# Patient Record
Sex: MALE | Race: WHITE | NOT HISPANIC OR LATINO
[De-identification: names, ages, dates, MRNs, and addresses within clinical notes are randomized per-mention and may not be internally consistent; named-entity substitution may affect disease eponyms.]

---

## 2018-08-06 PROBLEM — Z00.00 ENCOUNTER FOR PREVENTIVE HEALTH EXAMINATION: Status: ACTIVE | Noted: 2018-08-06

## 2018-08-29 ENCOUNTER — APPOINTMENT (OUTPATIENT)
Dept: CARDIOLOGY | Facility: CLINIC | Age: 68
End: 2018-08-29

## 2020-01-10 ENCOUNTER — INPATIENT (INPATIENT)
Facility: HOSPITAL | Age: 70
LOS: 10 days | Discharge: NURSING, REHAB OR LONG-TERM CARE | DRG: 347 | End: 2020-01-21
Attending: SURGERY | Admitting: SURGERY
Payer: COMMERCIAL

## 2020-01-10 VITALS
HEIGHT: 70 IN | WEIGHT: 160.06 LBS | DIASTOLIC BLOOD PRESSURE: 83 MMHG | SYSTOLIC BLOOD PRESSURE: 205 MMHG | OXYGEN SATURATION: 100 % | HEART RATE: 106 BPM | RESPIRATION RATE: 16 BRPM | TEMPERATURE: 98 F

## 2020-01-10 DIAGNOSIS — S12.501A UNSPECIFIED NONDISPLACED FRACTURE OF SIXTH CERVICAL VERTEBRA, INITIAL ENCOUNTER FOR CLOSED FRACTURE: ICD-10-CM

## 2020-01-10 DIAGNOSIS — S20.211A CONTUSION OF RIGHT FRONT WALL OF THORAX, INITIAL ENCOUNTER: ICD-10-CM

## 2020-01-10 DIAGNOSIS — E11.69 TYPE 2 DIABETES MELLITUS WITH OTHER SPECIFIED COMPLICATION: ICD-10-CM

## 2020-01-10 DIAGNOSIS — S00.81XA ABRASION OF OTHER PART OF HEAD, INITIAL ENCOUNTER: ICD-10-CM

## 2020-01-10 DIAGNOSIS — S32.591A OTHER SPECIFIED FRACTURE OF RIGHT PUBIS, INITIAL ENCOUNTER FOR CLOSED FRACTURE: ICD-10-CM
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
## 2020-01-10 DIAGNOSIS — I48.91 UNSPECIFIED ATRIAL FIBRILLATION: ICD-10-CM

## 2020-01-10 LAB
ALBUMIN SERPL ELPH-MCNC: 3.6 G/DL — SIGNIFICANT CHANGE UP (ref 3.3–5)
ALP SERPL-CCNC: 68 U/L — SIGNIFICANT CHANGE UP (ref 40–120)
ALT FLD-CCNC: 24 U/L — SIGNIFICANT CHANGE UP (ref 12–78)
ANION GAP SERPL CALC-SCNC: 5 MMOL/L — SIGNIFICANT CHANGE UP (ref 5–17)
APTT BLD: 25.4 SEC — LOW (ref 27.5–36.3)
AST SERPL-CCNC: 32 U/L — SIGNIFICANT CHANGE UP (ref 15–37)
BASOPHILS # BLD AUTO: 0.03 K/UL — SIGNIFICANT CHANGE UP (ref 0–0.2)
BASOPHILS NFR BLD AUTO: 0.2 % — SIGNIFICANT CHANGE UP (ref 0–2)
BILIRUB SERPL-MCNC: 0.6 MG/DL — SIGNIFICANT CHANGE UP (ref 0.2–1.2)
BLD GP AB SCN SERPL QL: SIGNIFICANT CHANGE UP
BUN SERPL-MCNC: 14 MG/DL — SIGNIFICANT CHANGE UP (ref 7–23)
CALCIUM SERPL-MCNC: 8.9 MG/DL — SIGNIFICANT CHANGE UP (ref 8.5–10.1)
CHLORIDE SERPL-SCNC: 109 MMOL/L — HIGH (ref 96–108)
CO2 SERPL-SCNC: 27 MMOL/L — SIGNIFICANT CHANGE UP (ref 22–31)
CREAT SERPL-MCNC: 0.91 MG/DL — SIGNIFICANT CHANGE UP (ref 0.5–1.3)
EOSINOPHIL # BLD AUTO: 0.07 K/UL — SIGNIFICANT CHANGE UP (ref 0–0.5)
EOSINOPHIL NFR BLD AUTO: 0.5 % — SIGNIFICANT CHANGE UP (ref 0–6)
GLUCOSE BLDC GLUCOMTR-MCNC: 201 MG/DL — HIGH (ref 70–99)
GLUCOSE SERPL-MCNC: 207 MG/DL — HIGH (ref 70–99)
HCT VFR BLD CALC: 40 % — SIGNIFICANT CHANGE UP (ref 39–50)
HGB BLD-MCNC: 13.4 G/DL — SIGNIFICANT CHANGE UP (ref 13–17)
IMM GRANULOCYTES NFR BLD AUTO: 0.9 % — SIGNIFICANT CHANGE UP (ref 0–1.5)
INR BLD: 0.97 RATIO — SIGNIFICANT CHANGE UP (ref 0.88–1.16)
LYMPHOCYTES # BLD AUTO: 0.92 K/UL — LOW (ref 1–3.3)
LYMPHOCYTES # BLD AUTO: 6.1 % — LOW (ref 13–44)
MCHC RBC-ENTMCNC: 29.5 PG — SIGNIFICANT CHANGE UP (ref 27–34)
MCHC RBC-ENTMCNC: 33.5 GM/DL — SIGNIFICANT CHANGE UP (ref 32–36)
MCV RBC AUTO: 87.9 FL — SIGNIFICANT CHANGE UP (ref 80–100)
MONOCYTES # BLD AUTO: 1.1 K/UL — HIGH (ref 0–0.9)
MONOCYTES NFR BLD AUTO: 7.3 % — SIGNIFICANT CHANGE UP (ref 2–14)
NEUTROPHILS # BLD AUTO: 12.78 K/UL — HIGH (ref 1.8–7.4)
NEUTROPHILS NFR BLD AUTO: 85 % — HIGH (ref 43–77)
PLATELET # BLD AUTO: 193 K/UL — SIGNIFICANT CHANGE UP (ref 150–400)
POTASSIUM SERPL-MCNC: 3.8 MMOL/L — SIGNIFICANT CHANGE UP (ref 3.5–5.3)
POTASSIUM SERPL-SCNC: 3.8 MMOL/L — SIGNIFICANT CHANGE UP (ref 3.5–5.3)
PROT SERPL-MCNC: 6.9 GM/DL — SIGNIFICANT CHANGE UP (ref 6–8.3)
PROTHROM AB SERPL-ACNC: 10.8 SEC — SIGNIFICANT CHANGE UP (ref 10–12.9)
RBC # BLD: 4.55 M/UL — SIGNIFICANT CHANGE UP (ref 4.2–5.8)
RBC # FLD: 13.2 % — SIGNIFICANT CHANGE UP (ref 10.3–14.5)
SODIUM SERPL-SCNC: 141 MMOL/L — SIGNIFICANT CHANGE UP (ref 135–145)
WBC # BLD: 15.04 K/UL — HIGH (ref 3.8–10.5)
WBC # FLD AUTO: 15.04 K/UL — HIGH (ref 3.8–10.5)

## 2020-01-10 PROCEDURE — 80053 COMPREHEN METABOLIC PANEL: CPT

## 2020-01-10 PROCEDURE — 36415 COLL VENOUS BLD VENIPUNCTURE: CPT

## 2020-01-10 PROCEDURE — 84484 ASSAY OF TROPONIN QUANT: CPT

## 2020-01-10 PROCEDURE — 82962 GLUCOSE BLOOD TEST: CPT

## 2020-01-10 PROCEDURE — 85610 PROTHROMBIN TIME: CPT

## 2020-01-10 PROCEDURE — 99223 1ST HOSP IP/OBS HIGH 75: CPT

## 2020-01-10 PROCEDURE — 82607 VITAMIN B-12: CPT

## 2020-01-10 PROCEDURE — 86803 HEPATITIS C AB TEST: CPT

## 2020-01-10 PROCEDURE — 93005 ELECTROCARDIOGRAM TRACING: CPT

## 2020-01-10 PROCEDURE — 84443 ASSAY THYROID STIM HORMONE: CPT

## 2020-01-10 PROCEDURE — 71045 X-RAY EXAM CHEST 1 VIEW: CPT | Mod: 26

## 2020-01-10 PROCEDURE — 97530 THERAPEUTIC ACTIVITIES: CPT | Mod: GP

## 2020-01-10 PROCEDURE — 80307 DRUG TEST PRSMV CHEM ANLYZR: CPT

## 2020-01-10 PROCEDURE — 73523 X-RAY EXAM HIPS BI 5/> VIEWS: CPT | Mod: 26

## 2020-01-10 PROCEDURE — 80061 LIPID PANEL: CPT

## 2020-01-10 PROCEDURE — 97116 GAIT TRAINING THERAPY: CPT | Mod: GP

## 2020-01-10 PROCEDURE — 93306 TTE W/DOPPLER COMPLETE: CPT

## 2020-01-10 PROCEDURE — 83036 HEMOGLOBIN GLYCOSYLATED A1C: CPT

## 2020-01-10 PROCEDURE — 73552 X-RAY EXAM OF FEMUR 2/>: CPT | Mod: 26,LT,76

## 2020-01-10 PROCEDURE — 93010 ELECTROCARDIOGRAM REPORT: CPT | Mod: 76

## 2020-01-10 PROCEDURE — 70498 CT ANGIOGRAPHY NECK: CPT

## 2020-01-10 PROCEDURE — 72125 CT NECK SPINE W/O DYE: CPT | Mod: 26

## 2020-01-10 PROCEDURE — 71045 X-RAY EXAM CHEST 1 VIEW: CPT

## 2020-01-10 PROCEDURE — 84439 ASSAY OF FREE THYROXINE: CPT

## 2020-01-10 PROCEDURE — 72141 MRI NECK SPINE W/O DYE: CPT

## 2020-01-10 PROCEDURE — 84100 ASSAY OF PHOSPHORUS: CPT

## 2020-01-10 PROCEDURE — 70496 CT ANGIOGRAPHY HEAD: CPT

## 2020-01-10 PROCEDURE — 85730 THROMBOPLASTIN TIME PARTIAL: CPT

## 2020-01-10 PROCEDURE — 70551 MRI BRAIN STEM W/O DYE: CPT

## 2020-01-10 PROCEDURE — 99221 1ST HOSP IP/OBS SF/LOW 40: CPT | Mod: 25

## 2020-01-10 PROCEDURE — 97163 PT EVAL HIGH COMPLEX 45 MIN: CPT | Mod: GP

## 2020-01-10 PROCEDURE — 70547 MR ANGIOGRAPHY NECK W/O DYE: CPT

## 2020-01-10 PROCEDURE — 80048 BASIC METABOLIC PNL TOTAL CA: CPT

## 2020-01-10 PROCEDURE — 83735 ASSAY OF MAGNESIUM: CPT

## 2020-01-10 PROCEDURE — 27197 CLSD TX PELVIC RING FX: CPT | Mod: RT

## 2020-01-10 PROCEDURE — 85027 COMPLETE CBC AUTOMATED: CPT

## 2020-01-10 PROCEDURE — 95819 EEG AWAKE AND ASLEEP: CPT

## 2020-01-10 PROCEDURE — 70450 CT HEAD/BRAIN W/O DYE: CPT | Mod: 26

## 2020-01-10 PROCEDURE — 94640 AIRWAY INHALATION TREATMENT: CPT

## 2020-01-10 RX ORDER — ONDANSETRON 8 MG/1
4 TABLET, FILM COATED ORAL EVERY 6 HOURS
Refills: 0 | Status: DISCONTINUED | OUTPATIENT
Start: 2020-01-10 | End: 2020-01-21

## 2020-01-10 RX ORDER — DEXTROSE 50 % IN WATER 50 %
12.5 SYRINGE (ML) INTRAVENOUS ONCE
Refills: 0 | Status: DISCONTINUED | OUTPATIENT
Start: 2020-01-10 | End: 2020-01-21

## 2020-01-10 RX ORDER — INSULIN LISPRO 100/ML
VIAL (ML) SUBCUTANEOUS
Refills: 0 | Status: DISCONTINUED | OUTPATIENT
Start: 2020-01-10 | End: 2020-01-11

## 2020-01-10 RX ORDER — INSULIN LISPRO 100/ML
VIAL (ML) SUBCUTANEOUS AT BEDTIME
Refills: 0 | Status: DISCONTINUED | OUTPATIENT
Start: 2020-01-10 | End: 2020-01-11

## 2020-01-10 RX ORDER — GLIMEPIRIDE 1 MG
1 TABLET ORAL
Qty: 0 | Refills: 0 | DISCHARGE

## 2020-01-10 RX ORDER — SODIUM CHLORIDE 9 MG/ML
1000 INJECTION, SOLUTION INTRAVENOUS
Refills: 0 | Status: DISCONTINUED | OUTPATIENT
Start: 2020-01-10 | End: 2020-01-21

## 2020-01-10 RX ORDER — DEXTROSE 50 % IN WATER 50 %
15 SYRINGE (ML) INTRAVENOUS ONCE
Refills: 0 | Status: DISCONTINUED | OUTPATIENT
Start: 2020-01-10 | End: 2020-01-21

## 2020-01-10 RX ORDER — ACETAMINOPHEN 500 MG
650 TABLET ORAL EVERY 6 HOURS
Refills: 0 | Status: DISCONTINUED | OUTPATIENT
Start: 2020-01-10 | End: 2020-01-21

## 2020-01-10 RX ORDER — SODIUM CHLORIDE 9 MG/ML
1000 INJECTION INTRAMUSCULAR; INTRAVENOUS; SUBCUTANEOUS
Refills: 0 | Status: DISCONTINUED | OUTPATIENT
Start: 2020-01-10 | End: 2020-01-11

## 2020-01-10 RX ORDER — DEXTROSE 50 % IN WATER 50 %
25 SYRINGE (ML) INTRAVENOUS ONCE
Refills: 0 | Status: DISCONTINUED | OUTPATIENT
Start: 2020-01-10 | End: 2020-01-21

## 2020-01-10 RX ORDER — GLUCAGON INJECTION, SOLUTION 0.5 MG/.1ML
1 INJECTION, SOLUTION SUBCUTANEOUS ONCE
Refills: 0 | Status: DISCONTINUED | OUTPATIENT
Start: 2020-01-10 | End: 2020-01-21

## 2020-01-10 RX ORDER — ASPIRIN/CALCIUM CARB/MAGNESIUM 324 MG
1 TABLET ORAL
Qty: 0 | Refills: 0 | DISCHARGE

## 2020-01-10 NOTE — ED PROVIDER NOTE - EYES, MLM
Clear bilaterally, pupils equal, round and reactive to light. +small abrasion to lateral L orbit, no step offs, no deformities. EOMI without pain.
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
## 2020-01-10 NOTE — ED ADULT NURSE REASSESSMENT NOTE - NS ED NURSE REASSESS COMMENT FT1
report received from previous rn. color good, skin warm and dry, respirations even and unlabored. patient noted to go into SVT on cardiac monitor, broke into NSR, rhythm then went into vtach. zoll machine and pads on patient. dr. pedraza at bedside. FAST exam in progress by dr. pedraza. patient safety maintained. will continue to monitor.
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
## 2020-01-10 NOTE — ED PROVIDER NOTE - DURATION
minute(s) Patient back at baseline; evaluated by neuro; can f/u as outpatient with neurology; will repeat creatinine s/p hydration;pt wants to go home; obtained old ekg and twi seen on old ekg; strict return precautions for recurrent symptoms; pt with  at bedside; will f/u with pmd as well
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
## 2020-01-10 NOTE — ED PROVIDER NOTE - CARE PLAN
Principal Discharge DX:	Closed nondisplaced fracture of sixth cervical vertebra, unspecified fracture morphology, initial encounter  Secondary Diagnosis:	Pubic ramus fracture, right, closed, initial encounter
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
## 2020-01-10 NOTE — ED PROVIDER NOTE - MUSCULOSKELETAL, MLM
+some midline cervical spinal tenderness, tenderness to L and R hip no pelvic instability. Neurovascularly intact.
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
## 2020-01-11 DIAGNOSIS — I47.1 SUPRAVENTRICULAR TACHYCARDIA: ICD-10-CM

## 2020-01-11 DIAGNOSIS — I47.2 VENTRICULAR TACHYCARDIA: ICD-10-CM

## 2020-01-11 LAB
ABO RH CONFIRMATION: SIGNIFICANT CHANGE UP
ETHANOL SERPL-MCNC: <10 MG/DL — SIGNIFICANT CHANGE UP (ref 0–10)
HBA1C BLD-MCNC: 12.9 % — HIGH (ref 4–5.6)
HCV AB S/CO SERPL IA: 0.05 S/CO — SIGNIFICANT CHANGE UP (ref 0–0.99)
HCV AB SERPL-IMP: SIGNIFICANT CHANGE UP
MAGNESIUM SERPL-MCNC: 1.6 MG/DL — SIGNIFICANT CHANGE UP (ref 1.6–2.6)
TROPONIN I SERPL-MCNC: 0.04 NG/ML — SIGNIFICANT CHANGE UP (ref 0.01–0.04)
TROPONIN I SERPL-MCNC: 0.04 NG/ML — SIGNIFICANT CHANGE UP (ref 0.01–0.04)

## 2020-01-11 PROCEDURE — 99232 SBSQ HOSP IP/OBS MODERATE 35: CPT

## 2020-01-11 PROCEDURE — 93306 TTE W/DOPPLER COMPLETE: CPT | Mod: 26

## 2020-01-11 PROCEDURE — 99255 IP/OBS CONSLTJ NEW/EST HI 80: CPT

## 2020-01-11 PROCEDURE — 99223 1ST HOSP IP/OBS HIGH 75: CPT

## 2020-01-11 PROCEDURE — 93010 ELECTROCARDIOGRAM REPORT: CPT

## 2020-01-11 RX ORDER — OXYCODONE HYDROCHLORIDE 5 MG/1
5 TABLET ORAL EVERY 4 HOURS
Refills: 0 | Status: DISCONTINUED | OUTPATIENT
Start: 2020-01-11 | End: 2020-01-12

## 2020-01-11 RX ORDER — MAGNESIUM SULFATE 500 MG/ML
2 VIAL (ML) INJECTION ONCE
Refills: 0 | Status: COMPLETED | OUTPATIENT
Start: 2020-01-11 | End: 2020-01-11

## 2020-01-11 RX ORDER — AMIODARONE HYDROCHLORIDE 400 MG/1
1 TABLET ORAL
Qty: 900 | Refills: 0 | Status: DISCONTINUED | OUTPATIENT
Start: 2020-01-11 | End: 2020-01-11

## 2020-01-11 RX ORDER — METOPROLOL TARTRATE 50 MG
25 TABLET ORAL
Refills: 0 | Status: DISCONTINUED | OUTPATIENT
Start: 2020-01-11 | End: 2020-01-11

## 2020-01-11 RX ORDER — AMIODARONE HYDROCHLORIDE 400 MG/1
150 TABLET ORAL ONCE
Refills: 0 | Status: COMPLETED | OUTPATIENT
Start: 2020-01-11 | End: 2020-01-11

## 2020-01-11 RX ADMIN — Medication 1: at 12:28

## 2020-01-11 RX ADMIN — SODIUM CHLORIDE 75 MILLILITER(S): 9 INJECTION INTRAMUSCULAR; INTRAVENOUS; SUBCUTANEOUS at 05:57

## 2020-01-11 RX ADMIN — Medication 50 GRAM(S): at 10:58

## 2020-01-11 RX ADMIN — Medication 650 MILLIGRAM(S): at 16:20

## 2020-01-11 RX ADMIN — AMIODARONE HYDROCHLORIDE 618 MILLIGRAM(S): 400 TABLET ORAL at 02:21

## 2020-01-11 RX ADMIN — AMIODARONE HYDROCHLORIDE 33.33 MG/MIN: 400 TABLET ORAL at 02:33

## 2020-01-11 RX ADMIN — Medication 2: at 10:59
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
## 2020-01-11 NOTE — ED ADULT NURSE REASSESSMENT NOTE - NS ED NURSE REASSESS COMMENT FT1
author RN remains awaiting Amiodarone and filter. pharmacy contacted and aware. will continue to monitor.

## 2020-01-11 NOTE — PHYSICAL THERAPY INITIAL EVALUATION ADULT - GENERAL OBSERVATIONS, REHAB EVAL
c-collar donned; IV; flowtrons; HM; BP cuff; pulse oxym; pt rec'd in bed supine; HR 93; /66; O2 Sat 97%; RR 19; 1:1 in progress; pain in neck 1/10; RN Saida made aware
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
## 2020-01-11 NOTE — PHYSICAL THERAPY INITIAL EVALUATION ADULT - MODALITIES TREATMENT COMMENTS
pt left seated in chair post Eval; chair alarm / c-collar donned; all above lines/monitors in place; 1:1 in progress; callbell in reach; pt instructed not to get up alone; call nursing for assist; alfie well; pain level 1/10; RN Saida made aware pt OOB

## 2020-01-11 NOTE — ED ADULT NURSE REASSESSMENT NOTE - NS ED NURSE REASSESS COMMENT FT1
pharmacy called by author RN for amiodarone and in-line filter. awaiting medication and filter. will continue to monitor.
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
## 2020-01-11 NOTE — PATIENT PROFILE ADULT - NSTRANSFEREYEGLASSESPAIRS_GEN_A_NUR
1 pair [Dear  ___] : Dear  [unfilled], [Consult Letter:] : I had the pleasure of evaluating your patient, [unfilled]. [Please see my note below.] : Please see my note below. [Consult Closing:] : Thank you very much for allowing me to participate in the care of this patient.  If you have any questions, please do not hesitate to contact me. [Sincerely,] : Sincerely, [FreeTextEntry3] : Bina Avelar MD
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
## 2020-01-11 NOTE — ED ADULT NURSE REASSESSMENT NOTE - NS ED NURSE REASSESS COMMENT FT1
spoke with dr. douglass, patient continues to go into runs of vtach. last episode noted to be 2 minutes, rhythm broke on its own, dr. douglass made aware. Amiodarone bolus and drip to be ordered and given. will continue to monitor.

## 2020-01-12 DIAGNOSIS — R41.0 DISORIENTATION, UNSPECIFIED: ICD-10-CM

## 2020-01-12 DIAGNOSIS — T14.90XA INJURY, UNSPECIFIED, INITIAL ENCOUNTER: ICD-10-CM

## 2020-01-12 LAB
ANION GAP SERPL CALC-SCNC: 7 MMOL/L — SIGNIFICANT CHANGE UP (ref 5–17)
BUN SERPL-MCNC: 17 MG/DL — SIGNIFICANT CHANGE UP (ref 7–23)
CALCIUM SERPL-MCNC: 8.7 MG/DL — SIGNIFICANT CHANGE UP (ref 8.5–10.1)
CHLORIDE SERPL-SCNC: 110 MMOL/L — HIGH (ref 96–108)
CO2 SERPL-SCNC: 23 MMOL/L — SIGNIFICANT CHANGE UP (ref 22–31)
CREAT SERPL-MCNC: 0.86 MG/DL — SIGNIFICANT CHANGE UP (ref 0.5–1.3)
GLUCOSE SERPL-MCNC: 280 MG/DL — HIGH (ref 70–99)
HCT VFR BLD CALC: 40.6 % — SIGNIFICANT CHANGE UP (ref 39–50)
HGB BLD-MCNC: 13.2 G/DL — SIGNIFICANT CHANGE UP (ref 13–17)
MCHC RBC-ENTMCNC: 29 PG — SIGNIFICANT CHANGE UP (ref 27–34)
MCHC RBC-ENTMCNC: 32.5 GM/DL — SIGNIFICANT CHANGE UP (ref 32–36)
MCV RBC AUTO: 89.2 FL — SIGNIFICANT CHANGE UP (ref 80–100)
PLATELET # BLD AUTO: 147 K/UL — LOW (ref 150–400)
POTASSIUM SERPL-MCNC: 3.9 MMOL/L — SIGNIFICANT CHANGE UP (ref 3.5–5.3)
POTASSIUM SERPL-SCNC: 3.9 MMOL/L — SIGNIFICANT CHANGE UP (ref 3.5–5.3)
RBC # BLD: 4.55 M/UL — SIGNIFICANT CHANGE UP (ref 4.2–5.8)
RBC # FLD: 13.2 % — SIGNIFICANT CHANGE UP (ref 10.3–14.5)
SODIUM SERPL-SCNC: 140 MMOL/L — SIGNIFICANT CHANGE UP (ref 135–145)
TSH SERPL-MCNC: 0.37 UU/ML — SIGNIFICANT CHANGE UP (ref 0.34–4.82)
VIT B12 SERPL-MCNC: 538 PG/ML — SIGNIFICANT CHANGE UP (ref 232–1245)
WBC # BLD: 10.5 K/UL — SIGNIFICANT CHANGE UP (ref 3.8–10.5)
WBC # FLD AUTO: 10.5 K/UL — SIGNIFICANT CHANGE UP (ref 3.8–10.5)

## 2020-01-12 PROCEDURE — 99232 SBSQ HOSP IP/OBS MODERATE 35: CPT

## 2020-01-12 PROCEDURE — 93010 ELECTROCARDIOGRAM REPORT: CPT

## 2020-01-12 PROCEDURE — 71045 X-RAY EXAM CHEST 1 VIEW: CPT | Mod: 26

## 2020-01-12 PROCEDURE — 99233 SBSQ HOSP IP/OBS HIGH 50: CPT

## 2020-01-12 PROCEDURE — 99223 1ST HOSP IP/OBS HIGH 75: CPT

## 2020-01-12 RX ORDER — MAGNESIUM SULFATE 500 MG/ML
2 VIAL (ML) INJECTION ONCE
Refills: 0 | Status: COMPLETED | OUTPATIENT
Start: 2020-01-12 | End: 2020-01-12

## 2020-01-12 RX ORDER — HYDRALAZINE HCL 50 MG
10 TABLET ORAL EVERY 6 HOURS
Refills: 0 | Status: DISCONTINUED | OUTPATIENT
Start: 2020-01-12 | End: 2020-01-14

## 2020-01-12 RX ORDER — INSULIN LISPRO 100/ML
VIAL (ML) SUBCUTANEOUS AT BEDTIME
Refills: 0 | Status: DISCONTINUED | OUTPATIENT
Start: 2020-01-12 | End: 2020-01-21

## 2020-01-12 RX ORDER — AMIODARONE HYDROCHLORIDE 400 MG/1
1 TABLET ORAL
Qty: 900 | Refills: 0 | Status: DISCONTINUED | OUTPATIENT
Start: 2020-01-12 | End: 2020-01-12

## 2020-01-12 RX ORDER — DILTIAZEM HCL 120 MG
20 CAPSULE, EXT RELEASE 24 HR ORAL ONCE
Refills: 0 | Status: COMPLETED | OUTPATIENT
Start: 2020-01-12 | End: 2020-01-12

## 2020-01-12 RX ORDER — HYDRALAZINE HCL 50 MG
10 TABLET ORAL ONCE
Refills: 0 | Status: COMPLETED | OUTPATIENT
Start: 2020-01-12 | End: 2020-01-12

## 2020-01-12 RX ORDER — INSULIN LISPRO 100/ML
VIAL (ML) SUBCUTANEOUS
Refills: 0 | Status: DISCONTINUED | OUTPATIENT
Start: 2020-01-12 | End: 2020-01-21

## 2020-01-12 RX ORDER — AMIODARONE HYDROCHLORIDE 400 MG/1
0.5 TABLET ORAL
Qty: 900 | Refills: 0 | Status: DISCONTINUED | OUTPATIENT
Start: 2020-01-12 | End: 2020-01-12

## 2020-01-12 RX ORDER — MORPHINE SULFATE 50 MG/1
2 CAPSULE, EXTENDED RELEASE ORAL EVERY 4 HOURS
Refills: 0 | Status: DISCONTINUED | OUTPATIENT
Start: 2020-01-12 | End: 2020-01-19

## 2020-01-12 RX ORDER — DILTIAZEM HCL 120 MG
10 CAPSULE, EXT RELEASE 24 HR ORAL
Qty: 125 | Refills: 0 | Status: DISCONTINUED | OUTPATIENT
Start: 2020-01-12 | End: 2020-01-13

## 2020-01-12 RX ORDER — EPINEPHRINE 11.25MG/ML
1 SOLUTION, NON-ORAL INHALATION ONCE
Refills: 0 | Status: COMPLETED | OUTPATIENT
Start: 2020-01-12 | End: 2020-01-12

## 2020-01-12 RX ORDER — AMIODARONE HYDROCHLORIDE 400 MG/1
150 TABLET ORAL ONCE
Refills: 0 | Status: COMPLETED | OUTPATIENT
Start: 2020-01-12 | End: 2020-01-12

## 2020-01-12 RX ORDER — ADENOSINE 3 MG/ML
6 INJECTION INTRAVENOUS ONCE
Refills: 0 | Status: COMPLETED | OUTPATIENT
Start: 2020-01-12 | End: 2020-01-12

## 2020-01-12 RX ADMIN — Medication 10 MG/HR: at 08:20

## 2020-01-12 RX ADMIN — Medication 10 MILLIGRAM(S): at 15:34

## 2020-01-12 RX ADMIN — AMIODARONE HYDROCHLORIDE 618 MILLIGRAM(S): 400 TABLET ORAL at 06:12

## 2020-01-12 RX ADMIN — Medication 2 MILLIGRAM(S): at 15:28

## 2020-01-12 RX ADMIN — MORPHINE SULFATE 2 MILLIGRAM(S): 50 CAPSULE, EXTENDED RELEASE ORAL at 23:13

## 2020-01-12 RX ADMIN — Medication 4: at 17:09

## 2020-01-12 RX ADMIN — Medication 50 GRAM(S): at 06:09

## 2020-01-12 RX ADMIN — AMIODARONE HYDROCHLORIDE 33.33 MG/MIN: 400 TABLET ORAL at 06:12

## 2020-01-12 RX ADMIN — MORPHINE SULFATE 2 MILLIGRAM(S): 50 CAPSULE, EXTENDED RELEASE ORAL at 23:30

## 2020-01-12 RX ADMIN — Medication 1 MILLILITER(S): at 11:58

## 2020-01-12 RX ADMIN — Medication 1 MILLIGRAM(S): at 06:08

## 2020-01-12 RX ADMIN — MORPHINE SULFATE 2 MILLIGRAM(S): 50 CAPSULE, EXTENDED RELEASE ORAL at 15:34

## 2020-01-12 RX ADMIN — Medication 20 MILLIGRAM(S): at 08:19

## 2020-01-12 RX ADMIN — ADENOSINE 6 MILLIGRAM(S): 3 INJECTION INTRAVENOUS at 08:20
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
## 2020-01-12 NOTE — PROGRESS NOTE ADULT - SUBJECTIVE AND OBJECTIVE BOX
patient seen/examined this afternoon  agree with MYCHAL Castaneda note from earlier this am  motor/neuro remain intact to all extremities  cervical collar in place  needs to be changed to a [place] ISAK or [place] orthosis  spoke with Randy Bhandari at [facility] this am who will coordinate for collar  maintain cervical collar at all times  f/u MRA to r/o vertebral artery injury (low likelihood with nondisplaed fracture)  Continue management as per CCU

## 2020-01-12 NOTE — CONSULT NOTE ADULT - PROBLEM SELECTOR RECOMMENDATION 5
Acute encephalopathy of unclear etiology - possible due to pain, acute infectious process, concussion vs metabolic   - MRI/MRA brain   - CBC, BMP   - CXR   - resume BGM and coverage, no feeding until follows commands

## 2020-01-12 NOTE — PROGRESS NOTE ADULT - PROBLEM SELECTOR PLAN 1
SVT has been recurrent and associated with very high HRs; I gave adenosine 6mg IVP upon arrival this AM and arrhythmia broke but then recurred; start IV Cardizem; poor candidate for ablation until mental status improves.

## 2020-01-12 NOTE — CHART NOTE - NSCHARTNOTEFT_GEN_A_CORE
Asked by RN to evaluate the patient for Stridor and HTN.      Upon arrival, the patient was noted to have some audible inspiratory stridorous breathing. Mildly tachypneic, not is respiratory distress. No paradoxical breathing,  O2 sats 100%, BP also noted to be elevated in to 180s HR was afib @ rate of 100.   Exam:  inspiratory upper airway stidor present. B/L BS with good air movement over lower lung fields, some transmitted sounds from upper airway. No Wheezing noted     Will give racemic epinephrine treatment now and IV Hydralzine for BP control.    Dr Byrne notified by bedside RN, who will further evaluate.

## 2020-01-12 NOTE — CONSULT NOTE ADULT - PROBLEM SELECTOR RECOMMENDATION 2
Sustained wide complex tachyarrhythmias on telemetry in CCU (VT vs SVT with aberrancy); continue amiodarone for now (anticipate short-term therapy); will request input from electrophysiology service.  No signs of myocardial injury -- await TTE.
Collar in place - change to [place]  as per ortho

## 2020-01-12 NOTE — PROVIDER CONTACT NOTE (OTHER) - BACKGROUND
wrist restraints placed for safety. 1:1 maintained. TRAVIS HORTA saw pt at bedside. call placed to dr jiang & md douglass. orders received. ativan given x1. amio bolus and gtt started.
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
## 2020-01-12 NOTE — PROVIDER CONTACT NOTE (OTHER) - ASSESSMENT
pt appeared to change rhythm to sinus tach 150s. per C.C PA okay to keep amio gtt. amio gtt then stopped around 0700 per md bioglioli. adenosine 6mg push x1 w/ md bioglioli at bedside. report passed to day ganga cha.
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
## 2020-01-12 NOTE — PROGRESS NOTE ADULT - PROBLEM SELECTOR PLAN 2
Mental status worse than yesterday - recommend medicine consult to assist with evaluation and management -- patient denied etOH use yesterday when he was more lucid.
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
## 2020-01-12 NOTE — CHART NOTE - NSCHARTNOTEFT_GEN_A_CORE
The patient received a new cervical collar orthosis today. The collar does not have to be tight, but it should be secure. Additional replacement sleeves were left with the patient. They can be washed and alternated with the sleeves that are already on the collar. Use and care were explained. Instructions were left bedside with the sleeves.    Kem Giordano [facility]  [phone]

## 2020-01-13 PROBLEM — E11.9 TYPE 2 DIABETES MELLITUS WITHOUT COMPLICATIONS: Chronic | Status: ACTIVE | Noted: 2020-01-10

## 2020-01-13 LAB — PCP SPEC-MCNC: SIGNIFICANT CHANGE UP

## 2020-01-13 PROCEDURE — 99232 SBSQ HOSP IP/OBS MODERATE 35: CPT

## 2020-01-13 PROCEDURE — 70551 MRI BRAIN STEM W/O DYE: CPT | Mod: 26

## 2020-01-13 PROCEDURE — 99233 SBSQ HOSP IP/OBS HIGH 50: CPT

## 2020-01-13 PROCEDURE — 95819 EEG AWAKE AND ASLEEP: CPT | Mod: 26

## 2020-01-13 PROCEDURE — 72141 MRI NECK SPINE W/O DYE: CPT | Mod: 26

## 2020-01-13 PROCEDURE — ZZZZZ: CPT

## 2020-01-13 RX ORDER — OLANZAPINE 15 MG/1
5 TABLET, FILM COATED ORAL ONCE
Refills: 0 | Status: COMPLETED | OUTPATIENT
Start: 2020-01-13 | End: 2020-01-13

## 2020-01-13 RX ORDER — DILTIAZEM HCL 120 MG
240 CAPSULE, EXT RELEASE 24 HR ORAL DAILY
Refills: 0 | Status: DISCONTINUED | OUTPATIENT
Start: 2020-01-13 | End: 2020-01-13

## 2020-01-13 RX ORDER — DONEPEZIL HYDROCHLORIDE 10 MG/1
5 TABLET, FILM COATED ORAL AT BEDTIME
Refills: 0 | Status: DISCONTINUED | OUTPATIENT
Start: 2020-01-13 | End: 2020-01-21

## 2020-01-13 RX ORDER — DILTIAZEM HCL 120 MG
240 CAPSULE, EXT RELEASE 24 HR ORAL DAILY
Refills: 0 | Status: DISCONTINUED | OUTPATIENT
Start: 2020-01-13 | End: 2020-01-21

## 2020-01-13 RX ADMIN — Medication 240 MILLIGRAM(S): at 08:19

## 2020-01-13 RX ADMIN — MORPHINE SULFATE 2 MILLIGRAM(S): 50 CAPSULE, EXTENDED RELEASE ORAL at 15:00

## 2020-01-13 RX ADMIN — Medication 1 MILLIGRAM(S): at 19:46

## 2020-01-13 RX ADMIN — MORPHINE SULFATE 2 MILLIGRAM(S): 50 CAPSULE, EXTENDED RELEASE ORAL at 10:34

## 2020-01-13 RX ADMIN — MORPHINE SULFATE 2 MILLIGRAM(S): 50 CAPSULE, EXTENDED RELEASE ORAL at 23:07

## 2020-01-13 RX ADMIN — MORPHINE SULFATE 2 MILLIGRAM(S): 50 CAPSULE, EXTENDED RELEASE ORAL at 14:46

## 2020-01-13 RX ADMIN — Medication 1 MILLIGRAM(S): at 15:06

## 2020-01-13 RX ADMIN — Medication 4: at 12:48

## 2020-01-13 RX ADMIN — DONEPEZIL HYDROCHLORIDE 5 MILLIGRAM(S): 10 TABLET, FILM COATED ORAL at 22:07

## 2020-01-13 RX ADMIN — MORPHINE SULFATE 2 MILLIGRAM(S): 50 CAPSULE, EXTENDED RELEASE ORAL at 09:59

## 2020-01-13 RX ADMIN — Medication 4: at 08:18

## 2020-01-13 RX ADMIN — OLANZAPINE 5 MILLIGRAM(S): 15 TABLET, FILM COATED ORAL at 18:10

## 2020-01-13 RX ADMIN — Medication 4: at 18:16
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
## 2020-01-13 NOTE — PROGRESS NOTE ADULT - SUBJECTIVE AND OBJECTIVE BOX
confused, agitated and belligerent  On exam, pt is following commands, comprehends, has no apahsia    B12 Level 538, TSH 0.37    ROS:     MEDICATIONS  (STANDING):  dextrose 5%. 1000 milliLiter(s) (50 mL/Hr) IV Continuous <Continuous>  dextrose 50% Injectable 12.5 Gram(s) IV Push once  dextrose 50% Injectable 25 Gram(s) IV Push once  dextrose 50% Injectable 25 Gram(s) IV Push once  diltiazem    milliGRAM(s) Oral daily  hydrALAZINE Injectable 10 milliGRAM(s) IV Push every 6 hours  insulin lispro (HumaLOG) corrective regimen sliding scale   SubCutaneous three times a day before meals  insulin lispro (HumaLOG) corrective regimen sliding scale   SubCutaneous at bedtime      Vital Signs Last 24 Hrs  T(C): 36.9 (13 Jan 2020 05:21), Max: 36.9 (13 Jan 2020 05:21)  T(F): 98.5 (13 Jan 2020 05:21), Max: 98.5 (13 Jan 2020 05:21)  HR: 66 (13 Jan 2020 08:00) (66 - 128)  BP: 133/74 (13 Jan 2020 08:00) (103/84 - 156/65)  BP(mean): 85 (13 Jan 2020 08:00) (67 - 109)  RR: 13 (13 Jan 2020 08:00) (12 - 25)  SpO2: 100% (13 Jan 2020 08:00) (91% - 100%)    Gen exam:   Very frail looking male, restless, agitated.  HEENT: PERRLA, EOMI,   Neck: in Collar  Respiratory:  tender on right lower chest on palpation  Cardiovascular: S1 and S2, regular  Extremities:  no edema  Vascular: Caritid Bruit - unable to check due to collar       Neurological exam:  HF: Lethargic but arousable, follows some simple commands, oriented to self, hospital and knows where he lives, but confused, restless and decreased con/attention  CN: KRISTIE, EOMI, no gross NLFD, tongue midline, Palate moves equally  Motor: Unable to check for pronator drift, moves UE prox barely antigravity, grasp 5/5, wiggles toes and withdraws, unable to raise legs antigravity    Sens: Limited exam    Reflexes: BJ 2+, BR 2+, KJ 0, AJ 0, downgoing toes b/l  Coord: unable to check   Gait/Balance: Cannot test            Labs:   Vitamin B12, Serum: 538 pg/mL (01.12.20 @ 06:31)    Thyroid Stimulating Hormone, Serum: 0.37 uU/mL (01.12.20 @ 06:31)                 13.2   10.50 )-----------( 147      ( 12 Jan 2020 13:28 )             40.6     01-12    140  |  110<H>  |  17  ----------------------------<  280<H>  3.9   |  23  |  0.86    Ca    8.7      12 Jan 2020 13:28      01-11 [id number] 12.9        Radiology report:   CT Head: < from: CT Head No Cont (01.10.20 @ 21:03) >  IMPRESSION:   Unremarkable head CT.    < from: CT Cervical Spine No Cont (01.10.20 @ 21:05) >  IMPRESSION:  Left C6 facet/pars nondisplaced fracture. Pts mental sensorium remakablr improved, no confusion or agitated toady.  On exam, interactive, answers questions appropriately, is following commands, comprehends, has no aphasia  Moves left leg better than right.    B12 Level 538, TSH 0.37    ROS: As above, other ROS Negative      MEDICATIONS  (STANDING):  dextrose 5%. 1000 milliLiter(s) (50 mL/Hr) IV Continuous <Continuous>  dextrose 50% Injectable 12.5 Gram(s) IV Push once  dextrose 50% Injectable 25 Gram(s) IV Push once  dextrose 50% Injectable 25 Gram(s) IV Push once  diltiazem    milliGRAM(s) Oral daily  hydrALAZINE Injectable 10 milliGRAM(s) IV Push every 6 hours  insulin lispro (HumaLOG) corrective regimen sliding scale   SubCutaneous three times a day before meals  insulin lispro (HumaLOG) corrective regimen sliding scale   SubCutaneous at bedtime      Vital Signs Last 24 Hrs  T(C): 36.9 (13 Jan 2020 05:21), Max: 36.9 (13 Jan 2020 05:21)  T(F): 98.5 (13 Jan 2020 05:21), Max: 98.5 (13 Jan 2020 05:21)  HR: 66 (13 Jan 2020 08:00) (66 - 128)  BP: 133/74 (13 Jan 2020 08:00) (103/84 - 156/65)  BP(mean): 85 (13 Jan 2020 08:00) (67 - 109)  RR: 13 (13 Jan 2020 08:00) (12 - 25)  SpO2: 100% (13 Jan 2020 08:00) (91% - 100%)    Gen exam:   Very frail looking male, in no distress.  HEENT: PERRLA, EOMI,   Neck: in Collar  Respiratory:  tender on right lower chest on palpation  Extremities:  no edema       Neurological exam:  HF: Alert and attentive, 0 x 3,, follows simple commands, oriented x 3, no aphasia, con/attention - nl  CN: KRISTIE, EOMI, no gross NLFD, tongue midline, Palate moves equally  Motor: No pronator drift, moves UE 5-/5 prox grasp 5/5, RLE 4-/5, LLE 4/5    Sens: LT/PP - nl   Reflexes: BJ 2+, BR 2+, KJ 0, AJ 0, downgoing toes b/l  Coord: No FMFA   Gait/Balance: Cannot test              Labs:   Vitamin B12, Serum: 538 pg/mL (01.12.20 @ 06:31)    Thyroid Stimulating Hormone, Serum: 0.37 uU/mL (01.12.20 @ 06:31)                 13.2   10.50 )-----------( 147      ( 12 Jan 2020 13:28 )             40.6     01-12    140  |  110<H>  |  17  ----------------------------<  280<H>  3.9   |  23  |  0.86    Ca    8.7      12 Jan 2020 13:28      01-11 [id number] 12.9        Radiology report:   CT Head: < from: CT Head No Cont (01.10.20 @ 21:03) >  IMPRESSION:   Unremarkable head CT.    < from: CT Cervical Spine No Cont (01.10.20 @ 21:05) >  IMPRESSION:  Left C6 facet/pars nondisplaced fracture.

## 2020-01-13 NOTE — PROGRESS NOTE ADULT - SUBJECTIVE AND OBJECTIVE BOX
Pt seen laying in bed with Aspen collar on. Pt confused, but is able to cooperate with exam. Pt does not express pain of the cervical region. Pt denies upper extremities pain, numbness, and weakness.    PE  Gen appearance: NAD  Motor strength of the upper extremities: 5/5 of b/l upper ext  Sensation: intact of the upper ext  Motor strength: 4/5 of the right HF otherwise 5/5 of b/l lower extremities. Weakness 2/2 right superior and inferior pubic ramus fractures.   Sensation intact of the lower ext    Plan---Left C6 facet/pars nondisplaced fracture.  - Awaiting for MRA to r/o vertebral artery injury  - Keep collar on at all times  - Gen ortho eval for right superior and inferior pubic ramus fractures
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
## 2020-01-13 NOTE — PROGRESS NOTE ADULT - PROBLEM SELECTOR PLAN 2
Mental status worse than yesterday - recommend medicine consult to assist with evaluation and management -- patient denied etOH use yesterday when he was more lucid. mental status improved today.

## 2020-01-13 NOTE — PROGRESS NOTE ADULT - PROBLEM SELECTOR PLAN 1
SVT has been recurrent and associated with very high HRs; I gave adenosine 6mg IVP upon arrival this AM and arrhythmia broke but then recurred; start IV Cardizem; poor candidate for ablation until mental status improves. SVT responded to cardizem gtt at 10 mg/hr.  Has remained in NSR for past 24 hrs.  d/c cardizem gtt, start cardizem po this AM.

## 2020-01-14 LAB
HCT VFR BLD CALC: 42 % — SIGNIFICANT CHANGE UP (ref 39–50)
HGB BLD-MCNC: 14 G/DL — SIGNIFICANT CHANGE UP (ref 13–17)
MCHC RBC-ENTMCNC: 29.4 PG — SIGNIFICANT CHANGE UP (ref 27–34)
MCHC RBC-ENTMCNC: 33.3 GM/DL — SIGNIFICANT CHANGE UP (ref 32–36)
MCV RBC AUTO: 88.2 FL — SIGNIFICANT CHANGE UP (ref 80–100)
PLATELET # BLD AUTO: 193 K/UL — SIGNIFICANT CHANGE UP (ref 150–400)
RBC # BLD: 4.76 M/UL — SIGNIFICANT CHANGE UP (ref 4.2–5.8)
RBC # FLD: 13.3 % — SIGNIFICANT CHANGE UP (ref 10.3–14.5)
T4 FREE SERPL-MCNC: 1.31 NG/DL — SIGNIFICANT CHANGE UP (ref 0.76–1.46)
TSH SERPL-MCNC: 0.58 UU/ML — SIGNIFICANT CHANGE UP (ref 0.34–4.82)
WBC # BLD: 7.35 K/UL — SIGNIFICANT CHANGE UP (ref 3.8–10.5)
WBC # FLD AUTO: 7.35 K/UL — SIGNIFICANT CHANGE UP (ref 3.8–10.5)

## 2020-01-14 PROCEDURE — 99232 SBSQ HOSP IP/OBS MODERATE 35: CPT

## 2020-01-14 PROCEDURE — 99233 SBSQ HOSP IP/OBS HIGH 50: CPT

## 2020-01-14 RX ORDER — QUETIAPINE FUMARATE 200 MG/1
25 TABLET, FILM COATED ORAL
Refills: 0 | Status: DISCONTINUED | OUTPATIENT
Start: 2020-01-14 | End: 2020-01-21

## 2020-01-14 RX ORDER — POTASSIUM CHLORIDE 20 MEQ
20 PACKET (EA) ORAL
Refills: 0 | Status: COMPLETED | OUTPATIENT
Start: 2020-01-14 | End: 2020-01-14

## 2020-01-14 RX ORDER — SODIUM,POTASSIUM PHOSPHATES 278-250MG
2 POWDER IN PACKET (EA) ORAL ONCE
Refills: 0 | Status: COMPLETED | OUTPATIENT
Start: 2020-01-14 | End: 2020-01-14

## 2020-01-14 RX ORDER — HEPARIN SODIUM 5000 [USP'U]/ML
5000 INJECTION INTRAVENOUS; SUBCUTANEOUS EVERY 8 HOURS
Refills: 0 | Status: DISCONTINUED | OUTPATIENT
Start: 2020-01-14 | End: 2020-01-19

## 2020-01-14 RX ORDER — POTASSIUM CHLORIDE 20 MEQ
20 PACKET (EA) ORAL
Refills: 0 | Status: DISCONTINUED | OUTPATIENT
Start: 2020-01-14 | End: 2020-01-14

## 2020-01-14 RX ADMIN — MORPHINE SULFATE 2 MILLIGRAM(S): 50 CAPSULE, EXTENDED RELEASE ORAL at 11:39

## 2020-01-14 RX ADMIN — MORPHINE SULFATE 2 MILLIGRAM(S): 50 CAPSULE, EXTENDED RELEASE ORAL at 11:25

## 2020-01-14 RX ADMIN — Medication 4: at 17:12

## 2020-01-14 RX ADMIN — Medication 240 MILLIGRAM(S): at 05:27

## 2020-01-14 RX ADMIN — Medication 20 MILLIEQUIVALENT(S): at 17:11

## 2020-01-14 RX ADMIN — MORPHINE SULFATE 2 MILLIGRAM(S): 50 CAPSULE, EXTENDED RELEASE ORAL at 00:00

## 2020-01-14 RX ADMIN — Medication 1 MILLIGRAM(S): at 21:04

## 2020-01-14 RX ADMIN — Medication 1 MILLIGRAM(S): at 13:30

## 2020-01-14 RX ADMIN — HEPARIN SODIUM 5000 UNIT(S): 5000 INJECTION INTRAVENOUS; SUBCUTANEOUS at 13:33

## 2020-01-14 RX ADMIN — Medication 2 PACKET(S): at 14:07

## 2020-01-14 RX ADMIN — Medication 4: at 08:17

## 2020-01-14 RX ADMIN — DONEPEZIL HYDROCHLORIDE 5 MILLIGRAM(S): 10 TABLET, FILM COATED ORAL at 21:04

## 2020-01-14 RX ADMIN — MORPHINE SULFATE 2 MILLIGRAM(S): 50 CAPSULE, EXTENDED RELEASE ORAL at 22:55

## 2020-01-14 RX ADMIN — HEPARIN SODIUM 5000 UNIT(S): 5000 INJECTION INTRAVENOUS; SUBCUTANEOUS at 21:04

## 2020-01-14 RX ADMIN — Medication 4: at 11:25

## 2020-01-14 RX ADMIN — Medication 20 MILLIEQUIVALENT(S): at 14:07

## 2020-01-14 RX ADMIN — QUETIAPINE FUMARATE 25 MILLIGRAM(S): 200 TABLET, FILM COATED ORAL at 17:11

## 2020-01-14 RX ADMIN — MORPHINE SULFATE 2 MILLIGRAM(S): 50 CAPSULE, EXTENDED RELEASE ORAL at 23:15
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
## 2020-01-14 NOTE — PROGRESS NOTE ADULT - SUBJECTIVE AND OBJECTIVE BOX
Known left C6 pars fx  Cervical MR completed and reviewed  limited study, no definate fracture identified,   no cord compression, no severe stenosis   awaiting MRA access vertebral artery  patency
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
## 2020-01-14 NOTE — PROGRESS NOTE ADULT - ASSESSMENT
A/P:  C6 fracture on CT  Spine surgery on consult, pt in hard cervical collar  Right pelvic fracture  Fall risk precautions  Neurology on consult  Ortho on consult  Hospitalist on consult for medical management of comorbidities of DMII, Dementia  GI/DVT prophylaxis  Pain control  F/U labs  Pt will be monitored for signs of evolution/resolution of pathology and surgical intervention as required and warranted  Cont current care and meds
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
## 2020-01-14 NOTE — PROGRESS NOTE ADULT - SUBJECTIVE AND OBJECTIVE BOX
Pts mental sensorium remakablr improved, no confusion or agitated toady.  On exam, interactive, answers questions appropriately, is following commands, comprehends, has no aphasia  Moves left leg better than right.    B12 Level 538, TSH 0.37      ROS: As above, other     MEDICATIONS  (STANDING):  dextrose 5%. 1000 milliLiter(s) (50 mL/Hr) IV Continuous <Continuous>  dextrose 50% Injectable 12.5 Gram(s) IV Push once  dextrose 50% Injectable 25 Gram(s) IV Push once  dextrose 50% Injectable 25 Gram(s) IV Push once  diltiazem    milliGRAM(s) Oral daily  donepezil 5 milliGRAM(s) Oral at bedtime  hydrALAZINE Injectable 10 milliGRAM(s) IV Push every 6 hours  insulin lispro (HumaLOG) corrective regimen sliding scale   SubCutaneous three times a day before meals  insulin lispro (HumaLOG) corrective regimen sliding scale   SubCutaneous at bedtime      Vital Signs Last 24 Hrs  T(C): 37.1 (14 Jan 2020 09:17), Max: 37.1 (13 Jan 2020 10:12)  T(F): 98.7 (14 Jan 2020 09:17), Max: 98.8 (13 Jan 2020 10:12)  HR: 85 (14 Jan 2020 07:00) (81 - 132)  BP: 133/56 (14 Jan 2020 07:00) (116/76 - 153/85)  BP(mean): 76 (14 Jan 2020 07:00) (76 - 104)  RR: 14 (14 Jan 2020 07:00) (12 - 28)  SpO2: 97% (14 Jan 2020 07:00) (97% - 100%)    Neurological exam:  HEENT: PERRLA, EOMI,   Neck: in Collar  HF: Alert and attentive, 0 x 3,, follows simple commands, oriented x 3, no aphasia, con/attention - nl  CN: KRISTIE, EOMI, no gross NLFD, tongue midline, Palate moves equally  Motor: No pronator drift, moves UE 5-/5 prox grasp 5/5, RLE 4-/5, LLE 4/5    Sens: LT/PP - nl   Reflexes: BJ 2+, BR 2+, KJ 0, AJ 0, downgoing toes b/l  Coord: No FMFA   Gait/Balance: Cannot test                                14.0   7.35  )-----------( 193      ( 14 Jan 2020 09:31 )             42.0     01-14    138  |  105  |  29<H>  ----------------------------<  213<H>  3.2<L>   |  26  |  0.90    Ca    9.4      14 Jan 2020 09:31  Phos  2.2     01-14  Mg     2.0     01-14 01-11 [id number] 12.9        Radiology report:  - MRI brain: < from: MR Head No Cont (01.13.20 @ 15:54) >  IMPRESSION:   Nondiagnostic study secondary to motion. DWI and ADC sequences are not diagnostic. The brain parenchyma on the sagittal T1-weighted sequence appears unremarkable, however evaluation is markedly limited.    - EEG: < from: EEG Awake and Asleep (01.13.20 @ 11:30) >  EEG Summary/Classification:  This was a normal EEG study in the awake and drowsy states.    EEG Impression/Clinical Correlate:  No epileptiform activity was seen and no clinical events or seizures were recorded. Consider a repeat study if clinically indicated. Pts mental sensorium has improved remakably, sitting in chair, interactive, answers questions appropriately, is following commands, comprehends, has no aphasia  Mild weakness of left right > left leg.    B12 Level 538, TSH 0.37    MRI brain and C-spine are limited due to motion, no gross acute infarct or CORD COMPRESSION noted    ROS: As above, other ROS negative    MEDICATIONS  (STANDING):  dextrose 5%. 1000 milliLiter(s) (50 mL/Hr) IV Continuous <Continuous>  dextrose 50% Injectable 12.5 Gram(s) IV Push once  dextrose 50% Injectable 25 Gram(s) IV Push once  dextrose 50% Injectable 25 Gram(s) IV Push once  diltiazem    milliGRAM(s) Oral daily  donepezil 5 milliGRAM(s) Oral at bedtime  hydrALAZINE Injectable 10 milliGRAM(s) IV Push every 6 hours  insulin lispro (HumaLOG) corrective regimen sliding scale   SubCutaneous three times a day before meals  insulin lispro (HumaLOG) corrective regimen sliding scale   SubCutaneous at bedtime      Vital Signs Last 24 Hrs  T(C): 37.1 (14 Jan 2020 09:17), Max: 37.1 (13 Jan 2020 10:12)  T(F): 98.7 (14 Jan 2020 09:17), Max: 98.8 (13 Jan 2020 10:12)  HR: 85 (14 Jan 2020 07:00) (81 - 132)  BP: 133/56 (14 Jan 2020 07:00) (116/76 - 153/85)  BP(mean): 76 (14 Jan 2020 07:00) (76 - 104)  RR: 14 (14 Jan 2020 07:00) (12 - 28)  SpO2: 97% (14 Jan 2020 07:00) (97% - 100%)      Neurological exam:  HEENT: PERRLA, EOMI,   Neck: in Collar  HF: Alert and attentive, 0 x 3,, follows simple commands, oriented x 3, no aphasia, con/attention - nl  CN: KRISTIE, EOMI, no gross NLFD, tongue midline, Palate moves equally  Motor: No pronator drift, moves UE 5/5 prox grasp 5/5, RLE 4-/5, LLE 4/5, atrophy B/L Quards   Sens: LT/PP - nl   Reflexes: BJ 2+, BR 2+, KJ 0, AJ 0, downgoing toes b/l  Coord: No FNFA   Gait/Balance: Cannot test            labs:                        14.0   7.35  )-----------( 193      ( 14 Jan 2020 09:31 )             42.0     01-14    138  |  105  |  29<H>  ----------------------------<  213<H>  3.2<L>   |  26  |  0.90    Ca    9.4      14 Jan 2020 09:31  Phos  2.2     01-14  Mg     2.0     01-14 01-11 [id number] 12.9    Radiology report:  - MRI brain: < from: MR Head No Cont (01.13.20 @ 15:54) >  IMPRESSION:   Nondiagnostic study secondary to motion. DWI and ADC sequences are not diagnostic. The brain parenchyma on the sagittal T1-weighted sequence appears unremarkable, however evaluation is markedly limited.    < from: MR Cervical Spine No Cont (01.13.20 @ 15:55) >  IMPRESSION:      Single sagittal T2-weighted sequence was obtained. Study is of very limited diagnostic value. No definite fracture. Alignment is maintained. No cord compression. Posterior disc bulging is present at C3/C4 through C6/C7.      - EEG: < from: EEG Awake and Asleep (01.13.20 @ 11:30) >  EEG Summary/Classification:  This was a normal EEG study in the awake and drowsy states.    EEG Impression/Clinical Correlate:  No epileptiform activity was seen and no clinical events or seizures were recorded. Consider a repeat study if clinically indicated.

## 2020-01-14 NOTE — PROGRESS NOTE ADULT - PROBLEM SELECTOR PLAN 1
Remained in NSR on cardizem 240 CD, cont. at current dose.  EP following, plan for EP study & ablation as OP.

## 2020-01-15 DIAGNOSIS — E11.9 TYPE 2 DIABETES MELLITUS WITHOUT COMPLICATIONS: ICD-10-CM

## 2020-01-15 LAB
ANION GAP SERPL CALC-SCNC: 9 MMOL/L — SIGNIFICANT CHANGE UP (ref 5–17)
BUN SERPL-MCNC: 27 MG/DL — HIGH (ref 7–23)
CALCIUM SERPL-MCNC: 8.7 MG/DL — SIGNIFICANT CHANGE UP (ref 8.5–10.1)
CHLORIDE SERPL-SCNC: 108 MMOL/L — SIGNIFICANT CHANGE UP (ref 96–108)
CO2 SERPL-SCNC: 23 MMOL/L — SIGNIFICANT CHANGE UP (ref 22–31)
CREAT SERPL-MCNC: 0.7 MG/DL — SIGNIFICANT CHANGE UP (ref 0.5–1.3)
GLUCOSE SERPL-MCNC: 215 MG/DL — HIGH (ref 70–99)
MAGNESIUM SERPL-MCNC: 1.8 MG/DL — SIGNIFICANT CHANGE UP (ref 1.6–2.6)
PHOSPHATE SERPL-MCNC: 3.1 MG/DL — SIGNIFICANT CHANGE UP (ref 2.5–4.5)
POTASSIUM SERPL-MCNC: 4.1 MMOL/L — SIGNIFICANT CHANGE UP (ref 3.5–5.3)
POTASSIUM SERPL-SCNC: 4.1 MMOL/L — SIGNIFICANT CHANGE UP (ref 3.5–5.3)
SODIUM SERPL-SCNC: 140 MMOL/L — SIGNIFICANT CHANGE UP (ref 135–145)

## 2020-01-15 PROCEDURE — 99232 SBSQ HOSP IP/OBS MODERATE 35: CPT

## 2020-01-15 PROCEDURE — 99233 SBSQ HOSP IP/OBS HIGH 50: CPT

## 2020-01-15 RX ADMIN — Medication 6: at 12:13

## 2020-01-15 RX ADMIN — HEPARIN SODIUM 5000 UNIT(S): 5000 INJECTION INTRAVENOUS; SUBCUTANEOUS at 13:14

## 2020-01-15 RX ADMIN — MORPHINE SULFATE 2 MILLIGRAM(S): 50 CAPSULE, EXTENDED RELEASE ORAL at 06:02

## 2020-01-15 RX ADMIN — Medication 2: at 09:46

## 2020-01-15 RX ADMIN — Medication 240 MILLIGRAM(S): at 05:44

## 2020-01-15 RX ADMIN — HEPARIN SODIUM 5000 UNIT(S): 5000 INJECTION INTRAVENOUS; SUBCUTANEOUS at 05:45

## 2020-01-15 RX ADMIN — HEPARIN SODIUM 5000 UNIT(S): 5000 INJECTION INTRAVENOUS; SUBCUTANEOUS at 21:35

## 2020-01-15 RX ADMIN — MORPHINE SULFATE 2 MILLIGRAM(S): 50 CAPSULE, EXTENDED RELEASE ORAL at 20:00

## 2020-01-15 RX ADMIN — Medication 6: at 17:07

## 2020-01-15 RX ADMIN — QUETIAPINE FUMARATE 25 MILLIGRAM(S): 200 TABLET, FILM COATED ORAL at 17:07

## 2020-01-15 RX ADMIN — MORPHINE SULFATE 2 MILLIGRAM(S): 50 CAPSULE, EXTENDED RELEASE ORAL at 19:25

## 2020-01-15 RX ADMIN — DONEPEZIL HYDROCHLORIDE 5 MILLIGRAM(S): 10 TABLET, FILM COATED ORAL at 21:35

## 2020-01-15 RX ADMIN — MORPHINE SULFATE 2 MILLIGRAM(S): 50 CAPSULE, EXTENDED RELEASE ORAL at 05:44
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
## 2020-01-15 NOTE — PROGRESS NOTE ADULT - PROBLEM SELECTOR PROBLEM 5
Pubic ramus fracture, right, closed, initial encounter
Type 2 diabetes mellitus without complication, unspecified whether long term insulin use
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
## 2020-01-16 PROCEDURE — 99232 SBSQ HOSP IP/OBS MODERATE 35: CPT

## 2020-01-16 RX ORDER — INSULIN LISPRO 100/ML
2 VIAL (ML) SUBCUTANEOUS
Refills: 0 | Status: DISCONTINUED | OUTPATIENT
Start: 2020-01-16 | End: 2020-01-21

## 2020-01-16 RX ORDER — INSULIN GLARGINE 100 [IU]/ML
7 INJECTION, SOLUTION SUBCUTANEOUS AT BEDTIME
Refills: 0 | Status: DISCONTINUED | OUTPATIENT
Start: 2020-01-16 | End: 2020-01-21

## 2020-01-16 RX ORDER — OLANZAPINE 15 MG/1
5 TABLET, FILM COATED ORAL ONCE
Refills: 0 | Status: COMPLETED | OUTPATIENT
Start: 2020-01-16 | End: 2020-01-16

## 2020-01-16 RX ADMIN — MORPHINE SULFATE 2 MILLIGRAM(S): 50 CAPSULE, EXTENDED RELEASE ORAL at 23:20

## 2020-01-16 RX ADMIN — Medication 2: at 21:48

## 2020-01-16 RX ADMIN — Medication 2: at 11:58

## 2020-01-16 RX ADMIN — HEPARIN SODIUM 5000 UNIT(S): 5000 INJECTION INTRAVENOUS; SUBCUTANEOUS at 15:43

## 2020-01-16 RX ADMIN — Medication 4: at 08:14

## 2020-01-16 RX ADMIN — QUETIAPINE FUMARATE 25 MILLIGRAM(S): 200 TABLET, FILM COATED ORAL at 19:50

## 2020-01-16 RX ADMIN — DONEPEZIL HYDROCHLORIDE 5 MILLIGRAM(S): 10 TABLET, FILM COATED ORAL at 19:50

## 2020-01-16 RX ADMIN — HEPARIN SODIUM 5000 UNIT(S): 5000 INJECTION INTRAVENOUS; SUBCUTANEOUS at 21:48

## 2020-01-16 RX ADMIN — Medication 1 MILLIGRAM(S): at 15:43

## 2020-01-16 RX ADMIN — OLANZAPINE 5 MILLIGRAM(S): 15 TABLET, FILM COATED ORAL at 16:41

## 2020-01-16 RX ADMIN — MORPHINE SULFATE 2 MILLIGRAM(S): 50 CAPSULE, EXTENDED RELEASE ORAL at 23:08

## 2020-01-16 RX ADMIN — INSULIN GLARGINE 7 UNIT(S): 100 INJECTION, SOLUTION SUBCUTANEOUS at 21:48

## 2020-01-16 RX ADMIN — Medication 240 MILLIGRAM(S): at 05:15
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
## 2020-01-17 PROCEDURE — 99232 SBSQ HOSP IP/OBS MODERATE 35: CPT

## 2020-01-17 PROCEDURE — 99233 SBSQ HOSP IP/OBS HIGH 50: CPT

## 2020-01-17 RX ORDER — HALOPERIDOL DECANOATE 100 MG/ML
5 INJECTION INTRAMUSCULAR ONCE
Refills: 0 | Status: DISCONTINUED | OUTPATIENT
Start: 2020-01-17 | End: 2020-01-17

## 2020-01-17 RX ORDER — QUETIAPINE FUMARATE 200 MG/1
12.5 TABLET, FILM COATED ORAL DAILY
Refills: 0 | Status: DISCONTINUED | OUTPATIENT
Start: 2020-01-17 | End: 2020-01-20

## 2020-01-17 RX ADMIN — HEPARIN SODIUM 5000 UNIT(S): 5000 INJECTION INTRAVENOUS; SUBCUTANEOUS at 05:39

## 2020-01-17 RX ADMIN — QUETIAPINE FUMARATE 12.5 MILLIGRAM(S): 200 TABLET, FILM COATED ORAL at 15:07

## 2020-01-17 RX ADMIN — MORPHINE SULFATE 2 MILLIGRAM(S): 50 CAPSULE, EXTENDED RELEASE ORAL at 15:20

## 2020-01-17 RX ADMIN — MORPHINE SULFATE 2 MILLIGRAM(S): 50 CAPSULE, EXTENDED RELEASE ORAL at 07:58

## 2020-01-17 RX ADMIN — MORPHINE SULFATE 2 MILLIGRAM(S): 50 CAPSULE, EXTENDED RELEASE ORAL at 08:34

## 2020-01-17 RX ADMIN — Medication 240 MILLIGRAM(S): at 14:58

## 2020-01-17 RX ADMIN — QUETIAPINE FUMARATE 25 MILLIGRAM(S): 200 TABLET, FILM COATED ORAL at 22:28

## 2020-01-17 RX ADMIN — Medication 1 MILLIGRAM(S): at 01:48

## 2020-01-17 RX ADMIN — MORPHINE SULFATE 2 MILLIGRAM(S): 50 CAPSULE, EXTENDED RELEASE ORAL at 23:10

## 2020-01-17 RX ADMIN — Medication 2 UNIT(S): at 12:02

## 2020-01-17 RX ADMIN — Medication 4: at 12:01

## 2020-01-17 RX ADMIN — Medication 4: at 17:22

## 2020-01-17 RX ADMIN — INSULIN GLARGINE 7 UNIT(S): 100 INJECTION, SOLUTION SUBCUTANEOUS at 22:30

## 2020-01-17 RX ADMIN — Medication 2 UNIT(S): at 17:23

## 2020-01-17 RX ADMIN — DONEPEZIL HYDROCHLORIDE 5 MILLIGRAM(S): 10 TABLET, FILM COATED ORAL at 22:31

## 2020-01-17 RX ADMIN — HEPARIN SODIUM 5000 UNIT(S): 5000 INJECTION INTRAVENOUS; SUBCUTANEOUS at 14:58

## 2020-01-17 RX ADMIN — HEPARIN SODIUM 5000 UNIT(S): 5000 INJECTION INTRAVENOUS; SUBCUTANEOUS at 22:31

## 2020-01-17 RX ADMIN — MORPHINE SULFATE 2 MILLIGRAM(S): 50 CAPSULE, EXTENDED RELEASE ORAL at 14:58

## 2020-01-17 RX ADMIN — MORPHINE SULFATE 2 MILLIGRAM(S): 50 CAPSULE, EXTENDED RELEASE ORAL at 22:34

## 2020-01-17 NOTE — PROVIDER CONTACT NOTE (CHANGE IN STATUS NOTIFICATION) - SITUATION
Patient with increasing agitation/agression- Pt pulling off heart monitor, attempting to pull cervical collar,climb OOB- verbal aggression noted.

## 2020-01-17 NOTE — PROGRESS NOTE ADULT - SUBJECTIVE AND OBJECTIVE BOX
contacted by Dr. Middleton regarding MRA ordered on 01-  to r/o vertebral artery injury  Neuro (Dr. Brito) reported as not requiring the study  d/w Dr. Cosby - spine recommendation to complete the study stands as ordered  d/w RN in CCU - pt will require sedation for study due to increasing agitation and aggression  please recontact if order needs to be modified to include sedation
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
## 2020-01-18 LAB
ALBUMIN SERPL ELPH-MCNC: 3.2 G/DL — LOW (ref 3.3–5)
ALP SERPL-CCNC: 83 U/L — SIGNIFICANT CHANGE UP (ref 40–120)
ALT FLD-CCNC: 11 U/L — LOW (ref 12–78)
ANION GAP SERPL CALC-SCNC: 7 MMOL/L — SIGNIFICANT CHANGE UP (ref 5–17)
AST SERPL-CCNC: 12 U/L — LOW (ref 15–37)
BILIRUB SERPL-MCNC: 0.9 MG/DL — SIGNIFICANT CHANGE UP (ref 0.2–1.2)
BUN SERPL-MCNC: 24 MG/DL — HIGH (ref 7–23)
CALCIUM SERPL-MCNC: 9.4 MG/DL — SIGNIFICANT CHANGE UP (ref 8.5–10.1)
CHLORIDE SERPL-SCNC: 104 MMOL/L — SIGNIFICANT CHANGE UP (ref 96–108)
CO2 SERPL-SCNC: 28 MMOL/L — SIGNIFICANT CHANGE UP (ref 22–31)
CREAT SERPL-MCNC: 1.09 MG/DL — SIGNIFICANT CHANGE UP (ref 0.5–1.3)
GLUCOSE SERPL-MCNC: 200 MG/DL — HIGH (ref 70–99)
HCT VFR BLD CALC: 41.5 % — SIGNIFICANT CHANGE UP (ref 39–50)
HGB BLD-MCNC: 13.8 G/DL — SIGNIFICANT CHANGE UP (ref 13–17)
MCHC RBC-ENTMCNC: 29.3 PG — SIGNIFICANT CHANGE UP (ref 27–34)
MCHC RBC-ENTMCNC: 33.3 GM/DL — SIGNIFICANT CHANGE UP (ref 32–36)
MCV RBC AUTO: 88.1 FL — SIGNIFICANT CHANGE UP (ref 80–100)
PLATELET # BLD AUTO: 313 K/UL — SIGNIFICANT CHANGE UP (ref 150–400)
POTASSIUM SERPL-MCNC: 3.9 MMOL/L — SIGNIFICANT CHANGE UP (ref 3.5–5.3)
POTASSIUM SERPL-SCNC: 3.9 MMOL/L — SIGNIFICANT CHANGE UP (ref 3.5–5.3)
PROT SERPL-MCNC: 7.4 GM/DL — SIGNIFICANT CHANGE UP (ref 6–8.3)
RBC # BLD: 4.71 M/UL — SIGNIFICANT CHANGE UP (ref 4.2–5.8)
RBC # FLD: 13 % — SIGNIFICANT CHANGE UP (ref 10.3–14.5)
SODIUM SERPL-SCNC: 139 MMOL/L — SIGNIFICANT CHANGE UP (ref 135–145)
WBC # BLD: 6.76 K/UL — SIGNIFICANT CHANGE UP (ref 3.8–10.5)
WBC # FLD AUTO: 6.76 K/UL — SIGNIFICANT CHANGE UP (ref 3.8–10.5)

## 2020-01-18 PROCEDURE — 72198 MR ANGIO PELVIS W/O & W/DYE: CPT | Mod: 26

## 2020-01-18 PROCEDURE — 99254 IP/OBS CNSLTJ NEW/EST MOD 60: CPT

## 2020-01-18 PROCEDURE — 99233 SBSQ HOSP IP/OBS HIGH 50: CPT

## 2020-01-18 PROCEDURE — 70496 CT ANGIOGRAPHY HEAD: CPT | Mod: 26

## 2020-01-18 PROCEDURE — 70498 CT ANGIOGRAPHY NECK: CPT | Mod: 26

## 2020-01-18 RX ADMIN — QUETIAPINE FUMARATE 12.5 MILLIGRAM(S): 200 TABLET, FILM COATED ORAL at 10:04

## 2020-01-18 RX ADMIN — Medication 2 UNIT(S): at 18:25

## 2020-01-18 RX ADMIN — Medication 1 MILLIGRAM(S): at 11:52

## 2020-01-18 RX ADMIN — DONEPEZIL HYDROCHLORIDE 5 MILLIGRAM(S): 10 TABLET, FILM COATED ORAL at 22:14

## 2020-01-18 RX ADMIN — Medication 240 MILLIGRAM(S): at 06:14

## 2020-01-18 RX ADMIN — Medication 4: at 18:25

## 2020-01-18 RX ADMIN — HEPARIN SODIUM 5000 UNIT(S): 5000 INJECTION INTRAVENOUS; SUBCUTANEOUS at 22:13

## 2020-01-18 RX ADMIN — HEPARIN SODIUM 5000 UNIT(S): 5000 INJECTION INTRAVENOUS; SUBCUTANEOUS at 06:14

## 2020-01-18 RX ADMIN — Medication 2: at 08:06

## 2020-01-18 RX ADMIN — HEPARIN SODIUM 5000 UNIT(S): 5000 INJECTION INTRAVENOUS; SUBCUTANEOUS at 13:44

## 2020-01-18 RX ADMIN — QUETIAPINE FUMARATE 25 MILLIGRAM(S): 200 TABLET, FILM COATED ORAL at 18:26

## 2020-01-18 RX ADMIN — Medication 2 UNIT(S): at 08:07

## 2020-01-18 RX ADMIN — MORPHINE SULFATE 2 MILLIGRAM(S): 50 CAPSULE, EXTENDED RELEASE ORAL at 11:52

## 2020-01-18 RX ADMIN — MORPHINE SULFATE 2 MILLIGRAM(S): 50 CAPSULE, EXTENDED RELEASE ORAL at 13:38
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
## 2020-01-18 NOTE — CONSULT NOTE ADULT - SKIN/BREAST
negative Doxepin Pregnancy And Lactation Text: This medication is Pregnancy Category C and it isn't known if it is safe during pregnancy. It is also excreted in breast milk and breast feeding isn't recommended.
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
## 2020-01-18 NOTE — PROGRESS NOTE ADULT - SUBJECTIVE AND OBJECTIVE BOX
< from: MR Angio Neck No Cont (01.18.20 @ 12:34) >    IMPRESSION:      1. There is an abrupt transition in caliber of the right vertebral artery at approximately the C3 level, with attenuation or occlusion more cephalad. 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 Vascular injury or thrombus cannot be excluded. However this could be technical, asthere is no infarct on the MRI study of 1/13/2020, of either the brain or cervical spine. CTA imaging of the head and neck is recommended.  2. The other vessels are patent.      D//W Dr. Trevizo of ortho spine  neurosurgery consult to discuss need for CTA, A/P

## 2020-01-18 NOTE — PROGRESS NOTE ADULT - ASSESSMENT
C6 pars fracture    - CTA neck pending.   - Neurosurgery consult to eval for vertebral artery injury  - No acute ortho spine surgical intervention for fracture  - Maintain cervical collar at all times    Gurpreet Trevizo MD  [facility],
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
## 2020-01-18 NOTE — PROGRESS NOTE ADULT - SUBJECTIVE AND OBJECTIVE BOX
Ortho Spine Attending Note    Pt seen and examined  MRA cervical spine done.  No significant neck pain.    In rigid collar  5/5 throughout b/l UE  Sensation grossly intact b/l UE    MRA c spine reviewed - per report, possible injury to vertebral artery

## 2020-01-18 NOTE — CONSULT NOTE ADULT - CRANIAL NERVE
CN II -XII intact, no pronator drift, no facial asymmetry, speech is clear & fluent, no finger to nose ataxia
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
## 2020-01-18 NOTE — CONSULT NOTE ADULT - MOTOR
R  delt 5/5  bicep 5/5  tricep 5/5  grasp intact  pincer intact  interoseous 5/5 wrist ext 5/5  L         5/5 5/5 5/5            intact            intact                   5/5 5/5  R HF    5-/5  quad 5/5  ham 5/5  plantar 5/5  dorsi 5/5 EHL 5/5  L         5/5 5/5 5/5 5/5 5/5 5/5

## 2020-01-19 LAB
APTT BLD: 30 SEC — SIGNIFICANT CHANGE UP (ref 27.5–36.3)
APTT BLD: 67.3 SEC — HIGH (ref 27.5–36.3)
APTT BLD: 71.1 SEC — HIGH (ref 27.5–36.3)
HCT VFR BLD CALC: 36.1 % — LOW (ref 39–50)
HGB BLD-MCNC: 12 G/DL — LOW (ref 13–17)
INR BLD: 1.03 RATIO — SIGNIFICANT CHANGE UP (ref 0.88–1.16)
MCHC RBC-ENTMCNC: 29.2 PG — SIGNIFICANT CHANGE UP (ref 27–34)
MCHC RBC-ENTMCNC: 33.2 GM/DL — SIGNIFICANT CHANGE UP (ref 32–36)
MCV RBC AUTO: 87.8 FL — SIGNIFICANT CHANGE UP (ref 80–100)
PLATELET # BLD AUTO: 292 K/UL — SIGNIFICANT CHANGE UP (ref 150–400)
PROTHROM AB SERPL-ACNC: 11.5 SEC — SIGNIFICANT CHANGE UP (ref 10–12.9)
RBC # BLD: 4.11 M/UL — LOW (ref 4.2–5.8)
RBC # FLD: 12.8 % — SIGNIFICANT CHANGE UP (ref 10.3–14.5)
WBC # BLD: 5.37 K/UL — SIGNIFICANT CHANGE UP (ref 3.8–10.5)
WBC # FLD AUTO: 5.37 K/UL — SIGNIFICANT CHANGE UP (ref 3.8–10.5)

## 2020-01-19 PROCEDURE — 99223 1ST HOSP IP/OBS HIGH 75: CPT

## 2020-01-19 PROCEDURE — 99232 SBSQ HOSP IP/OBS MODERATE 35: CPT

## 2020-01-19 RX ORDER — ASPIRIN/CALCIUM CARB/MAGNESIUM 324 MG
325 TABLET ORAL DAILY
Refills: 0 | Status: DISCONTINUED | OUTPATIENT
Start: 2020-01-19 | End: 2020-01-19

## 2020-01-19 RX ORDER — HEPARIN SODIUM 5000 [USP'U]/ML
1000 INJECTION INTRAVENOUS; SUBCUTANEOUS
Qty: 25000 | Refills: 0 | Status: DISCONTINUED | OUTPATIENT
Start: 2020-01-19 | End: 2020-01-20

## 2020-01-19 RX ORDER — ASPIRIN/CALCIUM CARB/MAGNESIUM 324 MG
81 TABLET ORAL ONCE
Refills: 0 | Status: COMPLETED | OUTPATIENT
Start: 2020-01-19 | End: 2020-01-19

## 2020-01-19 RX ORDER — ASPIRIN/CALCIUM CARB/MAGNESIUM 324 MG
81 TABLET ORAL DAILY
Refills: 0 | Status: DISCONTINUED | OUTPATIENT
Start: 2020-01-19 | End: 2020-01-19

## 2020-01-19 RX ORDER — ASPIRIN/CALCIUM CARB/MAGNESIUM 324 MG
81 TABLET ORAL DAILY
Refills: 0 | Status: DISCONTINUED | OUTPATIENT
Start: 2020-01-19 | End: 2020-01-20

## 2020-01-19 RX ORDER — ATORVASTATIN CALCIUM 80 MG/1
40 TABLET, FILM COATED ORAL AT BEDTIME
Refills: 0 | Status: DISCONTINUED | OUTPATIENT
Start: 2020-01-19 | End: 2020-01-21

## 2020-01-19 RX ADMIN — ATORVASTATIN CALCIUM 40 MILLIGRAM(S): 80 TABLET, FILM COATED ORAL at 21:47

## 2020-01-19 RX ADMIN — INSULIN GLARGINE 7 UNIT(S): 100 INJECTION, SOLUTION SUBCUTANEOUS at 21:48

## 2020-01-19 RX ADMIN — QUETIAPINE FUMARATE 12.5 MILLIGRAM(S): 200 TABLET, FILM COATED ORAL at 12:45

## 2020-01-19 RX ADMIN — Medication 2 UNIT(S): at 12:45

## 2020-01-19 RX ADMIN — QUETIAPINE FUMARATE 25 MILLIGRAM(S): 200 TABLET, FILM COATED ORAL at 17:17

## 2020-01-19 RX ADMIN — Medication 2 UNIT(S): at 17:16

## 2020-01-19 RX ADMIN — DONEPEZIL HYDROCHLORIDE 5 MILLIGRAM(S): 10 TABLET, FILM COATED ORAL at 21:47

## 2020-01-19 RX ADMIN — Medication 2: at 12:45

## 2020-01-19 RX ADMIN — Medication 2: at 17:17

## 2020-01-19 RX ADMIN — Medication 81 MILLIGRAM(S): at 12:48

## 2020-01-19 RX ADMIN — HEPARIN SODIUM 10 UNIT(S)/HR: 5000 INJECTION INTRAVENOUS; SUBCUTANEOUS at 03:48

## 2020-01-19 RX ADMIN — MORPHINE SULFATE 2 MILLIGRAM(S): 50 CAPSULE, EXTENDED RELEASE ORAL at 17:39

## 2020-01-19 RX ADMIN — Medication 81 MILLIGRAM(S): at 02:43

## 2020-01-19 RX ADMIN — MORPHINE SULFATE 2 MILLIGRAM(S): 50 CAPSULE, EXTENDED RELEASE ORAL at 17:50

## 2020-01-19 RX ADMIN — Medication 240 MILLIGRAM(S): at 06:29

## 2020-01-19 RX ADMIN — HEPARIN SODIUM 10 UNIT(S)/HR: 5000 INJECTION INTRAVENOUS; SUBCUTANEOUS at 17:45

## 2020-01-19 RX ADMIN — HEPARIN SODIUM 10 UNIT(S)/HR: 5000 INJECTION INTRAVENOUS; SUBCUTANEOUS at 11:05
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
## 2020-01-19 NOTE — PROGRESS NOTE ADULT - SUBJECTIVE AND OBJECTIVE BOX
D/W Dr. Marrero at [facility], suggested to continue A/C, considering neurologically intact, no infarction on imaging will not need intervention, will D/W family, need for A/C, increased risk of stroke. I left messages for Son stanley. Hold off on transfer for now, will reevaluate if neuro exam changes with focal deficit.   Will follwo up with neurology and neurosurgery at [facility] in am.
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
## 2020-01-20 LAB
ADD ON TEST-SPECIMEN IN LAB: SIGNIFICANT CHANGE UP
APTT BLD: 38.3 SEC — HIGH (ref 27.5–36.3)
CHOLEST SERPL-MCNC: 243 MG/DL — HIGH (ref 10–199)
HCT VFR BLD CALC: 35.5 % — LOW (ref 39–50)
HDLC SERPL-MCNC: 51 MG/DL — SIGNIFICANT CHANGE UP
HGB BLD-MCNC: 12.1 G/DL — LOW (ref 13–17)
LIPID PNL WITH DIRECT LDL SERPL: 177 MG/DL — HIGH
MCHC RBC-ENTMCNC: 29.4 PG — SIGNIFICANT CHANGE UP (ref 27–34)
MCHC RBC-ENTMCNC: 34.1 GM/DL — SIGNIFICANT CHANGE UP (ref 32–36)
MCV RBC AUTO: 86.4 FL — SIGNIFICANT CHANGE UP (ref 80–100)
PLATELET # BLD AUTO: 311 K/UL — SIGNIFICANT CHANGE UP (ref 150–400)
RBC # BLD: 4.11 M/UL — LOW (ref 4.2–5.8)
RBC # FLD: 12.7 % — SIGNIFICANT CHANGE UP (ref 10.3–14.5)
TOTAL CHOLESTEROL/HDL RATIO MEASUREMENT: 4.8 RATIO — SIGNIFICANT CHANGE UP (ref 3.4–9.6)
TRIGL SERPL-MCNC: 76 MG/DL — SIGNIFICANT CHANGE UP (ref 10–149)
WBC # BLD: 5.3 K/UL — SIGNIFICANT CHANGE UP (ref 3.8–10.5)
WBC # FLD AUTO: 5.3 K/UL — SIGNIFICANT CHANGE UP (ref 3.8–10.5)

## 2020-01-20 PROCEDURE — 99232 SBSQ HOSP IP/OBS MODERATE 35: CPT

## 2020-01-20 PROCEDURE — 99233 SBSQ HOSP IP/OBS HIGH 50: CPT

## 2020-01-20 RX ORDER — ASPIRIN/CALCIUM CARB/MAGNESIUM 324 MG
325 TABLET ORAL DAILY
Refills: 0 | Status: DISCONTINUED | OUTPATIENT
Start: 2020-01-20 | End: 2020-01-21

## 2020-01-20 RX ORDER — QUETIAPINE FUMARATE 200 MG/1
25 TABLET, FILM COATED ORAL DAILY
Refills: 0 | Status: DISCONTINUED | OUTPATIENT
Start: 2020-01-20 | End: 2020-01-21

## 2020-01-20 RX ORDER — HEPARIN SODIUM 5000 [USP'U]/ML
6000 INJECTION INTRAVENOUS; SUBCUTANEOUS ONCE
Refills: 0 | Status: COMPLETED | OUTPATIENT
Start: 2020-01-20 | End: 2020-01-20

## 2020-01-20 RX ORDER — HEPARIN SODIUM 5000 [USP'U]/ML
6000 INJECTION INTRAVENOUS; SUBCUTANEOUS EVERY 6 HOURS
Refills: 0 | Status: DISCONTINUED | OUTPATIENT
Start: 2020-01-20 | End: 2020-01-20

## 2020-01-20 RX ORDER — QUETIAPINE FUMARATE 200 MG/1
25 TABLET, FILM COATED ORAL ONCE
Refills: 0 | Status: DISCONTINUED | OUTPATIENT
Start: 2020-01-20 | End: 2020-01-20

## 2020-01-20 RX ORDER — QUETIAPINE FUMARATE 200 MG/1
12.5 TABLET, FILM COATED ORAL ONCE
Refills: 0 | Status: COMPLETED | OUTPATIENT
Start: 2020-01-20 | End: 2020-01-20

## 2020-01-20 RX ORDER — HEPARIN SODIUM 5000 [USP'U]/ML
INJECTION INTRAVENOUS; SUBCUTANEOUS
Qty: 25000 | Refills: 0 | Status: DISCONTINUED | OUTPATIENT
Start: 2020-01-20 | End: 2020-01-20

## 2020-01-20 RX ORDER — HEPARIN SODIUM 5000 [USP'U]/ML
3000 INJECTION INTRAVENOUS; SUBCUTANEOUS EVERY 6 HOURS
Refills: 0 | Status: DISCONTINUED | OUTPATIENT
Start: 2020-01-20 | End: 2020-01-20

## 2020-01-20 RX ORDER — ENOXAPARIN SODIUM 100 MG/ML
40 INJECTION SUBCUTANEOUS DAILY
Refills: 0 | Status: DISCONTINUED | OUTPATIENT
Start: 2020-01-20 | End: 2020-01-21

## 2020-01-20 RX ADMIN — Medication 325 MILLIGRAM(S): at 11:28

## 2020-01-20 RX ADMIN — INSULIN GLARGINE 7 UNIT(S): 100 INJECTION, SOLUTION SUBCUTANEOUS at 21:43

## 2020-01-20 RX ADMIN — Medication 4: at 08:27

## 2020-01-20 RX ADMIN — ATORVASTATIN CALCIUM 40 MILLIGRAM(S): 80 TABLET, FILM COATED ORAL at 21:44

## 2020-01-20 RX ADMIN — QUETIAPINE FUMARATE 25 MILLIGRAM(S): 200 TABLET, FILM COATED ORAL at 17:48

## 2020-01-20 RX ADMIN — Medication 4: at 16:47

## 2020-01-20 RX ADMIN — DONEPEZIL HYDROCHLORIDE 5 MILLIGRAM(S): 10 TABLET, FILM COATED ORAL at 21:44

## 2020-01-20 RX ADMIN — ENOXAPARIN SODIUM 40 MILLIGRAM(S): 100 INJECTION SUBCUTANEOUS at 11:29

## 2020-01-20 RX ADMIN — Medication 650 MILLIGRAM(S): at 17:49

## 2020-01-20 RX ADMIN — HEPARIN SODIUM 6000 UNIT(S): 5000 INJECTION INTRAVENOUS; SUBCUTANEOUS at 04:41

## 2020-01-20 RX ADMIN — HEPARIN SODIUM 1300 UNIT(S)/HR: 5000 INJECTION INTRAVENOUS; SUBCUTANEOUS at 04:42

## 2020-01-20 RX ADMIN — Medication 650 MILLIGRAM(S): at 18:26

## 2020-01-20 RX ADMIN — QUETIAPINE FUMARATE 12.5 MILLIGRAM(S): 200 TABLET, FILM COATED ORAL at 11:28

## 2020-01-20 RX ADMIN — Medication 2 UNIT(S): at 08:28

## 2020-01-20 RX ADMIN — Medication 240 MILLIGRAM(S): at 05:19

## 2020-01-20 RX ADMIN — Medication 2 UNIT(S): at 16:47
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
## 2020-01-20 NOTE — PROGRESS NOTE ADULT - CARDIOVASCULAR
detailed exam
detailed exam
Regular rate & rhythm, normal S1, S2; no murmurs, gallops or rubs; no S3, S4
detailed exam
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
## 2020-01-20 NOTE — PROGRESS NOTE ADULT - PROBLEM/PLAN-2
Addended by: Barbara Sarmiento on: 2/1/2019 10:05 AM     Modules accepted: Orders
DISPLAY PLAN FREE TEXT
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
## 2020-01-20 NOTE — CONSULT NOTE ADULT - PROVIDER SPECIALTY LIST ADULT
Orthopedics
Anticoag Management
Electrophysiology
Neurology
Neurology
Cardiology
Hospitalist
Neurosurgery
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
## 2020-01-20 NOTE — PROVIDER CONTACT NOTE (OTHER) - SITUATION
Dr. Griffin office aware of consult.
Dr. Ramirez aware of consult.
Notified Dr. Pedraza office regarding consult spoke with Colleen from answering service.
Notified Dr. Ruano office regarding consult spoke with Sinai from answering service.
Spoke with Eloina at office to unform dr that patient is in [facility].  Please fax discharge papers to0 [phone].
Spoke with Ginny at service to inform  of consult.
pt became extremely agitated/combative. stating he feels "scared & unsafe." noted to be in a rapid afib 180-200s. unable to get EKG. attempted to reorientate and calm patient down but failed.
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
## 2020-01-21 ENCOUNTER — TRANSCRIPTION ENCOUNTER (OUTPATIENT)
Age: 70
End: 2020-01-21

## 2020-01-21 VITALS — DIASTOLIC BLOOD PRESSURE: 70 MMHG | SYSTOLIC BLOOD PRESSURE: 156 MMHG

## 2020-01-21 LAB
HCT VFR BLD CALC: 36.9 % — LOW (ref 39–50)
HGB BLD-MCNC: 12.2 G/DL — LOW (ref 13–17)
MCHC RBC-ENTMCNC: 28.8 PG — SIGNIFICANT CHANGE UP (ref 27–34)
MCHC RBC-ENTMCNC: 33.1 GM/DL — SIGNIFICANT CHANGE UP (ref 32–36)
MCV RBC AUTO: 87.2 FL — SIGNIFICANT CHANGE UP (ref 80–100)
PLATELET # BLD AUTO: 324 K/UL — SIGNIFICANT CHANGE UP (ref 150–400)
RBC # BLD: 4.23 M/UL — SIGNIFICANT CHANGE UP (ref 4.2–5.8)
RBC # FLD: 13.1 % — SIGNIFICANT CHANGE UP (ref 10.3–14.5)
WBC # BLD: 6.58 K/UL — SIGNIFICANT CHANGE UP (ref 3.8–10.5)
WBC # FLD AUTO: 6.58 K/UL — SIGNIFICANT CHANGE UP (ref 3.8–10.5)

## 2020-01-21 PROCEDURE — 99239 HOSP IP/OBS DSCHRG MGMT >30: CPT

## 2020-01-21 PROCEDURE — 99231 SBSQ HOSP IP/OBS SF/LOW 25: CPT

## 2020-01-21 RX ORDER — DONEPEZIL HYDROCHLORIDE 10 MG/1
1 TABLET, FILM COATED ORAL
Qty: 0 | Refills: 0 | DISCHARGE
Start: 2020-01-21

## 2020-01-21 RX ORDER — DILTIAZEM HCL 120 MG
1 CAPSULE, EXT RELEASE 24 HR ORAL
Qty: 0 | Refills: 0 | DISCHARGE
Start: 2020-01-21

## 2020-01-21 RX ORDER — ENOXAPARIN SODIUM 100 MG/ML
40 INJECTION SUBCUTANEOUS
Qty: 0 | Refills: 0 | DISCHARGE
Start: 2020-01-21

## 2020-01-21 RX ORDER — ASPIRIN/CALCIUM CARB/MAGNESIUM 324 MG
1 TABLET ORAL
Qty: 0 | Refills: 0 | DISCHARGE
Start: 2020-01-21

## 2020-01-21 RX ORDER — QUETIAPINE FUMARATE 200 MG/1
1 TABLET, FILM COATED ORAL
Qty: 0 | Refills: 0 | DISCHARGE
Start: 2020-01-21

## 2020-01-21 RX ORDER — INSULIN GLARGINE 100 [IU]/ML
7 INJECTION, SOLUTION SUBCUTANEOUS
Qty: 0 | Refills: 0 | DISCHARGE
Start: 2020-01-21

## 2020-01-21 RX ORDER — ATORVASTATIN CALCIUM 80 MG/1
1 TABLET, FILM COATED ORAL
Qty: 0 | Refills: 0 | DISCHARGE
Start: 2020-01-21

## 2020-01-21 RX ADMIN — Medication 2 UNIT(S): at 08:18

## 2020-01-21 RX ADMIN — Medication 4: at 08:18

## 2020-01-21 RX ADMIN — Medication 650 MILLIGRAM(S): at 12:14

## 2020-01-21 RX ADMIN — Medication 240 MILLIGRAM(S): at 05:27

## 2020-01-21 RX ADMIN — Medication 8: at 11:23

## 2020-01-21 RX ADMIN — Medication 2 UNIT(S): at 11:23

## 2020-01-21 RX ADMIN — Medication 325 MILLIGRAM(S): at 12:13

## 2020-01-21 RX ADMIN — Medication 650 MILLIGRAM(S): at 13:00

## 2020-01-21 RX ADMIN — ENOXAPARIN SODIUM 40 MILLIGRAM(S): 100 INJECTION SUBCUTANEOUS at 12:13

## 2020-01-21 RX ADMIN — QUETIAPINE FUMARATE 25 MILLIGRAM(S): 200 TABLET, FILM COATED ORAL at 12:14
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
## 2020-01-21 NOTE — DISCHARGE NOTE NURSING/CASE MANAGEMENT/SOCIAL WORK - PATIENT PORTAL LINK FT
You can access the FollowMyHealth Patient Portal offered by [facility] by registering at the following website: [email or web address]. By joining [email or web address]’s FollowMyHealth portal, you will also be able to view your health information using other applications (apps) compatible with our system.

## 2020-01-21 NOTE — DISCHARGE NOTE PROVIDER - CARE PROVIDER_API CALL
Jose Antonio Gant)  Cardiovascular Disease; Internal Medicine  [address] [place] [address]  Phone: [phone]  Fax: [phone]  Follow Up Time: 1 week    Don Knowles (DO)  Orthopaedic Surgery  [address] [place] [address]  Phone: [phone]  Fax: [phone]  Follow Up Time: 1 week    Amando Miranda; PhD)  Neurosurgery  [address] [facility] [place] [address]  Phone: [phone]  Fax: [phone]  Follow Up Time: 1 week    Agustin Pedraza (DO)  Orthopaedic Surgery  [address] [facility], [address] [place] [address]  Phone: [phone]  Fax: [phone]  Follow Up Time: 1 week    Sheridan Barbour)  Cardiology; Internal Medicine  [address] [place] [address]  Phone: [phone]  Fax: [phone]  Follow Up Time: 1 week    Wade Villar)  Internal Medicine; Neurology  [address], [address]  [place] [address]  Phone: [phone]  Fax: [phone]  Follow Up Time: 1 week
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
## 2020-01-21 NOTE — DISCHARGE NOTE PROVIDER - NSDCMRMEDTOKEN_GEN_ALL_CORE_FT
aspirin 325 mg oral tablet: 1 tab(s) orally once a day  atorvastatin 40 mg oral tablet: 1 tab(s) orally once a day (at bedtime)  dilTIAZem 240 mg/24 hours oral capsule, extended release: 1 cap(s) orally once a day  donepezil 5 mg oral tablet: 1 tab(s) orally once a day (at bedtime)  enoxaparin: 40 milligram(s) subcutaneous once a day  insulin glargine: 7 unit(s) subcutaneous once a day (at bedtime)  QUEtiapine 25 mg oral tablet: 1 tab(s) orally   QUEtiapine 25 mg oral tablet: 1 tab(s) orally once a day

## 2020-01-21 NOTE — DISCHARGE NOTE PROVIDER - PROVIDER TOKENS
PROVIDER:[TOKEN:[[id number]],FOLLOWUP:[1 week]],PROVIDER:[TOKEN:[[id number]],FOLLOWUP:[1 week]],PROVIDER:[TOKEN:[[id number]],FOLLOWUP:[1 week]],PROVIDER:[TOKEN:[[id number]],FOLLOWUP:[1 week]],PROVIDER:[TOKEN:[[id number]],FOLLOWUP:[1 week]],PROVIDER:[TOKEN:[[id number]],FOLLOWUP:[1 week]]

## 2020-01-21 NOTE — DISCHARGE NOTE PROVIDER - NSDCFUADDINST_GEN_ALL_CORE_FT
Take all appropriate fall risk precautions  Please follow up as indicated for orthopedic, neurosurgery, spine surgery, cardiology, neurology  Please seek immediate medical attention for chest pain, shortness of breath, any adverse changes to health
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
## 2020-01-21 NOTE — DISCHARGE NOTE PROVIDER - NSDCACTIVITY_GEN_ALL_CORE
Stairs allowed/Walking - Indoors allowed/No heavy lifting/straining/Do not drive or operate machinery/Bathing allowed/Walking - Outdoors allowed/Do not make important decisions/Showering allowed
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
## 2020-01-21 NOTE — DISCHARGE NOTE PROVIDER - CARE PROVIDERS DIRECT ADDRESSES
[email or web address]

## 2020-01-23 NOTE — PROGRESS NOTE ADULT - PROBLEM SELECTOR PLAN 2
Patient: Ermelinda Hayes Date of Service: 2020   : 1960 MRN: [id number]       Reason For Visit  Ermelinda Hayes is a 61year old female who presents today for cough. Chief Complaint   Patient presents with   â¢ Cough     possible bronchitis, cough has been going on for a couple of days            HPI     #cough  -states she was sick with a bad cold in November and states it went away  -started last week  -trying OTC cough syrups and OTC nasal spray  -reports pressure pain in the bottom of the throat when she breaths  -[facility] to go to her PCP  -reports low grade fever 98.8  -endorses chills and fatigue  -states at times she has difficulty taking a full breath  -reports clear phlegm when she coughs  -denies ear pain  -denies any sick contacts at work or at home    Review of Systems  Review of Systems   Constitutional: Positive for chills and fatigue. Negative for activity change, appetite change and fever. HENT: Positive for congestion. Negative for rhinorrhea and sore throat. Eyes: Negative for pain, discharge and redness. Respiratory: Positive for cough. Negative for shortness of breath. Cardiovascular: Negative for chest pain and palpitations. Gastrointestinal: Negative for abdominal pain, constipation, diarrhea, nausea and vomiting. Genitourinary: Negative for dysuria, frequency and hematuria. Musculoskeletal: Negative for arthralgias. Skin: Negative for color change and rash. Neurological: Negative for dizziness, weakness, numbness and headaches. Psychiatric/Behavioral: Negative for agitation and confusion.        Allergy  ALLERGIES:   Allergen Reactions   â¢ Hydroxychloroquine Other (See Comments)     Unknown   â¢ Latex RASH     LATEX  ADHESIVE TAPE    â¢ Oxaprozin Other (See Comments)     Unknown       Medications  Current Outpatient Medications   Medication Sig Dispense Refill   â¢ DULoxetine (CYMBALTA) 30 MG capsule TK 1 C PO QAM  0   â¢ topiramate (TOPAMAX) 100 "MG tablet Take 100 mg by mouth daily. â¢ traZODone (DESYREL) 50 MG tablet Take 50 mg by mouth daily. No current facility-administered medications for this visit. Past Medical History  No problems updated. Active Ambulatory Problems     Diagnosis Date Noted   â¢ Cerebral palsy (CMS/[facility]) 03/30/2018   â¢ Fibromyalgia 03/30/2018   â¢ Hyperlipidemia 10/29/2018   â¢ Essential hypertension 02/04/2019   â¢ Osteoarthrosis 02/04/2019   â¢ RA (rheumatoid arthritis) (CMS/[facility]) 02/04/2019     Resolved Ambulatory Problems     Diagnosis Date Noted   â¢ No Resolved Ambulatory Problems     Past Medical History:   Diagnosis Date   â¢ Depressive disorder    â¢ Endometriosis    â¢ Rheumatoid arthritis (CMS/[facility])        Surgical History  Past Surgical History:   Procedure Laterality Date   â¢ Anesth,knee arthroscopy     â¢ Foot surgery     â¢ Hysterectomy     â¢ Shoulder arthroscopy     â¢ Shoulder surgery         Family History  Family History   Problem Relation Age of Onset   â¢ Patient is unaware of any medical problems Father    â¢ Alcohol Abuse Sister    â¢ Heart disease Paternal Grandfather        Social History  Social History     Tobacco Use   â¢ Smoking status: Never Smoker   â¢ Smokeless tobacco: Never Used   Substance Use Topics   â¢ Alcohol use: Yes     Alcohol/week: 14.0 standard drinks     Types: 14 Cans of beer per week   â¢ Drug use: Yes     Types: Marijuana     Comment: MEDICAL MARIJUANA        Physical Exam  Vitals:    01/23/20 1415   BP: 164/88   Pulse: 90   Temp: 98.8 Â°F (37.1 Â°C)   TempSrc: Oral   SpO2: 99%   Weight: 66.4 kg (146 lb 6.2 oz)   Height: 5' 1"" (1.549 m)     Body mass index is 27.66 kg/mÂ². Physical Exam   Gen:  Alert and Oriented x3, NAD, well appearing  HEENT:  normocephalic, atraumatic,  No conjunctival discharge. TM pearly grey bilaterally, PERRLA, EOMI, +erythematous posterior pharynx, no exudates, +nasal drainage  Neck:  No cervical lymphadenopathy  CV:  RRR, S1, S2, no m/r/g. No pedal edema.   2+ " peripheral pulses  Chest:  No bony abnormalities. Pulm:  Good aeration,  +rales at lung bases  Abd:  Soft, NT, ND, NABS. No HSM  MS:  Normal gait. No effusions present  Neuro:  alert  Skin:   No rashes or ulcerations. No concerning skin lesions. Psych:  Speech fluent and appropriate. Mentation appropriate. Normal eye contact       Assessment/Plan    Acute bronchitis, unspecified organism  - benzonatate (TESSALON PERLES) 100 MG capsule; Take 1 capsule by mouth 3 times daily as needed for Cough. Dispense: 20 capsule; Refill: 0  - azithromycin (ZITHROMAX) 250 MG tablet; Take 2 tablets by mouth on day 1. Then take 1 tablet by mouth for the next 4 days. Dispense: 6 tablet; Refill: 0    #acute bronchitis  -advised patient if she does not feel better in a few days then start to start z-pack  -prescribed tessalon perles for cough relief  -continue with rest and increase fluid intake  -may continue to use throat lozenges and flonase  -note for work given to patient  -advised to return if cough does not improve in 1 week    Proper usage and side effects of medications reviewed and discussed. Patient education completed on disease process, etiology, and prognosis. Return to clinic as clinically indicated. All questions answered and patient verbalized understanding of the conversation and plan. Return if symptoms worsen or fail to improve.       Devan Calix MD as per cc and ortho.

## 2020-01-24 DIAGNOSIS — E83.39 OTHER DISORDERS OF PHOSPHORUS METABOLISM: ICD-10-CM

## 2020-01-24 DIAGNOSIS — I47.1 SUPRAVENTRICULAR TACHYCARDIA: ICD-10-CM

## 2020-01-24 DIAGNOSIS — I48.91 UNSPECIFIED ATRIAL FIBRILLATION: ICD-10-CM

## 2020-01-24 DIAGNOSIS — S20.20XA CONTUSION OF THORAX, UNSPECIFIED, INITIAL ENCOUNTER: ICD-10-CM

## 2020-01-24 DIAGNOSIS — G82.20 PARAPLEGIA, UNSPECIFIED: ICD-10-CM

## 2020-01-24 DIAGNOSIS — S00.81XA ABRASION OF OTHER PART OF HEAD, INITIAL ENCOUNTER: ICD-10-CM

## 2020-01-24 DIAGNOSIS — S32.591A OTHER SPECIFIED FRACTURE OF RIGHT PUBIS, INITIAL ENCOUNTER FOR CLOSED FRACTURE: ICD-10-CM

## 2020-01-24 DIAGNOSIS — I47.2 VENTRICULAR TACHYCARDIA: ICD-10-CM

## 2020-01-24 DIAGNOSIS — S12.501A UNSPECIFIED NONDISPLACED FRACTURE OF SIXTH CERVICAL VERTEBRA, INITIAL ENCOUNTER FOR CLOSED FRACTURE: ICD-10-CM

## 2020-01-24 DIAGNOSIS — E87.6 HYPOKALEMIA: ICD-10-CM
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
## 2020-01-24 DIAGNOSIS — F03.90 UNSPECIFIED DEMENTIA WITHOUT BEHAVIORAL DISTURBANCE: ICD-10-CM

## 2020-01-24 DIAGNOSIS — E11.9 TYPE 2 DIABETES MELLITUS WITHOUT COMPLICATIONS: ICD-10-CM

## 2020-01-24 DIAGNOSIS — Z87.891 PERSONAL HISTORY OF NICOTINE DEPENDENCE: ICD-10-CM

## 2020-01-24 DIAGNOSIS — G93.41 METABOLIC ENCEPHALOPATHY: ICD-10-CM
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
## 2020-01-24 DIAGNOSIS — I65.01 OCCLUSION AND STENOSIS OF RIGHT VERTEBRAL ARTERY: ICD-10-CM

## 2020-01-29 ENCOUNTER — APPOINTMENT (OUTPATIENT)
Dept: ORTHOPEDIC SURGERY | Facility: CLINIC | Age: 70
End: 2020-01-29

## 2020-01-29 VITALS
BODY MASS INDEX: 24.25 KG/M2 | DIASTOLIC BLOOD PRESSURE: 75 MMHG | HEIGHT: 68 IN | SYSTOLIC BLOOD PRESSURE: 131 MMHG | HEART RATE: 72 BPM | WEIGHT: 160 LBS

## 2020-02-21 DIAGNOSIS — Z78.9 OTHER SPECIFIED HEALTH STATUS: ICD-10-CM

## 2020-02-21 DIAGNOSIS — Z86.39 PERSONAL HISTORY OF OTHER ENDOCRINE, NUTRITIONAL AND METABOLIC DISEASE: ICD-10-CM

## 2020-02-21 DIAGNOSIS — Z72.3 LACK OF PHYSICAL EXERCISE: ICD-10-CM

## 2020-02-21 RX ORDER — DONEPEZIL HYDROCHLORIDE 23 MG/1
TABLET, FILM COATED ORAL
Refills: 0 | Status: ACTIVE | COMMUNITY

## 2020-02-24 ENCOUNTER — APPOINTMENT (OUTPATIENT)
Dept: ELECTROPHYSIOLOGY | Facility: CLINIC | Age: 70
End: 2020-02-24

## 2020-02-25 ENCOUNTER — APPOINTMENT (OUTPATIENT)
Dept: NEUROLOGY | Facility: CLINIC | Age: 70
End: 2020-02-25
Payer: COMMERCIAL

## 2020-02-25 VITALS
HEIGHT: 69.5 IN | BODY MASS INDEX: 19.69 KG/M2 | SYSTOLIC BLOOD PRESSURE: 138 MMHG | HEART RATE: 60 BPM | DIASTOLIC BLOOD PRESSURE: 60 MMHG | WEIGHT: 136 LBS

## 2020-02-25 PROCEDURE — 99214 OFFICE O/P EST MOD 30 MIN: CPT

## 2020-02-25 NOTE — DATA REVIEWED
[de-identified] : \par FINDINGS:\par \par CT HEAD:\par \par There is no evidence of acute infarction, intracranial hemorrhage or mass lesion.  There is hypoattenuation of the subcortical and periventricular white matter, which is nonspecific finding, but most likely represents sequela of chronic microvascular ischemic disease. There are atherosclerotic calcifications of the cavernous and supraclinoid internal carotid arteries bilaterally, and also the bilateral intradural vertebral arteries. There is mild prominence of the cortical sulci related to underlying brain parenchymal volume loss.\par \par There is no evidence of hydrocephalus. There are no extra-axial fluid collections.\par \par The visualized intraorbital contents are normal. The imaged portions of the paranasal sinuses are aerated. The mastoid air cells are clear. The visualized soft tissues and osseous structures appear unremarkable.\par \par \par CTA NECK:\par \par The visualized portion of the aortic arch is unremarkable in appearance. The origins of the great vessels and the vertebral arteries are normal without evidence of stenosis.\par \par The common carotid arteries are patent bilaterally without evidence of stenosis. There is mild atherosclerotic plaque at the left carotid bulb. There is no narrowing of the cervical internal carotid arteries bilaterally. \par \par There is a dominant left vertebral artery. There is nonvisualization of the right vertebral artery V3 segment from the level of the C2 vertebral body and extending intracranially (series 4 images [id number]).\par \par \par CTA HEAD:\par \par Evaluation of the anterior circulation demonstrates atherosclerotic calcification along the cavernous and supraclinoid ICAs bilaterally, resulting in mild vessel narrowing.  The proximal anterior, middle and posterior cerebral arteries are patent bilaterally.\par \par Evaluation of the posterior circulation demonstrates reconstitution of flow within the right vertebral artery at the level of the origin of the right PICA. The left vertebral artery is dominant and normal in caliber. The basilar artery is normal in caliber and patent throughout its course.\par \par No evidence of  aneurysm or vascular malformation. \par \par \par \par IMPRESSION: \par CT HEAD: No acute intracranial findings.\par \par CTA NECK: Nonvisualization of the right vertebral artery from the level of the C2 vertebral body and extending intracranially (V3 segment). Normal carotid arteries bilaterally.\par \par CTA HEAD: Reconstitution of flow within the right vertebral artery at the level of the origin of the right PICA. Mild narrowing cavernous and supraclinoid ICAs bilaterally. [de-identified] : \par EEG Summary/Classification:\par This was a normal EEG study in the awake and drowsy states.\par \par EEG Impression/Clinical Correlate:\par No epileptiform activity was seen and no clinical events or seizures were recorded. Consider a repeat study if clinically indicated. \par \par \par ________________________________________\par Sofie Aguayo MD\par Attending Physician\par Director of Epilepsy at [facility]\par

## 2020-02-25 NOTE — CONSULT LETTER
[Dear  ___] : Dear  [unfilled], [Consult Closing:] : Thank you very much for allowing me to participate in the care of this patient.  If you have any questions, please do not hesitate to contact me. [Courtesy Letter:] : I had the pleasure of seeing your patient, [unfilled], in my office today. [FreeTextEntry2] : Familia Coffman MD [Sincerely,] : Sincerely, [FreeTextEntry3] : Wade Villar MD

## 2020-02-25 NOTE — REVIEW OF SYSTEMS
[Confused or Disoriented] : confusion [Memory Lapses or Loss] : memory loss [Decr. Concentrating Ability] : decreased concentrating ability [Facial Weakness] : no facial weakness [Difficulty with Language] : no ~M difficulty with language [Hand Weakness] : no hand weakness [Arm Weakness] : no arm weakness [Leg Weakness] : no leg weakness [Abnormal Sensation] : no abnormal sensation [Poor Coordination] : good coordination [Seizures] : no convulsions [Dizziness] : no dizziness [Difficulty Walking] : no difficulty walking [Frequent Falls] : not falling [FreeTextEntry9] : neck pain [Negative] : Heme/Lymph

## 2020-02-25 NOTE — PHYSICAL EXAM
[General Appearance - Alert] : alert [Person] : oriented to person [General Appearance - In No Acute Distress] : in no acute distress [Time] : disoriented to time [Place] : disoriented to place [Remote Intact] : remote memory impaired [Concentration Intact] : a decrease in concentrating ability was observed [Naming Objects] : no difficulty naming common objects [Span Intact] : the attention span was normal [Repeating Phrases] : no difficulty repeating a phrase [Fluency] : fluency intact [Comprehension] : comprehension intact [Current Events] : inadequate knowledge of current events [Past History] : inadequate knowledge of personal past history [Cranial Nerves Optic (II)] : visual acuity intact bilaterally,  visual fields full to confrontation, pupils equal round and reactive to light [Cranial Nerves Oculomotor (III)] : extraocular motion intact [Cranial Nerves Trigeminal (V)] : facial sensation intact symmetrically [Cranial Nerves Glossopharyngeal (IX)] : tongue and palate midline [Cranial Nerves Vestibulocochlear (VIII)] : hearing was intact bilaterally [Cranial Nerves Facial (VII)] : face symmetrical [Cranial Nerves Hypoglossal (XII)] : there was no tongue deviation with protrusion [Motor Tone] : muscle tone was normal in all four extremities [Cranial Nerves Accessory (XI - Cranial And Spinal)] : head turning and shoulder shrug symmetric [Paresis Pronator Drift Right-Sided] : no pronator drift on the right [Motor Strength] : muscle strength was normal in all four extremities [Motor Handedness Right-Handed] : the patient is right hand dominant [Paresis Pronator Drift Left-Sided] : no pronator drift on the left [Sensation Pain / Temperature Decrease] : pain and temperature was intact [Sensation Tactile Decrease] : light touch was intact [Romberg's Sign] : Romberg's sign was negtive [Coordination - Dysmetria Impaired Finger-to-Nose Bilateral] : not present [Dysdiadochokinesia Bilaterally] : not present [1+] : Patella left 1+ [0] : Ankle jerk left 0 [PERRL With Normal Accommodation] : pupils were equal in size, round, reactive to light, with normal accommodation [Sclera] : the sclera and conjunctiva were normal [Full Visual Field] : full visual field [Extraocular Movements] : extraocular movements were intact [Neck Appearance] : the appearance of the neck was normal [Outer Ear] : the ears and nose were normal in appearance [Arterial Pulses Carotid] : carotid pulses were normal with no bruits [] : the neck was supple [No Spinal Tenderness] : no spinal tenderness [Abnormal Walk] : normal gait [Musculoskeletal - Swelling] : no joint swelling seen [Skin Color & Pigmentation] : normal skin color and pigmentation
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
## 2020-11-10 ENCOUNTER — INPATIENT (INPATIENT)
Facility: HOSPITAL | Age: 70
LOS: 7 days | Discharge: OTHER INSTITUTION | DRG: 57 | End: 2020-11-18
Attending: INTERNAL MEDICINE | Admitting: INTERNAL MEDICINE
Payer: MEDICARE

## 2020-11-10 VITALS
TEMPERATURE: 99 F | WEIGHT: 138.01 LBS | DIASTOLIC BLOOD PRESSURE: 111 MMHG | HEIGHT: 67 IN | RESPIRATION RATE: 19 BRPM | OXYGEN SATURATION: 97 % | SYSTOLIC BLOOD PRESSURE: 164 MMHG | HEART RATE: 118 BPM

## 2020-11-10 DIAGNOSIS — I10 ESSENTIAL (PRIMARY) HYPERTENSION: ICD-10-CM

## 2020-11-10 DIAGNOSIS — R41.82 ALTERED MENTAL STATUS, UNSPECIFIED: ICD-10-CM

## 2020-11-10 DIAGNOSIS — G30.9 ALZHEIMER'S DISEASE, UNSPECIFIED: ICD-10-CM

## 2020-11-10 DIAGNOSIS — E78.5 HYPERLIPIDEMIA, UNSPECIFIED: ICD-10-CM

## 2020-11-10 DIAGNOSIS — R79.89 OTHER SPECIFIED ABNORMAL FINDINGS OF BLOOD CHEMISTRY: ICD-10-CM

## 2020-11-10 DIAGNOSIS — I48.91 UNSPECIFIED ATRIAL FIBRILLATION: ICD-10-CM

## 2020-11-10 DIAGNOSIS — Z29.9 ENCOUNTER FOR PROPHYLACTIC MEASURES, UNSPECIFIED: ICD-10-CM

## 2020-11-10 DIAGNOSIS — F60.0 PARANOID PERSONALITY DISORDER: ICD-10-CM

## 2020-11-10 DIAGNOSIS — E11.9 TYPE 2 DIABETES MELLITUS WITHOUT COMPLICATIONS: ICD-10-CM

## 2020-11-10 LAB
ALBUMIN SERPL ELPH-MCNC: 4.9 G/DL — SIGNIFICANT CHANGE UP (ref 3.3–5)
ALP SERPL-CCNC: 59 U/L — SIGNIFICANT CHANGE UP (ref 40–120)
ALT FLD-CCNC: 17 U/L — SIGNIFICANT CHANGE UP (ref 12–78)
ANION GAP SERPL CALC-SCNC: 6 MMOL/L — SIGNIFICANT CHANGE UP (ref 5–17)
APPEARANCE UR: CLEAR — SIGNIFICANT CHANGE UP
AST SERPL-CCNC: 12 U/L — LOW (ref 15–37)
BASOPHILS # BLD AUTO: 0.05 K/UL — SIGNIFICANT CHANGE UP (ref 0–0.2)
BASOPHILS NFR BLD AUTO: 0.4 % — SIGNIFICANT CHANGE UP (ref 0–2)
BILIRUB SERPL-MCNC: 0.7 MG/DL — SIGNIFICANT CHANGE UP (ref 0.2–1.2)
BILIRUB UR-MCNC: NEGATIVE — SIGNIFICANT CHANGE UP
BUN SERPL-MCNC: 25 MG/DL — HIGH (ref 7–23)
CALCIUM SERPL-MCNC: 9.6 MG/DL — SIGNIFICANT CHANGE UP (ref 8.5–10.1)
CHLORIDE SERPL-SCNC: 105 MMOL/L — SIGNIFICANT CHANGE UP (ref 96–108)
CO2 SERPL-SCNC: 28 MMOL/L — SIGNIFICANT CHANGE UP (ref 22–31)
COLOR SPEC: YELLOW — SIGNIFICANT CHANGE UP
CREAT SERPL-MCNC: 1.3 MG/DL — SIGNIFICANT CHANGE UP (ref 0.5–1.3)
D DIMER BLD IA.RAPID-MCNC: 174 NG/ML DDU — SIGNIFICANT CHANGE UP
DIFF PNL FLD: NEGATIVE — SIGNIFICANT CHANGE UP
EOSINOPHIL # BLD AUTO: 0.1 K/UL — SIGNIFICANT CHANGE UP (ref 0–0.5)
EOSINOPHIL NFR BLD AUTO: 0.8 % — SIGNIFICANT CHANGE UP (ref 0–6)
GLUCOSE SERPL-MCNC: 281 MG/DL — HIGH (ref 70–99)
GLUCOSE UR QL: 100 MG/DL
HCT VFR BLD CALC: 40.2 % — SIGNIFICANT CHANGE UP (ref 39–50)
HGB BLD-MCNC: 14 G/DL — SIGNIFICANT CHANGE UP (ref 13–17)
IMM GRANULOCYTES NFR BLD AUTO: 0.3 % — SIGNIFICANT CHANGE UP (ref 0–1.5)
KETONES UR-MCNC: NEGATIVE — SIGNIFICANT CHANGE UP
LACTATE SERPL-SCNC: 0.9 MMOL/L — SIGNIFICANT CHANGE UP (ref 0.7–2)
LACTATE SERPL-SCNC: 3.1 MMOL/L — HIGH (ref 0.7–2)
LEUKOCYTE ESTERASE UR-ACNC: NEGATIVE — SIGNIFICANT CHANGE UP
LYMPHOCYTES # BLD AUTO: 1.31 K/UL — SIGNIFICANT CHANGE UP (ref 1–3.3)
LYMPHOCYTES # BLD AUTO: 10.6 % — LOW (ref 13–44)
MCHC RBC-ENTMCNC: 31.3 PG — SIGNIFICANT CHANGE UP (ref 27–34)
MCHC RBC-ENTMCNC: 34.8 GM/DL — SIGNIFICANT CHANGE UP (ref 32–36)
MCV RBC AUTO: 89.7 FL — SIGNIFICANT CHANGE UP (ref 80–100)
MONOCYTES # BLD AUTO: 0.84 K/UL — SIGNIFICANT CHANGE UP (ref 0–0.9)
MONOCYTES NFR BLD AUTO: 6.8 % — SIGNIFICANT CHANGE UP (ref 2–14)
NEUTROPHILS # BLD AUTO: 10.04 K/UL — HIGH (ref 1.8–7.4)
NEUTROPHILS NFR BLD AUTO: 81.1 % — HIGH (ref 43–77)
NITRITE UR-MCNC: NEGATIVE — SIGNIFICANT CHANGE UP
NRBC # BLD: 0 /100 WBCS — SIGNIFICANT CHANGE UP (ref 0–0)
PH UR: 5 — SIGNIFICANT CHANGE UP (ref 5–8)
PLATELET # BLD AUTO: 248 K/UL — SIGNIFICANT CHANGE UP (ref 150–400)
POTASSIUM SERPL-MCNC: 4.4 MMOL/L — SIGNIFICANT CHANGE UP (ref 3.5–5.3)
POTASSIUM SERPL-SCNC: 4.4 MMOL/L — SIGNIFICANT CHANGE UP (ref 3.5–5.3)
PROCALCITONIN SERPL-MCNC: <0.05 — SIGNIFICANT CHANGE UP (ref 0–0.04)
PROT SERPL-MCNC: 8.6 G/DL — HIGH (ref 6–8.3)
PROT UR-MCNC: NEGATIVE — SIGNIFICANT CHANGE UP
RBC # BLD: 4.48 M/UL — SIGNIFICANT CHANGE UP (ref 4.2–5.8)
RBC # FLD: 14.1 % — SIGNIFICANT CHANGE UP (ref 10.3–14.5)
SARS-COV-2 RNA SPEC QL NAA+PROBE: SIGNIFICANT CHANGE UP
SARS-COV-2 RNA SPEC QL NAA+PROBE: SIGNIFICANT CHANGE UP
SODIUM SERPL-SCNC: 139 MMOL/L — SIGNIFICANT CHANGE UP (ref 135–145)
SP GR SPEC: 1.01 — SIGNIFICANT CHANGE UP (ref 1.01–1.02)
UROBILINOGEN FLD QL: NEGATIVE — SIGNIFICANT CHANGE UP
WBC # BLD: 12.38 K/UL — HIGH (ref 3.8–10.5)
WBC # FLD AUTO: 12.38 K/UL — HIGH (ref 3.8–10.5)

## 2020-11-10 PROCEDURE — 99285 EMERGENCY DEPT VISIT HI MDM: CPT

## 2020-11-10 RX ORDER — SODIUM CHLORIDE 9 MG/ML
1000 INJECTION, SOLUTION INTRAVENOUS
Refills: 0 | Status: DISCONTINUED | OUTPATIENT
Start: 2020-11-10 | End: 2020-11-18

## 2020-11-10 RX ORDER — ATORVASTATIN CALCIUM 80 MG/1
40 TABLET, FILM COATED ORAL AT BEDTIME
Refills: 0 | Status: DISCONTINUED | OUTPATIENT
Start: 2020-11-10 | End: 2020-11-18

## 2020-11-10 RX ORDER — GLUCAGON INJECTION, SOLUTION 0.5 MG/.1ML
1 INJECTION, SOLUTION SUBCUTANEOUS ONCE
Refills: 0 | Status: DISCONTINUED | OUTPATIENT
Start: 2020-11-10 | End: 2020-11-18

## 2020-11-10 RX ORDER — QUETIAPINE FUMARATE 200 MG/1
50 TABLET, FILM COATED ORAL ONCE
Refills: 0 | Status: COMPLETED | OUTPATIENT
Start: 2020-11-10 | End: 2020-11-10

## 2020-11-10 RX ORDER — OLANZAPINE 15 MG/1
5 TABLET, FILM COATED ORAL EVERY 8 HOURS
Refills: 0 | Status: DISCONTINUED | OUTPATIENT
Start: 2020-11-10 | End: 2020-11-11

## 2020-11-10 RX ORDER — SODIUM CHLORIDE 9 MG/ML
1000 INJECTION INTRAMUSCULAR; INTRAVENOUS; SUBCUTANEOUS ONCE
Refills: 0 | Status: COMPLETED | OUTPATIENT
Start: 2020-11-10 | End: 2020-11-10

## 2020-11-10 RX ORDER — DILTIAZEM HCL 120 MG
240 CAPSULE, EXT RELEASE 24 HR ORAL DAILY
Refills: 0 | Status: DISCONTINUED | OUTPATIENT
Start: 2020-11-10 | End: 2020-11-18

## 2020-11-10 RX ORDER — LORATADINE 10 MG/1
10 TABLET ORAL DAILY
Refills: 0 | Status: DISCONTINUED | OUTPATIENT
Start: 2020-11-10 | End: 2020-11-18

## 2020-11-10 RX ORDER — MEMANTINE HYDROCHLORIDE 10 MG/1
5 TABLET ORAL
Refills: 0 | Status: DISCONTINUED | OUTPATIENT
Start: 2020-11-10 | End: 2020-11-18

## 2020-11-10 RX ORDER — HALOPERIDOL DECANOATE 100 MG/ML
5 INJECTION INTRAMUSCULAR ONCE
Refills: 0 | Status: COMPLETED | OUTPATIENT
Start: 2020-11-10 | End: 2020-11-10

## 2020-11-10 RX ORDER — HEPARIN SODIUM 5000 [USP'U]/ML
5000 INJECTION INTRAVENOUS; SUBCUTANEOUS EVERY 12 HOURS
Refills: 0 | Status: DISCONTINUED | OUTPATIENT
Start: 2020-11-10 | End: 2020-11-18

## 2020-11-10 RX ORDER — QUETIAPINE FUMARATE 200 MG/1
25 TABLET, FILM COATED ORAL THREE TIMES A DAY
Refills: 0 | Status: DISCONTINUED | OUTPATIENT
Start: 2020-11-10 | End: 2020-11-11

## 2020-11-10 RX ORDER — ACETAMINOPHEN 500 MG
650 TABLET ORAL EVERY 6 HOURS
Refills: 0 | Status: DISCONTINUED | OUTPATIENT
Start: 2020-11-10 | End: 2020-11-18

## 2020-11-10 RX ORDER — DONEPEZIL HYDROCHLORIDE 10 MG/1
10 TABLET, FILM COATED ORAL AT BEDTIME
Refills: 0 | Status: DISCONTINUED | OUTPATIENT
Start: 2020-11-10 | End: 2020-11-18

## 2020-11-10 RX ORDER — DEXTROSE 50 % IN WATER 50 %
15 SYRINGE (ML) INTRAVENOUS ONCE
Refills: 0 | Status: DISCONTINUED | OUTPATIENT
Start: 2020-11-10 | End: 2020-11-18

## 2020-11-10 RX ORDER — LANOLIN ALCOHOL/MO/W.PET/CERES
5 CREAM (GRAM) TOPICAL AT BEDTIME
Refills: 0 | Status: DISCONTINUED | OUTPATIENT
Start: 2020-11-10 | End: 2020-11-18

## 2020-11-10 RX ORDER — DEXTROSE 50 % IN WATER 50 %
25 SYRINGE (ML) INTRAVENOUS ONCE
Refills: 0 | Status: DISCONTINUED | OUTPATIENT
Start: 2020-11-10 | End: 2020-11-18

## 2020-11-10 RX ORDER — FAMOTIDINE 10 MG/ML
20 INJECTION INTRAVENOUS DAILY
Refills: 0 | Status: DISCONTINUED | OUTPATIENT
Start: 2020-11-10 | End: 2020-11-18

## 2020-11-10 RX ORDER — SODIUM CHLORIDE 9 MG/ML
1000 INJECTION INTRAMUSCULAR; INTRAVENOUS; SUBCUTANEOUS
Refills: 0 | Status: DISCONTINUED | OUTPATIENT
Start: 2020-11-10 | End: 2020-11-13

## 2020-11-10 RX ORDER — DEXTROSE 50 % IN WATER 50 %
12.5 SYRINGE (ML) INTRAVENOUS ONCE
Refills: 0 | Status: DISCONTINUED | OUTPATIENT
Start: 2020-11-10 | End: 2020-11-18

## 2020-11-10 RX ORDER — ACETAMINOPHEN 500 MG
650 TABLET ORAL ONCE
Refills: 0 | Status: COMPLETED | OUTPATIENT
Start: 2020-11-10 | End: 2020-11-10

## 2020-11-10 RX ORDER — ASPIRIN/CALCIUM CARB/MAGNESIUM 324 MG
325 TABLET ORAL DAILY
Refills: 0 | Status: DISCONTINUED | OUTPATIENT
Start: 2020-11-10 | End: 2020-11-18

## 2020-11-10 RX ORDER — INSULIN LISPRO 100/ML
VIAL (ML) SUBCUTANEOUS
Refills: 0 | Status: DISCONTINUED | OUTPATIENT
Start: 2020-11-10 | End: 2020-11-16

## 2020-11-10 RX ADMIN — HALOPERIDOL DECANOATE 5 MILLIGRAM(S): 100 INJECTION INTRAMUSCULAR at 10:19

## 2020-11-10 RX ADMIN — Medication 1 MILLIGRAM(S): at 17:16

## 2020-11-10 RX ADMIN — Medication 1 MILLIGRAM(S): at 09:15

## 2020-11-10 RX ADMIN — DONEPEZIL HYDROCHLORIDE 10 MILLIGRAM(S): 10 TABLET, FILM COATED ORAL at 23:20

## 2020-11-10 RX ADMIN — SODIUM CHLORIDE 1000 MILLILITER(S): 9 INJECTION INTRAMUSCULAR; INTRAVENOUS; SUBCUTANEOUS at 10:30

## 2020-11-10 RX ADMIN — SODIUM CHLORIDE 1000 MILLILITER(S): 9 INJECTION INTRAMUSCULAR; INTRAVENOUS; SUBCUTANEOUS at 11:10

## 2020-11-10 RX ADMIN — HALOPERIDOL DECANOATE 5 MILLIGRAM(S): 100 INJECTION INTRAMUSCULAR at 17:23

## 2020-11-10 RX ADMIN — QUETIAPINE FUMARATE 25 MILLIGRAM(S): 200 TABLET, FILM COATED ORAL at 23:20

## 2020-11-10 RX ADMIN — SODIUM CHLORIDE 1000 MILLILITER(S): 9 INJECTION INTRAMUSCULAR; INTRAVENOUS; SUBCUTANEOUS at 09:15

## 2020-11-10 RX ADMIN — QUETIAPINE FUMARATE 50 MILLIGRAM(S): 200 TABLET, FILM COATED ORAL at 19:31

## 2020-11-10 RX ADMIN — SODIUM CHLORIDE 50 MILLILITER(S): 9 INJECTION INTRAMUSCULAR; INTRAVENOUS; SUBCUTANEOUS at 18:39

## 2020-11-10 RX ADMIN — ATORVASTATIN CALCIUM 40 MILLIGRAM(S): 80 TABLET, FILM COATED ORAL at 23:20

## 2020-11-10 RX ADMIN — Medication 650 MILLIGRAM(S): at 09:15

## 2020-11-10 RX ADMIN — Medication 0.5 MILLIGRAM(S): at 23:39

## 2020-11-10 RX ADMIN — Medication 650 MILLIGRAM(S): at 10:19

## 2020-11-10 RX ADMIN — OLANZAPINE 5 MILLIGRAM(S): 15 TABLET, FILM COATED ORAL at 19:21

## 2020-11-10 NOTE — ED PROVIDER NOTE - PATIENT PORTAL LINK FT
You can access the FollowMyHealth Patient Portal offered by [facility] by registering at the following website: [email or web address]. By joining [employer]’s FollowMyHealth portal, you will also be able to view your health information using other applications (apps) compatible with our system.

## 2020-11-10 NOTE — CONSULT NOTE ADULT - SUBJECTIVE AND OBJECTIVE BOX
JODI DUBOSE    PLV APER 07    Patient is a 69y old  Male who presents with a chief complaint of ALTERED MENTAL STATUS  AND LACTICEMIA (10 Nov 2020 18:10)       Allergies    No Known Allergies    Intolerances        HPI:  70 y/o Male  from [facility] with medical  hx of Afib, HTN ,HLD ,DM ,Paranoid personality disorder, Alzheimer's dementia, sent in to ED  for combative behaviour and AMS x 1 day.  Patient was tested  +Covid yesterday  .  Pt is apparently AAOx3 at baseline.  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 Pt wants the person responsible for sending him to the ED to be fired. Pt denies any SOB, cough, GI/ symptoms. Covid test came back negative but patient found to have lactate serum elevated 3.1 ,iv hydration given Admitted for septic workup and evaluation,send blood and urine cx,serial lactate levels,monitor vitals closley,ivfs hydration,monitor urine output and renal profile,id and pulm consults called to determine need in antibacterial tx Patient developed severe agitation and combativeness in er later ,requiring  several ativan ,haldol injections .Placed on constant observation CT BRAIN -NAD .PSYCHIATRY AND NEUROLOGY consults requested .Palliative care consult requested ,to discuss advance directives and complete MOLST  (10 Nov 2020 18:10)      PAST MEDICAL & SURGICAL HISTORY:  Paranoid personality disorder    Anxiety    Dementia    Alzheimer disease    Hypertension    Hyperlipidemia    Diabetes mellitus    Afib        FAMILY HISTORY:        MEDICATIONS   acetaminophen   Tablet .. 650 milliGRAM(s) Oral every 6 hours PRN  aspirin 325 milliGRAM(s) Oral daily  atorvastatin 40 milliGRAM(s) Oral at bedtime  dextrose 40% Gel 15 Gram(s) Oral once  dextrose 5%. 1000 milliLiter(s) IV Continuous <Continuous>  dextrose 5%. 1000 milliLiter(s) IV Continuous <Continuous>  dextrose 50% Injectable 25 Gram(s) IV Push once  dextrose 50% Injectable 12.5 Gram(s) IV Push once  dextrose 50% Injectable 25 Gram(s) IV Push once  diltiazem    milliGRAM(s) Oral daily  donepezil 10 milliGRAM(s) Oral at bedtime  famotidine    Tablet 20 milliGRAM(s) Oral daily  glucagon  Injectable 1 milliGRAM(s) IntraMuscular once  heparin   Injectable 5000 Unit(s) SubCutaneous every 12 hours  insulin lispro (ADMELOG) corrective regimen sliding scale   SubCutaneous three times a day before meals  loratadine 10 milliGRAM(s) Oral daily  LORazepam   Injectable 0.5 milliGRAM(s) IV Push three times a day PRN  melatonin 5 milliGRAM(s) Oral at bedtime PRN  memantine 5 milliGRAM(s) Oral two times a day  OLANZapine Injectable 5 milliGRAM(s) IntraMuscular every 8 hours PRN  QUEtiapine 25 milliGRAM(s) Oral three times a day  sodium chloride 0.9%. 1000 milliLiter(s) IV Continuous <Continuous>      Vital Signs Last 24 Hrs  T(C): 36.8 (10 Nov 2020 13:04), Max: 37.3 (10 Nov 2020 08:36)  T(F): 98.3 (10 Nov 2020 13:04), Max: 99.2 (10 Nov 2020 08:36)  HR: 74 (10 Nov 2020 18:31) (54 - 118)  BP: 130/84 (10 Nov 2020 18:31) (130/84 - 164/111)  BP(mean): --  RR: 16 (10 Nov 2020 18:31) (16 - 19)  SpO2: 99% (10 Nov 2020 18:31) (97% - 99%)            LABS:                        14.0   12.38 )-----------( 248      ( 10 Nov 2020 09:41 )             40.2     11-10    139  |  105  |  25<H>  ----------------------------<  281<H>  4.4   |  28  |  1.30    Ca    9.6      10 Nov 2020 09:41    TPro  8.6<H>  /  Alb  4.9  /  TBili  0.7  /  DBili  x   /  AST  12<L>  /  ALT  17  /  AlkPhos  59  11-10      Urinalysis Basic - ( 10 Nov 2020 15:37 )    Color: Yellow / Appearance: Clear / S.015 / pH: x  Gluc: x / Ketone: Negative  / Bili: Negative / Urobili: Negative   Blood: x / Protein: Negative / Nitrite: Negative   Leuk Esterase: Negative / RBC: x / WBC x   Sq Epi: x / Non Sq Epi: x / Bacteria: x            WBC:  WBC Count: 12.38 K/uL (11-10 @ 09:41)      MICROBIOLOGY:  RECENT CULTURES:                  Sodium:  Sodium, Serum: 139 mmol/L (11-10 @ 09:41)      1.30 mg/dL 11-10 @ 09:41      Hemoglobin:  Hemoglobin: 14.0 g/dL (11-10 @ 09:41)      Platelets: Platelet Count - Automated: 248 K/uL (11-10 @ 09:41)      LIVER FUNCTIONS - ( 10 Nov 2020 09:41 )  Alb: 4.9 g/dL / Pro: 8.6 g/dL / ALK PHOS: 59 U/L / ALT: 17 U/L / AST: 12 U/L / GGT: x             Urinalysis Basic - ( 10 Nov 2020 15:37 )    Color: Yellow / Appearance: Clear / S.015 / pH: x  Gluc: x / Ketone: Negative  / Bili: Negative / Urobili: Negative   Blood: x / Protein: Negative / Nitrite: Negative   Leuk Esterase: Negative / RBC: x / WBC x   Sq Epi: x / Non Sq Epi: x / Bacteria: x        RADIOLOGY & ADDITIONAL STUDIES:

## 2020-11-10 NOTE — CONSULT NOTE ADULT - ASSESSMENT
THOMAS ZAPATA [facility] 11/10/2020 1950 DR GEORGIA BROWNLEE            Initial evaluation/Pulmonary Critical Care consultation requested on  11/10/2020  by Dr Brownlee    from Dr Cameron   Patient examined chart reviewed    HOSPITAL ADMISSION   PATIENT CAME  FROM (if information available)      THOMAS ZAPATA [facility] 11/10/2020 1950 DR GEORGIA BROWNLEE       REVIEW OF SYMPTOMS      Able to give ROS  NO     PHYSICAL EXAM    HEENT Unremarkable PERRLA atraumatic   RESP Fair air entry EXP prolonged    Harsh breath sound Resp distres mild   CARDIAC S1 S2 No S3     NO JVD    ABDOMEN SOFT BS PRESENT NOT DISTENDED No hepatosplenomegaly PEDAL EDEMA present No calf tenderness  NO rash     PT DATA/BEST PRACTICE  ALLERGY   nka           WT                11/10/2020 62               BMI                       11/10/2020 21                 ADVANCED DIRECTIVE     HEAD OF BED ELEVATION Yes      DVT PROPHYLAXIS.      hpsc (11/10/2020)   DIET. cons carb soft  (11/10/46046                                                                    LLOYD PROPHYLAXIS.   INFECTION PPLX                                                    OXYGENATION.   11/10/2020 ra 995       VITALS.   11/10/2020 99f 118 136/65          IV Fluids.   NS 50 (11/10/27712         MICROBIO.   COVID pcr 11/10/2020 pcr negv   UA 11/10/2020 ua nitr negv l estr negv       LABS.   W 11/10/2020 w 12.3  la 11/10-11/10/2020 la 3.1-.9  Pr 11/10/2020 Pr negv   d dimr 11/10/2020 ddimr 174   Hb 11/10/2020 Hb 14   Plt 11/10/2020 plt 248   Na 11/10/2020 Na 139   Cr 11/10/2020 Cr 1.3   LFTS 11/10/2020   ap 59  ast 12  alt 17          EKG  11/10/2020 ekg a fib     IMAGING.   CXR 11/10/2020 cxr napd   CT h 11/10/2020 ct h negv            MEDS.   HPSC (11/10/2020)    (11/10/2020)  LORATADINE 10 (11/10/2020)   ATORVASTAT 40 (11/10/2020)   CARDIZEM 240 (11/10/2020)   FAMOTIDINE 20 (11/10/2020)   INSULIN (11/10/2020)  LORAZEPAM .5x3 p (11/10/2020)   OLANZAPINE 5.3 p (11/10/2020)   QUETIAPINE 25.3 (11/10/2020)   DONEPEZIL 10 (11/10/2020)       PATIENT SUMMARY.   69mf who had tested covid posv 11/9 was sent from [employer] [facility] to er with agitation andtested covid negv On arrival 164/110 99f ra 97%   In ER pt was found to have lacticemia and pulm consulted for sepsis ro pneumonia 11/10/2020   PMH Afib alzheimers anxiety dementia DM hytn   home meds asa 325 atorvastat 40 cardizem 240 aricept 10 pepcid namenda 5 claritin 10 metformin 500 quetiapine 25.3       PROBLEM/ASSESSMENT/RECOMMENDATIONS.  SEPSIS   Doubt bacterial infection No significant fever or leukocytosis and negv cxr and negv procalc poa   LACTICEMIA   Repeat la improved on 11/10/2020   RESP   ra po 99% poa   Monitor and target po 90-95%  ALTERED MENTAL STATE   11/10/2020 ct head 11/10/2020 was negv   observe   Control agitation with meds   A fib   On ASA cardizem   cont rx      TIME SPENT   Over 55 minutes aggregate care time spent on encounter; activities included   direct patient care, counseling and/or coordinating care reviewing notes, lab data/ imaging , discussion with multidisciplinary team/ patient  /family and explaining in detail risks, benefits, alternatives  of the recommendations     THOMAS MCCLELLAND 11/10/2020 1950 DR GEORGIA BROWNLEE

## 2020-11-10 NOTE — H&P ADULT - PROBLEM SELECTOR PLAN 4
serial bmp and lactate levels ,continue iv hydration ,stop metformin .Septic workup and id evaluation in view of recent covid positive test

## 2020-11-10 NOTE — H&P ADULT - PROBLEM SELECTOR PLAN 2
WITH BEHAVIORAL DISTURBANCE ,POA -ZYPREXA AND ATIVAN PRN ,CONSTANT OBSERVATION ,PSYCHIATRY CONSULT ,SUPPORTIVE CARE

## 2020-11-10 NOTE — H&P ADULT - HISTORY OF PRESENT ILLNESS
68 y/o Male  from [facility] with medical  hx of Afib, HTN ,HLD ,DM ,Paranoid personality disorder, Alzheimer's dementia, sent in to ED  for combative behaviour and AMS x 1 day.  Patient was tested  +Covid yesterday 11/09 .  Pt is apparently AAOx3 at baseline.  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 Pt wants the person responsible for sending him to the ED to be fired. Pt denies any SOB, cough, GI/ symptoms. Covid test came back negative but patient found to have lactate serum elevated 3.1 ,iv hydration given Admitted for septic workup and evaluation,send blood and urine cx,serial lactate levels,monitor vitals closley,ivfs hydration,monitor urine output and renal profile,id and pulm consults called to determine need in antibacterial tx Patient developed severe agitation and combativeness in er later ,requiring  several ativan ,haldol injections .Placed on constant observation CT BRAIN -NAD .PSYCHIATRY AND NEUROLOGY consults requested .Palliative care consult requested ,to discuss advance directives and complete MOLST
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
## 2020-11-10 NOTE — ED ADULT NURSE REASSESSMENT NOTE - NS ED NURSE REASSESS COMMENT FT1
Meal provided, pt sitting up awake alert calm and cooperative. Reiterated need for urine to patient, requests water in order to urinate.

## 2020-11-10 NOTE — INPATIENT CERTIFICATION FOR MEDICARE PATIENTS - RISKS OF ADVERSE EVENTS
Concern for delay in diagnosis and treatment/Other:/Concern for neurologic deterioration/management of severe agitation

## 2020-11-10 NOTE — H&P ADULT - NSICDXPASTMEDICALHX_GEN_ALL_CORE_FT
PAST MEDICAL HISTORY:  Afib     Alzheimer disease     Anxiety     Dementia     Diabetes mellitus     Hyperlipidemia     Hypertension     Paranoid personality disorder

## 2020-11-10 NOTE — H&P ADULT - PROBLEM SELECTOR PLAN 1
LIKELY 2/2 TO PROGRESSION OF DEMENTIA ,RULE OUT ACUTE METABOLIC ENCEPHALOPATHY AND INFECTIOUS SOURCES OF AMS

## 2020-11-10 NOTE — ED ADULT NURSE NOTE - PMH
Afib    Alzheimer disease    Anxiety    Dementia    Diabetes mellitus    Hyperlipidemia    Hypertension    Paranoid personality disorder

## 2020-11-10 NOTE — H&P ADULT - NSHPLABSRESULTS_GEN_ALL_CORE
< from: CT Head No Cont (11.10.20 @ 14:43) >          INTERPRETATION:  CLINICAL Indications:  Mental status change, unknown cause    COMPARISON: None.    TECHNIQUE: Noncontrast CT of the head. Multiplanar reformations are submitted.    FINDINGS:  There is periventricular and subcortical white matter hypodensity without mass effect, nonspecific, likely representing mild chronic microvascular ischemic changes. There is no compelling evidence for an acute transcortical infarction. There is no evidence of mass, mass effect, midline shift or extra-axial fluid collection. The lateral ventricles and cortical sulci are age-appropriate in size and configuration. The orbits, mastoid air cells and visualized paranasal sinusesare unremarkable. The calvarium is intact. Consider MRI of the brain as clinically warranted.    IMPRESSION:  Mild chronic microvascular changes without evidence of an acute transcortical infarction or hemorrhage.    < end of copied text >    < from: Xray Chest 1 View- PORTABLE-Urgent (11.10.20 @ 11:33) >    EXAM:  XR CHEST PORTABLE URGENT 1V                            PROCEDURE DATE:  11/10/2020          INTERPRETATION:  Chest one view    HISTORY: Shortness of breath, cough and fever    COMPARISON STUDY: None available    Frontal expiratory view of the chest shows the heart to be normal in size. The lungs are clear and there is no evidence of pneumothorax nor definite pleural effusion.    IMPRESSION:  No active pulmonary disease.    Thank you for the courtesy of this referral.    < end of copied text >

## 2020-11-10 NOTE — ED PROVIDER NOTE - OBJECTIVE STATEMENT
68 y/o M from [facility] with hx of Afib, paranoid personality disorder, alzheimer's dementia, sent in for combative behaviour, AMS x 1 day.  pt +Covid yesterday.  Pt is apparently AAOx3 at baseline.  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 Pt wants the person responsible for sending him to the ED to be fired. Pt denies any SOB, cough, GI/ symptoms.

## 2020-11-10 NOTE — ED PROVIDER NOTE - RELIEVING FACTORS
Pt is wondering if there is any alternative he could get for the Rx for Proctoroam HC 1-1% rectal foam that was sent over? It is quite expensive even after insurance ($135)         none

## 2020-11-10 NOTE — H&P ADULT - ASSESSMENT
68 y/o Male  from [facility] with medical  hx of Afib, HTN ,HLD ,DM ,Paranoid personality disorder, Alzheimer's dementia, sent in to ED  for combative behaviour and AMS x 1 day.  Patient was tested  +Covid yesterday 11/09 .  Pt is apparently AAOx3 at baseline.  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 Pt wants the person responsible for sending him to the ED to be fired. Pt denies any SOB, cough, GI/ symptoms. Covid test came back negative but patient found to have lactate serum elevated 3.1 ,iv hydration given Admitted for septic workup and evaluation,send blood and urine cx,serial lactate levels,monitor vitals closley,ivfs hydration,monitor urine output and renal profile,id and pulm consults called to determine need in antibacterial tx Patient developed severe agitation and combativeness in er later ,requiring  several ativan ,haldol injections .Placed on constant observation CT BRAIN -NAD .PSYCHIATRY AND NEUROLOGY consults requested .Palliative care consult requested ,to discuss advance directives and complete MOLST

## 2020-11-10 NOTE — ED PROVIDER NOTE - PROGRESS NOTE DETAILS
pt improved after ativan/haldol.  improved after IVFs.  lactate normal.  pt pending discharge become combative and agitated again.  seroquel/ativan/haldol, admit

## 2020-11-10 NOTE — H&P ADULT - ATTENDING COMMENTS
70 y/o Male  from [facility] with medical  hx of Afib, HTN ,HLD ,DM ,Paranoid personality disorder, Alzheimer's dementia, sent in to ED  for combative behaviour and AMS x 1 day.  Patient was tested  +Covid yesterday 11/09 .  Pt is apparently AAOx3 at baseline.  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 Pt wants the person responsible for sending him to the ED to be fired. Pt denies any SOB, cough, GI/ symptoms. Covid test came back negative but patient found to have lactate serum elevated 3.1 ,iv hydration given Admitted for septic workup and evaluation,send blood and urine cx,serial lactate levels,monitor vitals closley,ivfs hydration,monitor urine output and renal profile,id and pulm consults called to determine need in antibacterial tx Patient developed severe agitation and combativeness in er later ,requiring  several ativan ,haldol injections .Placed on constant observation CT BRAIN -NAD .PSYCHIATRY AND NEUROLOGY consults requested .Palliative care consult requested ,to discuss advance directives and complete MOLST

## 2020-11-10 NOTE — CONSULT NOTE ADULT - SUBJECTIVE AND OBJECTIVE BOX
Patient is a 69y old  Male who presents with a chief complaint of ALTERED MENTAL STATUS  AND LACTICEMIA (10 Nov 2020 18:45)      HPI:  68 y/o Male  from [facility] with medical  hx of Afib, HTN ,HLD ,DM ,Paranoid personality disorder, Alzheimer's dementia, sent in to ED  for combative behaviour and AMS x 1 day.  Patient was tested  +Covid yesterday 11/09 .  Pt is apparently AAOx3 at baseline.  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 Pt wants the person responsible for sending him to the ED to be fired. Pt denies any SOB, cough, GI/ symptoms. Covid test came back negative but patient found to have lactate serum elevated 3.1 ,iv hydration given Admitted for septic workup and evaluation,send blood and urine cx,serial lactate levels,monitor vitals closley,ivfs hydration,monitor urine output and renal profile,id and pulm consults called to determine need in antibacterial tx Patient developed severe agitation and combativeness in er later ,requiring  several ativan ,haldol injections .Placed on constant observation CT BRAIN -NAD .PSYCHIATRY AND NEUROLOGY consults requested .Palliative care consult requested ,to discuss advance directives and complete MOLST  (10 Nov 2020 18:10)      Asked to see patient for ID Consult    PAST MEDICAL & SURGICAL HISTORY:  Paranoid personality disorder    Anxiety    Dementia    Alzheimer disease    Hypertension    Hyperlipidemia    Diabetes mellitus    Afib        Allergies    No Known Allergies    Intolerances        REVIEW OF SYSTEMS:  All systems below were reviewed and are negative [  ]  HEENT:  ID:  Pulmonary:  Cardiac:  GI:  Renal:  Musculoskeletal:  All other systems above were reviewed and are negative   [  ]    HOME MEDICATIONS:    MEDICATIONS  (STANDING):  aspirin 325 milliGRAM(s) Oral daily  atorvastatin 40 milliGRAM(s) Oral at bedtime  dextrose 40% Gel 15 Gram(s) Oral once  dextrose 5%. 1000 milliLiter(s) (50 mL/Hr) IV Continuous <Continuous>  dextrose 5%. 1000 milliLiter(s) (100 mL/Hr) IV Continuous <Continuous>  dextrose 50% Injectable 25 Gram(s) IV Push once  dextrose 50% Injectable 12.5 Gram(s) IV Push once  dextrose 50% Injectable 25 Gram(s) IV Push once  diltiazem    milliGRAM(s) Oral daily  donepezil 10 milliGRAM(s) Oral at bedtime  famotidine    Tablet 20 milliGRAM(s) Oral daily  glucagon  Injectable 1 milliGRAM(s) IntraMuscular once  heparin   Injectable 5000 Unit(s) SubCutaneous every 12 hours  insulin lispro (ADMELOG) corrective regimen sliding scale   SubCutaneous three times a day before meals  loratadine 10 milliGRAM(s) Oral daily  memantine 5 milliGRAM(s) Oral two times a day  QUEtiapine 25 milliGRAM(s) Oral three times a day  sodium chloride 0.9%. 1000 milliLiter(s) (50 mL/Hr) IV Continuous <Continuous>    MEDICATIONS  (PRN):  acetaminophen   Tablet .. 650 milliGRAM(s) Oral every 6 hours PRN Temp greater or equal to 38C (100.4F), Mild Pain (1 - 3)  LORazepam   Injectable 0.5 milliGRAM(s) IV Push three times a day PRN SEVERE AGITATION  melatonin 5 milliGRAM(s) Oral at bedtime PRN Insomnia  OLANZapine Injectable 5 milliGRAM(s) IntraMuscular every 8 hours PRN agitation      Vital Signs Last 24 Hrs  T(C): 36.8 (10 Nov 2020 13:04), Max: 37.3 (10 Nov 2020 08:36)  T(F): 98.3 (10 Nov 2020 13:04), Max: 99.2 (10 Nov 2020 08:36)  HR: 74 (10 Nov 2020 18:31) (54 - 118)  BP: 130/84 (10 Nov 2020 18:31) (130/84 - 164/111)  BP(mean): --  RR: 16 (10 Nov 2020 18:31) (16 - 19)  SpO2: 99% (10 Nov 2020 18:31) (97% - 99%)    PHYSICAL EXAM:  HEENT:  Neck:  Lungs:  Heart:  Abdomen:  Genital/ Rectal:  Extremities:  Neurologic:  Vascular:    I&O's Summary      LABORATORY:                          14.0   12.38 )-----------( 248      ( 10 Nov 2020 09:41 )             40.2       ESR:                   11-10 @ 15:33  --    C-Reactive Protein:     11-10 @ 15:33  1.24    Procalcitonin:           11-10 @ 15:33   --  ESR:                   11-10 @ 09:41  --    C-Reactive Protein:     11-10 @ 09:41  --    Procalcitonin:           11-10 @ 09:41   <0.05      11-10    139  |  105  |  25<H>  ----------------------------<  281<H>  4.4   |  28  |  1.30    Ca    9.6      10 Nov 2020 09:41    TPro  8.6<H>  /  Alb  4.9  /  TBili  0.7  /  DBili  x   /  AST  12<L>  /  ALT  17  /  AlkPhos  59  11-10          LABORATORY:    CBC Full  -  ( 10 Nov 2020 09:41 )  WBC Count : 12.38 K/uL  RBC Count : 4.48 M/uL  Hemoglobin : 14.0 g/dL  Hematocrit : 40.2 %  Platelet Count - Automated : 248 K/uL  Mean Cell Volume : 89.7 fl  Mean Cell Hemoglobin : 31.3 pg  Mean Cell Hemoglobin Concentration : 34.8 gm/dL  Auto Neutrophil # : 10.04 K/uL  Auto Lymphocyte # : 1.31 K/uL  Auto Monocyte # : 0.84 K/uL  Auto Eosinophil # : 0.10 K/uL  Auto Basophil # : 0.05 K/uL  Auto Neutrophil % : 81.1 %  Auto Lymphocyte % : 10.6 %  Auto Monocyte % : 6.8 %  Auto Eosinophil % : 0.8 %  Auto Basophil % : 0.4 %        ESR:                   11-10 @ 15:33  --    C-Reactive Protein:     11-10 @ 15:33  1.24    Procalcitonin:           11-10 @ 15:33   --    ESR:                   11-10 @ 09:41  --    C-Reactive Protein:     11-10 @ 09:41  --    Procalcitonin:           11-10 @ 09:41   <0.05      11-10    139  |  105  |  25<H>  ----------------------------<  281<H>  4.4   |  28  |  1.30    Ca    9.6      10 Nov 2020 09:41    TPro  8.6<H>  /  Alb  4.9  /  TBili  0.7  /  DBili  x   /  AST  12<L>  /  ALT  17  /  AlkPhos  59  11-10                                                   Patient is a 69y old  Male who presents with a chief complaint of ALTERED MENTAL STATUS  AND LACTICEMIA (10 Nov 2020 18:45)      HPI:  70 y/o Male  from [facility] with medical  hx of Afib, HTN ,HLD ,DM ,Paranoid personality disorder, Alzheimer's dementia, sent in to ED  for combative behaviour and AMS x 1 day.  Patient was tested  +Covid yesterday 11/09 .  Pt is apparently AAOx3 at baseline.  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 Pt wants the person responsible for sending him to the ED to be fired. Pt denies any SOB, cough, GI/ symptoms. Covid test came back negative but patient found to have lactate serum elevated 3.1 ,iv hydration given Admitted for septic workup and evaluation,send blood and urine cx,serial lactate levels,monitor vitals closley,ivfs hydration,monitor urine output and renal profile,id and pulm consults called to determine need in antibacterial tx Patient developed severe agitation and combativeness in er later ,requiring  several ativan ,haldol injections .Placed on constant observation CT BRAIN -NAD .PSYCHIATRY AND NEUROLOGY consults requested .Palliative care consult requested ,to discuss advance directives and complete MOLST  (10 Nov 2020 18:10)      Asked to see patient for ID Consult    PAST MEDICAL & SURGICAL HISTORY:  Paranoid personality disorder    Anxiety    Dementia    Alzheimer disease    Hypertension    Hyperlipidemia    Diabetes mellitus    Afib        Allergies    No Known Allergies    Intolerances        REVIEW OF SYSTEMS:  No cough or SOB  No diarrhea     HOME MEDICATIONS:    MEDICATIONS  (STANDING):  aspirin 325 milliGRAM(s) Oral daily  atorvastatin 40 milliGRAM(s) Oral at bedtime  dextrose 40% Gel 15 Gram(s) Oral once  dextrose 5%. 1000 milliLiter(s) (50 mL/Hr) IV Continuous <Continuous>  dextrose 5%. 1000 milliLiter(s) (100 mL/Hr) IV Continuous <Continuous>  dextrose 50% Injectable 25 Gram(s) IV Push once  dextrose 50% Injectable 12.5 Gram(s) IV Push once  dextrose 50% Injectable 25 Gram(s) IV Push once  diltiazem    milliGRAM(s) Oral daily  donepezil 10 milliGRAM(s) Oral at bedtime  famotidine    Tablet 20 milliGRAM(s) Oral daily  glucagon  Injectable 1 milliGRAM(s) IntraMuscular once  heparin   Injectable 5000 Unit(s) SubCutaneous every 12 hours  insulin lispro (ADMELOG) corrective regimen sliding scale   SubCutaneous three times a day before meals  loratadine 10 milliGRAM(s) Oral daily  memantine 5 milliGRAM(s) Oral two times a day  QUEtiapine 25 milliGRAM(s) Oral three times a day  sodium chloride 0.9%. 1000 milliLiter(s) (50 mL/Hr) IV Continuous <Continuous>    MEDICATIONS  (PRN):  acetaminophen   Tablet .. 650 milliGRAM(s) Oral every 6 hours PRN Temp greater or equal to 38C (100.4F), Mild Pain (1 - 3)  LORazepam   Injectable 0.5 milliGRAM(s) IV Push three times a day PRN SEVERE AGITATION  melatonin 5 milliGRAM(s) Oral at bedtime PRN Insomnia  OLANZapine Injectable 5 milliGRAM(s) IntraMuscular every 8 hours PRN agitation      Vital Signs Last 24 Hrs  T(C): 36.8 (10 Nov 2020 13:04), Max: 37.3 (10 Nov 2020 08:36)  T(F): 98.3 (10 Nov 2020 13:04), Max: 99.2 (10 Nov 2020 08:36)  HR: 74 (10 Nov 2020 18:31) (54 - 118)  BP: 130/84 (10 Nov 2020 18:31) (130/84 - 164/111)  BP(mean): --  RR: 16 (10 Nov 2020 18:31) (16 - 19)  SpO2: 99% (10 Nov 2020 18:31) (97% - 99%)    PHYSICAL EXAM:    HEENT: NC/AT  Neck: Soft  Lungs: CTA bilaterally; no wheezing.   Heart: RRR; no murmurs.   Abdomen: Soft; no tenderness.   Genital/ Rectal: No echeverria   Extremities: No ulcers.   Neurologic:: Confused.       I&O's Summary      LABORATORY:                          14.0   12.38 )-----------( 248      ( 10 Nov 2020 09:41 )             40.2       ESR:                   11-10 @ 15:33  --    C-Reactive Protein:     11-10 @ 15:33  1.24    Procalcitonin:           11-10 @ 15:33   --  ESR:                   11-10 @ 09:41  --    C-Reactive Protein:     11-10 @ 09:41  --    Procalcitonin:           11-10 @ 09:41   <0.05      11-10    139  |  105  |  25<H>  ----------------------------<  281<H>  4.4   |  28  |  1.30    Ca    9.6      10 Nov 2020 09:41    TPro  8.6<H>  /  Alb  4.9  /  TBili  0.7  /  DBili  x   /  AST  12<L>  /  ALT  17  /  AlkPhos  59  11-10          LABORATORY:    CBC Full  -  ( 10 Nov 2020 09:41 )  WBC Count : 12.38 K/uL  RBC Count : 4.48 M/uL  Hemoglobin : 14.0 g/dL  Hematocrit : 40.2 %  Platelet Count - Automated : 248 K/uL  Mean Cell Volume : 89.7 fl  Mean Cell Hemoglobin : 31.3 pg  Mean Cell Hemoglobin Concentration : 34.8 gm/dL  Auto Neutrophil # : 10.04 K/uL  Auto Lymphocyte # : 1.31 K/uL  Auto Monocyte # : 0.84 K/uL  Auto Eosinophil # : 0.10 K/uL  Auto Basophil # : 0.05 K/uL  Auto Neutrophil % : 81.1 %  Auto Lymphocyte % : 10.6 %  Auto Monocyte % : 6.8 %  Auto Eosinophil % : 0.8 %  Auto Basophil % : 0.4 %        ESR:                   11-10 @ 15:33  --    C-Reactive Protein:     11-10 @ 15:33  1.24    Procalcitonin:           11-10 @ 15:33   --    ESR:                   11-10 @ 09:41  --    C-Reactive Protein:     11-10 @ 09:41  --    Procalcitonin:           11-10 @ 09:41   <0.05      11-10    139  |  105  |  25<H>  ----------------------------<  281<H>  4.4   |  28  |  1.30    Ca    9.6      10 Nov 2020 09:41    TPro  8.6<H>  /  Alb  4.9  /  TBili  0.7  /  DBili  x   /  AST  12<L>  /  ALT  17  /  AlkPhos  59  11-10        Assessment and Plan:    1.  COVID 19  2. Altered menta; status.    . He is currently with no SOB, hypoxia or fevers.  . Monitor closely. No need for steroid or Remdesivir for now.  . COVID 19 PCR was negative in the ED but will continue isolation for now.    Thank you                                            Patient is a 69y old  Male who presents with a chief complaint of ALTERED MENTAL STATUS  AND LACTICEMIA (10 Nov 2020 18:45)      The patient is a 68 y/o Male  from [facility] with medical  hx of Afib, HTN ,HLD ,DM ,Paranoid personality disorder, Alzheimer's dementia, sent in to ED  for increased agitation and combative behaviour for 1 day.  The patient was tested positive for Covid 19 yesterday 11/09 at the [facility].  He was apparently AAOx3 at his baseline.  In the ED he was confused at restless. He was not hypoxic and did not have SOB or coughing. COVID 19 PCR was negative in the ED. CXR was negative for pneumonia.       PAST MEDICAL & SURGICAL HISTORY:  Paranoid personality disorder    Anxiety    Dementia    Alzheimer disease    Hypertension    Hyperlipidemia    Diabetes mellitus    Afib        Allergies    No Known Allergies    Intolerances        REVIEW OF SYSTEMS:  No cough or SOB  No diarrhea     HOME MEDICATIONS:    MEDICATIONS  (STANDING):  aspirin 325 milliGRAM(s) Oral daily  atorvastatin 40 milliGRAM(s) Oral at bedtime  dextrose 40% Gel 15 Gram(s) Oral once  dextrose 5%. 1000 milliLiter(s) (50 mL/Hr) IV Continuous <Continuous>  dextrose 5%. 1000 milliLiter(s) (100 mL/Hr) IV Continuous <Continuous>  dextrose 50% Injectable 25 Gram(s) IV Push once  dextrose 50% Injectable 12.5 Gram(s) IV Push once  dextrose 50% Injectable 25 Gram(s) IV Push once  diltiazem    milliGRAM(s) Oral daily  donepezil 10 milliGRAM(s) Oral at bedtime  famotidine    Tablet 20 milliGRAM(s) Oral daily  glucagon  Injectable 1 milliGRAM(s) IntraMuscular once  heparin   Injectable 5000 Unit(s) SubCutaneous every 12 hours  insulin lispro (ADMELOG) corrective regimen sliding scale   SubCutaneous three times a day before meals  loratadine 10 milliGRAM(s) Oral daily  memantine 5 milliGRAM(s) Oral two times a day  QUEtiapine 25 milliGRAM(s) Oral three times a day  sodium chloride 0.9%. 1000 milliLiter(s) (50 mL/Hr) IV Continuous <Continuous>    MEDICATIONS  (PRN):  acetaminophen   Tablet .. 650 milliGRAM(s) Oral every 6 hours PRN Temp greater or equal to 38C (100.4F), Mild Pain (1 - 3)  LORazepam   Injectable 0.5 milliGRAM(s) IV Push three times a day PRN SEVERE AGITATION  melatonin 5 milliGRAM(s) Oral at bedtime PRN Insomnia  OLANZapine Injectable 5 milliGRAM(s) IntraMuscular every 8 hours PRN agitation      Vital Signs Last 24 Hrs  T(C): 36.8 (10 Nov 2020 13:04), Max: 37.3 (10 Nov 2020 08:36)  T(F): 98.3 (10 Nov 2020 13:04), Max: 99.2 (10 Nov 2020 08:36)  HR: 74 (10 Nov 2020 18:31) (54 - 118)  BP: 130/84 (10 Nov 2020 18:31) (130/84 - 164/111)  BP(mean): --  RR: 16 (10 Nov 2020 18:31) (16 - 19)  SpO2: 99% (10 Nov 2020 18:31) (97% - 99%)    PHYSICAL EXAM:    HEENT: NC/AT  Neck: Soft  Lungs: CTA bilaterally; no wheezing.   Heart: RRR; no murmurs.   Abdomen: Soft; no tenderness.   Genital/ Rectal: No echeverria   Extremities: No ulcers.   Neurologic:: Confused.       I&O's Summary      LABORATORY:                          14.0   12.38 )-----------( 248      ( 10 Nov 2020 09:41 )             40.2       ESR:                   11-10 @ 15:33  --    C-Reactive Protein:     11-10 @ 15:33  1.24    Procalcitonin:           11-10 @ 15:33   --  ESR:                   11-10 @ 09:41  --    C-Reactive Protein:     11-10 @ 09:41  --    Procalcitonin:           11-10 @ 09:41   <0.05      11-10    139  |  105  |  25<H>  ----------------------------<  281<H>  4.4   |  28  |  1.30    Ca    9.6      10 Nov 2020 09:41    TPro  8.6<H>  /  Alb  4.9  /  TBili  0.7  /  DBili  x   /  AST  12<L>  /  ALT  17  /  AlkPhos  59  11-10          LABORATORY:    CBC Full  -  ( 10 Nov 2020 09:41 )  WBC Count : 12.38 K/uL  RBC Count : 4.48 M/uL  Hemoglobin : 14.0 g/dL  Hematocrit : 40.2 %  Platelet Count - Automated : 248 K/uL  Mean Cell Volume : 89.7 fl  Mean Cell Hemoglobin : 31.3 pg  Mean Cell Hemoglobin Concentration : 34.8 gm/dL  Auto Neutrophil # : 10.04 K/uL  Auto Lymphocyte # : 1.31 K/uL  Auto Monocyte # : 0.84 K/uL  Auto Eosinophil # : 0.10 K/uL  Auto Basophil # : 0.05 K/uL  Auto Neutrophil % : 81.1 %  Auto Lymphocyte % : 10.6 %  Auto Monocyte % : 6.8 %  Auto Eosinophil % : 0.8 %  Auto Basophil % : 0.4 %        ESR:                   11-10 @ 15:33  --    C-Reactive Protein:     11-10 @ 15:33  1.24    Procalcitonin:           11-10 @ 15:33   --    ESR:                   11-10 @ 09:41  --    C-Reactive Protein:     11-10 @ 09:41  --    Procalcitonin:           11-10 @ 09:41   <0.05      11-10    139  |  105  |  25<H>  ----------------------------<  281<H>  4.4   |  28  |  1.30    Ca    9.6      10 Nov 2020 09:41    TPro  8.6<H>  /  Alb  4.9  /  TBili  0.7  /  DBili  x   /  AST  12<L>  /  ALT  17  /  AlkPhos  59  11-10        Assessment and Plan:    1.  =COVID 19  2. Altered mental status.    . He is currently with no SOB, hypoxia or fevers.  . Monitor closely. No need for steroid or Remdesivir for now.  . COVID 19 PCR was negative in the ED but will continue isolation for now.    Thank you for the courtesy of this consultation.

## 2020-11-10 NOTE — ED ADULT NURSE REASSESSMENT NOTE - NS ED NURSE REASSESS COMMENT FT1
Pt agitated, repeatedly getting out of bed, attempting to hit staff kicking and yelling. Attempts to calm patient w/o success. MD Cabello at bedside to assess pt. Pt placed on 1:1, transport cancelled. Pt to be admitted.

## 2020-11-11 DIAGNOSIS — Z51.5 ENCOUNTER FOR PALLIATIVE CARE: ICD-10-CM

## 2020-11-11 DIAGNOSIS — G30.8 OTHER ALZHEIMER'S DISEASE: ICD-10-CM

## 2020-11-11 LAB
-  COAGULASE NEGATIVE STAPHYLOCOCCUS: SIGNIFICANT CHANGE UP
A1C WITH ESTIMATED AVERAGE GLUCOSE RESULT: 7 % — HIGH (ref 4–5.6)
ALBUMIN SERPL ELPH-MCNC: 3.5 G/DL — SIGNIFICANT CHANGE UP (ref 3.3–5)
ALP SERPL-CCNC: 41 U/L — SIGNIFICANT CHANGE UP (ref 40–120)
ALT FLD-CCNC: 15 U/L — SIGNIFICANT CHANGE UP (ref 12–78)
ANION GAP SERPL CALC-SCNC: 6 MMOL/L — SIGNIFICANT CHANGE UP (ref 5–17)
AST SERPL-CCNC: 25 U/L — SIGNIFICANT CHANGE UP (ref 15–37)
BASOPHILS # BLD AUTO: 0.03 K/UL — SIGNIFICANT CHANGE UP (ref 0–0.2)
BASOPHILS NFR BLD AUTO: 0.6 % — SIGNIFICANT CHANGE UP (ref 0–2)
BILIRUB SERPL-MCNC: 0.5 MG/DL — SIGNIFICANT CHANGE UP (ref 0.2–1.2)
BUN SERPL-MCNC: 14 MG/DL — SIGNIFICANT CHANGE UP (ref 7–23)
CALCIUM SERPL-MCNC: 8.4 MG/DL — LOW (ref 8.5–10.1)
CHLORIDE SERPL-SCNC: 113 MMOL/L — HIGH (ref 96–108)
CO2 SERPL-SCNC: 26 MMOL/L — SIGNIFICANT CHANGE UP (ref 22–31)
CREAT SERPL-MCNC: 0.56 MG/DL — SIGNIFICANT CHANGE UP (ref 0.5–1.3)
CULTURE RESULTS: NO GROWTH — SIGNIFICANT CHANGE UP
EOSINOPHIL # BLD AUTO: 0.16 K/UL — SIGNIFICANT CHANGE UP (ref 0–0.5)
EOSINOPHIL NFR BLD AUTO: 3.2 % — SIGNIFICANT CHANGE UP (ref 0–6)
ESTIMATED AVERAGE GLUCOSE: 154 MG/DL — HIGH (ref 68–114)
GLUCOSE SERPL-MCNC: 129 MG/DL — HIGH (ref 70–99)
GRAM STN FLD: SIGNIFICANT CHANGE UP
HCT VFR BLD CALC: 31.8 % — LOW (ref 39–50)
HCV AB S/CO SERPL IA: 0.04 S/CO — SIGNIFICANT CHANGE UP (ref 0–0.99)
HCV AB SERPL-IMP: SIGNIFICANT CHANGE UP
HGB BLD-MCNC: 11 G/DL — LOW (ref 13–17)
IMM GRANULOCYTES NFR BLD AUTO: 0.2 % — SIGNIFICANT CHANGE UP (ref 0–1.5)
INR BLD: 1.07 RATIO — SIGNIFICANT CHANGE UP (ref 0.88–1.16)
LACTATE SERPL-SCNC: 1 MMOL/L — SIGNIFICANT CHANGE UP (ref 0.7–2)
LYMPHOCYTES # BLD AUTO: 0.77 K/UL — LOW (ref 1–3.3)
LYMPHOCYTES # BLD AUTO: 15.2 % — SIGNIFICANT CHANGE UP (ref 13–44)
MCHC RBC-ENTMCNC: 31.2 PG — SIGNIFICANT CHANGE UP (ref 27–34)
MCHC RBC-ENTMCNC: 34.6 GM/DL — SIGNIFICANT CHANGE UP (ref 32–36)
MCV RBC AUTO: 90.1 FL — SIGNIFICANT CHANGE UP (ref 80–100)
METHOD TYPE: SIGNIFICANT CHANGE UP
MONOCYTES # BLD AUTO: 0.48 K/UL — SIGNIFICANT CHANGE UP (ref 0–0.9)
MONOCYTES NFR BLD AUTO: 9.5 % — SIGNIFICANT CHANGE UP (ref 2–14)
NEUTROPHILS # BLD AUTO: 3.61 K/UL — SIGNIFICANT CHANGE UP (ref 1.8–7.4)
NEUTROPHILS NFR BLD AUTO: 71.3 % — SIGNIFICANT CHANGE UP (ref 43–77)
NRBC # BLD: 0 /100 WBCS — SIGNIFICANT CHANGE UP (ref 0–0)
PHOSPHATE SERPL-MCNC: 2.4 MG/DL — LOW (ref 2.5–4.5)
PLATELET # BLD AUTO: 160 K/UL — SIGNIFICANT CHANGE UP (ref 150–400)
POTASSIUM SERPL-MCNC: 3.5 MMOL/L — SIGNIFICANT CHANGE UP (ref 3.5–5.3)
POTASSIUM SERPL-SCNC: 3.5 MMOL/L — SIGNIFICANT CHANGE UP (ref 3.5–5.3)
PROCALCITONIN SERPL-MCNC: <0.05 — SIGNIFICANT CHANGE UP (ref 0–0.04)
PROT SERPL-MCNC: 6.2 G/DL — SIGNIFICANT CHANGE UP (ref 6–8.3)
PROTHROM AB SERPL-ACNC: 12.5 SEC — SIGNIFICANT CHANGE UP (ref 10.6–13.6)
RBC # BLD: 3.53 M/UL — LOW (ref 4.2–5.8)
RBC # FLD: 13.9 % — SIGNIFICANT CHANGE UP (ref 10.3–14.5)
SODIUM SERPL-SCNC: 145 MMOL/L — SIGNIFICANT CHANGE UP (ref 135–145)
SPECIMEN SOURCE: SIGNIFICANT CHANGE UP
TROPONIN I SERPL-MCNC: 0.28 NG/ML — HIGH (ref 0.01–0.04)
WBC # BLD: 5.06 K/UL — SIGNIFICANT CHANGE UP (ref 3.8–10.5)
WBC # FLD AUTO: 5.06 K/UL — SIGNIFICANT CHANGE UP (ref 3.8–10.5)

## 2020-11-11 PROCEDURE — 99232 SBSQ HOSP IP/OBS MODERATE 35: CPT

## 2020-11-11 RX ORDER — MEMANTINE HYDROCHLORIDE 10 MG/1
1 TABLET ORAL
Qty: 0 | Refills: 0 | DISCHARGE

## 2020-11-11 RX ORDER — METOPROLOL TARTRATE 50 MG
25 TABLET ORAL
Refills: 0 | Status: DISCONTINUED | OUTPATIENT
Start: 2020-11-11 | End: 2020-11-16

## 2020-11-11 RX ORDER — DONEPEZIL HYDROCHLORIDE 10 MG/1
1 TABLET, FILM COATED ORAL
Qty: 0 | Refills: 0 | DISCHARGE

## 2020-11-11 RX ORDER — FAMOTIDINE 10 MG/ML
1 INJECTION INTRAVENOUS
Qty: 0 | Refills: 0 | DISCHARGE

## 2020-11-11 RX ORDER — OLANZAPINE 15 MG/1
5 TABLET, FILM COATED ORAL EVERY 6 HOURS
Refills: 0 | Status: DISCONTINUED | OUTPATIENT
Start: 2020-11-11 | End: 2020-11-18

## 2020-11-11 RX ORDER — OLANZAPINE 15 MG/1
10 TABLET, FILM COATED ORAL ONCE
Refills: 0 | Status: COMPLETED | OUTPATIENT
Start: 2020-11-11 | End: 2020-11-11

## 2020-11-11 RX ORDER — OLANZAPINE 15 MG/1
10 TABLET, FILM COATED ORAL THREE TIMES A DAY
Refills: 0 | Status: DISCONTINUED | OUTPATIENT
Start: 2020-11-12 | End: 2020-11-18

## 2020-11-11 RX ORDER — ACETAMINOPHEN 500 MG
650 TABLET ORAL
Qty: 0 | Refills: 0 | DISCHARGE

## 2020-11-11 RX ORDER — SODIUM,POTASSIUM PHOSPHATES 278-250MG
1 POWDER IN PACKET (EA) ORAL
Refills: 0 | Status: COMPLETED | OUTPATIENT
Start: 2020-11-11 | End: 2020-11-13

## 2020-11-11 RX ORDER — QUETIAPINE FUMARATE 200 MG/1
50 TABLET, FILM COATED ORAL THREE TIMES A DAY
Refills: 0 | Status: DISCONTINUED | OUTPATIENT
Start: 2020-11-11 | End: 2020-11-11

## 2020-11-11 RX ORDER — METFORMIN HYDROCHLORIDE 850 MG/1
1 TABLET ORAL
Qty: 0 | Refills: 0 | DISCHARGE

## 2020-11-11 RX ORDER — OLANZAPINE 15 MG/1
10 TABLET, FILM COATED ORAL THREE TIMES A DAY
Refills: 0 | Status: DISCONTINUED | OUTPATIENT
Start: 2020-11-11 | End: 2020-11-11

## 2020-11-11 RX ADMIN — OLANZAPINE 10 MILLIGRAM(S): 15 TABLET, FILM COATED ORAL at 14:24

## 2020-11-11 RX ADMIN — DONEPEZIL HYDROCHLORIDE 10 MILLIGRAM(S): 10 TABLET, FILM COATED ORAL at 21:41

## 2020-11-11 RX ADMIN — Medication 1 PACKET(S): at 17:20

## 2020-11-11 RX ADMIN — LORATADINE 10 MILLIGRAM(S): 10 TABLET ORAL at 11:50

## 2020-11-11 RX ADMIN — Medication 1: at 17:19

## 2020-11-11 RX ADMIN — QUETIAPINE FUMARATE 25 MILLIGRAM(S): 200 TABLET, FILM COATED ORAL at 05:49

## 2020-11-11 RX ADMIN — Medication 325 MILLIGRAM(S): at 13:17

## 2020-11-11 RX ADMIN — MEMANTINE HYDROCHLORIDE 5 MILLIGRAM(S): 10 TABLET ORAL at 05:49

## 2020-11-11 RX ADMIN — Medication 0.5 MILLIGRAM(S): at 13:40

## 2020-11-11 RX ADMIN — HEPARIN SODIUM 5000 UNIT(S): 5000 INJECTION INTRAVENOUS; SUBCUTANEOUS at 05:49

## 2020-11-11 RX ADMIN — ATORVASTATIN CALCIUM 40 MILLIGRAM(S): 80 TABLET, FILM COATED ORAL at 21:42

## 2020-11-11 RX ADMIN — Medication 25 MILLIGRAM(S): at 17:20

## 2020-11-11 RX ADMIN — HEPARIN SODIUM 5000 UNIT(S): 5000 INJECTION INTRAVENOUS; SUBCUTANEOUS at 17:19

## 2020-11-11 RX ADMIN — MEMANTINE HYDROCHLORIDE 5 MILLIGRAM(S): 10 TABLET ORAL at 17:20

## 2020-11-11 RX ADMIN — FAMOTIDINE 20 MILLIGRAM(S): 10 INJECTION INTRAVENOUS at 11:49

## 2020-11-11 RX ADMIN — Medication 0.5 MILLIGRAM(S): at 08:26

## 2020-11-11 RX ADMIN — Medication 0.5 MILLIGRAM(S): at 21:41

## 2020-11-11 RX ADMIN — OLANZAPINE 5 MILLIGRAM(S): 15 TABLET, FILM COATED ORAL at 08:40

## 2020-11-11 RX ADMIN — QUETIAPINE FUMARATE 50 MILLIGRAM(S): 200 TABLET, FILM COATED ORAL at 13:17

## 2020-11-11 RX ADMIN — Medication 240 MILLIGRAM(S): at 05:49

## 2020-11-11 NOTE — BEHAVIORAL HEALTH ASSESSMENT NOTE - HPI (INCLUDE ILLNESS QUALITY, SEVERITY, DURATION, TIMING, CONTEXT, MODIFYING FACTORS, ASSOCIATED SIGNS AND SYMPTOMS)
Patient seen, evaluated and chart reviewed. Patient is a 70 y/o Male  from [facility] with medical  hx of Afib, HTN ,HLD ,DM ,Paranoid personality disorder, Alzheimer's dementia, sent in to ED  for combative behaviour and AMS x 1 day.  Patient was tested  +Covid yesterday 11/09 .  Pt is apparently AAOx3 at baseline.  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 Pt wants the person responsible for sending him to the ED to be fired. Pt denies any SOB, cough, GI/ symptoms. Covid test came back negative but patient found to have lactate serum elevated 3.1 ,iv hydration given Admitted for septic workup and evaluation,send blood and urine cx,serial lactate levels,monitor vitals closley,ivfs hydration,monitor urine output and renal profile,id and pulm consults called to determine need in antibacterial tx Patient developed severe agitation and combativeness in er later ,requiring  several ativan ,haldol injections .Placed on constant observation CT BRAIN -NAD .PSYCHIATRY AND NEUROLOGY consults requested. Patient present with severe agitation upon arrival, did not respond to redirection, and had to be given Zyprexa and Ativan. Patient at this time patient is calm, but very confused and is unreliable historian.

## 2020-11-11 NOTE — PROGRESS NOTE ADULT - SUBJECTIVE AND OBJECTIVE BOX
PROGRESS NOTE  Patient is a 69y old  Male who presents with a chief complaint of ALTERED MENTAL STATUS  AND LACTICEMIA (2020 08:41)  Chart and available morning labs /imaging are reviewed electronically , urgent issues addressed . More information  is being added upon completion of rounds , when more information is collected and management discussed with consultants , medical staff and social service/case management on the floor   LATE ENTRY- patient was seen and examined earlier today . Plan of care was discussed with med staff and unit coordinator .   OVERNIGHT  Wilman bland was called this morning since patient was trying to leave the unit . He was redirected by medical staff and went back to the bed .Recieved zyprexa .Ativan was given earlier for anxious behavior .Seen by Dr Watkins shortly after episode .Continue current management advised     HPI:  70 y/o Male  from [facility] with medical  hx of Afib, HTN ,HLD ,DM ,Paranoid personality disorder, Alzheimer's dementia, sent in to ED  for combative behaviour and AMS x 1 day.  Patient was tested  +Covid yesterday  .  Pt is apparently AAOx3 at baseline.  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 Pt wants the person responsible for sending him to the ED to be fired. Pt denies any SOB, cough, GI/ symptoms. Covid test came back negative but patient found to have lactate serum elevated 3.1 ,iv hydration given Admitted for septic workup and evaluation,send blood and urine cx,serial lactate levels,monitor vitals closley,ivfs hydration,monitor urine output and renal profile,id and pulm consults called to determine need in antibacterial tx Patient developed severe agitation and combativeness in er later ,requiring  several ativan ,haldol injections .Placed on constant observation CT BRAIN -NAD .PSYCHIATRY AND NEUROLOGY consults requested .Palliative care consult requested ,to discuss advance directives and complete MOLST  (10 Nov 2020 18:10)    PAST MEDICAL & SURGICAL HISTORY:  Paranoid personality disorder    Anxiety    Dementia    Alzheimer disease    Hypertension    Hyperlipidemia    Diabetes mellitus    Afib        MEDICATIONS  (STANDING):  aspirin 325 milliGRAM(s) Oral daily  atorvastatin 40 milliGRAM(s) Oral at bedtime  dextrose 40% Gel 15 Gram(s) Oral once  dextrose 5%. 1000 milliLiter(s) (50 mL/Hr) IV Continuous <Continuous>  dextrose 5%. 1000 milliLiter(s) (100 mL/Hr) IV Continuous <Continuous>  dextrose 50% Injectable 25 Gram(s) IV Push once  dextrose 50% Injectable 12.5 Gram(s) IV Push once  dextrose 50% Injectable 25 Gram(s) IV Push once  diltiazem    milliGRAM(s) Oral daily  donepezil 10 milliGRAM(s) Oral at bedtime  famotidine    Tablet 20 milliGRAM(s) Oral daily  glucagon  Injectable 1 milliGRAM(s) IntraMuscular once  heparin   Injectable 5000 Unit(s) SubCutaneous every 12 hours  insulin lispro (ADMELOG) corrective regimen sliding scale   SubCutaneous three times a day before meals  loratadine 10 milliGRAM(s) Oral daily  memantine 5 milliGRAM(s) Oral two times a day  potassium phosphate / sodium phosphate Powder (PHOS-NaK) 1 Packet(s) Oral two times a day  QUEtiapine 50 milliGRAM(s) Oral three times a day  sodium chloride 0.9%. 1000 milliLiter(s) (50 mL/Hr) IV Continuous <Continuous>    MEDICATIONS  (PRN):  acetaminophen   Tablet .. 650 milliGRAM(s) Oral every 6 hours PRN Temp greater or equal to 38C (100.4F), Mild Pain (1 - 3)  LORazepam   Injectable 0.5 milliGRAM(s) IV Push three times a day PRN SEVERE AGITATION  melatonin 5 milliGRAM(s) Oral at bedtime PRN Insomnia  OLANZapine Injectable 5 milliGRAM(s) IntraMuscular every 8 hours PRN agitation      OBJECTIVE    T(C): 36.6 (20 @ 05:34), Max: 36.8 (11-10-20 @ 13:04)  HR: 71 (20 @ 05:34) (54 - 76)  BP: 109/63 (20 @ 05:34) (109/63 - 159/85)  RR: 17 (20 @ 05:34) (16 - 18)  SpO2: 94% (20 @ 05:34) (94% - 99%)  Wt(kg): --  I&O's Summary    10 Nov 2020 07:01  -  2020 07:00  --------------------------------------------------------  IN: 400 mL / OUT: 0 mL / NET: 400 mL          REVIEW OF SYSTEMS:  ROS is unobtainable due to dementia and confusion PATIENT IS CONFUSED AND IS UNABLE TO GIVE ANY/RELIABLE HISTORY OR REVIEW OF SYSTEMS PLEASE ALSO SEE UNDER HPI AND ASSESSMENT FOR OBTAINED REVIEW OF SYSTEMS     PHYSICAL EXAM:  Appearance: NAD. VS past 24 hrs -as above   HEENT:   Moist oral mucosa. Conjunctiva clear b/l.   Neck : supple  Respiratory: Lungs CTAB.  Gastrointestinal:  Soft, nontender. No rebound. No rigidity. BS present	  Cardiovascular: RRR ,S1S2 present  Neurologic: Non-focal. Moving all extremities.  Extremities: No edema. No erythema. No calf tenderness.  Skin: No rashes, No ecchymoses, No cyanosis.	  wounds ,skin lesions-See skin assesment flow sheet   LABS:                        14.0   12.38 )-----------( 248      ( 10 Nov 2020 09:41 )             40.2     -    145  |  113<H>  |  14  ----------------------------<  129<H>  3.5   |  26  |  0.56    Ca    8.4<L>      2020 05:52  Phos  2.4         TPro  6.2  /  Alb  3.5  /  TBili  0.5  /  DBili  x   /  AST  25  /  ALT  15  /  AlkPhos  41  11-11    CAPILLARY BLOOD GLUCOSE      POCT Blood Glucose.: 144 mg/dL (2020 11:42)  POCT Blood Glucose.: 117 mg/dL (2020 07:13)  POCT Blood Glucose.: 138 mg/dL (10 Nov 2020 23:34)  POCT Blood Glucose.: 177 mg/dL (10 Nov 2020 18:29)    PT/INR - ( 2020 05:52 )   PT: 12.5 sec;   INR: 1.07 ratio           Urinalysis Basic - ( 10 Nov 2020 15:37 )    Color: Yellow / Appearance: Clear / S.015 / pH: x  Gluc: x / Ketone: Negative  / Bili: Negative / Urobili: Negative   Blood: x / Protein: Negative / Nitrite: Negative   Leuk Esterase: Negative / RBC: x / WBC x   Sq Epi: x / Non Sq Epi: x / Bacteria: x        RADIOLOGY & ADDITIONAL TESTS:< from: CT Head No Cont (11.10.20 @ 14:43) >  EXAM:  CT BRAIN                            PROCEDURE DATE:  11/10/2020          INTERPRETATION:  CLINICAL Indications:  Mental status change, unknown cause    COMPARISON: None.    TECHNIQUE: Noncontrast CT of the head. Multiplanar reformations are submitted.    FINDINGS:  There is periventricular and subcortical white matter hypodensity without mass effect, nonspecific, likely representing mild chronic microvascular ischemic changes. There is no compelling evidence for an acute transcortical infarction. There is no evidence of mass, mass effect, midline shift or extra-axial fluid collection. The lateral ventricles and cortical sulci are age-appropriate in size and configuration. The orbits, mastoid air cells and visualized paranasal sinusesare unremarkable. The calvarium is intact. Consider MRI of the brain as clinically warranted.    IMPRESSION:  Mild chronic microvascular changes without evidence of an acute transcortical infarction or hemorrhage.    < end of copied text >     reviewed elctronically  ASSESSMENT/PLAN: 	  45minutes spent on this visit, 50% visit time spent in care co-ordination with other attendings and counselling patient  I have discussed care plan with patient and HCP,expressed understanding of problems treatment and their effect and side effects, alternatives in detail,I have asked if they have any questions and concerns and appropriately addressed them to best of my ability

## 2020-11-11 NOTE — CONSULT NOTE ADULT - PROBLEM SELECTOR RECOMMENDATION 9
70 y/o Male  from [facility] with medical  hx of Afib, HTN ,HLD ,DM ,Paranoid personality disorder, Alzheimer's dementia, sent in to ED  for combative behaviour  ID eval noted  Pulm eval noted  CXR and CT head noted  Covid PCR neg on admission  assist with ADL  left a message for the listed contacts  GOC discussion in progress  anti psychotics use PRN  monitor for needs re - orient nutrition

## 2020-11-11 NOTE — BEHAVIORAL HEALTH ASSESSMENT NOTE - NSBHCHARTREVIEWVS_PSY_A_CORE FT
Vital Signs Last 24 Hrs  T(C): 36.9 (11 Nov 2020 13:05), Max: 36.9 (11 Nov 2020 13:05)  T(F): 98.5 (11 Nov 2020 13:05), Max: 98.5 (11 Nov 2020 13:05)  HR: 90 (11 Nov 2020 13:05) (58 - 90)  BP: 160/69 (11 Nov 2020 13:05) (109/63 - 160/69)  BP(mean): --  RR: 18 (11 Nov 2020 13:05) (16 - 18)  SpO2: 95% (11 Nov 2020 13:05) (94% - 99%)

## 2020-11-11 NOTE — PROVIDER CONTACT NOTE (CRITICAL VALUE NOTIFICATION) - ACTION/TREATMENT ORDERED:
Message left for MD awaiting call back Message left for MD awaiting call back. MD aware another troponin to be drawn at 1800

## 2020-11-11 NOTE — CONSULT NOTE ADULT - SUBJECTIVE AND OBJECTIVE BOX
Date/Time Patient Seen:  		  Referring MD:   Data Reviewed	       Patient is a 69y old  Male who presents with a chief complaint of ALTERED MENTAL STATUS  AND LACTICEMIA (10 Nov 2020 19:28)      Subjective/HPI  in bed  seen and examined  on 1 to 1 obs  agitated at times  unable to provide Hx  from [facility] [employer]     68 y/o Male  from [facility] with medical  hx of Afib, HTN ,HLD ,DM ,Paranoid personality disorder, Alzheimer's dementia, sent in to ED  for combative behaviour and AMS x 1 day.  Patient was tested  +Covid yesterday 11/09 .  Pt is apparently AAOx3 at baseline.  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 Pt wants the person responsible for sending him to the ED to be fired. Pt denies any SOB, cough, GI/ symptoms. Covid test came back negative but patient found to have lactate serum elevated 3.1 ,iv hydration given Admitted for septic workup and evaluation,send blood and urine cx,serial lactate levels,monitor vitals closley,ivfs hydration,monitor urine output and renal profile,id and pulm consults called to determine need in antibacterial tx Patient developed severe agitation and combativeness in er later ,requiring  several ativan ,haldol injections .Placed on constant observation CT BRAIN -NAD .PSYCHIATRY AND NEUROLOGY consults requested .Palliative care consult requested ,to discuss advance directives and complete MOLST       PAST MEDICAL & SURGICAL HISTORY:  Paranoid personality disorder    Anxiety    Dementia    Alzheimer disease    Hypertension    Hyperlipidemia    Diabetes mellitus    Afib          Medication list         MEDICATIONS  (STANDING):  aspirin 325 milliGRAM(s) Oral daily  atorvastatin 40 milliGRAM(s) Oral at bedtime  dextrose 40% Gel 15 Gram(s) Oral once  dextrose 5%. 1000 milliLiter(s) (50 mL/Hr) IV Continuous <Continuous>  dextrose 5%. 1000 milliLiter(s) (100 mL/Hr) IV Continuous <Continuous>  dextrose 50% Injectable 25 Gram(s) IV Push once  dextrose 50% Injectable 12.5 Gram(s) IV Push once  dextrose 50% Injectable 25 Gram(s) IV Push once  diltiazem    milliGRAM(s) Oral daily  donepezil 10 milliGRAM(s) Oral at bedtime  famotidine    Tablet 20 milliGRAM(s) Oral daily  glucagon  Injectable 1 milliGRAM(s) IntraMuscular once  heparin   Injectable 5000 Unit(s) SubCutaneous every 12 hours  insulin lispro (ADMELOG) corrective regimen sliding scale   SubCutaneous three times a day before meals  loratadine 10 milliGRAM(s) Oral daily  memantine 5 milliGRAM(s) Oral two times a day  QUEtiapine 25 milliGRAM(s) Oral three times a day  sodium chloride 0.9%. 1000 milliLiter(s) (50 mL/Hr) IV Continuous <Continuous>    MEDICATIONS  (PRN):  acetaminophen   Tablet .. 650 milliGRAM(s) Oral every 6 hours PRN Temp greater or equal to 38C (100.4F), Mild Pain (1 - 3)  LORazepam   Injectable 0.5 milliGRAM(s) IV Push three times a day PRN SEVERE AGITATION  melatonin 5 milliGRAM(s) Oral at bedtime PRN Insomnia  OLANZapine Injectable 5 milliGRAM(s) IntraMuscular every 8 hours PRN agitation         Vitals log        ICU Vital Signs Last 24 Hrs  T(C): 36.6 (11 Nov 2020 05:34), Max: 37.3 (10 Nov 2020 08:36)  T(F): 97.8 (11 Nov 2020 05:34), Max: 99.2 (10 Nov 2020 08:36)  HR: 71 (11 Nov 2020 05:34) (54 - 118)  BP: 109/63 (11 Nov 2020 05:34) (109/63 - 164/111)  BP(mean): --  ABP: --  ABP(mean): --  RR: 17 (11 Nov 2020 05:34) (16 - 19)  SpO2: 94% (11 Nov 2020 05:34) (94% - 99%)           Input and Output:  I&O's Detail    10 Nov 2020 07:01  -  11 Nov 2020 05:54  --------------------------------------------------------  IN:    sodium chloride 0.9%: 200 mL  Total IN: 200 mL    OUT:  Total OUT: 0 mL    Total NET: 200 mL          Lab Data                        14.0   12.38 )-----------( 248      ( 10 Nov 2020 09:41 )             40.2     11-10    139  |  105  |  25<H>  ----------------------------<  281<H>  4.4   |  28  |  1.30    Ca    9.6      10 Nov 2020 09:41    TPro  8.6<H>  /  Alb  4.9  /  TBili  0.7  /  DBili  x   /  AST  12<L>  /  ALT  17  /  AlkPhos  59  11-10            Review of Systems	      Objective     Physical Examination        Pertinent Lab findings & Imaging      Bianca:  NO   Adequate UO     I&O's Detail    10 Nov 2020 07:01  -  11 Nov 2020 05:54  --------------------------------------------------------  IN:    sodium chloride 0.9%: 200 mL  Total IN: 200 mL    OUT:  Total OUT: 0 mL    Total NET: 200 mL               Discussed with:     Cultures:	        Radiology            EXAM:  XR CHEST PORTABLE URGENT 1V                            PROCEDURE DATE:  11/10/2020          INTERPRETATION:  Chest one view    HISTORY: Shortness of breath, cough and fever    COMPARISON STUDY: None available    Frontal expiratory view of the chest shows the heart to be normal in size. The lungs are clear and there is no evidence of pneumothorax nor definite pleural effusion.    IMPRESSION:  No active pulmonary disease.    Thank you for the courtesy of this referral.            JEWEL ESCAMILLA MD; Attending Interventional Radiologist  This document has been electronically signed. Nov 10 2020  1:42PM

## 2020-11-11 NOTE — PROVIDER CONTACT NOTE (CRITICAL VALUE NOTIFICATION) - ACTION/TREATMENT ORDERED:
Message left for MD awaiting call back Message left for MD awaiting call back. MD made aware. No further orders at this time

## 2020-11-11 NOTE — PROGRESS NOTE ADULT - PROBLEM SELECTOR PLAN 4
serial bmp and lactate levels ,continue iv hydration ,stop metformin .Septic workup and id evaluation in view of recent covid positive test ,repeated test is negative Lactate is WNL now

## 2020-11-11 NOTE — PROGRESS NOTE ADULT - SUBJECTIVE AND OBJECTIVE BOX
JODI DUBOSE    PLV 1EAS 115 D1    Patient is a 69y old  Male who presents with a chief complaint of ALTERED MENTAL STATUS  AND LACTICEMIA (2020 05:54)       Allergies    No Known Allergies    Intolerances        HPI:  70 y/o Male  from [facility] with medical  hx of Afib, HTN ,HLD ,DM ,Paranoid personality disorder, Alzheimer's dementia, sent in to ED  for combative behaviour and AMS x 1 day.  Patient was tested  +Covid yesterday  .  Pt is apparently AAOx3 at baseline.  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 Pt wants the person responsible for sending him to the ED to be fired. Pt denies any SOB, cough, GI/ symptoms. Covid test came back negative but patient found to have lactate serum elevated 3.1 ,iv hydration given Admitted for septic workup and evaluation,send blood and urine cx,serial lactate levels,monitor vitals closley,ivfs hydration,monitor urine output and renal profile,id and pulm consults called to determine need in antibacterial tx Patient developed severe agitation and combativeness in er later ,requiring  several ativan ,haldol injections .Placed on constant observation CT BRAIN -NAD .PSYCHIATRY AND NEUROLOGY consults requested .Palliative care consult requested ,to discuss advance directives and complete MOLST  (10 Nov 2020 18:10)      PAST MEDICAL & SURGICAL HISTORY:  Paranoid personality disorder    Anxiety    Dementia    Alzheimer disease    Hypertension    Hyperlipidemia    Diabetes mellitus    Afib        FAMILY HISTORY:        MEDICATIONS   acetaminophen   Tablet .. 650 milliGRAM(s) Oral every 6 hours PRN  aspirin 325 milliGRAM(s) Oral daily  atorvastatin 40 milliGRAM(s) Oral at bedtime  dextrose 40% Gel 15 Gram(s) Oral once  dextrose 5%. 1000 milliLiter(s) IV Continuous <Continuous>  dextrose 5%. 1000 milliLiter(s) IV Continuous <Continuous>  dextrose 50% Injectable 25 Gram(s) IV Push once  dextrose 50% Injectable 12.5 Gram(s) IV Push once  dextrose 50% Injectable 25 Gram(s) IV Push once  diltiazem    milliGRAM(s) Oral daily  donepezil 10 milliGRAM(s) Oral at bedtime  famotidine    Tablet 20 milliGRAM(s) Oral daily  glucagon  Injectable 1 milliGRAM(s) IntraMuscular once  heparin   Injectable 5000 Unit(s) SubCutaneous every 12 hours  insulin lispro (ADMELOG) corrective regimen sliding scale   SubCutaneous three times a day before meals  loratadine 10 milliGRAM(s) Oral daily  LORazepam   Injectable 0.5 milliGRAM(s) IV Push three times a day PRN  melatonin 5 milliGRAM(s) Oral at bedtime PRN  memantine 5 milliGRAM(s) Oral two times a day  OLANZapine Injectable 5 milliGRAM(s) IntraMuscular every 8 hours PRN  QUEtiapine 25 milliGRAM(s) Oral three times a day  sodium chloride 0.9%. 1000 milliLiter(s) IV Continuous <Continuous>      Vital Signs Last 24 Hrs  T(C): 36.6 (2020 05:34), Max: 36.8 (10 Nov 2020 13:04)  T(F): 97.8 (2020 05:34), Max: 98.3 (10 Nov 2020 13:04)  HR: 71 (2020 05:34) (54 - 76)  BP: 109/63 (2020 05:34) (109/63 - 159/85)  BP(mean): --  RR: 17 (2020 05:34) (16 - 18)  SpO2: 94% (2020 05:34) (94% - 99%)      11-10-20 @ 07:01  -  20 @ 07:00  --------------------------------------------------------  IN: 400 mL / OUT: 0 mL / NET: 400 mL            LABS:                        14.0   12.38 )-----------( 248      ( 10 Nov 2020 09:41 )             40.2         145  |  113<H>  |  14  ----------------------------<  129<H>  3.5   |  26  |  0.56    Ca    8.4<L>      2020 05:52  Phos  2.4         TPro  6.2  /  Alb  3.5  /  TBili  0.5  /  DBili  x   /  AST  25  /  ALT  15  /  AlkPhos  41  11-11    PT/INR - ( 2020 05:52 )   PT: 12.5 sec;   INR: 1.07 ratio           Urinalysis Basic - ( 10 Nov 2020 15:37 )    Color: Yellow / Appearance: Clear / S.015 / pH: x  Gluc: x / Ketone: Negative  / Bili: Negative / Urobili: Negative   Blood: x / Protein: Negative / Nitrite: Negative   Leuk Esterase: Negative / RBC: x / WBC x   Sq Epi: x / Non Sq Epi: x / Bacteria: x            WBC:  WBC Count: 12.38 K/uL (11-10 @ 09:41)      MICROBIOLOGY:  RECENT CULTURES:              PT/INR - ( 2020 05:52 )   PT: 12.5 sec;   INR: 1.07 ratio             Sodium:  Sodium, Serum: 145 mmol/L ( @ 05:52)  Sodium, Serum: 139 mmol/L (11-10 @ 09:41)      0.56 mg/dL  05:52  1.30 mg/dL 11-10 @ 09:41      Hemoglobin:  Hemoglobin: 14.0 g/dL (11-10 @ 09:41)      Platelets: Platelet Count - Automated: 248 K/uL (11-10 @ 09:41)      LIVER FUNCTIONS - ( 2020 05:52 )  Alb: 3.5 g/dL / Pro: 6.2 g/dL / ALK PHOS: 41 U/L / ALT: 15 U/L / AST: 25 U/L / GGT: x             Urinalysis Basic - ( 10 Nov 2020 15:37 )    Color: Yellow / Appearance: Clear / S.015 / pH: x  Gluc: x / Ketone: Negative  / Bili: Negative / Urobili: Negative   Blood: x / Protein: Negative / Nitrite: Negative   Leuk Esterase: Negative / RBC: x / WBC x   Sq Epi: x / Non Sq Epi: x / Bacteria: x        RADIOLOGY & ADDITIONAL STUDIES:

## 2020-11-11 NOTE — BEHAVIORAL HEALTH ASSESSMENT NOTE - NSBHCHARTREVIEWLAB_PSY_A_CORE FT
Occupational Therapy Daily Treatment Note   Date: 3/10/2020  Name: Radha Hernandez  [facility] Number: [id number]  Age: 6  y.o. 4  m.o.    Therapy Diagnosis:   Encounter Diagnosis   Name Primary?    Other lack of coordination Yes     Physician: Briana Walker MD    Physician Orders: Ambulatory referral to Physical / Occupational Therapy   Medical Diagnosis: none given  Evaluation Date: 8/7/2019  Insurance Authorization Period Expiration: 12/31/2020  Plan of Care Certification Period: 2/27/20 - 8/27/20    Visit # / Visits authorized: 8/20  Time In:1:30  Time Out: 2:15  Total Billable Time: 45 minutes    Precautions:  Standard  Subjective   Mother brought Radha to therapy today.  Pt / caregiver reports: No significant report today.  he was compliant with home exercise program given last session.   Response to previous treatment: Patient transitioned to therapy without difficulty and was cooperative during the session. Radha required verbal cues to maintain attention to task and to transition to tasks.      Pain: Child too young to understand and rate pain levels. No pain behaviors or report of pain.   Objective     Jean Paul participated in dynamic functional therapeutic activities to improve functional performance for 60  minutes, including:  - fine motor strengthening activity with tongs, theraputty exercises 5 repetitions  - [place] tolerated compression vest which provides organizing proprioceptive input to CNS   - vestibular/ proprioceptive activity on bolster swing  - fine motor/visual motor writing activity copying letters, writing on line with proper placement   - Writing  activity to encourage improved letter formation with block paper and visual cues for letter imitation   - Radha able to use a mature tripod grasp this session with initial placement and cueing from therapist  - Able to remain seated 10 minutes at table engaged in non-preferred activity  - feeding therapy began this session to address texture  issues - mom provided taco meat and steak - Radha able to take a small piece, hold in his mouth for 3 seconds and spit into a napkin - both the ground meat and steak  -Shaving cream tactile play, tracing letters of his first name, Radha with good tolerance of shaving cream this session    Formal Testing:    [place] Developmental Motor Scales, Second Edition  Sensory Profile 2  VMI    Home Exercises and Education Provided     Education provided:   - Caregiver educated on current performance and POC. Caregiver verbalized understanding.           Assessment   Radha continues to display a high level of distractibility and sensory seeking behaviors. He was able to sit for table top fine  Motor activities for 10 minutes this date.  Pt would continue to benefit from skilled OT. Continued visual motor assessment and treatment as well as sensory integration treatment strategies to be implemented at home and in the classroom will benefit Radha in his functional daily performance tasks. Direct therapy focused on improving tactile processing and self-feeding skills will benefit patient to be more independent in his ADL's .    Jean Paul is progressing well towards his goals and there are no updates to goals at this time. Pt prognosis is Good.     Pt will continue to benefit from skilled outpatient occupational therapy to address the deficits listed in the problem list on initial evaluation provide pt/family education and to maximize pt's level of independence in the home and community environment.     Anticipated barriers to occupational therapy: decreased attention, decreased self regulation      Pt's spiritual, cultural and educational needs considered and pt agreeable to plan of care and goals.    Goals:  New Short Term Goals :  (5/27/2020)  1. Demonstrate improved oral tactile tolerance of textures as evidenced by tasting 2 new foods.  2. Demonstrate improved attention to task and self-regulation by sitting at table for  non-preferred activity for 10 minutes with verbal cues to remain seated.  3. Demonstrate increased attention and focus by displaying ability to follow 2 step directions 3/5 trials.  4. Demonstrate increased tactile processing by engaging in sensory play with shaving cream for 3 minutes. (GOAL MET 3/10/2020)     New Long Term Goals:(8/27/2020)  1. Demonstrate improved oral tactile tolerance of textures as evidenced by chewing and swallowing 2 new foods, 2-3 bites.  2. Demonstrate improved attention to task and self-regulation by sitting at table for non-preferred activity for 15 minutes with verbal cues to remain seated.  3. Demonstrate increased attention and focus by displaying ability to follow 2 step directions 5/5 trials.  4. Demonstrate increased tactile processing by engaging in sensory play with shaving cream for 5 minutes.       Plan   Therapist will reassess goals and add fine motor and sensory goals next session.    Occupational therapy services will be provided 1/week through direct intervention, parent education and home programming. Therapy will be discontinued when child has met all goals, is not making progress, parent discontinues therapy, and/or for any other applicable reasons    Maria Del Rosario Kimball OT    14.0   12.38 )-----------( 248      ( 10 Nov 2020 09:41 )             40.2   11-11    145  |  113<H>  |  14  ----------------------------<  129<H>  3.5   |  26  |  0.56    Ca    8.4<L>      11 Nov 2020 05:52  Phos  2.4     11-11    TPro  6.2  /  Alb  3.5  /  TBili  0.5  /  DBili  x   /  AST  25  /  ALT  15  /  AlkPhos  41  11-11

## 2020-11-11 NOTE — PROGRESS NOTE ADULT - ASSESSMENT
70 y/o Male  from [facility] with medical  hx of Afib, HTN ,HLD ,DM ,Paranoid personality disorder, Alzheimer's dementia, sent in to ED  for combative behaviour and AMS x 1 day.  Patient was tested  +Covid yesterday 11/09 .  Pt is apparently AAOx3 at baseline.  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 Pt wants the person responsible for sending him to the ED to be fired. Pt denies any SOB, cough, GI/ symptoms. Covid test came back negative but patient found to have lactate serum elevated 3.1 ,iv hydration given Admitted for septic workup and evaluation,send blood and urine cx,serial lactate levels,monitor vitals closley,ivfs hydration,monitor urine output and renal profile,id and pulm consults called to determine need in antibacterial tx Patient developed severe agitation and combativeness in er later ,requiring  several ativan ,haldol injections .Placed on constant observation CT BRAIN -NAD .PSYCHIATRY AND NEUROLOGY consults requested .Palliative care consult requested ,to discuss advance directives and complete MOLST

## 2020-11-11 NOTE — PROGRESS NOTE ADULT - PROBLEM SELECTOR PLAN 2
WITH BEHAVIORAL DISTURBANCE ,POA -ZYPREXA AND ATIVAN PRN  ,PSYCHIATRY CONSULT ,SEROQUEL DOSE AS PER  , CONTINUE SUPPORTIVE CARE

## 2020-11-11 NOTE — PROGRESS NOTE ADULT - SUBJECTIVE AND OBJECTIVE BOX
Interval History:    CENTRAL LINE:   [  ] YES       [  ] NO  [place]:                 [  ] YES       [  ] NO         REVIEW OF SYSTEMS:  All Systems below were reviewed and are negative [  ]  HEENT:  ID:  Pulmonary:  Cardiac:  GI:  Renal:  Musculoskeletal:  All other systems above were reviewed and are negative   [  ]      MEDICATIONS  (STANDING):  aspirin 325 milliGRAM(s) Oral daily  atorvastatin 40 milliGRAM(s) Oral at bedtime  dextrose 40% Gel 15 Gram(s) Oral once  dextrose 5%. 1000 milliLiter(s) (50 mL/Hr) IV Continuous <Continuous>  dextrose 5%. 1000 milliLiter(s) (100 mL/Hr) IV Continuous <Continuous>  dextrose 50% Injectable 25 Gram(s) IV Push once  dextrose 50% Injectable 12.5 Gram(s) IV Push once  dextrose 50% Injectable 25 Gram(s) IV Push once  diltiazem    milliGRAM(s) Oral daily  donepezil 10 milliGRAM(s) Oral at bedtime  famotidine    Tablet 20 milliGRAM(s) Oral daily  glucagon  Injectable 1 milliGRAM(s) IntraMuscular once  heparin   Injectable 5000 Unit(s) SubCutaneous every 12 hours  insulin lispro (ADMELOG) corrective regimen sliding scale   SubCutaneous three times a day before meals  loratadine 10 milliGRAM(s) Oral daily  memantine 5 milliGRAM(s) Oral two times a day  metoprolol tartrate 25 milliGRAM(s) Oral two times a day  potassium phosphate / sodium phosphate Powder (PHOS-NaK) 1 Packet(s) Oral two times a day  sodium chloride 0.9%. 1000 milliLiter(s) (50 mL/Hr) IV Continuous <Continuous>    MEDICATIONS  (PRN):  acetaminophen   Tablet .. 650 milliGRAM(s) Oral every 6 hours PRN Temp greater or equal to 38C (100.4F), Mild Pain (1 - 3)  LORazepam   Injectable 0.5 milliGRAM(s) IV Push every 6 hours PRN SEVERE AGITATION  melatonin 5 milliGRAM(s) Oral at bedtime PRN Insomnia  OLANZapine Injectable 5 milliGRAM(s) IntraMuscular every 6 hours PRN agitation      Vital Signs Last 24 Hrs  T(C): 36.9 (11 Nov 2020 13:05), Max: 36.9 (11 Nov 2020 13:05)  T(F): 98.5 (11 Nov 2020 13:05), Max: 98.5 (11 Nov 2020 13:05)  HR: 87 (11 Nov 2020 17:29) (58 - 90)  BP: 157/78 (11 Nov 2020 17:29) (109/63 - 160/69)  BP(mean): --  RR: 18 (11 Nov 2020 13:05) (16 - 18)  SpO2: 95% (11 Nov 2020 13:05) (94% - 98%)    I&O's Summary    10 Nov 2020 07:01  -  11 Nov 2020 07:00  --------------------------------------------------------  IN: 400 mL / OUT: 0 mL / NET: 400 mL        PHYSICAL EXAM:  HEENT: NC/AT; PERRLA  Neck: Soft; no tenderness  Lungs: CTA bilaterally; no wheezing.   Heart:  Abdomen:  Genital/ Rectal:  Extremities:  Neurologic:  Vascular:      LABORATORY:    CBC Full  -  ( 11 Nov 2020 05:52 )  WBC Count : 5.06 K/uL  RBC Count : 3.53 M/uL  Hemoglobin : 11.0 g/dL  Hematocrit : 31.8 %  Platelet Count - Automated : 160 K/uL  Mean Cell Volume : 90.1 fl  Mean Cell Hemoglobin : 31.2 pg  Mean Cell Hemoglobin Concentration : 34.6 gm/dL  Auto Neutrophil # : 3.61 K/uL  Auto Lymphocyte # : 0.77 K/uL  Auto Monocyte # : 0.48 K/uL  Auto Eosinophil # : 0.16 K/uL  Auto Basophil # : 0.03 K/uL  Auto Neutrophil % : 71.3 %  Auto Lymphocyte % : 15.2 %  Auto Monocyte % : 9.5 %  Auto Eosinophil % : 3.2 %  Auto Basophil % : 0.6 %      ESR:                   11-11 @ 05:52  --    C-Reactive Protein:     11-11 @ 05:52  --    Procalcitonin:           11-11 @ 05:52   <0.05  ESR:                   11-10 @ 15:33  --    C-Reactive Protein:     11-10 @ 15:33  1.24    Procalcitonin:           11-10 @ 15:33   --  ESR:                   11-10 @ 09:41  --    C-Reactive Protein:     11-10 @ 09:41  --    Procalcitonin:           11-10 @ 09:41   <0.05      11-11    145  |  113<H>  |  14  ----------------------------<  129<H>  3.5   |  26  |  0.56    Ca    8.4<L>      11 Nov 2020 05:52  Phos  2.4     11-11    TPro  6.2  /  Alb  3.5  /  TBili  0.5  /  DBili  x   /  AST  25  /  ALT  15  /  AlkPhos  41  11-11          Assessment and Plan:          Dorian Ruiz MD   [phone].   He is confused and restless  No fevers noted   No SOB or hypoxia    MEDICATIONS  (STANDING):  aspirin 325 milliGRAM(s) Oral daily  atorvastatin 40 milliGRAM(s) Oral at bedtime  dextrose 40% Gel 15 Gram(s) Oral once  dextrose 5%. 1000 milliLiter(s) (50 mL/Hr) IV Continuous <Continuous>  dextrose 5%. 1000 milliLiter(s) (100 mL/Hr) IV Continuous <Continuous>  dextrose 50% Injectable 25 Gram(s) IV Push once  dextrose 50% Injectable 12.5 Gram(s) IV Push once  dextrose 50% Injectable 25 Gram(s) IV Push once  diltiazem    milliGRAM(s) Oral daily  donepezil 10 milliGRAM(s) Oral at bedtime  famotidine    Tablet 20 milliGRAM(s) Oral daily  glucagon  Injectable 1 milliGRAM(s) IntraMuscular once  heparin   Injectable 5000 Unit(s) SubCutaneous every 12 hours  insulin lispro (ADMELOG) corrective regimen sliding scale   SubCutaneous three times a day before meals  loratadine 10 milliGRAM(s) Oral daily  memantine 5 milliGRAM(s) Oral two times a day  metoprolol tartrate 25 milliGRAM(s) Oral two times a day  potassium phosphate / sodium phosphate Powder (PHOS-NaK) 1 Packet(s) Oral two times a day  sodium chloride 0.9%. 1000 milliLiter(s) (50 mL/Hr) IV Continuous <Continuous>    MEDICATIONS  (PRN):  acetaminophen   Tablet .. 650 milliGRAM(s) Oral every 6 hours PRN Temp greater or equal to 38C (100.4F), Mild Pain (1 - 3)  LORazepam   Injectable 0.5 milliGRAM(s) IV Push every 6 hours PRN SEVERE AGITATION  melatonin 5 milliGRAM(s) Oral at bedtime PRN Insomnia  OLANZapine Injectable 5 milliGRAM(s) IntraMuscular every 6 hours PRN agitation      Vital Signs Last 24 Hrs  T(C): 36.9 (11 Nov 2020 13:05), Max: 36.9 (11 Nov 2020 13:05)  T(F): 98.5 (11 Nov 2020 13:05), Max: 98.5 (11 Nov 2020 13:05)  HR: 87 (11 Nov 2020 17:29) (58 - 90)  BP: 157/78 (11 Nov 2020 17:29) (109/63 - 160/69)  BP(mean): --  RR: 18 (11 Nov 2020 13:05) (16 - 18)  SpO2: 95% (11 Nov 2020 13:05) (94% - 98%)    I&O's Summary    10 Nov 2020 07:01  -  11 Nov 2020 07:00  --------------------------------------------------------  IN: 400 mL / OUT: 0 mL / NET: 400 mL        PHYSICAL EXAM:  HEENT: NC/AT; PERRLA  Neck: Soft; no tenderness  Lungs: Coarse BS bilaterally; no wheezing.   Heart: RRR; no murmurs.   Abdomen: Soft; no tenderness.   Extremities: No ulcers.   Neurologic: Confused.       LABORATORY:    CBC Full  -  ( 11 Nov 2020 05:52 )  WBC Count : 5.06 K/uL  RBC Count : 3.53 M/uL  Hemoglobin : 11.0 g/dL  Hematocrit : 31.8 %  Platelet Count - Automated : 160 K/uL  Mean Cell Volume : 90.1 fl  Mean Cell Hemoglobin : 31.2 pg  Mean Cell Hemoglobin Concentration : 34.6 gm/dL  Auto Neutrophil # : 3.61 K/uL  Auto Lymphocyte # : 0.77 K/uL  Auto Monocyte # : 0.48 K/uL  Auto Eosinophil # : 0.16 K/uL  Auto Basophil # : 0.03 K/uL  Auto Neutrophil % : 71.3 %  Auto Lymphocyte % : 15.2 %  Auto Monocyte % : 9.5 %  Auto Eosinophil % : 3.2 %  Auto Basophil % : 0.6 %      ESR:                   11-11 @ 05:52  --    C-Reactive Protein:     11-11 @ 05:52  --    Procalcitonin:           11-11 @ 05:52   <0.05  ESR:                   11-10 @ 15:33  --    C-Reactive Protein:     11-10 @ 15:33  1.24    Procalcitonin:           11-10 @ 15:33   --  E11-11    145  |  113<H>  |  14  ----------------------------<  129<H>  3.5   |  26  |  0.56    Ca    8.4<L>      11 Nov 2020 05:52  Phos  2.4     11-11    TPro  6.2  /  Alb  3.5  /  TBili  0.5  /  DBili  x   /  AST  25  /  ALT  15  /  AlkPhos  41  11-11      Assessment and Plan:    1. Delirium.   2. Positive blood culture with gram positive cocci in clusters.     . He is currently with no SOB, hypoxia or fevers. COVID 19 PCR were negative x2 in the ED.   . Blood culture with gram positive cocci in clusters was likely due to contamination. Monitor the patient off antibiotics for now. Repeat blood cultures in AM   . Monitor the progression of delirium.         Dorian Ruiz MD   [phone].

## 2020-11-11 NOTE — CHART NOTE - NSCHARTNOTEFT_GEN_A_CORE
Responded to Wilman Corbett called overhead for patient's agitation. Patient has received PRN Ativan and Zyprexa, and is now calming down. As d/w Dr. Watkins, adjusted PRN Zyprexa from Q8h to Q6h. RN to call if any changes. Responded to Wilman Corbett called overhead for patient's agitation. Patient has received PRN Ativan 0.5 mg IV x1 and Zyprexa 10 mg IM x1 (as d/w Dr. Watkins) and is now calming down. As d/w Dr. Watkins, adjusted PRN IM Zyprexa 5 mg from Q8h to Q6h. Spoke with pharmacy to reschedule standing PO Zyprexa to tomorrow. RN to call if any changes.

## 2020-11-11 NOTE — CONSULT NOTE ADULT - SUBJECTIVE AND OBJECTIVE BOX
[facility] [place]     Patient is a 69y old  Male who presents with a chief complaint of ALTERED MENTAL STATUS  AND LACTICEMIA (2020 08:59)      HPI:  68 y/o Male  from [facility] with medical  hx of Afib, HTN ,HLD ,DM ,Paranoid personality disorder, Alzheimer's dementia, sent in to ED  for combative behaviour and AMS x 1 day.  Patient was tested  +Covid yesterday  .  Pt is apparently AAOx3 at baseline.  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 Pt wants the person responsible for sending him to the ED to be fired. Pt denies any SOB, cough, GI/ symptoms. Covid test came back negative but patient found to have lactate serum elevated 3.1 ,iv hydration given Admitted for septic workup and evaluation,send blood and urine cx,serial lactate levels,monitor vitals closley,ivfs hydration,monitor urine output and renal profile,id and pulm consults called to determine need in antibacterial tx Patient developed severe agitation and combativeness in er later ,requiring  several ativan ,haldol injections .Placed on constant observation CT BRAIN -NAD .PSYCHIATRY AND NEUROLOGY consults requested .Palliative care consult requested ,to discuss advance directives and complete MOLST  (10 Nov 2020 18:10)      HPI:    69y Male for Cardiology Consult    PAST MEDICAL & SURGICAL HISTORY:  Paranoid personality disorder    Anxiety    Dementia    Alzheimer disease    Hypertension    Hyperlipidemia    Diabetes mellitus    Afib        FAMILY HISTORY:      SOCIAL HISTORY:   Alcohol:  Smoking:    Allergies    No Known Allergies    Intolerances        MEDICATIONS  (STANDING):  aspirin 325 milliGRAM(s) Oral daily  atorvastatin 40 milliGRAM(s) Oral at bedtime  dextrose 40% Gel 15 Gram(s) Oral once  dextrose 5%. 1000 milliLiter(s) (50 mL/Hr) IV Continuous <Continuous>  dextrose 5%. 1000 milliLiter(s) (100 mL/Hr) IV Continuous <Continuous>  dextrose 50% Injectable 25 Gram(s) IV Push once  dextrose 50% Injectable 12.5 Gram(s) IV Push once  dextrose 50% Injectable 25 Gram(s) IV Push once  diltiazem    milliGRAM(s) Oral daily  donepezil 10 milliGRAM(s) Oral at bedtime  famotidine    Tablet 20 milliGRAM(s) Oral daily  glucagon  Injectable 1 milliGRAM(s) IntraMuscular once  heparin   Injectable 5000 Unit(s) SubCutaneous every 12 hours  insulin lispro (ADMELOG) corrective regimen sliding scale   SubCutaneous three times a day before meals  loratadine 10 milliGRAM(s) Oral daily  memantine 5 milliGRAM(s) Oral two times a day  potassium phosphate / sodium phosphate Powder (PHOS-NaK) 1 Packet(s) Oral two times a day  sodium chloride 0.9%. 1000 milliLiter(s) (50 mL/Hr) IV Continuous <Continuous>    MEDICATIONS  (PRN):  acetaminophen   Tablet .. 650 milliGRAM(s) Oral every 6 hours PRN Temp greater or equal to 38C (100.4F), Mild Pain (1 - 3)  LORazepam   Injectable 0.5 milliGRAM(s) IV Push every 6 hours PRN SEVERE AGITATION  melatonin 5 milliGRAM(s) Oral at bedtime PRN Insomnia  OLANZapine Injectable 5 milliGRAM(s) IntraMuscular every 6 hours PRN agitation      REVIEW OF SYSTEMS:  CONSTITUTIONAL: No fever, weight loss, chills, shakes, or fat  RESPIRATORY: No cough, wheezing, hemoptysis, or shortness of breath  CARDIOVASCULAR: No chest pain, dyspnea, palpitations, dizziness, syncope, paroxysmal nocturnal dyspnea, orthopnea, or arm or leg swelling  GASTROINTESTINAL: No abdominal  or epigastric pain, nausea, vomiting, hematemesis, diarrhea, constipation, melena or bright red bloo  NEUROLOGICAL: No headaches, memory loss, slurred speech, limb weakness, loss of strength, numbness, or tremors  SKIN: No itching, burning, rashes, or lesions  ENDOCRINE: No heat or cold intolerance, or hair loss  MUSCULOSKELETAL: No joint pain or swelling, muscle, back, or extremity pain  HEME/LYMPH: No easy bruising or bleeding gums  ALLERY AND IMMUNOLOGIC: No hives or rash.    Vital Signs Last 24 Hrs  T(C): 36.9 (2020 13:05), Max: 36.9 (2020 13:05)  T(F): 98.5 (2020 13:05), Max: 98.5 (2020 13:05)  HR: 90 (2020 13:05) (58 - 90)  BP: 160/69 (2020 13:05) (109/63 - 160/69)  BP(mean): --  RR: 18 (2020 13:05) (16 - 18)  SpO2: 95% (2020 13:05) (94% - 99%)    PHYSICAL EXAM:  HEAD:  Atraumatic, Normocephalic  EYES: EOMI, PERRLA, conjunctiva and sclera clear  NECK: Supple and normal thyroid.  No JVD or carotid bruit.   HEART: S1, S2 regular , 1/6 soft ejection systolic murmur in mitral area , no thrill and no gallops .  PULMONARY: Bilateral vesicular breathing , few scattered ronchi and few scattered rales are present .  ABDOMEN: Soft nontender and positive bowl sounds   EXTREMITIES:  No clubbing, cyanosis, or pedal  edema  NEUROLOGICAL: AAOX3 , no focal deficit .    LABS:                        11.0   5.06  )-----------( 160      ( 2020 05:52 )             31.8         145  |  113<H>  |  14  ----------------------------<  129<H>  3.5   |  26  |  0.56    Ca    8.4<L>      2020 05:52  Phos  2.4         TPro  6.2  /  Alb  3.5  /  TBili  0.5  /  DBili  x   /  AST  25  /  ALT  15  /  AlkPhos  41  11-11        PT/INR - ( 2020 05:52 )   PT: 12.5 sec;   INR: 1.07 ratio           Urinalysis Basic - ( 10 Nov 2020 15:37 )    Color: Yellow / Appearance: Clear / S.015 / pH: x  Gluc: x / Ketone: Negative  / Bili: Negative / Urobili: Negative   Blood: x / Protein: Negative / Nitrite: Negative   Leuk Esterase: Negative / RBC: x / WBC x   Sq Epi: x / Non Sq Epi: x / Bacteria: x      BNP      EKG:  ECHO:  IMAGING:    Assessment and Plan :     Will continue to follow during hospital course and give further recommendations as needed . Thanks for your referral . if any questions please contact me at office ([phone] [phone]) ABBY FELICIANO MD [place] [address]  [address]  OFFICE : [phone]  CELL : [phone]    CARDIOLOGY CONSULT ;    Patient is a 69y old  Male who presents with a chief complaint of ALTERED MENTAL STATUS  AND LACTICEMIA (2020 08:59)      HPI:  68 y/o Male  from [facility] with medical  hx of Afib, HTN ,HLD ,DM ,Paranoid personality disorder, Alzheimer's dementia, sent in to ED  for combative behaviour and AMS x 1 day.  Patient was tested  +Covid yesterday  .  Pt is apparently AAOx3 at baseline.  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 Pt wants the person responsible for sending him to the ED to be fired. Pt denies any SOB, cough, GI/ symptoms. Covid test came back negative but patient found to have lactate serum elevated 3.1 ,iv hydration given Admitted for septic workup and evaluation,send blood and urine cx,serial lactate levels,monitor vitals closley,ivfs hydration,monitor urine output and renal profile,id and pulm consults called to determine need in antibacterial tx Patient developed severe agitation and combativeness in er later ,requiring  several ativan ,haldol injections .Placed on constant observation CT BRAIN -NAD .PSYCHIATRY AND NEUROLOGY consults requested .Palliative care consult requested ,to discuss advance directives and complete MOLST  (10 Nov 2020 18:10)      HPI:    69y Male for Cardiology Consult    PAST MEDICAL & SURGICAL HISTORY:  Paranoid personality disorder    Anxiety    Dementia    Alzheimer disease    Hypertension    Hyperlipidemia    Diabetes mellitus    Afib        FAMILY HISTORY:      SOCIAL HISTORY:   Alcohol:  Smoking:    Allergies    No Known Allergies    Intolerances        MEDICATIONS  (STANDING):  aspirin 325 milliGRAM(s) Oral daily  atorvastatin 40 milliGRAM(s) Oral at bedtime  dextrose 40% Gel 15 Gram(s) Oral once  dextrose 5%. 1000 milliLiter(s) (50 mL/Hr) IV Continuous <Continuous>  dextrose 5%. 1000 milliLiter(s) (100 mL/Hr) IV Continuous <Continuous>  dextrose 50% Injectable 25 Gram(s) IV Push once  dextrose 50% Injectable 12.5 Gram(s) IV Push once  dextrose 50% Injectable 25 Gram(s) IV Push once  diltiazem    milliGRAM(s) Oral daily  donepezil 10 milliGRAM(s) Oral at bedtime  famotidine    Tablet 20 milliGRAM(s) Oral daily  glucagon  Injectable 1 milliGRAM(s) IntraMuscular once  heparin   Injectable 5000 Unit(s) SubCutaneous every 12 hours  insulin lispro (ADMELOG) corrective regimen sliding scale   SubCutaneous three times a day before meals  loratadine 10 milliGRAM(s) Oral daily  memantine 5 milliGRAM(s) Oral two times a day  potassium phosphate / sodium phosphate Powder (PHOS-NaK) 1 Packet(s) Oral two times a day  sodium chloride 0.9%. 1000 milliLiter(s) (50 mL/Hr) IV Continuous <Continuous>    MEDICATIONS  (PRN):  acetaminophen   Tablet .. 650 milliGRAM(s) Oral every 6 hours PRN Temp greater or equal to 38C (100.4F), Mild Pain (1 - 3)  LORazepam   Injectable 0.5 milliGRAM(s) IV Push every 6 hours PRN SEVERE AGITATION  melatonin 5 milliGRAM(s) Oral at bedtime PRN Insomnia  OLANZapine Injectable 5 milliGRAM(s) IntraMuscular every 6 hours PRN agitation      Vital Signs Last 24 Hrs  T(C): 36.9 (2020 13:05), Max: 36.9 (2020 13:05)  T(F): 98.5 (2020 13:05), Max: 98.5 (2020 13:05)  HR: 90 (2020 13:05) (58 - 90)  BP: 160/69 (2020 13:05) (109/63 - 160/69)  BP(mean): --  RR: 18 (2020 13:05) (16 - 18)  SpO2: 95% (2020 13:05) (94% - 99%)    LABS:                        11.0   5.06  )-----------( 160      ( 2020 05:52 )             31.8         145  |  113<H>  |  14  ----------------------------<  129<H>  3.5   |  26  |  0.56    Ca    8.4<L>      2020 05:52  Phos  2.4         TPro  6.2  /  Alb  3.5  /  TBili  0.5  /  DBili  x   /  AST  25  /  ALT  15  /  AlkPhos  41          PT/INR - ( 2020 05:52 )   PT: 12.5 sec;   INR: 1.07 ratio           Urinalysis Basic - ( 10 Nov 2020 15:37 )    Color: Yellow / Appearance: Clear / S.015 / pH: x  Gluc: x / Ketone: Negative  / Bili: Negative / Urobili: Negative   Blood: x / Protein: Negative / Nitrite: Negative   Leuk Esterase: Negative / RBC: x / WBC x   Sq Epi: x / Non Sq Epi: x / Bacteria: x      Assessment and Plan :   FULL CONSULT DICTATED .  69 years old male with H/O psychiatric problems , atrial fibrillation has  altered mental status and agitation and also has mild fast ventricular rate . Will add metoprolol . Cardiac stable so far . Patient high risk for full anticoagulation due to risk of falls and bleeding , Continue aspirin 325 mg daily and DVT prophylaxis .  Will continue to follow during hospital course and give further recommendations as needed . Thanks for your referral . if any questions please contact me at office ([phone])

## 2020-11-11 NOTE — BEHAVIORAL HEALTH ASSESSMENT NOTE - NSBHATTESTSEENBY_PSY_A_CORE
This condition has been longstanding and has been previously treated. Will prescribe IOP lowering drops. Pt will follow with Dr. Raleigh Hatchet for HVF and OCT RNFL OS. Stressed good BP/BS control to patient. attending Psychiatrist without NP/Trainee

## 2020-11-11 NOTE — BEHAVIORAL HEALTH ASSESSMENT NOTE - NSBHCHARTREVIEWINVESTIGATE_PSY_A_CORE FT
< from: 12 Lead ECG (11.10.20 @ 10:09) >    Ventricular Rate 90 BPM    Atrial Rate 90 BPM    QRS Duration 82 ms    Q-T Interval 394 ms    QTC Calculation(Bazett) 481 ms    R Axis 14 degrees    T Axis 68 degrees    Diagnosis Line Atrial fibrillation  Septal infarct , age undetermined  Confirmed by YEISON DAHL ([id number]) on 11/10/2020 11:59:20 AM    < end of copied text >

## 2020-11-11 NOTE — BEHAVIORAL HEALTH ASSESSMENT NOTE - NSBHCHARTREVIEWIMAGING_PSY_A_CORE FT
< from: CT Head No Cont (11.10.20 @ 14:43) >    EXAM:  CT BRAIN                            PROCEDURE DATE:  11/10/2020          INTERPRETATION:  CLINICAL Indications:  Mental status change, unknown cause    COMPARISON: None.    TECHNIQUE: Noncontrast CT of the head. Multiplanar reformations are submitted.    FINDINGS:  There is periventricular and subcortical white matter hypodensity without mass effect, nonspecific, likely representing mild chronic microvascular ischemic changes. There is no compelling evidence for an acute transcortical infarction. There is no evidence of mass, mass effect, midline shift or extra-axial fluid collection. The lateral ventricles and cortical sulci are age-appropriate in size and configuration. The orbits, mastoid air cells and visualized paranasal sinusesare unremarkable. The calvarium is intact. Consider MRI of the brain as clinically warranted.    IMPRESSION:  Mild chronic microvascular changes without evidence of an acute transcortical infarction or hemorrhage.              BERTHA MEDEIROS MD; Attending Radiologist  This document has been electronically signed. Nov 10 2020  2:48PM    < end of copied text >

## 2020-11-11 NOTE — PROGRESS NOTE ADULT - ASSESSMENT
cont rx   cont rx      THOMAS ZAPATA DOA 11/10/2020 1950 DR GEOR[place]IA HAMILTON       REVIEW OF SYMPTOMS      Able to give ROS  NO     PHYSICAL EXAM    HEENT Unremarkable PERRLA atraumatic   RESP Fair air entry EXP prolonged    Harsh breath sound Resp distres mild   CARDIAC S1 S2 No S3     NO JVD    ABDOMEN SOFT BS PRESENT NOT DISTENDED No hepatosplenomegaly PEDAL EDEMA present No calf tenderness  NO rash     PT DATA/BEST PRACTICE  ALLERGY   nka           WT                11/10/2020 62               BMI                       11/10/2020 21                 ADVANCED DIRECTIVE     HEAD OF BED ELEVATION Yes      DVT PROPHYLAXIS.      hpsc (11/10/2020)   DIET. cons carb soft  (11/10/46824                                                                    LLOYD PROPHYLAXIS.   INFECTION PPLX                                                    OXYGENATION.   11/10/2020 ra 995       VITALS.   11/11/2020 afeb 71 109/60     IV Fluids.   NS 50 (11/10/08012         MICROBIO.   COVID pcr 11/10/2020 pcr negv   UA 11/10/2020 ua nitr negv l estr negv     EKG  11/10/2020 ekg a fib     IMAGING.   CXR 11/10/2020 cxr napd   CT h 11/10/2020 ct h negv       MEDS.   HPSC (11/10/2020)    (11/10/2020)  LORATADINE 10 (11/10/2020)   ATORVASTAT 40 (11/10/2020)   CARDIZEM 240 (11/10/2020)   FAMOTIDINE 20 (11/10/2020)   INSULIN (11/10/2020)  LORAZEPAM .5x3 p (11/10/2020)   OLANZAPINE 5.3 p (11/10/2020)   QUETIAPINE 25.3 (11/10/2020)   DONEPEZIL 10 (11/10/2020)       PATIENT SUMMARY.   69 m who had tested covid posv 11/9 was sent from [employer] [facility] to er with agitation andtested covid negv On arrival 164/110 99f ra 97%   In ER pt was found to have lacticemia and pulm consulted for sepsis ro pneumonia 11/10/2020   PMH Afib alzheimers anxiety dementia DM hytn   home meds asa 325 atorvastat 40 cardizem 240 aricept 10 pepcid namenda 5 claritin 10 metformin 500 quetiapine 25.3         PROBLEM/ASSESSMENT/RECOMMENDATIONS.  SEPSIS   Doubt bacterial infection No significant fever or leukocytosis and negv cxr and negv procalc poa   defer abio   LACTICEMIA   Repeat la improved on 11/10/2020   Is on iv fluids  RESP   ra po 99% poa   Monitor and target po 90-95%  ALTERED MENTAL STATE   11/10/2020 ct head 11/10/2020 was negv   observe   Control agitation with meds   A fib   On ASA cardizem   cont rx        TIME SPENT   Over 25 minutes aggregate care time spent on encounter; activities included   direct patient care, counseling and/or coordinating care reviewing notes, lab data/ imaging , discussion with multidisciplinary team/ patient  /family and explaining in detail risks, benefits, alternatives  of the recommendations     THOMAS MCCLELLAND 11/10/2020 [address] DR [address]OR[place] HAMILTON

## 2020-11-11 NOTE — BEHAVIORAL HEALTH ASSESSMENT NOTE - SUMMARY
68 y/o Male  from [facility] with medical  hx of Afib, HTN ,HLD ,DM ,Paranoid personality disorder, Alzheimer's dementia, sent in to ED  for combative behaviour and AMS x 1 day.  Patient was tested  +Covid yesterday 11/09 .  Pt is apparently AAOx3 at baseline.  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     IMP: Delirium    REC: Zyprexa zydis 10 mg po tid

## 2020-11-12 DIAGNOSIS — R77.8 OTHER SPECIFIED ABNORMALITIES OF PLASMA PROTEINS: ICD-10-CM

## 2020-11-12 DIAGNOSIS — E11.65 TYPE 2 DIABETES MELLITUS WITH HYPERGLYCEMIA: ICD-10-CM

## 2020-11-12 LAB
ANION GAP SERPL CALC-SCNC: 10 MMOL/L — SIGNIFICANT CHANGE UP (ref 5–17)
BUN SERPL-MCNC: 10 MG/DL — SIGNIFICANT CHANGE UP (ref 7–23)
CALCIUM SERPL-MCNC: 8.5 MG/DL — SIGNIFICANT CHANGE UP (ref 8.5–10.1)
CHLORIDE SERPL-SCNC: 110 MMOL/L — HIGH (ref 96–108)
CHOLEST SERPL-MCNC: 122 MG/DL — SIGNIFICANT CHANGE UP
CO2 SERPL-SCNC: 24 MMOL/L — SIGNIFICANT CHANGE UP (ref 22–31)
CREAT SERPL-MCNC: 0.65 MG/DL — SIGNIFICANT CHANGE UP (ref 0.5–1.3)
CULTURE RESULTS: SIGNIFICANT CHANGE UP
GLUCOSE SERPL-MCNC: 106 MG/DL — HIGH (ref 70–99)
HCT VFR BLD CALC: 35.4 % — LOW (ref 39–50)
HDLC SERPL-MCNC: 53 MG/DL — SIGNIFICANT CHANGE UP
HGB BLD-MCNC: 12.1 G/DL — LOW (ref 13–17)
LACTATE SERPL-SCNC: 0.9 MMOL/L — SIGNIFICANT CHANGE UP (ref 0.7–2)
LIPID PNL WITH DIRECT LDL SERPL: 61 MG/DL — SIGNIFICANT CHANGE UP
MAGNESIUM SERPL-MCNC: 1.7 MG/DL — SIGNIFICANT CHANGE UP (ref 1.6–2.6)
MAGNESIUM SERPL-MCNC: 1.8 MG/DL — SIGNIFICANT CHANGE UP (ref 1.6–2.6)
MCHC RBC-ENTMCNC: 30.9 PG — SIGNIFICANT CHANGE UP (ref 27–34)
MCHC RBC-ENTMCNC: 34.2 GM/DL — SIGNIFICANT CHANGE UP (ref 32–36)
MCV RBC AUTO: 90.5 FL — SIGNIFICANT CHANGE UP (ref 80–100)
NON HDL CHOLESTEROL: 69 MG/DL — SIGNIFICANT CHANGE UP
NRBC # BLD: 0 /100 WBCS — SIGNIFICANT CHANGE UP (ref 0–0)
ORGANISM # SPEC MICROSCOPIC CNT: SIGNIFICANT CHANGE UP
ORGANISM # SPEC MICROSCOPIC CNT: SIGNIFICANT CHANGE UP
PHOSPHATE SERPL-MCNC: 3.1 MG/DL — SIGNIFICANT CHANGE UP (ref 2.5–4.5)
PLATELET # BLD AUTO: 178 K/UL — SIGNIFICANT CHANGE UP (ref 150–400)
POTASSIUM SERPL-MCNC: 3.4 MMOL/L — LOW (ref 3.5–5.3)
POTASSIUM SERPL-SCNC: 3.4 MMOL/L — LOW (ref 3.5–5.3)
RBC # BLD: 3.91 M/UL — LOW (ref 4.2–5.8)
RBC # FLD: 14.2 % — SIGNIFICANT CHANGE UP (ref 10.3–14.5)
SARS-COV-2 IGG SERPL QL IA: POSITIVE
SARS-COV-2 IGM SERPL IA-ACNC: 2.34 INDEX — HIGH
SODIUM SERPL-SCNC: 144 MMOL/L — SIGNIFICANT CHANGE UP (ref 135–145)
SPECIMEN SOURCE: SIGNIFICANT CHANGE UP
TRIGL SERPL-MCNC: 42 MG/DL — SIGNIFICANT CHANGE UP
TROPONIN I SERPL-MCNC: 0.07 NG/ML — HIGH (ref 0.01–0.04)
TSH SERPL-MCNC: 0.32 UIU/ML — LOW (ref 0.36–3.74)
WBC # BLD: 4.94 K/UL — SIGNIFICANT CHANGE UP (ref 3.8–10.5)
WBC # FLD AUTO: 4.94 K/UL — SIGNIFICANT CHANGE UP (ref 3.8–10.5)

## 2020-11-12 RX ADMIN — Medication 325 MILLIGRAM(S): at 18:08

## 2020-11-12 RX ADMIN — Medication 5 MILLIGRAM(S): at 21:53

## 2020-11-12 RX ADMIN — MEMANTINE HYDROCHLORIDE 5 MILLIGRAM(S): 10 TABLET ORAL at 18:07

## 2020-11-12 RX ADMIN — OLANZAPINE 10 MILLIGRAM(S): 15 TABLET, FILM COATED ORAL at 21:53

## 2020-11-12 RX ADMIN — Medication 25 MILLIGRAM(S): at 18:07

## 2020-11-12 RX ADMIN — ATORVASTATIN CALCIUM 40 MILLIGRAM(S): 80 TABLET, FILM COATED ORAL at 21:53

## 2020-11-12 RX ADMIN — OLANZAPINE 5 MILLIGRAM(S): 15 TABLET, FILM COATED ORAL at 10:41

## 2020-11-12 RX ADMIN — FAMOTIDINE 20 MILLIGRAM(S): 10 INJECTION INTRAVENOUS at 18:07

## 2020-11-12 RX ADMIN — Medication 1 PACKET(S): at 18:07

## 2020-11-12 RX ADMIN — MEMANTINE HYDROCHLORIDE 5 MILLIGRAM(S): 10 TABLET ORAL at 06:14

## 2020-11-12 RX ADMIN — OLANZAPINE 10 MILLIGRAM(S): 15 TABLET, FILM COATED ORAL at 06:13

## 2020-11-12 RX ADMIN — LORATADINE 10 MILLIGRAM(S): 10 TABLET ORAL at 18:07

## 2020-11-12 RX ADMIN — Medication 0.5 MILLIGRAM(S): at 13:34

## 2020-11-12 RX ADMIN — Medication 1 PACKET(S): at 06:14

## 2020-11-12 RX ADMIN — HEPARIN SODIUM 5000 UNIT(S): 5000 INJECTION INTRAVENOUS; SUBCUTANEOUS at 06:13

## 2020-11-12 RX ADMIN — Medication 25 MILLIGRAM(S): at 06:13

## 2020-11-12 RX ADMIN — HEPARIN SODIUM 5000 UNIT(S): 5000 INJECTION INTRAVENOUS; SUBCUTANEOUS at 18:07

## 2020-11-12 RX ADMIN — Medication 240 MILLIGRAM(S): at 06:13

## 2020-11-12 RX ADMIN — DONEPEZIL HYDROCHLORIDE 10 MILLIGRAM(S): 10 TABLET, FILM COATED ORAL at 21:53

## 2020-11-12 NOTE — PROGRESS NOTE ADULT - SUBJECTIVE AND OBJECTIVE BOX
[facility] [place]       HPI:  70 y/o Male  from [facility] with medical  hx of Afib, HTN ,HLD ,DM ,Paranoid personality disorder, Alzheimer's dementia, sent in to ED  for combative behaviour and AMS x 1 day.  Patient was tested  +Covid yesterday 11/09 .  Pt is apparently AAOx3 at baseline.  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 Pt wants the person responsible for sending him to the ED to be fired. Pt denies any SOB, cough, GI/ symptoms. Covid test came back negative but patient found to have lactate serum elevated 3.1 ,iv hydration given Admitted for septic workup and evaluation,send blood and urine cx,serial lactate levels,monitor vitals closley,ivfs hydration,monitor urine output and renal profile,id and pulm consults called to determine need in antibacterial tx Patient developed severe agitation and combativeness in er later ,requiring  several ativan ,haldol injections .Placed on constant observation CT BRAIN -NAD .PSYCHIATRY AND NEUROLOGY consults requested .Palliative care consult requested ,to discuss advance directives and complete MOLST  (10 Nov 2020 18:10)        SUBJECTIVE:      ALLERGIES:  Allergies    No Known Allergies    Intolerances          MEDICATIONS  (STANDING):  aspirin 325 milliGRAM(s) Oral daily  atorvastatin 40 milliGRAM(s) Oral at bedtime  dextrose 40% Gel 15 Gram(s) Oral once  dextrose 5%. 1000 milliLiter(s) (50 mL/Hr) IV Continuous <Continuous>  dextrose 5%. 1000 milliLiter(s) (100 mL/Hr) IV Continuous <Continuous>  dextrose 50% Injectable 25 Gram(s) IV Push once  dextrose 50% Injectable 12.5 Gram(s) IV Push once  dextrose 50% Injectable 25 Gram(s) IV Push once  diltiazem    milliGRAM(s) Oral daily  donepezil 10 milliGRAM(s) Oral at bedtime  famotidine    Tablet 20 milliGRAM(s) Oral daily  glucagon  Injectable 1 milliGRAM(s) IntraMuscular once  heparin   Injectable 5000 Unit(s) SubCutaneous every 12 hours  insulin lispro (ADMELOG) corrective regimen sliding scale   SubCutaneous three times a day before meals  loratadine 10 milliGRAM(s) Oral daily  memantine 5 milliGRAM(s) Oral two times a day  metoprolol tartrate 25 milliGRAM(s) Oral two times a day  OLANZapine Disintegrating Tablet 10 milliGRAM(s) Oral three times a day  potassium phosphate / sodium phosphate Powder (PHOS-NaK) 1 Packet(s) Oral two times a day  sodium chloride 0.9%. 1000 milliLiter(s) (50 mL/Hr) IV Continuous <Continuous>    MEDICATIONS  (PRN):  acetaminophen   Tablet .. 650 milliGRAM(s) Oral every 6 hours PRN Temp greater or equal to 38C (100.4F), Mild Pain (1 - 3)  LORazepam   Injectable 0.5 milliGRAM(s) IV Push every 6 hours PRN SEVERE AGITATION  melatonin 5 milliGRAM(s) Oral at bedtime PRN Insomnia  OLANZapine Injectable 5 milliGRAM(s) IntraMuscular every 6 hours PRN agitation      REVIEW OF SYSTEMS:  CONSTITUTIONAL: No fever,  RESPIRATORY: No cough, wheezing, shortness of breath  CARDIOVASCULAR: No chest pain, dyspnea, palpitations, dizziness, syncope, paroxysmal nocturnal dyspnea, orthopnea, or arm or leg swelling  GASTROINTESTINAL: No abdominal  or epigastric pain, nausea, vomiting,  diarrhea  NEUROLOGICAL: No headaches,  loss of strength, numbness, or tremors    Vital Signs Last 24 Hrs  T(C): 36.8 (12 Nov 2020 21:52), Max: 37.1 (12 Nov 2020 05:00)  T(F): 98.3 (12 Nov 2020 21:52), Max: 98.7 (12 Nov 2020 05:00)  HR: 72 (12 Nov 2020 21:52) (63 - 82)  BP: 122/77 (12 Nov 2020 21:52) (114/72 - 137/60)  BP(mean): --  RR: 18 (12 Nov 2020 21:52) (18 - 18)  SpO2: 97% (12 Nov 2020 21:52) (94% - 97%)    PHYSICAL EXAM:  HEAD:  Atraumatic, Normocephalic  NECK: Supple and normal thyroid.  No JVD or carotid bruit.   HEART: S1, S2 regular , 1/6 soft ejection systolic murmur in mitral area , no thrill and no gallops .  PULMONARY: Bilateral vesicular breathing , few scattered ronchi and few scattered rales are present .  ABDOMEN: Soft nontender and positive bowl sounds   EXTREMITIES:  No clubbing, cyanosis, or pedal  edema  NEUROLOGICAL: AAOX3 , no focal deficit .    LABS:                        12.1   4.94  )-----------( 178      ( 12 Nov 2020 08:50 )             35.4     11-12    144  |  110<H>  |  10  ----------------------------<  106<H>  3.4<L>   |  24  |  0.65    Ca    8.5      12 Nov 2020 08:50  Phos  3.1     11-12  Mg     1.7     11-12    TPro  6.2  /  Alb  3.5  /  TBili  0.5  /  DBili  x   /  AST  25  /  ALT  15  /  AlkPhos  41  11-11    CARDIAC MARKERS ( 12 Nov 2020 10:42 )  .069 ng/mL / x     / x     / x     / x      CARDIAC MARKERS ( 11 Nov 2020 18:55 )  .278 ng/mL / x     / x     / x     / x      CARDIAC MARKERS ( 11 Nov 2020 05:52 )  .053 ng/mL / x     / x     / x     / x          PT/INR - ( 11 Nov 2020 05:52 )   PT: 12.5 sec;   INR: 1.07 ratio             BNP      EKG:  ECHO:  IMAGING:    Assessment/Plan    Will continue to follow during hospital course and give further recommendations as needed . Thanks for your referral . if any questions please contact me at office ([phone] [phone]) ABBY FELICIANO MD [place] [id number]  [address]  OFFICE : [phone]  CELL : [phone]    CARDIOLOGY F/U :      HPI:  68 y/o Male  from [facility] with medical  hx of Afib, HTN ,HLD ,DM ,Paranoid personality disorder, Alzheimer's dementia, sent in to ED  for combative behaviour and AMS x 1 day.  Patient was tested  +Covid yesterday 11/09 .  Pt is apparently AAOx3 at baseline.  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 Pt wants the person responsible for sending him to the ED to be fired. Pt denies any SOB, cough, GI/ symptoms. Covid test came back negative but patient found to have lactate serum elevated 3.1 ,iv hydration given Admitted for septic workup and evaluation,send blood and urine cx,serial lactate levels,monitor vitals closley,ivfs hydration,monitor urine output and renal profile,id and pulm consults called to determine need in antibacterial tx Patient developed severe agitation and combativeness in er later ,requiring  several ativan ,haldol injections .Placed on constant observation CT BRAIN -NAD .PSYCHIATRY AND NEUROLOGY consults requested .Palliative care consult requested ,to discuss advance directives and complete MOLST  (10 Nov 2020 18:10)        SUBJECTIVE: Patient confused but no distress .      ALLERGIES:  Allergies    No Known Allergies    Intolerances          MEDICATIONS  (STANDING):  aspirin 325 milliGRAM(s) Oral daily  atorvastatin 40 milliGRAM(s) Oral at bedtime  dextrose 40% Gel 15 Gram(s) Oral once  dextrose 5%. 1000 milliLiter(s) (50 mL/Hr) IV Continuous <Continuous>  dextrose 5%. 1000 milliLiter(s) (100 mL/Hr) IV Continuous <Continuous>  dextrose 50% Injectable 25 Gram(s) IV Push once  dextrose 50% Injectable 12.5 Gram(s) IV Push once  dextrose 50% Injectable 25 Gram(s) IV Push once  diltiazem    milliGRAM(s) Oral daily  donepezil 10 milliGRAM(s) Oral at bedtime  famotidine    Tablet 20 milliGRAM(s) Oral daily  glucagon  Injectable 1 milliGRAM(s) IntraMuscular once  heparin   Injectable 5000 Unit(s) SubCutaneous every 12 hours  insulin lispro (ADMELOG) corrective regimen sliding scale   SubCutaneous three times a day before meals  loratadine 10 milliGRAM(s) Oral daily  memantine 5 milliGRAM(s) Oral two times a day  metoprolol tartrate 25 milliGRAM(s) Oral two times a day  OLANZapine Disintegrating Tablet 10 milliGRAM(s) Oral three times a day  potassium phosphate / sodium phosphate Powder (PHOS-NaK) 1 Packet(s) Oral two times a day  sodium chloride 0.9%. 1000 milliLiter(s) (50 mL/Hr) IV Continuous <Continuous>    MEDICATIONS  (PRN):  acetaminophen   Tablet .. 650 milliGRAM(s) Oral every 6 hours PRN Temp greater or equal to 38C (100.4F), Mild Pain (1 - 3)  LORazepam   Injectable 0.5 milliGRAM(s) IV Push every 6 hours PRN SEVERE AGITATION  melatonin 5 milliGRAM(s) Oral at bedtime PRN Insomnia  OLANZapine Injectable 5 milliGRAM(s) IntraMuscular every 6 hours PRN agitation      REVIEW OF SYSTEMS:  CONSTITUTIONAL: No fever,  RESPIRATORY: No cough, wheezing, shortness of breath  CARDIOVASCULAR: No chest pain, dyspnea, palpitations, dizziness, syncope, paroxysmal nocturnal dyspnea, orthopnea, or arm or leg swelling  GASTROINTESTINAL: No abdominal  or epigastric pain, nausea, vomiting,  diarrhea  NEUROLOGICAL: No headaches,  numbness, or tremors  Skin : No itching .  Hematology : : No bleeding .  Endocrinology : No heat and cold intolerance .  Psychiatry : Patient is confused .  Musculoskeletal : Patient has mild chronic arthritis .    Vital Signs Last 24 Hrs  T(C): 36.8 (12 Nov 2020 21:52), Max: 37.1 (12 Nov 2020 05:00)  T(F): 98.3 (12 Nov 2020 21:52), Max: 98.7 (12 Nov 2020 05:00)  HR: 72 (12 Nov 2020 21:52) (63 - 82)  BP: 122/77 (12 Nov 2020 21:52) (114/72 - 137/60)  BP(mean): --  RR: 18 (12 Nov 2020 21:52) (18 - 18)  SpO2: 97% (12 Nov 2020 21:52) (94% - 97%)    PHYSICAL EXAM:  HEAD:  Atraumatic, Normocephalic  NECK: Supple and normal thyroid.  No JVD or carotid bruit.   HEART: S1, S2 irregular , 1/6 soft ejection systolic murmur in mitral area , no thrill and no gallops .  PULMONARY: Bilateral vesicular breathing , few scattered ronchi and few scattered rales are present .  ABDOMEN: Soft nontender and positive bowl sounds   EXTREMITIES:  No clubbing, cyanosis, or pedal  edema  NEUROLOGICAL: AA and confuse .     LABS:                        12.1   4.94  )-----------( 178      ( 12 Nov 2020 08:50 )             35.4     11-12    144  |  110<H>  |  10  ----------------------------<  106<H>  3.4<L>   |  24  |  0.65    Ca    8.5      12 Nov 2020 08:50  Phos  3.1     11-12  Mg     1.7     11-12    TPro  6.2  /  Alb  3.5  /  TBili  0.5  /  DBili  x   /  AST  25  /  ALT  15  /  AlkPhos  41  11-11    CARDIAC MARKERS ( 12 Nov 2020 10:42 )  .069 ng/mL / x     / x     / x     / x      CARDIAC MARKERS ( 11 Nov 2020 18:55 )  .278 ng/mL / x     / x     / x     / x      CARDIAC MARKERS ( 11 Nov 2020 05:52 )  .053 ng/mL / x     / x     / x     / x          PT/INR - ( 11 Nov 2020 05:52 )   PT: 12.5 sec;   INR: 1.07 ratio             Assessment/Plan  Patient has :  1) Altered mental status . Agitation and Bipolar disorder .  2) Mild dehydration and improved .  3) Paroxysmal atrial fibrillation and stable .  4) Hypertension and BP stable .  5) Mild elevated Troponin I secondary to oxygen demand supply mismatch and not NON-ST EDUIN as primary event .  Plan : 1) Continue I/V hydration 2) Monitor electrolytes . 3) Cardiac stable so far . 4) Continue present medications .  Will continue to follow during hospital course and give further recommendations as needed . Thanks for your referral . if any questions please contact me at office ([phone])

## 2020-11-12 NOTE — PROGRESS NOTE ADULT - SUBJECTIVE AND OBJECTIVE BOX
Neurology follow up note    JODI [id number]      Interval History:    Patient resting bed events noted     MEDICATIONS    acetaminophen   Tablet .. 650 milliGRAM(s) Oral every 6 hours PRN  aspirin 325 milliGRAM(s) Oral daily  atorvastatin 40 milliGRAM(s) Oral at bedtime  dextrose 40% Gel 15 Gram(s) Oral once  dextrose 5%. 1000 milliLiter(s) IV Continuous <Continuous>  dextrose 5%. 1000 milliLiter(s) IV Continuous <Continuous>  dextrose 50% Injectable 25 Gram(s) IV Push once  dextrose 50% Injectable 12.5 Gram(s) IV Push once  dextrose 50% Injectable 25 Gram(s) IV Push once  diltiazem    milliGRAM(s) Oral daily  donepezil 10 milliGRAM(s) Oral at bedtime  famotidine    Tablet 20 milliGRAM(s) Oral daily  glucagon  Injectable 1 milliGRAM(s) IntraMuscular once  heparin   Injectable 5000 Unit(s) SubCutaneous every 12 hours  insulin lispro (ADMELOG) corrective regimen sliding scale   SubCutaneous three times a day before meals  loratadine 10 milliGRAM(s) Oral daily  LORazepam   Injectable 0.5 milliGRAM(s) IV Push every 6 hours PRN  melatonin 5 milliGRAM(s) Oral at bedtime PRN  memantine 5 milliGRAM(s) Oral two times a day  metoprolol tartrate 25 milliGRAM(s) Oral two times a day  OLANZapine Disintegrating Tablet 10 milliGRAM(s) Oral three times a day  OLANZapine Injectable 5 milliGRAM(s) IntraMuscular every 6 hours PRN  potassium phosphate / sodium phosphate Powder (PHOS-NaK) 1 Packet(s) Oral two times a day  sodium chloride 0.9%. 1000 milliLiter(s) IV Continuous <Continuous>      Allergies    No Known Allergies    Intolerances            Vital Signs Last 24 Hrs  T(C): 37.1 (2020 05:00), Max: 37.3 (2020 20:36)  T(F): 98.7 (2020 05:00), Max: 99.1 (2020 20:36)  HR: 63 (2020 05:00) (63 - 90)  BP: 114/72 (2020 05:00) (114/72 - 160/69)  BP(mean): --  RR: 18 (2020 05:00) (17 - 19)  SpO2: 94% (2020 05:00) (94% - 96%)           REVIEW OF SYSTEMS:  Very limited secondary to the patient has underlying dementia, would not answer questions accordingly.    PHYSICAL EXAMINATION:  HEENT:  Head:  Normocephalic, atraumatic.  Eyes:  No scleral icterus.  Ears:  Hearing appeared to be intact.  NECK:  Supple.  RESPIRATORY:  Good air entry bilaterally.  CARDIOVASCULAR:  S1 and S2 heard.  ABDOMEN:  Soft and nontender.  EXTREMITIES:  No clubbing or cyanosis were noted.      NEUROLOGIC:  The patient is sleepy    Positive blink to bilateral visual threat.  Speech was fluent.  The patient did follow simple commands.  Bilateral upper were 4/5, bilateral lower were 3+/5..       LABS:  CBC Full  -  ( 2020 08:50 )  WBC Count : 4.94 K/uL  RBC Count : 3.91 M/uL  Hemoglobin : 12.1 g/dL  Hematocrit : 35.4 %  Platelet Count - Automated : 178 K/uL  Mean Cell Volume : 90.5 fl  Mean Cell Hemoglobin : 30.9 pg  Mean Cell Hemoglobin Concentration : 34.2 gm/dL  Auto Neutrophil # : x  Auto Lymphocyte # : x  Auto Monocyte # : x  Auto Eosinophil # : x  Auto Basophil # : x  Auto Neutrophil % : x  Auto Lymphocyte % : x  Auto Monocyte % : x  Auto Eosinophil % : x  Auto Basophil % : x    Urinalysis Basic - ( 10 Nov 2020 15:37 )    Color: Yellow / Appearance: Clear / S.015 / pH: x  Gluc: x / Ketone: Negative  / Bili: Negative / Urobili: Negative   Blood: x / Protein: Negative / Nitrite: Negative   Leuk Esterase: Negative / RBC: x / WBC x   Sq Epi: x / Non Sq Epi: x / Bacteria: x      -12    144  |  110<H>  |  10  ----------------------------<  106<H>  3.4<L>   |  24  |  0.65    Ca    8.5      2020 08:50  Phos  2.4     11-11  Mg     1.8     -12    TPro  6.2  /  Alb  3.5  /  TBili  0.5  /  DBili  x   /  AST  25  /  ALT  15  /  AlkPhos  41  11-11    Hemoglobin A1C:     LIVER FUNCTIONS - ( 2020 05:52 )  Alb: 3.5 g/dL / Pro: 6.2 g/dL / ALK PHOS: 41 U/L / ALT: 15 U/L / AST: 25 U/L / GGT: x           Vitamin B12   PT/INR - ( 2020 05:52 )   PT: 12.5 sec;   INR: 1.07 ratio               RADIOLOGY      ANALYSIS AND PLAN:  This is a 69-year-old with an episode of altered mental status and agitation.  For episode of altered mental status, agitation, suspect most likely secondary to progressive underlying dementia with a personality disorder.  There are no clear signs on examinations to suggest a new cerebrovascular accident has ensued.  I would recommend a psychiatric follow-up.  Consider Seroquel.  For atrial fibrillation, risks versus benefits, continue the patient on aspirin.  For history of underlying dementia, appears to be advanced, would recommend to continue the patient on his Aricept and Namenda.  For history of diabetes, strict control of blood sugars.  For history of hypertension, monitor systolic blood pressure.  For high cholesterol, continue the patient on his statin.  Greater than 40 minutes of time was spent with the patient, plan of care, reviewing data, speaking to the multidisciplinary healthcare team with greater than 50% of that time in regards to counseling and care coordination.

## 2020-11-12 NOTE — PROGRESS NOTE ADULT - PROBLEM SELECTOR PLAN 1
LIKELY 2/2 TO PROGRESSION OF DEMENTIA WITH BEHAVIORAL DISTURBANCE ,RULE OUT ACUTE METABOLIC ENCEPHALOPATHY AND  INFECTIOUS SOURCES OF AMS

## 2020-11-12 NOTE — DIETITIAN INITIAL EVALUATION ADULT. - ORAL INTAKE PTA/DIET HISTORY
Per transfer records pt was on a RANJANA, NCS diet, whole consistency. Pt with hx of diabetes, on Metformin PTA, current HgbA1c 7.0%, indicates good control. Supplement use PTA includes Glucerna TID. NKFA.

## 2020-11-12 NOTE — PROGRESS NOTE ADULT - SUBJECTIVE AND OBJECTIVE BOX
PROGRESS NOTE  Patient is a 69y old  Male who presents with a chief complaint of ALTERED MENTAL STATUS  AND LACTICEMIA (2020 06:24)  Chart and available morning labs /imaging are reviewed electronically , urgent issues addressed . 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  Patient was tested  +Covid yesterday  .  Pt is apparently AAOx3 at baseline.  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 Pt wants the person responsible for sending him to the ED to be fired. Pt denies any SOB, cough, GI/ symptoms. Covid test came back negative but patient found to have lactate serum elevated 3.1 ,iv hydration given Admitted for septic workup and evaluation,send blood and urine cx,serial lactate levels,monitor vitals closley,ivfs hydration,monitor urine output and renal profile,id and pulm consults called to determine need in antibacterial tx Patient developed severe agitation and combativeness in er later ,requiring  several ativan ,haldol injections .Placed on constant observation CT BRAIN -NAD .PSYCHIATRY AND NEUROLOGY consults requested .Palliative care consult requested ,to discuss advance directives and complete MOLST  (10 Nov 2020 18:10)  PAST MEDICAL & SURGICAL HISTORY:  Paranoid personality disorder  Anxiety  Dementia  Alzheimer disease  Hypertension    Hyperlipidemia    Diabetes mellitus    Afib    DM (diabetes mellitus)    DM (diabetes mellitus)        MEDICATIONS  (STANDING):  aspirin 325 milliGRAM(s) Oral daily  atorvastatin 40 milliGRAM(s) Oral at bedtime  dextrose 40% Gel 15 Gram(s) Oral once  dextrose 5%. 1000 milliLiter(s) (100 mL/Hr) IV Continuous <Continuous>  dextrose 5%. 1000 milliLiter(s) (50 mL/Hr) IV Continuous <Continuous>  dextrose 50% Injectable 25 Gram(s) IV Push once  dextrose 50% Injectable 12.5 Gram(s) IV Push once  dextrose 50% Injectable 25 Gram(s) IV Push once  diltiazem    milliGRAM(s) Oral daily  donepezil 10 milliGRAM(s) Oral at bedtime  famotidine    Tablet 20 milliGRAM(s) Oral daily  glucagon  Injectable 1 milliGRAM(s) IntraMuscular once  heparin   Injectable 5000 Unit(s) SubCutaneous every 12 hours  insulin lispro (ADMELOG) corrective regimen sliding scale   SubCutaneous three times a day before meals  loratadine 10 milliGRAM(s) Oral daily  memantine 5 milliGRAM(s) Oral two times a day  metoprolol tartrate 25 milliGRAM(s) Oral two times a day  OLANZapine Disintegrating Tablet 10 milliGRAM(s) Oral three times a day  potassium phosphate / sodium phosphate Powder (PHOS-NaK) 1 Packet(s) Oral two times a day  sodium chloride 0.9%. 1000 milliLiter(s) (50 mL/Hr) IV Continuous <Continuous>    MEDICATIONS  (PRN):  acetaminophen   Tablet .. 650 milliGRAM(s) Oral every 6 hours PRN Temp greater or equal to 38C (100.4F), Mild Pain (1 - 3)  LORazepam   Injectable 0.5 milliGRAM(s) IV Push every 6 hours PRN SEVERE AGITATION  melatonin 5 milliGRAM(s) Oral at bedtime PRN Insomnia  OLANZapine Injectable 5 milliGRAM(s) IntraMuscular every 6 hours PRN agitation      OBJECTIVE    T(C): 37.1 (20 @ 05:00), Max: 37.3 (20 @ 20:36)  HR: 63 (20 @ 05:00) (63 - 90)  BP: 114/72 (20 @ 05:00) (114/72 - 160/69)  RR: 18 (20 @ 05:00) (17 - 19)  SpO2: 94% (20 @ 05:00) (94% - 96%)  Wt(kg): --  I&O's Summary        REVIEW OF SYSTEMS:  ROS is unobtainable due to dementia and confusion PATIENT IS CONFUSED AND IS UNABLE TO GIVE ANY/RELIABLE HISTORY OR REVIEW OF SYSTEMS PLEASE ALSO SEE UNDER HPI AND ASSESSMENT FOR OBTAINED REVIEW OF SYSTEMS     PHYSICAL EXAM:  Appearance: NAD. VS past 24 hrs -as above   HEENT:   Moist oral mucosa. Conjunctiva clear b/l.   Neck : supple  Respiratory: Lungs CTAB.  Gastrointestinal:  Soft, nontender. No rebound. No rigidity. BS present	  Cardiovascular: RRR ,S1S2 present  Neurologic: Non-focal. Moving all extremities.  Extremities: No edema. No erythema. No calf tenderness.  Skin: No rashes, No ecchymoses, No cyanosis.	  wounds ,skin lesions-See skin assesment flow sheet   LABS:                        11.0   5.06  )-----------( 160      ( 2020 05:52 )             31.8         145  |  113<H>  |  14  ----------------------------<  129<H>  3.5   |  26  |  0.56    Ca    8.4<L>      2020 05:52  Phos  2.4         TPro  6.2  /  Alb  3.5  /  TBili  0.5  /  DBili  x   /  AST  25  /  ALT  15  /  AlkPhos  41      CAPILLARY BLOOD GLUCOSE      POCT Blood Glucose.: 165 mg/dL (2020 16:33)  POCT Blood Glucose.: 144 mg/dL (2020 11:42)  POCT Blood Glucose.: 117 mg/dL (2020 07:13)    PT/INR - ( 2020 05:52 )   PT: 12.5 sec;   INR: 1.07 ratio           Urinalysis Basic - ( 10 Nov 2020 15:37 )    Color: Yellow / Appearance: Clear / S.015 / pH: x  Gluc: x / Ketone: Negative  / Bili: Negative / Urobili: Negative   Blood: x / Protein: Negative / Nitrite: Negative   Leuk Esterase: Negative / RBC: x / WBC x   Sq Epi: x / Non Sq Epi: x / Bacteria: x        Culture - Urine (collected 10 Nov 2020 22:03)  Source: .Urine Clean Catch (Midstream)  Final Report (2020 18:40):    No growth    Culture - Blood (collected 10 Nov 2020 11:23)  Source: .Blood Blood-Peripheral  Preliminary Report (2020 12:01):    No growth to date.    Culture - Blood (collected 10 Nov 2020 11:23)  Source: .Blood Blood-Peripheral  Gram Stain (2020 14:58):    Growth in anaerobic bottle: Gram Positive Cocci in Clusters  Preliminary Report (2020 14:59):    Growth in anaerobic bottle: Gram Positive Cocci in Clusters    "Due to technical problems, Proteus sp. will Not be reported as part of    the BCID panel until further notice"    ***Blood Panel PCR results on this specimen are available    approximately 3 hours after the Gram stain result.***    Gram stain, PCR, and/or culture results may not always    correspond due to difference in methodologies.    ************************************************************    This PCR assay was performed using [employer].    The following targets are tested for: Enterococcus,    vancomycin resistant enterococci, Listeria monocytogenes,    coagulase negative staphylococci, S. aureus,    methicillin resistant S. aureus, Streptococcus agalactiae    (Group B), S. pneumoniae, S. pyogenes (Group A),    Acinetobacter baumannii, Enterobacter cloacae, E. coli,    Klebsiella oxytoca, K. pneumoniae, Proteus sp.,    Serratia marcescens, Haemophilus influenzae,    Neisseria meningitidis, Pseudomonas aeruginosa, Candida    albicans, C. glabrata, C krusei, C parapsilosis,    C. tropicalis and the KPC resistance gene.  Organism: Blood Culture PCR (2020 15:56)  Organism: Blood Culture PCR (2020 15:56)      RADIOLOGY & ADDITIONAL TESTS:   reviewed elctronically  ASSESSMENT/PLAN: 	     PROGRESS NOTE  Patient is a 69y old  Male who presents with a chief complaint of ALTERED MENTAL STATUS  AND LACTICEMIA (2020 06:24)  Chart and available morning labs /imaging are reviewed electronically , urgent issues addressed . More information  is being added upon completion of rounds , when more information is collected and management discussed with consultants , medical staff and social service/case management on the floor   OVERNIGHT Sleeping in a bed .  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  Patient was tested  +Covid yesterday  .  Pt is apparently AAOx3 at baseline.  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 Pt wants the person responsible for sending him to the ED to be fired. Pt denies any SOB, cough, GI/ symptoms. Covid test came back negative but patient found to have lactate serum elevated 3.1 ,iv hydration given Admitted for septic workup and evaluation,send blood and urine cx,serial lactate levels,monitor vitals closley,ivfs hydration,monitor urine output and renal profile,id and pulm consults called to determine need in antibacterial tx Patient developed severe agitation and combativeness in er later ,requiring  several ativan ,haldol injections .Placed on constant observation CT BRAIN -NAD .PSYCHIATRY AND NEUROLOGY consults requested .Palliative care consult requested ,to discuss advance directives and complete MOLST  (10 Nov 2020 18:10)  PAST MEDICAL & SURGICAL HISTORY:  Paranoid personality disorder  Anxiety  Dementia  Alzheimer disease  Hypertension    Hyperlipidemia    Diabetes mellitus    Afib    DM (diabetes mellitus)    DM (diabetes mellitus)        MEDICATIONS  (STANDING):  aspirin 325 milliGRAM(s) Oral daily  atorvastatin 40 milliGRAM(s) Oral at bedtime  dextrose 40% Gel 15 Gram(s) Oral once  dextrose 5%. 1000 milliLiter(s) (100 mL/Hr) IV Continuous <Continuous>  dextrose 5%. 1000 milliLiter(s) (50 mL/Hr) IV Continuous <Continuous>  dextrose 50% Injectable 25 Gram(s) IV Push once  dextrose 50% Injectable 12.5 Gram(s) IV Push once  dextrose 50% Injectable 25 Gram(s) IV Push once  diltiazem    milliGRAM(s) Oral daily  donepezil 10 milliGRAM(s) Oral at bedtime  famotidine    Tablet 20 milliGRAM(s) Oral daily  glucagon  Injectable 1 milliGRAM(s) IntraMuscular once  heparin   Injectable 5000 Unit(s) SubCutaneous every 12 hours  insulin lispro (ADMELOG) corrective regimen sliding scale   SubCutaneous three times a day before meals  loratadine 10 milliGRAM(s) Oral daily  memantine 5 milliGRAM(s) Oral two times a day  metoprolol tartrate 25 milliGRAM(s) Oral two times a day  OLANZapine Disintegrating Tablet 10 milliGRAM(s) Oral three times a day  potassium phosphate / sodium phosphate Powder (PHOS-NaK) 1 Packet(s) Oral two times a day  sodium chloride 0.9%. 1000 milliLiter(s) (50 mL/Hr) IV Continuous <Continuous>    MEDICATIONS  (PRN):  acetaminophen   Tablet .. 650 milliGRAM(s) Oral every 6 hours PRN Temp greater or equal to 38C (100.4F), Mild Pain (1 - 3)  LORazepam   Injectable 0.5 milliGRAM(s) IV Push every 6 hours PRN SEVERE AGITATION  melatonin 5 milliGRAM(s) Oral at bedtime PRN Insomnia  OLANZapine Injectable 5 milliGRAM(s) IntraMuscular every 6 hours PRN agitation      OBJECTIVE    T(C): 37.1 (20 @ 05:00), Max: 37.3 (20 @ 20:36)  HR: 63 (20 @ 05:00) (63 - 90)  BP: 114/72 (20 @ 05:00) (114/72 - 160/69)  RR: 18 (20 @ 05:00) (17 - 19)  SpO2: 94% (20 @ 05:00) (94% - 96%)  Wt(kg): --  I&O's Summary        REVIEW OF SYSTEMS:  ROS is unobtainable due to dementia and confusion PATIENT IS CONFUSED AND IS UNABLE TO GIVE ANY/RELIABLE HISTORY OR REVIEW OF SYSTEMS PLEASE ALSO SEE UNDER HPI AND ASSESSMENT FOR OBTAINED REVIEW OF SYSTEMS     PHYSICAL EXAM:  Appearance: NAD. VS past 24 hrs -as above   HEENT:   Moist oral mucosa. Conjunctiva clear b/l.   Neck : supple  Respiratory: Lungs CTAB.  Gastrointestinal:  Soft, nontender. No rebound. No rigidity. BS present	  Cardiovascular: RRR ,S1S2 present  Neurologic: Non-focal. Moving all extremities.  Extremities: No edema. No erythema. No calf tenderness.  Skin: No rashes, No ecchymoses, No cyanosis.	  wounds ,skin lesions-See skin assesment flow sheet   LABS:                        11.0   5.06  )-----------( 160      ( 2020 05:52 )             31.8         145  |  113<H>  |  14  ----------------------------<  129<H>  3.5   |  26  |  0.56    Ca    8.4<L>      2020 05:52  Phos  2.4         TPro  6.2  /  Alb  3.5  /  TBili  0.5  /  DBili  x   /  AST  25  /  ALT  15  /  AlkPhos  41      CAPILLARY BLOOD GLUCOSE      POCT Blood Glucose.: 165 mg/dL (2020 16:33)  POCT Blood Glucose.: 144 mg/dL (2020 11:42)  POCT Blood Glucose.: 117 mg/dL (2020 07:13)    PT/INR - ( 2020 05:52 )   PT: 12.5 sec;   INR: 1.07 ratio           Urinalysis Basic - ( 10 Nov 2020 15:37 )    Color: Yellow / Appearance: Clear / S.015 / pH: x  Gluc: x / Ketone: Negative  / Bili: Negative / Urobili: Negative   Blood: x / Protein: Negative / Nitrite: Negative   Leuk Esterase: Negative / RBC: x / WBC x   Sq Epi: x / Non Sq Epi: x / Bacteria: x        Culture - Urine (collected 10 Nov 2020 22:03)  Source: .Urine Clean Catch (Midstream)  Final Report (2020 18:40):    No growth    Culture - Blood (collected 10 Nov 2020 11:23)  Source: .Blood Blood-Peripheral  Preliminary Report (2020 12:01):    No growth to date.    Culture - Blood (collected 10 Nov 2020 11:23)  Source: .Blood Blood-Peripheral  Gram Stain (2020 14:58):    Growth in anaerobic bottle: Gram Positive Cocci in Clusters  Preliminary Report (2020 14:59):    Growth in anaerobic bottle: Gram Positive Cocci in Clusters    "Due to technical problems, Proteus sp. will Not be reported as part of    the BCID panel until further notice"    ***Blood Panel PCR results on this specimen are available    approximately 3 hours after the Gram stain result.***    Gram stain, PCR, and/or culture results may not always    correspond due to difference in methodologies.    ************************************************************    This PCR assay was performed using [employer].    The following targets are tested for: Enterococcus,    vancomycin resistant enterococci, Listeria monocytogenes,    coagulase negative staphylococci, S. aureus,    methicillin resistant S. aureus, Streptococcus agalactiae    (Group B), S. pneumoniae, S. pyogenes (Group A),    Acinetobacter baumannii, Enterobacter cloacae, E. coli,    Klebsiella oxytoca, K. pneumoniae, Proteus sp.,    Serratia marcescens, Haemophilus influenzae,    Neisseria meningitidis, Pseudomonas aeruginosa, Candida    albicans, C. glabrata, C krusei, C parapsilosis,    C. tropicalis and the KPC resistance gene.  Organism: Blood Culture PCR (2020 15:56)  Organism: Blood Culture PCR (2020 15:56)      RADIOLOGY & ADDITIONAL TESTS:   reviewed elctronically  ASSESSMENT/PLAN:

## 2020-11-12 NOTE — PROGRESS NOTE ADULT - SUBJECTIVE AND OBJECTIVE BOX
JODI DUBOSE    PLV 1EAS 115 D1    Patient is a 69y old  Male who presents with a chief complaint of ALTERED MENTAL STATUS  AND LACTICEMIA (2020 09:50)       Allergies    No Known Allergies    Intolerances        HPI:  70 y/o Male  from [facility] with medical  hx of Afib, HTN ,HLD ,DM ,Paranoid personality disorder, Alzheimer's dementia, sent in to ED  for combative behaviour and AMS x 1 day.  Patient was tested  +Covid yesterday  .  Pt is apparently AAOx3 at baseline.  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 Pt wants the person responsible for sending him to the ED to be fired. Pt denies any SOB, cough, GI/ symptoms. Covid test came back negative but patient found to have lactate serum elevated 3.1 ,iv hydration given Admitted for septic workup and evaluation,send blood and urine cx,serial lactate levels,monitor vitals closley,ivfs hydration,monitor urine output and renal profile,id and pulm consults called to determine need in antibacterial tx Patient developed severe agitation and combativeness in er later ,requiring  several ativan ,haldol injections .Placed on constant observation CT BRAIN -NAD .PSYCHIATRY AND NEUROLOGY consults requested .Palliative care consult requested ,to discuss advance directives and complete MOLST  (10 Nov 2020 18:10)      PAST MEDICAL & SURGICAL HISTORY:  Paranoid personality disorder    Anxiety    Dementia    Alzheimer disease    Hypertension    Hyperlipidemia    Diabetes mellitus    Afib    DM (diabetes mellitus)    DM (diabetes mellitus)        FAMILY HISTORY:        MEDICATIONS   acetaminophen   Tablet .. 650 milliGRAM(s) Oral every 6 hours PRN  aspirin 325 milliGRAM(s) Oral daily  atorvastatin 40 milliGRAM(s) Oral at bedtime  dextrose 40% Gel 15 Gram(s) Oral once  dextrose 5%. 1000 milliLiter(s) IV Continuous <Continuous>  dextrose 5%. 1000 milliLiter(s) IV Continuous <Continuous>  dextrose 50% Injectable 25 Gram(s) IV Push once  dextrose 50% Injectable 12.5 Gram(s) IV Push once  dextrose 50% Injectable 25 Gram(s) IV Push once  diltiazem    milliGRAM(s) Oral daily  donepezil 10 milliGRAM(s) Oral at bedtime  famotidine    Tablet 20 milliGRAM(s) Oral daily  glucagon  Injectable 1 milliGRAM(s) IntraMuscular once  heparin   Injectable 5000 Unit(s) SubCutaneous every 12 hours  insulin lispro (ADMELOG) corrective regimen sliding scale   SubCutaneous three times a day before meals  loratadine 10 milliGRAM(s) Oral daily  LORazepam   Injectable 0.5 milliGRAM(s) IV Push every 6 hours PRN  melatonin 5 milliGRAM(s) Oral at bedtime PRN  memantine 5 milliGRAM(s) Oral two times a day  metoprolol tartrate 25 milliGRAM(s) Oral two times a day  OLANZapine Disintegrating Tablet 10 milliGRAM(s) Oral three times a day  OLANZapine Injectable 5 milliGRAM(s) IntraMuscular every 6 hours PRN  potassium phosphate / sodium phosphate Powder (PHOS-NaK) 1 Packet(s) Oral two times a day  sodium chloride 0.9%. 1000 milliLiter(s) IV Continuous <Continuous>      Vital Signs Last 24 Hrs  T(C): 37.1 (2020 05:00), Max: 37.3 (2020 20:36)  T(F): 98.7 (2020 05:00), Max: 99.1 (2020 20:36)  HR: 63 (2020 05:00) (63 - 90)  BP: 114/72 (2020 05:00) (114/72 - 160/69)  BP(mean): --  RR: 18 (2020 05:00) (17 - 19)  SpO2: 94% (2020 05:00) (94% - 96%)            LABS:                        12.1   4.94  )-----------( 178      ( 2020 08:50 )             35.4     -12    144  |  110<H>  |  10  ----------------------------<  106<H>  3.4<L>   |  24  |  0.65    Ca    8.5      2020 08:50  Phos  2.4     11-11  Mg     1.8     12    TPro  6.2  /  Alb  3.5  /  TBili  0.5  /  DBili  x   /  AST  25  /  ALT  15  /  AlkPhos  41  11-11    PT/INR - ( 2020 05:52 )   PT: 12.5 sec;   INR: 1.07 ratio           Urinalysis Basic - ( 10 Nov 2020 15:37 )    Color: Yellow / Appearance: Clear / S.015 / pH: x  Gluc: x / Ketone: Negative  / Bili: Negative / Urobili: Negative   Blood: x / Protein: Negative / Nitrite: Negative   Leuk Esterase: Negative / RBC: x / WBC x   Sq Epi: x / Non Sq Epi: x / Bacteria: x            WBC:  WBC Count: 4.94 K/uL ( @ 08:50)  WBC Count: 5.06 K/uL ( @ 05:52)  WBC Count: 12.38 K/uL (11-10 @ 09:41)      MICROBIOLOGY:  RECENT CULTURES:  11-10 .Urine Clean Catch (Midstream) XXXX XXXX   No growth    11-10 .Blood Blood-Peripheral Blood Culture PCR   Growth in anaerobic bottle: Gram Positive Cocci in Clusters   Growth in anaerobic bottle: Gram Positive Cocci in Clusters  "Due to technical problems, Proteus sp. will Not be reported as part of  the BCID panel until further notice"  ***Blood Panel PCR results on this specimen are available  approximately 3 hours after the Gram stain result.***  Gram stain, PCR, and/or culture results may not always  correspond due to difference in methodologies.  ************************************************************  This PCR assay was performed using [employer].  The following targets are tested for: Enterococcus,  vancomycin resistant enterococci, Listeria monocytogenes,  coagulase negative staphylococci, S. aureus,  methicillin resistant S. aureus, Streptococcus agalactiae  (Group B), S. pneumoniae, S. pyogenes (Group A),  Acinetobacter baumannii, Enterobacter cloacae, E. coli,  Klebsiella oxytoca, K. pneumoniae, Proteus sp.,  Serratia marcescens, Haemophilus influenzae,  Neisseria meningitidis, Pseudomonas aeruginosa, Candida  albicans, C. glabrata, C krusei, C parapsilosis,  C. tropicalis and the KPC resistance gene.          CARDIAC MARKERS ( 2020 18:55 )  .278 ng/mL / x     / x     / x     / x      CARDIAC MARKERS ( 2020 05:52 )  .053 ng/mL / x     / x     / x     / x            PT/INR - ( 2020 05:52 )   PT: 12.5 sec;   INR: 1.07 ratio             Sodium:  Sodium, Serum: 144 mmol/L ( 08:50)  Sodium, Serum: 145 mmol/L ( 05:52)  Sodium, Serum: 139 mmol/L (11-10 @ 09:41)      0.65 mg/dL :50  0.56 mg/dL :52  1.30 mg/dL 11-10 @ 09:41      Hemoglobin:  Hemoglobin: 12.1 g/dL ( 08:50)  Hemoglobin: 11.0 g/dL (:52)  Hemoglobin: 14.0 g/dL (11-10 @ 09:41)      Platelets: Platelet Count - Automated: 178 K/uL ( 08:50)  Platelet Count - Automated: 160 K/uL ( 05:52)  Platelet Count - Automated: 248 K/uL (11-10 @ 09:41)      LIVER FUNCTIONS - ( 2020 05:52 )  Alb: 3.5 g/dL / Pro: 6.2 g/dL / ALK PHOS: 41 U/L / ALT: 15 U/L / AST: 25 U/L / GGT: x             Urinalysis Basic - ( 10 Nov 2020 15:37 )    Color: Yellow / Appearance: Clear / S.015 / pH: x  Gluc: x / Ketone: Negative  / Bili: Negative / Urobili: Negative   Blood: x / Protein: Negative / Nitrite: Negative   Leuk Esterase: Negative / RBC: x / WBC x   Sq Epi: x / Non Sq Epi: x / Bacteria: x        RADIOLOGY & ADDITIONAL STUDIES:

## 2020-11-12 NOTE — CONSULT NOTE ADULT - REASON FOR ADMISSION
ALTERED MENTAL STATUS  AND LACTICEMIA

## 2020-11-12 NOTE — DIETITIAN INITIAL EVALUATION ADULT. - OTHER INFO
As per chart pt is a 69 year old male who presents from SNF with a PMH of Afib, HTN ,HLD ,DM ,Paranoid personality disorder, Alzheimer's dementia, sent in to ED  for combative behaviour and AMS x 1 day.    Pt seen at bedside, sleeping, noted to have been agitated given medication, unable to participate in interview. Pt currently with poor appetite, RN states that pt has been sleeping all morning so has not consumed any breakfast, per chart pt noted to be refusing meals and when eat only consumes 25%. Pt's admission weight 112.14lbs. Per transfer records pt's weight is listed as 138lbs and height of 71in, large height discrepancy. As per chart pt is a 69 year old male who presents from SNF with a PMH of Afib, HTN ,HLD ,DM ,Paranoid personality disorder, Alzheimer's dementia, sent in to ED  for combative behaviour and AMS x 1 day.    Pt seen at bedside, sleeping, noted to have been agitated given medication, unable to participate in interview. Pt currently with poor appetite, RN states that pt has been sleeping all morning so has not consumed any breakfast, per chart pt noted to be refusing meals and when eat only consumes 25%. Pt's admission weight 112.14lbs. Per transfer records pt's weight is listed as 138lbs and height of 71in, large height discrepancy, however transfer record likely more accurate. Unable to perform Nutrition Focused Physical Exam at this time. Pt remains at nutritional risk, will continue to monitor. No GI distress noted at this time, no BM since admission.    No pressure injuries noted at this time

## 2020-11-12 NOTE — PROGRESS NOTE ADULT - PROBLEM SELECTOR PLAN 4
LIKELY 2/2 TO METFORMIN - serial bmp and lactate levels ,continue iv hydration ,stop metformin .Septic workup and id evaluation in view of recent covid positive test ,repeated test is negative Lactate is WNL now

## 2020-11-12 NOTE — PROGRESS NOTE ADULT - ASSESSMENT
68 y/o Male  from [facility] with medical  hx of Afib, HTN ,HLD ,DM ,Paranoid personality disorder, Alzheimer's dementia, sent in to ED  for combative behaviour and AMS x 1 day.  Patient was tested  +Covid yesterday 11/09 .  Pt is apparently AAOx3 at baseline.  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 Pt wants the person responsible for sending him to the ED to be fired. Pt denies any SOB, cough, GI/ symptoms. Covid test came back negative but patient found to have lactate serum elevated 3.1 ,iv hydration given Admitted for septic workup and evaluation,send blood and urine cx,serial lactate levels,monitor vitals closley,ivfs hydration,monitor urine output and renal profile,id and pulm consults called to determine need in antibacterial tx Patient developed severe agitation and combativeness in er later ,requiring  several ativan ,haldol injections .Placed on constant observation CT BRAIN -NAD .PSYCHIATRY AND NEUROLOGY consults requested .Palliative care consult requested ,to discuss advance directives and complete MOLST

## 2020-11-12 NOTE — PROGRESS NOTE ADULT - PROBLEM SELECTOR PLAN 1
70 y/o Male  from [facility] with medical  hx of Afib, HTN ,HLD ,DM ,Paranoid personality disorder, Alzheimer's dementia, sent in to ED  for combative behaviour  ID eval noted  Pulm eval noted  CXR and CT head noted  Covid PCR neg on admission  assist with ADL  left a message for the listed contacts  GOC discussion in progress  anti psychotics use PRN  monitor for needs re - orient nutrition.  Psych rx regimen optimization in progress - RRT notes reviewed - monitor for outbursts - agitation -

## 2020-11-12 NOTE — DIETITIAN INITIAL EVALUATION ADULT. - ADD RECOMMEND
1) Continue with current Soft, Consistent CHO, DASH/TLC diet, 2) Encourage adequate PO intake, 3) Monitor pt's PO intake, weight, skin, edema, GI distress

## 2020-11-12 NOTE — PROGRESS NOTE ADULT - ASSESSMENT
cont rx   cont rx      THOMAS ZAPATA DOA 11/10/2020 1950 DR GEORGIA HAMILTON       REVIEW OF SYMPTOMS      Able to give ROS  NO     PHYSICAL EXAM    HEENT Unremarkable PERRLA atraumatic   RESP Fair air entry EXP prolonged    Harsh breath sound Resp distres mild   CARDIAC S1 S2 No S3     NO JVD    ABDOMEN SOFT BS PRESENT NOT DISTENDED No hepatosplenomegaly PEDAL EDEMA present No calf tenderness  NO rash     PT DATA/BEST PRACTICE  ALLERGY   nka           WT                11/10/2020 62               BMI                       11/10/2020 21                 ADVANCED DIRECTIVE     HEAD OF BED ELEVATION Yes      DVT PROPHYLAXIS.      hpsc (11/10/2020)   DIET. cons carb soft  (11/10/67119                                                                    LLOYD PROPHYLAXIS.   INFECTION PPLX                                                    OXYGENATION.   11/10/2020 ra 995       VITALS.   11/12/2020 afeb 68 120/70       MICROBIO.   COVID pcr 11/10/2020 pcr negv   UA 11/10/2020 ua nitr negv l estr negv          EKG  11/10/2020 ekg a fib     IMAGING.   CXR 11/10/2020 cxr napd   CT h 11/10/2020 ct h negv            MEDS.   HPSC (11/10/2020)    (11/10/2020)  LORATADINE 10 (11/10/2020)   ATORVASTAT 40 (11/10/2020)   CARDIZEM 240 (11/10/2020)   FAMOTIDINE 20 (11/10/2020)   INSULIN (11/10/2020)  LORAZEPAM .5x3 p (11/10/2020)   OLANZAPINE 5.3 p (11/10/2020)   QUETIAPINE 25.3 (11/10/2020)   DONEPEZIL 10 (11/10/2020)              PATIENT SUMMARY.   69 m who had tested covid posv 11/9 was sent from [employer] [facility] to er with agitation andtested covid negv On arrival 164/110 99f ra 97%   In ER pt was found to have lacticemia and pulm consulted for sepsis ro pneumonia 11/10/2020   PMH Afib alzheimers anxiety dementia DM hytn   home meds asa 325 atorvastat 40 cardizem 240 aricept 10 pepcid namenda 5 claritin 10 metformin 500 quetiapine 25.3         PROBLEM/ASSESSMENT/RECOMMENDATIONS.  SEPSIS   Doubt bacterial infection No significant fever or leukocytosis and negv cxr and negv procalc poa   defer abio   COVID   ID on case Remdesevir and dexa deferred for now as oxygenation ok   LACTICEMIA   Repeat la improved on 11/10/2020   Is on iv fluids  RESP   ra po 99% poa   Monitor and target po 90-95%  ALTERED MENTAL STATE   11/10/2020 ct head 11/10/2020 was negv   observe   Control agitation with meds   A fib   On ASA cardizem   cont rx      TIME SPENT   Over 25 minutes aggregate care time spent on encounter; activities included   direct patient care, counseling and/or coordinating care reviewing notes, lab data/ imaging , discussion with multidisciplinary team/ patient  /family and explaining in detail risks, benefits, alternatives  of the recommendations     THOMAS MCCLELLAND 11/10/2020 1950 DR GEORGIA HAMILTON

## 2020-11-12 NOTE — CONSULT NOTE ADULT - SUBJECTIVE AND OBJECTIVE BOX
Patient is a 69y old  Male who presents with a chief complaint of ALTERED MENTAL STATUS  AND LACTICEMIA (12 Nov 2020 07:00)      Reason For Consult: dm2 uncontrolled, lactic acidosis    HPI:  70 y/o Male  from [facility] with medical  hx of Afib, HTN ,HLD ,DM ,Paranoid personality disorder, Alzheimer's dementia, sent in to ED  for combative behaviour and AMS x 1 day.  Patient was tested  +Covid yesterday 11/09 .  Pt is apparently AAOx3 at baseline.  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 Pt wants the person responsible for sending him to the ED to be fired. Pt denies any SOB, cough, GI/ symptoms. Covid test came back negative but patient found to have lactate serum elevated 3.1 ,iv hydration given Admitted for septic workup and evaluation,send blood and urine cx,serial lactate levels,monitor vitals closley,ivfs hydration,monitor urine output and renal profile,id and pulm consults called to determine need in antibacterial tx Patient developed severe agitation and combativeness in er later ,requiring  several ativan ,haldol injections .Placed on constant observation CT BRAIN -NAD .PSYCHIATRY AND NEUROLOGY consults requested .Palliative care consult requested ,to discuss advance directives and complete MOLST  (10 Nov 2020 18:10)      PAST MEDICAL & SURGICAL HISTORY:  Paranoid personality disorder    Anxiety    Dementia    Alzheimer disease    Hypertension    Hyperlipidemia    Diabetes mellitus    Afib    DM (diabetes mellitus)    DM (diabetes mellitus)        FAMILY HISTORY:        Social History:    MEDICATIONS  (STANDING):  aspirin 325 milliGRAM(s) Oral daily  atorvastatin 40 milliGRAM(s) Oral at bedtime  dextrose 40% Gel 15 Gram(s) Oral once  dextrose 5%. 1000 milliLiter(s) (100 mL/Hr) IV Continuous <Continuous>  dextrose 5%. 1000 milliLiter(s) (50 mL/Hr) IV Continuous <Continuous>  dextrose 50% Injectable 25 Gram(s) IV Push once  dextrose 50% Injectable 12.5 Gram(s) IV Push once  dextrose 50% Injectable 25 Gram(s) IV Push once  diltiazem    milliGRAM(s) Oral daily  donepezil 10 milliGRAM(s) Oral at bedtime  famotidine    Tablet 20 milliGRAM(s) Oral daily  glucagon  Injectable 1 milliGRAM(s) IntraMuscular once  heparin   Injectable 5000 Unit(s) SubCutaneous every 12 hours  insulin lispro (ADMELOG) corrective regimen sliding scale   SubCutaneous three times a day before meals  loratadine 10 milliGRAM(s) Oral daily  memantine 5 milliGRAM(s) Oral two times a day  metoprolol tartrate 25 milliGRAM(s) Oral two times a day  OLANZapine Disintegrating Tablet 10 milliGRAM(s) Oral three times a day  potassium phosphate / sodium phosphate Powder (PHOS-NaK) 1 Packet(s) Oral two times a day  sodium chloride 0.9%. 1000 milliLiter(s) (50 mL/Hr) IV Continuous <Continuous>    MEDICATIONS  (PRN):  acetaminophen   Tablet .. 650 milliGRAM(s) Oral every 6 hours PRN Temp greater or equal to 38C (100.4F), Mild Pain (1 - 3)  LORazepam   Injectable 0.5 milliGRAM(s) IV Push every 6 hours PRN SEVERE AGITATION  melatonin 5 milliGRAM(s) Oral at bedtime PRN Insomnia  OLANZapine Injectable 5 milliGRAM(s) IntraMuscular every 6 hours PRN agitation        T(C): 37.1 (11-12-20 @ 05:00), Max: 37.3 (11-11-20 @ 20:36)  HR: 63 (11-12-20 @ 05:00) (63 - 90)  BP: 114/72 (11-12-20 @ 05:00) (114/72 - 160/69)  RR: 18 (11-12-20 @ 05:00) (17 - 19)  SpO2: 94% (11-12-20 @ 05:00) (94% - 96%)  Wt(kg): --    PHYSICAL EXAM:  GENERAL: a. a & o x 0  NECK: Supple, No JVD, Normal thyroid  CHEST/LUNG: Clear to percussion bilaterally; No rales, rhonchi, wheezing, or rubs  HEART: Regular rate and rhythm; No murmurs, rubs, or gallops  ABDOMEN: Soft, Nontender, Nondistended; Bowel sounds present  EXTREMITIES:  2+ Peripheral Pulses, No clubbing, cyanosis, or edema  SKIN: No rashes or lesions    CAPILLARY BLOOD GLUCOSE      POCT Blood Glucose.: 117 mg/dL (12 Nov 2020 07:45)  POCT Blood Glucose.: 165 mg/dL (11 Nov 2020 16:33)  POCT Blood Glucose.: 144 mg/dL (11 Nov 2020 11:42)                            12.1   4.94  )-----------( 178      ( 12 Nov 2020 08:50 )             35.4       CMP:  11-11 @ 05:52  SGPT 15  Albumin 3.5   Alk Phos 41   Anion Gap 6   SGOT 25   Total Bili 0.5   BUN 14   Calcium Total 8.4   CO2 26   Chloride 113   Creatinine 0.56   eGFR if    eGFR if non    Glucose 129   Potassium 3.5   Protein 6.2   Sodium 145      Thyroid Function Tests:      Diabetes Tests:       Radiology:

## 2020-11-12 NOTE — PROGRESS NOTE ADULT - PROBLEM SELECTOR PLAN 6
continue home medications ,cardiology eval noted Dr Barnett is informed about TROPONIN elevation ,he will follow the patient

## 2020-11-13 LAB — TROPONIN I SERPL-MCNC: 0.04 NG/ML — SIGNIFICANT CHANGE UP (ref 0.01–0.04)

## 2020-11-13 RX ORDER — DEXTROSE MONOHYDRATE, SODIUM CHLORIDE, AND POTASSIUM CHLORIDE 50; .745; 4.5 G/1000ML; G/1000ML; G/1000ML
1000 INJECTION, SOLUTION INTRAVENOUS
Refills: 0 | Status: DISCONTINUED | OUTPATIENT
Start: 2020-11-13 | End: 2020-11-14

## 2020-11-13 RX ADMIN — OLANZAPINE 10 MILLIGRAM(S): 15 TABLET, FILM COATED ORAL at 21:44

## 2020-11-13 RX ADMIN — Medication 325 MILLIGRAM(S): at 18:27

## 2020-11-13 RX ADMIN — Medication 240 MILLIGRAM(S): at 05:07

## 2020-11-13 RX ADMIN — Medication 25 MILLIGRAM(S): at 05:07

## 2020-11-13 RX ADMIN — Medication 1 PACKET(S): at 05:16

## 2020-11-13 RX ADMIN — Medication 2: at 09:22

## 2020-11-13 RX ADMIN — Medication 0.5 MILLIGRAM(S): at 12:38

## 2020-11-13 RX ADMIN — Medication 5 MILLIGRAM(S): at 21:44

## 2020-11-13 RX ADMIN — Medication 0.5 MILLIGRAM(S): at 01:40

## 2020-11-13 RX ADMIN — LORATADINE 10 MILLIGRAM(S): 10 TABLET ORAL at 18:27

## 2020-11-13 RX ADMIN — HEPARIN SODIUM 5000 UNIT(S): 5000 INJECTION INTRAVENOUS; SUBCUTANEOUS at 18:28

## 2020-11-13 RX ADMIN — DEXTROSE MONOHYDRATE, SODIUM CHLORIDE, AND POTASSIUM CHLORIDE 60 MILLILITER(S): 50; .745; 4.5 INJECTION, SOLUTION INTRAVENOUS at 21:48

## 2020-11-13 RX ADMIN — DEXTROSE MONOHYDRATE, SODIUM CHLORIDE, AND POTASSIUM CHLORIDE 60 MILLILITER(S): 50; .745; 4.5 INJECTION, SOLUTION INTRAVENOUS at 01:59

## 2020-11-13 RX ADMIN — DONEPEZIL HYDROCHLORIDE 10 MILLIGRAM(S): 10 TABLET, FILM COATED ORAL at 21:44

## 2020-11-13 RX ADMIN — MEMANTINE HYDROCHLORIDE 5 MILLIGRAM(S): 10 TABLET ORAL at 05:07

## 2020-11-13 RX ADMIN — ATORVASTATIN CALCIUM 40 MILLIGRAM(S): 80 TABLET, FILM COATED ORAL at 21:44

## 2020-11-13 RX ADMIN — OLANZAPINE 5 MILLIGRAM(S): 15 TABLET, FILM COATED ORAL at 09:54

## 2020-11-13 RX ADMIN — OLANZAPINE 10 MILLIGRAM(S): 15 TABLET, FILM COATED ORAL at 05:07

## 2020-11-13 RX ADMIN — FAMOTIDINE 20 MILLIGRAM(S): 10 INJECTION INTRAVENOUS at 18:27

## 2020-11-13 RX ADMIN — MEMANTINE HYDROCHLORIDE 5 MILLIGRAM(S): 10 TABLET ORAL at 18:27

## 2020-11-13 RX ADMIN — Medication 2: at 12:19

## 2020-11-13 RX ADMIN — HEPARIN SODIUM 5000 UNIT(S): 5000 INJECTION INTRAVENOUS; SUBCUTANEOUS at 05:06

## 2020-11-13 NOTE — PROGRESS NOTE ADULT - SUBJECTIVE AND OBJECTIVE BOX
PROGRESS NOTE  Patient is a 69y old  Male who presents with a chief complaint of ALTERED MENTAL STATUS  AND LACTICEMIA (13 Nov 2020 06:18)  Chart and available morning labs /imaging are reviewed electronically , urgent issues addressed . More information  is being added upon completion of rounds , when more information is collected and management discussed with consultants , medical staff and social service/case management on the floor   OVERNIGHT  No new issues reported by medical staff . All above noted Patient is resting in a bed comfortably .Confused ,poor mentation .No distress noted   Having a breakfast Remains on constant observation As per aid at bedside patient had episodees of agitation earlier Followed by psychiatrist  HPI:  68 y/o Male  from [facility] with medical  hx of Afib, HTN ,HLD ,DM ,Paranoid personality disorder, Alzheimer's dementia, sent in to ED  for combative behaviour and AMS x 1 day.  Patient was tested  +Covid yesterday 11/09 .  Pt is apparently AAOx3 at baseline.  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 Pt wants the person responsible for sending him to the ED to be fired. Pt denies any SOB, cough, GI/ symptoms. Covid test came back negative but patient found to have lactate serum elevated 3.1 ,iv hydration given Admitted for septic workup and evaluation,send blood and urine cx,serial lactate levels,monitor vitals closley,ivfs hydration,monitor urine output and renal profile,id and pulm consults called to determine need in antibacterial tx Patient developed severe agitation and combativeness in er later ,requiring  several ativan ,haldol injections .Placed on constant observation CT BRAIN -NAD .PSYCHIATRY AND NEUROLOGY consults requested .Palliative care consult requested ,to discuss advance directives and complete MOLST  (10 Nov 2020 18:10)  PAST MEDICAL & SURGICAL HISTORY:  Paranoid personality disorder  Anxiety  Dementia  Alzheimer disease  Hypertension  Hyperlipidemia  Diabetes mellitus  Afib    DM (diabetes mellitus)    DM (diabetes mellitus)        MEDICATIONS  (STANDING):  aspirin 325 milliGRAM(s) Oral daily  atorvastatin 40 milliGRAM(s) Oral at bedtime  dextrose 40% Gel 15 Gram(s) Oral once  dextrose 5% + sodium chloride 0.45% with potassium chloride 20 mEq/L 1000 milliLiter(s) (60 mL/Hr) IV Continuous <Continuous>  dextrose 5%. 1000 milliLiter(s) (50 mL/Hr) IV Continuous <Continuous>  dextrose 5%. 1000 milliLiter(s) (100 mL/Hr) IV Continuous <Continuous>  dextrose 50% Injectable 25 Gram(s) IV Push once  dextrose 50% Injectable 12.5 Gram(s) IV Push once  dextrose 50% Injectable 25 Gram(s) IV Push once  diltiazem    milliGRAM(s) Oral daily  donepezil 10 milliGRAM(s) Oral at bedtime  famotidine    Tablet 20 milliGRAM(s) Oral daily  glucagon  Injectable 1 milliGRAM(s) IntraMuscular once  heparin   Injectable 5000 Unit(s) SubCutaneous every 12 hours  insulin lispro (ADMELOG) corrective regimen sliding scale   SubCutaneous three times a day before meals  loratadine 10 milliGRAM(s) Oral daily  memantine 5 milliGRAM(s) Oral two times a day  metoprolol tartrate 25 milliGRAM(s) Oral two times a day  OLANZapine Disintegrating Tablet 10 milliGRAM(s) Oral three times a day    MEDICATIONS  (PRN):  acetaminophen   Tablet .. 650 milliGRAM(s) Oral every 6 hours PRN Temp greater or equal to 38C (100.4F), Mild Pain (1 - 3)  LORazepam   Injectable 0.5 milliGRAM(s) IV Push every 6 hours PRN SEVERE AGITATION  melatonin 5 milliGRAM(s) Oral at bedtime PRN Insomnia  OLANZapine Injectable 5 milliGRAM(s) IntraMuscular every 6 hours PRN agitation      OBJECTIVE    T(C): 36.8 (11-13-20 @ 04:59), Max: 36.9 (11-12-20 @ 14:11)  HR: 79 (11-13-20 @ 04:59) (64 - 82)  BP: 158/77 (11-13-20 @ 04:59) (122/77 - 158/77)  RR: 18 (11-13-20 @ 04:59) (18 - 18)  SpO2: 98% (11-13-20 @ 04:59) (95% - 98%)  Wt(kg): --  I&O's Summary    12 Nov 2020 07:01  -  13 Nov 2020 07:00  --------------------------------------------------------  IN: 710 mL / OUT: 0 mL / NET: 710 mL          REVIEW OF SYSTEMS:  ROS is unobtainable due to dementia and confusion PATIENT IS CONFUSED AND IS UNABLE TO GIVE ANY/RELIABLE HISTORY OR REVIEW OF SYSTEMS PLEASE ALSO SEE UNDER HPI AND ASSESSMENT FOR OBTAINED REVIEW OF SYSTEMS     PHYSICAL EXAM:  Appearance: NAD. VS past 24 hrs -as above   HEENT:   Moist oral mucosa. Conjunctiva clear b/l.   Neck : supple  Respiratory: Lungs CTAB.  Gastrointestinal:  Soft, nontender. No rebound. No rigidity. BS present	  Cardiovascular: RRR ,S1S2 present  Neurologic: Non-focal. Moving all extremities.  Extremities: No edema. No erythema. No calf tenderness.  Skin: No rashes, No ecchymoses, No cyanosis.	  wounds ,skin lesions-See skin assesment flow sheet   LABS:                        12.1   4.94  )-----------( 178      ( 12 Nov 2020 08:50 )             35.4     11-12    144  |  110<H>  |  10  ----------------------------<  106<H>  3.4<L>   |  24  |  0.65    Ca    8.5      12 Nov 2020 08:50  Phos  3.1     11-12  Mg     1.7     11-12      CAPILLARY BLOOD GLUCOSE      POCT Blood Glucose.: 188 mg/dL (13 Nov 2020 07:14)  POCT Blood Glucose.: 159 mg/dL (12 Nov 2020 21:48)  POCT Blood Glucose.: 114 mg/dL (12 Nov 2020 16:41)  POCT Blood Glucose.: 116 mg/dL (12 Nov 2020 11:36)          Culture - Urine (collected 10 Nov 2020 22:03)  Source: .Urine Clean Catch (Midstream)  Final Report (11 Nov 2020 18:40):    No growth    Culture - Blood (collected 10 Nov 2020 11:23)  Source: .Blood Blood-Peripheral  Preliminary Report (11 Nov 2020 12:01):    No growth to date.    Culture - Blood (collected 10 Nov 2020 11:23)  Source: .Blood Blood-Peripheral  Gram Stain (11 Nov 2020 14:58):    Growth in anaerobic bottle: Gram Positive Cocci in Clusters  Final Report (12 Nov 2020 14:08):    Growth in anaerobic bottle: Staphylococcus hominis    Coag Negative Staphylococcus    Single set isolate, possible contaminant. Contact    Microbiology if susceptibility testing clinically    indicated.    "Due to technical problems, Proteus sp. will Not be reported as part of    the BCID panel until further notice"    ***Blood Panel PCR results on this specimen are available    approximately 3 hours after the Gram stain result.***    Gram stain, PCR, and/or culture results may not always    correspond due to difference in methodologies.    ************************************************************    This PCR assay was performed using [employer].    The following targets are tested for: Enterococcus,    vancomycin resistant enterococci, Listeria monocytogenes,    coagulase negative staphylococci, S. aureus,    methicillin resistant S. aureus, Streptococcus agalactiae    (Group B), S. pneumoniae, S. pyogenes (Group A),    Acinetobacter baumannii, Enterobacter cloacae, E. coli,    Klebsiella oxytoca, K. pneumoniae, Proteus sp.,    Serratia marcescens, Haemophilus influenzae,    Neisseria meningitidis, Pseudomonas aeruginosa, Candida    albicans, C. glabrata, C krusei, C parapsilosis,    C. tropicalis and the KPC resistance gene.  Organism: Blood Culture PCR (12 Nov 2020 14:08)  Organism: Blood Culture PCR (12 Nov 2020 14:08)      RADIOLOGY & ADDITIONAL TESTS:   reviewed elctronically  ASSESSMENT/PLAN:

## 2020-11-13 NOTE — PROGRESS NOTE ADULT - ASSESSMENT
cont rx   cont rx      THOMAS ZAPATA DOA 11/10/2020 1950 DR GEORGIA HAMILTON       REVIEW OF SYMPTOMS      Able to give ROS  NO     PHYSICAL EXAM    HEENT Unremarkable PERRLA atraumatic   RESP Fair air entry EXP prolonged    Harsh breath sound Resp distres mild   CARDIAC S1 S2 No S3     NO JVD    ABDOMEN SOFT BS PRESENT NOT DISTENDED No hepatosplenomegaly PEDAL EDEMA present No calf tenderness  NO rash     PT DATA/BEST PRACTICE  ALLERGY   nka           WT                11/10/2020 62               BMI                       11/10/2020 21                 ADVANCED DIRECTIVE     HEAD OF BED ELEVATION Yes      DVT PROPHYLAXIS.      hpsc (11/10/2020)   DIET. cons carb soft  (11/10/58590                                                                    LLOYD PROPHYLAXIS.   INFECTION PPLX                                                    OXYGENATION.   11/10/-11/481240 ra 995 - ra 96%       VITALS.   11/13/2020 afeb 79 150/70       MICROBIO.   COVID pcr 11/10/2020 pcr negv   UA 11/10/2020 ua nitr negv l estr negv   BLOD c 11/12 bc negv   URINE c 11/10 uc negv   BLOD c 11/10 staph hominis       PATIENT SUMMARY.   69 m who had tested covid posv 11/9 was sent from [employer] [facility] to er with agitation andtested covid negv On arrival 164/110 99f ra 97%   In ER pt was found to have lacticemia and pulm consulted for sepsis ro pneumonia 11/10/2020   PMH Afib alzheimers anxiety dementia DM hytn   home meds asa 325 atorvastat 40 cardizem 240 aricept 10 pepcid namenda 5 claritin 10 metformin 500 quetiapine 25.3       PROBLEM/ASSESSMENT/RECOMMENDATIONS.  SEPSIS   Doubt bacterial infection No significant fever or leukocytosis and negv cxr and negv procalc poa   defer abio   STAPH HOMINIS BC POSV ON 11/10   repeat bc 11/12 were negv Likely contaminant  COVID   ID on case Remdesevir and dexa deferred for now as oxygenation ok   LACTICEMIA   Repeat la improved on 11/10/2020   Is on iv fluids  RESP   ra po 99% poa   Monitor and target po 90-95%  ALTERED MENTAL STATE   11/10/2020 ct head 11/10/2020 was negv   observe   Control agitation with meds   A fib   On ASA cardizem   cont rx    TIME SPENT   Over 25 minutes aggregate care time spent on encounter; activities included   direct patient care, counseling and/or coordinating care reviewing notes, lab data/ imaging , discussion with multidisciplinary team/ patient  /family and explaining in detail risks, benefits, alternatives  of the recommendations     THOMAS MCCLELLAND 11/10/2020 1950 DR GEORGIA HAMILTON

## 2020-11-13 NOTE — PROGRESS NOTE ADULT - SUBJECTIVE AND OBJECTIVE BOX
Neurology follow up note    JODI [id number]      Interval History:    awake in bed     MEDICATIONS    acetaminophen   Tablet .. 650 milliGRAM(s) Oral every 6 hours PRN  aspirin 325 milliGRAM(s) Oral daily  atorvastatin 40 milliGRAM(s) Oral at bedtime  dextrose 40% Gel 15 Gram(s) Oral once  dextrose 5% + sodium chloride 0.45% with potassium chloride 20 mEq/L 1000 milliLiter(s) IV Continuous <Continuous>  dextrose 5%. 1000 milliLiter(s) IV Continuous <Continuous>  dextrose 5%. 1000 milliLiter(s) IV Continuous <Continuous>  dextrose 50% Injectable 25 Gram(s) IV Push once  dextrose 50% Injectable 12.5 Gram(s) IV Push once  dextrose 50% Injectable 25 Gram(s) IV Push once  diltiazem    milliGRAM(s) Oral daily  donepezil 10 milliGRAM(s) Oral at bedtime  famotidine    Tablet 20 milliGRAM(s) Oral daily  glucagon  Injectable 1 milliGRAM(s) IntraMuscular once  heparin   Injectable 5000 Unit(s) SubCutaneous every 12 hours  insulin lispro (ADMELOG) corrective regimen sliding scale   SubCutaneous three times a day before meals  loratadine 10 milliGRAM(s) Oral daily  LORazepam   Injectable 0.5 milliGRAM(s) IV Push every 6 hours PRN  melatonin 5 milliGRAM(s) Oral at bedtime PRN  memantine 5 milliGRAM(s) Oral two times a day  metoprolol tartrate 25 milliGRAM(s) Oral two times a day  OLANZapine Disintegrating Tablet 10 milliGRAM(s) Oral three times a day  OLANZapine Injectable 5 milliGRAM(s) IntraMuscular every 6 hours PRN      Allergies    No Known Allergies    Intolerances            Vital Signs Last 24 Hrs  T(C): 36.8 (13 Nov 2020 04:59), Max: 36.9 (12 Nov 2020 14:11)  T(F): 98.3 (13 Nov 2020 04:59), Max: 98.5 (12 Nov 2020 14:11)  HR: 79 (13 Nov 2020 04:59) (64 - 82)  BP: 158/77 (13 Nov 2020 04:59) (122/77 - 158/77)  BP(mean): --  RR: 18 (13 Nov 2020 04:59) (18 - 18)  SpO2: 98% (13 Nov 2020 04:59) (95% - 98%)             REVIEW OF SYSTEMS:  Very limited secondary to the patient has underlying dementia, would not answer questions accordingly.    PHYSICAL EXAMINATION:   HEENT:  Head:  Normocephalic, atraumatic.  Eyes:  No scleral icterus.  Ears:  Hearing appeared to be intact.  NECK:  Supple.  RESPIRATORY:  Good air entry bilaterally.  CARDIOVASCULAR:  S1 and S2 heard.  ABDOMEN:  Soft and nontender.  EXTREMITIES:  No clubbing or cyanosis were noted.  NEUROLOGIC:  The patient is awake.  The patient is unable to say location, year or month, had difficulty naming objects.  Upon conversing with the patient did slightly become agitated.  Positive blink to bilateral visual threat.  Speech was fluent.  The patient did follow simple commands.  Bilateral upper were 4/5, bilateral lower were 3+/5..       LABS:  CBC Full  -  ( 12 Nov 2020 08:50 )  WBC Count : 4.94 K/uL  RBC Count : 3.91 M/uL  Hemoglobin : 12.1 g/dL  Hematocrit : 35.4 %  Platelet Count - Automated : 178 K/uL  Mean Cell Volume : 90.5 fl  Mean Cell Hemoglobin : 30.9 pg  Mean Cell Hemoglobin Concentration : 34.2 gm/dL  Auto Neutrophil # : x  Auto Lymphocyte # : x  Auto Monocyte # : x  Auto Eosinophil # : x  Auto Basophil # : x  Auto Neutrophil % : x  Auto Lymphocyte % : x  Auto Monocyte % : x  Auto Eosinophil % : x  Auto Basophil % : x      11-12    144  |  110<H>  |  10  ----------------------------<  106<H>  3.4<L>   |  24  |  0.65    Ca    8.5      12 Nov 2020 08:50  Phos  3.1     11-12  Mg     1.7     11-12      Hemoglobin A1C:   Lipid Panel 11-12 @ 11:32  Total Cholesterol, Serum 122  LDL --  Triglycerides 42      Vitamin B12         RADIOLOGY      ANALYSIS AND PLAN:  This is a 69-year-old with an episode of altered mental status and agitation.  For episode of altered mental status, agitation, suspect most likely secondary to progressive underlying dementia with a personality disorder.  There are no clear signs on examinations to suggest a new cerebrovascular accident has ensued.  I would recommend a psychiatric follow-up.  Consider Seroquel.  For atrial fibrillation, risks versus benefits, continue the patient on aspirin.  For history of underlying dementia, appears to be advanced, would recommend to continue the patient on his Aricept and Namenda.  For history of diabetes, strict control of blood sugars.  For history of hypertension, monitor systolic blood pressure.  For high cholesterol, continue the patient on his statin.  Greater than 40 minutes of time was spent with the patient, plan of care, reviewing data, speaking to the multidisciplinary healthcare team with greater than 50% of that time in regards to counseling and care coordination.

## 2020-11-13 NOTE — PROGRESS NOTE ADULT - PROBLEM SELECTOR PLAN 1
Zyprexa increased in dose -   70 y/o Male  from [facility] with medical  hx of Afib, HTN ,HLD ,DM ,Paranoid personality disorder, Alzheimer's dementia, sent in to ED  for combative behaviour  ID eval noted  Pulm eval noted  CXR and CT head noted  Covid PCR neg on admission  assist with ADL  left a message for the listed contacts  GOC discussion in progress  anti psychotics use PRN  monitor for needs re - orient nutrition.  Psych rx regimen optimization in progress - RRT notes reviewed - monitor for outbursts - agitation -.

## 2020-11-13 NOTE — PROGRESS NOTE ADULT - PROBLEM SELECTOR PLAN 1
cont humalog scale coverage alone  metformin d/jose l (lactic acidosis)  cons cho diet  goal bg 100-180 in hosp setting

## 2020-11-13 NOTE — PROGRESS NOTE ADULT - SUBJECTIVE AND OBJECTIVE BOX
[facility] [place]       HPI:  70 y/o Male  from [facility] with medical  hx of Afib, HTN ,HLD ,DM ,Paranoid personality disorder, Alzheimer's dementia, sent in to ED  for combative behaviour and AMS x 1 day.  Patient was tested  +Covid yesterday 11/09 .  Pt is apparently AAOx3 at baseline.  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 Pt wants the person responsible for sending him to the ED to be fired. Pt denies any SOB, cough, GI/ symptoms. Covid test came back negative but patient found to have lactate serum elevated 3.1 ,iv hydration given Admitted for septic workup and evaluation,send blood and urine cx,serial lactate levels,monitor vitals closley,ivfs hydration,monitor urine output and renal profile,id and pulm consults called to determine need in antibacterial tx Patient developed severe agitation and combativeness in er later ,requiring  several ativan ,haldol injections .Placed on constant observation CT BRAIN -NAD .PSYCHIATRY AND NEUROLOGY consults requested .Palliative care consult requested ,to discuss advance directives and complete MOLST  (10 Nov 2020 18:10)        SUBJECTIVE:      ALLERGIES:  Allergies    No Known Allergies    Intolerances          MEDICATIONS  (STANDING):  aspirin 325 milliGRAM(s) Oral daily  atorvastatin 40 milliGRAM(s) Oral at bedtime  dextrose 40% Gel 15 Gram(s) Oral once  dextrose 5% + sodium chloride 0.45% with potassium chloride 20 mEq/L 1000 milliLiter(s) (60 mL/Hr) IV Continuous <Continuous>  dextrose 5%. 1000 milliLiter(s) (50 mL/Hr) IV Continuous <Continuous>  dextrose 5%. 1000 milliLiter(s) (100 mL/Hr) IV Continuous <Continuous>  dextrose 50% Injectable 25 Gram(s) IV Push once  dextrose 50% Injectable 12.5 Gram(s) IV Push once  dextrose 50% Injectable 25 Gram(s) IV Push once  diltiazem    milliGRAM(s) Oral daily  donepezil 10 milliGRAM(s) Oral at bedtime  famotidine    Tablet 20 milliGRAM(s) Oral daily  glucagon  Injectable 1 milliGRAM(s) IntraMuscular once  heparin   Injectable 5000 Unit(s) SubCutaneous every 12 hours  insulin lispro (ADMELOG) corrective regimen sliding scale   SubCutaneous three times a day before meals  loratadine 10 milliGRAM(s) Oral daily  memantine 5 milliGRAM(s) Oral two times a day  metoprolol tartrate 25 milliGRAM(s) Oral two times a day  OLANZapine Disintegrating Tablet 10 milliGRAM(s) Oral three times a day    MEDICATIONS  (PRN):  acetaminophen   Tablet .. 650 milliGRAM(s) Oral every 6 hours PRN Temp greater or equal to 38C (100.4F), Mild Pain (1 - 3)  LORazepam   Injectable 0.5 milliGRAM(s) IV Push every 6 hours PRN SEVERE AGITATION  melatonin 5 milliGRAM(s) Oral at bedtime PRN Insomnia  OLANZapine Injectable 5 milliGRAM(s) IntraMuscular every 6 hours PRN agitation      REVIEW OF SYSTEMS:  CONSTITUTIONAL: No fever,  RESPIRATORY: No cough, wheezing, shortness of breath  CARDIOVASCULAR: No chest pain, dyspnea, palpitations, dizziness, syncope, paroxysmal nocturnal dyspnea, orthopnea, or arm or leg swelling  GASTROINTESTINAL: No abdominal  or epigastric pain, nausea, vomiting,  diarrhea  NEUROLOGICAL: No headaches,  loss of strength, numbness, or tremors    Vital Signs Last 24 Hrs  T(C): 36.7 (13 Nov 2020 13:50), Max: 36.8 (12 Nov 2020 21:52)  T(F): 98 (13 Nov 2020 13:50), Max: 98.3 (12 Nov 2020 21:52)  HR: 80 (13 Nov 2020 18:25) (72 - 80)  BP: 92/68 (13 Nov 2020 18:25) (92/68 - 158/77)  BP(mean): --  RR: 18 (13 Nov 2020 13:50) (18 - 18)  SpO2: 97% (13 Nov 2020 18:25) (96% - 98%)    PHYSICAL EXAM:  HEAD:  Atraumatic, Normocephalic  NECK: Supple and normal thyroid.  No JVD or carotid bruit.   HEART: S1, S2 regular , 1/6 soft ejection systolic murmur in mitral area , no thrill and no gallops .  PULMONARY: Bilateral vesicular breathing , few scattered ronchi and few scattered rales are present .  ABDOMEN: Soft nontender and positive bowl sounds   EXTREMITIES:  No clubbing, cyanosis, or pedal  edema  NEUROLOGICAL: AAOX3 , no focal deficit .    LABS:                        12.1   4.94  )-----------( 178      ( 12 Nov 2020 08:50 )             35.4     11-12    144  |  110<H>  |  10  ----------------------------<  106<H>  3.4<L>   |  24  |  0.65    Ca    8.5      12 Nov 2020 08:50  Phos  3.1     11-12  Mg     1.7     11-12      CARDIAC MARKERS ( 13 Nov 2020 06:17 )  .039 ng/mL / x     / x     / x     / x      CARDIAC MARKERS ( 12 Nov 2020 10:42 )  .069 ng/mL / x     / x     / x     / x              BNP      EKG:  ECHO:  IMAGING:    Assessment/Plan    Will continue to follow during hospital course and give further recommendations as needed . Thanks for your referral . if any questions please contact me at office ([phone])cell [phone]) ABBY FELICIANO MD [place] [address]  [address]  OFFICE : [phone]  CELL : [phone]    CARDIOLOGY F/U :      HPI:  70 y/o Male  from [facility] with medical  hx of Afib, HTN ,HLD ,DM ,Paranoid personality disorder, Alzheimer's dementia, sent in to ED  for combative behaviour and AMS x 1 day.  Patient was tested  +Covid yesterday 11/09 .  Pt is apparently AAOx3 at baseline.  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 Pt wants the person responsible for sending him to the ED to be fired. Pt denies any SOB, cough, GI/ symptoms. Covid test came back negative but patient found to have lactate serum elevated 3.1 ,iv hydration given Admitted for septic workup and evaluation,send blood and urine cx,serial lactate levels,monitor vitals closley,ivfs hydration,monitor urine output and renal profile,id and pulm consults called to determine need in antibacterial tx Patient developed severe agitation and combativeness in er later ,requiring  several ativan ,haldol injections .Placed on constant observation CT BRAIN -NAD .PSYCHIATRY AND NEUROLOGY consults requested .Palliative care consult requested ,to discuss advance directives and complete MOLST  (10 Nov 2020 18:10)        SUBJECTIVE: Patient confused but no distress .      ALLERGIES:  Allergies    No Known Allergies    Intolerances          MEDICATIONS  (STANDING):  aspirin 325 milliGRAM(s) Oral daily  atorvastatin 40 milliGRAM(s) Oral at bedtime  dextrose 40% Gel 15 Gram(s) Oral once  dextrose 5% + sodium chloride 0.45% with potassium chloride 20 mEq/L 1000 milliLiter(s) (60 mL/Hr) IV Continuous <Continuous>  dextrose 5%. 1000 milliLiter(s) (50 mL/Hr) IV Continuous <Continuous>  dextrose 5%. 1000 milliLiter(s) (100 mL/Hr) IV Continuous <Continuous>  dextrose 50% Injectable 25 Gram(s) IV Push once  dextrose 50% Injectable 12.5 Gram(s) IV Push once  dextrose 50% Injectable 25 Gram(s) IV Push once  diltiazem    milliGRAM(s) Oral daily  donepezil 10 milliGRAM(s) Oral at bedtime  famotidine    Tablet 20 milliGRAM(s) Oral daily  glucagon  Injectable 1 milliGRAM(s) IntraMuscular once  heparin   Injectable 5000 Unit(s) SubCutaneous every 12 hours  insulin lispro (ADMELOG) corrective regimen sliding scale   SubCutaneous three times a day before meals  loratadine 10 milliGRAM(s) Oral daily  memantine 5 milliGRAM(s) Oral two times a day  metoprolol tartrate 25 milliGRAM(s) Oral two times a day  OLANZapine Disintegrating Tablet 10 milliGRAM(s) Oral three times a day    MEDICATIONS  (PRN):  acetaminophen   Tablet .. 650 milliGRAM(s) Oral every 6 hours PRN Temp greater or equal to 38C (100.4F), Mild Pain (1 - 3)  LORazepam   Injectable 0.5 milliGRAM(s) IV Push every 6 hours PRN SEVERE AGITATION  melatonin 5 milliGRAM(s) Oral at bedtime PRN Insomnia  OLANZapine Injectable 5 milliGRAM(s) IntraMuscular every 6 hours PRN agitation      REVIEW OF SYSTEMS:  CONSTITUTIONAL: No fever,  RESPIRATORY: No cough, wheezing, shortness of breath  CARDIOVASCULAR: No chest pain, dyspnea, palpitations, dizziness, syncope, paroxysmal nocturnal dyspnea, orthopnea, or arm or leg swelling  GASTROINTESTINAL: No abdominal  or epigastric pain, nausea, vomiting,  diarrhea  NEUROLOGICAL: No headaches,  numbness, or tremors  Skin : No itching .  Hematology : No bleeding .  Endocrinology : No heat and cold intolerance .  Psychiatry : Patient is confused .  Musculoskeletal : Patient has mild arthritis .  Vital Signs Last 24 Hrs  T(C): 36.7 (13 Nov 2020 13:50), Max: 36.8 (12 Nov 2020 21:52)  T(F): 98 (13 Nov 2020 13:50), Max: 98.3 (12 Nov 2020 21:52)  HR: 80 (13 Nov 2020 18:25) (72 - 80)  BP: 92/68 (13 Nov 2020 18:25) (92/68 - 158/77)  BP(mean): --  RR: 18 (13 Nov 2020 13:50) (18 - 18)  SpO2: 97% (13 Nov 2020 18:25) (96% - 98%)    PHYSICAL EXAM:  HEAD:  Atraumatic, Normocephalic  NECK: Supple and normal thyroid.  No JVD or carotid bruit.   HEART: S1, S2 regular , 1/6 soft ejection systolic murmur in mitral area , no thrill and no gallops .  PULMONARY: Bilateral vesicular breathing , few scattered and few scattered rales are present .  ABDOMEN: Soft nontender and positive bowl sounds   EXTREMITIES:  No clubbing, cyanosis, or pedal  edema  NEUROLOGICAL: AA and confused .   Skin : No rashes .  Musculoskeletal : No joint swellings .    LABS:                        12.1   4.94  )-----------( 178      ( 12 Nov 2020 08:50 )             35.4     11-12    144  |  110<H>  |  10  ----------------------------<  106<H>  3.4<L>   |  24  |  0.65    Ca    8.5      12 Nov 2020 08:50  Phos  3.1     11-12  Mg     1.7     11-12      CARDIAC MARKERS ( 13 Nov 2020 06:17 )  .039 ng/mL / x     / x     / x     / x      CARDIAC MARKERS ( 12 Nov 2020 10:42 )  .069 ng/mL / x     / x     / x     / x              Assessment/Plan  Patient has :  1) Altered mental status . Agitation and Bipolar disorder .  2) Mild dehydration and improved .  3) Paroxysmal atrial fibrillation and stable .  4) Hypertension and BP stable .  5) Mild elevated Troponin I secondary to oxygen demand supply mismatch and not NON-ST EDUIN as primary event .  Plan : 1) Continue I/V hydration 2) Monitor electrolytes . 3) Cardiac stable so far . 4) Continue present medications .  Will continue to follow during hospital course and give further recommendations as needed . Thanks for your referral . if any questions please contact me at office ([phone] [phone])

## 2020-11-13 NOTE — PROGRESS NOTE ADULT - SUBJECTIVE AND OBJECTIVE BOX
CAPILLARY BLOOD GLUCOSE      POCT Blood Glucose.: 188 mg/dL (13 Nov 2020 07:14)  POCT Blood Glucose.: 159 mg/dL (12 Nov 2020 21:48)  POCT Blood Glucose.: 114 mg/dL (12 Nov 2020 16:41)  POCT Blood Glucose.: 116 mg/dL (12 Nov 2020 11:36)      Vital Signs Last 24 Hrs  T(C): 36.8 (13 Nov 2020 04:59), Max: 36.9 (12 Nov 2020 14:11)  T(F): 98.3 (13 Nov 2020 04:59), Max: 98.5 (12 Nov 2020 14:11)  HR: 79 (13 Nov 2020 04:59) (64 - 82)  BP: 158/77 (13 Nov 2020 04:59) (122/77 - 158/77)  BP(mean): --  RR: 18 (13 Nov 2020 04:59) (18 - 18)  SpO2: 98% (13 Nov 2020 04:59) (95% - 98%)    General: WN/WD NAD  Respiratory: CTA B/L  CV: RRR, S1S2, no murmurs, rubs or gallops  Abdominal: Soft, NT, ND +BS, Last BM  Extremities: No edema, + peripheral pulses     11-12    144  |  110<H>  |  10  ----------------------------<  106<H>  3.4<L>   |  24  |  0.65    Ca    8.5      12 Nov 2020 08:50  Phos  3.1     11-12  Mg     1.7     11-12        atorvastatin 40 milliGRAM(s) Oral at bedtime  dextrose 40% Gel 15 Gram(s) Oral once  dextrose 50% Injectable 25 Gram(s) IV Push once  dextrose 50% Injectable 12.5 Gram(s) IV Push once  dextrose 50% Injectable 25 Gram(s) IV Push once  glucagon  Injectable 1 milliGRAM(s) IntraMuscular once  insulin lispro (ADMELOG) corrective regimen sliding scale   SubCutaneous three times a day before meals

## 2020-11-13 NOTE — PROGRESS NOTE ADULT - ASSESSMENT
70 y/o Male  from [facility] with medical  hx of Afib, HTN ,HLD ,DM ,Paranoid personality disorder, Alzheimer's dementia, sent in to ED  for combative behaviour and AMS x 1 day.  Patient was tested  +Covid yesterday 11/09 .  Pt is apparently AAOx3 at baseline.  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 Pt wants the person responsible for sending him to the ED to be fired. Pt denies any SOB, cough, GI/ symptoms. Covid test came back negative but patient found to have lactate serum elevated 3.1 ,iv hydration given Admitted for septic workup and evaluation,send blood and urine cx,serial lactate levels,monitor vitals closley,ivfs hydration,monitor urine output and renal profile,id and pulm consults called to determine need in antibacterial tx Patient developed severe agitation and combativeness in er later ,requiring  several ativan ,haldol injections .Placed on constant observation CT BRAIN -NAD .PSYCHIATRY AND NEUROLOGY consults requested .Palliative care consult requested ,to discuss advance directives and complete MOLST

## 2020-11-13 NOTE — PROGRESS NOTE ADULT - SUBJECTIVE AND OBJECTIVE BOX
JODI DUBOSE    PLV 1EAS 115 D1    Patient is a 69y old  Male who presents with a chief complaint of ALTERED MENTAL STATUS  AND LACTICEMIA (13 Nov 2020 09:05)       Allergies    No Known Allergies    Intolerances        HPI:  70 y/o Male  from [facility] with medical  hx of Afib, HTN ,HLD ,DM ,Paranoid personality disorder, Alzheimer's dementia, sent in to ED  for combative behaviour and AMS x 1 day.  Patient was tested  +Covid yesterday 11/09 .  Pt is apparently AAOx3 at baseline.  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 Pt wants the person responsible for sending him to the ED to be fired. Pt denies any SOB, cough, GI/ symptoms. Covid test came back negative but patient found to have lactate serum elevated 3.1 ,iv hydration given Admitted for septic workup and evaluation,send blood and urine cx,serial lactate levels,monitor vitals closley,ivfs hydration,monitor urine output and renal profile,id and pulm consults called to determine need in antibacterial tx Patient developed severe agitation and combativeness in er later ,requiring  several ativan ,haldol injections .Placed on constant observation CT BRAIN -NAD .PSYCHIATRY AND NEUROLOGY consults requested .Palliative care consult requested ,to discuss advance directives and complete MOLST  (10 Nov 2020 18:10)      PAST MEDICAL & SURGICAL HISTORY:  Paranoid personality disorder    Anxiety    Dementia    Alzheimer disease    Hypertension    Hyperlipidemia    Diabetes mellitus    Afib    DM (diabetes mellitus)    DM (diabetes mellitus)        FAMILY HISTORY:        MEDICATIONS   acetaminophen   Tablet .. 650 milliGRAM(s) Oral every 6 hours PRN  aspirin 325 milliGRAM(s) Oral daily  atorvastatin 40 milliGRAM(s) Oral at bedtime  dextrose 40% Gel 15 Gram(s) Oral once  dextrose 5% + sodium chloride 0.45% with potassium chloride 20 mEq/L 1000 milliLiter(s) IV Continuous <Continuous>  dextrose 5%. 1000 milliLiter(s) IV Continuous <Continuous>  dextrose 5%. 1000 milliLiter(s) IV Continuous <Continuous>  dextrose 50% Injectable 25 Gram(s) IV Push once  dextrose 50% Injectable 12.5 Gram(s) IV Push once  dextrose 50% Injectable 25 Gram(s) IV Push once  diltiazem    milliGRAM(s) Oral daily  donepezil 10 milliGRAM(s) Oral at bedtime  famotidine    Tablet 20 milliGRAM(s) Oral daily  glucagon  Injectable 1 milliGRAM(s) IntraMuscular once  heparin   Injectable 5000 Unit(s) SubCutaneous every 12 hours  insulin lispro (ADMELOG) corrective regimen sliding scale   SubCutaneous three times a day before meals  loratadine 10 milliGRAM(s) Oral daily  LORazepam   Injectable 0.5 milliGRAM(s) IV Push every 6 hours PRN  melatonin 5 milliGRAM(s) Oral at bedtime PRN  memantine 5 milliGRAM(s) Oral two times a day  metoprolol tartrate 25 milliGRAM(s) Oral two times a day  OLANZapine Disintegrating Tablet 10 milliGRAM(s) Oral three times a day  OLANZapine Injectable 5 milliGRAM(s) IntraMuscular every 6 hours PRN      Vital Signs Last 24 Hrs  T(C): 36.8 (13 Nov 2020 04:59), Max: 36.9 (12 Nov 2020 14:11)  T(F): 98.3 (13 Nov 2020 04:59), Max: 98.5 (12 Nov 2020 14:11)  HR: 79 (13 Nov 2020 04:59) (64 - 82)  BP: 158/77 (13 Nov 2020 04:59) (122/77 - 158/77)  BP(mean): --  RR: 18 (13 Nov 2020 04:59) (18 - 18)  SpO2: 98% (13 Nov 2020 04:59) (95% - 98%)      11-12-20 @ 07:01  -  11-13-20 @ 07:00  --------------------------------------------------------  IN: 710 mL / OUT: 0 mL / NET: 710 mL            LABS:                        12.1   4.94  )-----------( 178      ( 12 Nov 2020 08:50 )             35.4     11-12    144  |  110<H>  |  10  ----------------------------<  106<H>  3.4<L>   |  24  |  0.65    Ca    8.5      12 Nov 2020 08:50  Phos  3.1     11-12  Mg     1.7     11-12                WBC:  WBC Count: 4.94 K/uL (11-12 @ 08:50)  WBC Count: 5.06 K/uL (11-11 @ 05:52)  WBC Count: 12.38 K/uL (11-10 @ 09:41)      MICROBIOLOGY:  RECENT CULTURES:  11-10 .Urine Clean Catch (Midstream) XXXX XXXX   No growth    11-10 .Blood Blood-Peripheral Blood Culture PCR   Growth in anaerobic bottle: Gram Positive Cocci in Clusters   Growth in anaerobic bottle: Staphylococcus hominis  Coag Negative Staphylococcus  Single set isolate, possible contaminant. Contact  Microbiology if susceptibility testing clinically  indicated.  "Due to technical problems, Proteus sp. will Not be reported as part of  the BCID panel until further notice"  ***Blood Panel PCR results on this specimen are available  approximately 3 hours after the Gram stain result.***  Gram stain, PCR, and/or culture results may not always  correspond due to difference in methodologies.  ************************************************************  This PCR assay was performed using [employer].  The following targets are tested for: Enterococcus,  vancomycin resistant enterococci, Listeria monocytogenes,  coagulase negative staphylococci, S. aureus,  methicillin resistant S. aureus, Streptococcus agalactiae  (Group B), S. pneumoniae, S. pyogenes (Group A),  Acinetobacter baumannii, Enterobacter cloacae, E. coli,  Klebsiella oxytoca, K. pneumoniae, Proteus sp.,  Serratia marcescens, Haemophilus influenzae,  Neisseria meningitidis, Pseudomonas aeruginosa, Candida  albicans, C. glabrata, C krusei, C parapsilosis,  C. tropicalis and the KPC resistance gene.          CARDIAC MARKERS ( 13 Nov 2020 06:17 )  .039 ng/mL / x     / x     / x     / x      CARDIAC MARKERS ( 12 Nov 2020 10:42 )  .069 ng/mL / x     / x     / x     / x      CARDIAC MARKERS ( 11 Nov 2020 18:55 )  .278 ng/mL / x     / x     / x     / x                Sodium:  Sodium, Serum: 144 mmol/L (11-12 @ 08:50)  Sodium, Serum: 145 mmol/L (11-11 @ 05:52)  Sodium, Serum: 139 mmol/L (11-10 @ 09:41)      0.65 mg/dL 11-12 @ 08:50  0.56 mg/dL 11-11 @ 05:52  1.30 mg/dL 11-10 @ 09:41      Hemoglobin:  Hemoglobin: 12.1 g/dL (11-12 @ 08:50)  Hemoglobin: 11.0 g/dL (11-11 @ 05:52)  Hemoglobin: 14.0 g/dL (11-10 @ 09:41)      Platelets: Platelet Count - Automated: 178 K/uL (11-12 @ 08:50)  Platelet Count - Automated: 160 K/uL (11-11 @ 05:52)  Platelet Count - Automated: 248 K/uL (11-10 @ 09:41)              RADIOLOGY & ADDITIONAL STUDIES:

## 2020-11-13 NOTE — PROGRESS NOTE ADULT - SUBJECTIVE AND OBJECTIVE BOX
Date/Time Patient Seen:  		  Referring MD:   Data Reviewed	       Patient is a 69y old  Male who presents with a chief complaint of ALTERED MENTAL STATUS  AND LACTICEMIA (12 Nov 2020 23:53)      Subjective/HPI     PAST MEDICAL & SURGICAL HISTORY:  Paranoid personality disorder    Anxiety    Dementia    Alzheimer disease    Hypertension    Hyperlipidemia    Diabetes mellitus    Afib    DM (diabetes mellitus)    DM (diabetes mellitus)          Medication list         MEDICATIONS  (STANDING):  aspirin 325 milliGRAM(s) Oral daily  atorvastatin 40 milliGRAM(s) Oral at bedtime  dextrose 40% Gel 15 Gram(s) Oral once  dextrose 5% + sodium chloride 0.45% with potassium chloride 20 mEq/L 1000 milliLiter(s) (60 mL/Hr) IV Continuous <Continuous>  dextrose 5%. 1000 milliLiter(s) (50 mL/Hr) IV Continuous <Continuous>  dextrose 5%. 1000 milliLiter(s) (100 mL/Hr) IV Continuous <Continuous>  dextrose 50% Injectable 25 Gram(s) IV Push once  dextrose 50% Injectable 12.5 Gram(s) IV Push once  dextrose 50% Injectable 25 Gram(s) IV Push once  diltiazem    milliGRAM(s) Oral daily  donepezil 10 milliGRAM(s) Oral at bedtime  famotidine    Tablet 20 milliGRAM(s) Oral daily  glucagon  Injectable 1 milliGRAM(s) IntraMuscular once  heparin   Injectable 5000 Unit(s) SubCutaneous every 12 hours  insulin lispro (ADMELOG) corrective regimen sliding scale   SubCutaneous three times a day before meals  loratadine 10 milliGRAM(s) Oral daily  memantine 5 milliGRAM(s) Oral two times a day  metoprolol tartrate 25 milliGRAM(s) Oral two times a day  OLANZapine Disintegrating Tablet 10 milliGRAM(s) Oral three times a day    MEDICATIONS  (PRN):  acetaminophen   Tablet .. 650 milliGRAM(s) Oral every 6 hours PRN Temp greater or equal to 38C (100.4F), Mild Pain (1 - 3)  LORazepam   Injectable 0.5 milliGRAM(s) IV Push every 6 hours PRN SEVERE AGITATION  melatonin 5 milliGRAM(s) Oral at bedtime PRN Insomnia  OLANZapine Injectable 5 milliGRAM(s) IntraMuscular every 6 hours PRN agitation         Vitals log        ICU Vital Signs Last 24 Hrs  T(C): 36.8 (13 Nov 2020 04:59), Max: 36.9 (12 Nov 2020 14:11)  T(F): 98.3 (13 Nov 2020 04:59), Max: 98.5 (12 Nov 2020 14:11)  HR: 79 (13 Nov 2020 04:59) (64 - 82)  BP: 158/77 (13 Nov 2020 04:59) (122/77 - 158/77)  BP(mean): --  ABP: --  ABP(mean): --  RR: 18 (13 Nov 2020 04:59) (18 - 18)  SpO2: 98% (13 Nov 2020 04:59) (95% - 98%)           Input and Output:  I&O's Detail    12 Nov 2020 07:01  -  13 Nov 2020 06:18  --------------------------------------------------------  IN:    dextrose 5% + sodium chloride 0.45% w/ Additives: 60 mL    sodium chloride 0.9%: 350 mL  Total IN: 410 mL    OUT:  Total OUT: 0 mL    Total NET: 410 mL          Lab Data                        12.1   4.94  )-----------( 178      ( 12 Nov 2020 08:50 )             35.4     11-12    144  |  110<H>  |  10  ----------------------------<  106<H>  3.4<L>   |  24  |  0.65    Ca    8.5      12 Nov 2020 08:50  Phos  3.1     11-12  Mg     1.7     11-12        CARDIAC MARKERS ( 12 Nov 2020 10:42 )  .069 ng/mL / x     / x     / x     / x      CARDIAC MARKERS ( 11 Nov 2020 18:55 )  .278 ng/mL / x     / x     / x     / x            Review of Systems	      Objective     Physical Examination    heart s1s2  lung dec BS  abd soft      Pertinent Lab findings & Imaging      Bianca:  NO   Adequate UO     I&O's Detail    12 Nov 2020 07:01  -  13 Nov 2020 06:18  --------------------------------------------------------  IN:    dextrose 5% + sodium chloride 0.45% w/ Additives: 60 mL    sodium chloride 0.9%: 350 mL  Total IN: 410 mL    OUT:  Total OUT: 0 mL    Total NET: 410 mL               Discussed with:     Cultures:	        Radiology

## 2020-11-14 LAB
ANION GAP SERPL CALC-SCNC: 7 MMOL/L — SIGNIFICANT CHANGE UP (ref 5–17)
BUN SERPL-MCNC: 8 MG/DL — SIGNIFICANT CHANGE UP (ref 7–23)
CALCIUM SERPL-MCNC: 8.2 MG/DL — LOW (ref 8.5–10.1)
CHLORIDE SERPL-SCNC: 112 MMOL/L — HIGH (ref 96–108)
CO2 SERPL-SCNC: 25 MMOL/L — SIGNIFICANT CHANGE UP (ref 22–31)
CREAT SERPL-MCNC: 0.51 MG/DL — SIGNIFICANT CHANGE UP (ref 0.5–1.3)
GLUCOSE SERPL-MCNC: 172 MG/DL — HIGH (ref 70–99)
GRAM STN FLD: SIGNIFICANT CHANGE UP
HCT VFR BLD CALC: 34.9 % — LOW (ref 39–50)
HGB BLD-MCNC: 12.3 G/DL — LOW (ref 13–17)
MCHC RBC-ENTMCNC: 31.1 PG — SIGNIFICANT CHANGE UP (ref 27–34)
MCHC RBC-ENTMCNC: 35.2 GM/DL — SIGNIFICANT CHANGE UP (ref 32–36)
MCV RBC AUTO: 88.1 FL — SIGNIFICANT CHANGE UP (ref 80–100)
NRBC # BLD: 0 /100 WBCS — SIGNIFICANT CHANGE UP (ref 0–0)
PLATELET # BLD AUTO: 172 K/UL — SIGNIFICANT CHANGE UP (ref 150–400)
POTASSIUM SERPL-MCNC: 3.2 MMOL/L — LOW (ref 3.5–5.3)
POTASSIUM SERPL-SCNC: 3.2 MMOL/L — LOW (ref 3.5–5.3)
RBC # BLD: 3.96 M/UL — LOW (ref 4.2–5.8)
RBC # FLD: 14 % — SIGNIFICANT CHANGE UP (ref 10.3–14.5)
SODIUM SERPL-SCNC: 144 MMOL/L — SIGNIFICANT CHANGE UP (ref 135–145)
WBC # BLD: 6.89 K/UL — SIGNIFICANT CHANGE UP (ref 3.8–10.5)
WBC # FLD AUTO: 6.89 K/UL — SIGNIFICANT CHANGE UP (ref 3.8–10.5)

## 2020-11-14 RX ORDER — POTASSIUM CHLORIDE 20 MEQ
10 PACKET (EA) ORAL
Refills: 0 | Status: COMPLETED | OUTPATIENT
Start: 2020-11-14 | End: 2020-11-14

## 2020-11-14 RX ORDER — POTASSIUM CHLORIDE 20 MEQ
40 PACKET (EA) ORAL ONCE
Refills: 0 | Status: COMPLETED | OUTPATIENT
Start: 2020-11-14 | End: 2020-11-14

## 2020-11-14 RX ORDER — VANCOMYCIN HCL 1 G
1000 VIAL (EA) INTRAVENOUS ONCE
Refills: 0 | Status: COMPLETED | OUTPATIENT
Start: 2020-11-14 | End: 2020-11-14

## 2020-11-14 RX ADMIN — Medication 1: at 12:22

## 2020-11-14 RX ADMIN — OLANZAPINE 10 MILLIGRAM(S): 15 TABLET, FILM COATED ORAL at 20:55

## 2020-11-14 RX ADMIN — Medication 240 MILLIGRAM(S): at 06:32

## 2020-11-14 RX ADMIN — MEMANTINE HYDROCHLORIDE 5 MILLIGRAM(S): 10 TABLET ORAL at 17:44

## 2020-11-14 RX ADMIN — HEPARIN SODIUM 5000 UNIT(S): 5000 INJECTION INTRAVENOUS; SUBCUTANEOUS at 06:31

## 2020-11-14 RX ADMIN — DONEPEZIL HYDROCHLORIDE 10 MILLIGRAM(S): 10 TABLET, FILM COATED ORAL at 20:55

## 2020-11-14 RX ADMIN — Medication 100 MILLIEQUIVALENT(S): at 11:24

## 2020-11-14 RX ADMIN — Medication 40 MILLIEQUIVALENT(S): at 11:24

## 2020-11-14 RX ADMIN — HEPARIN SODIUM 5000 UNIT(S): 5000 INJECTION INTRAVENOUS; SUBCUTANEOUS at 17:45

## 2020-11-14 RX ADMIN — Medication 25 MILLIGRAM(S): at 06:31

## 2020-11-14 RX ADMIN — Medication 5 MILLIGRAM(S): at 23:38

## 2020-11-14 RX ADMIN — OLANZAPINE 5 MILLIGRAM(S): 15 TABLET, FILM COATED ORAL at 16:18

## 2020-11-14 RX ADMIN — OLANZAPINE 10 MILLIGRAM(S): 15 TABLET, FILM COATED ORAL at 06:31

## 2020-11-14 RX ADMIN — Medication 250 MILLIGRAM(S): at 20:49

## 2020-11-14 RX ADMIN — Medication 25 MILLIGRAM(S): at 17:44

## 2020-11-14 RX ADMIN — ATORVASTATIN CALCIUM 40 MILLIGRAM(S): 80 TABLET, FILM COATED ORAL at 20:55

## 2020-11-14 RX ADMIN — Medication 100 MILLIEQUIVALENT(S): at 13:06

## 2020-11-14 RX ADMIN — Medication 100 MILLIEQUIVALENT(S): at 17:02

## 2020-11-14 RX ADMIN — OLANZAPINE 10 MILLIGRAM(S): 15 TABLET, FILM COATED ORAL at 14:22

## 2020-11-14 RX ADMIN — LORATADINE 10 MILLIGRAM(S): 10 TABLET ORAL at 11:24

## 2020-11-14 RX ADMIN — Medication 1: at 07:51

## 2020-11-14 RX ADMIN — Medication 0.5 MILLIGRAM(S): at 21:49

## 2020-11-14 RX ADMIN — Medication 325 MILLIGRAM(S): at 11:24

## 2020-11-14 RX ADMIN — Medication 0.5 MILLIGRAM(S): at 15:28

## 2020-11-14 RX ADMIN — Medication 0.5 MILLIGRAM(S): at 00:07

## 2020-11-14 RX ADMIN — Medication 1: at 16:59

## 2020-11-14 RX ADMIN — MEMANTINE HYDROCHLORIDE 5 MILLIGRAM(S): 10 TABLET ORAL at 06:32

## 2020-11-14 RX ADMIN — DEXTROSE MONOHYDRATE, SODIUM CHLORIDE, AND POTASSIUM CHLORIDE 60 MILLILITER(S): 50; .745; 4.5 INJECTION, SOLUTION INTRAVENOUS at 06:32

## 2020-11-14 RX ADMIN — FAMOTIDINE 20 MILLIGRAM(S): 10 INJECTION INTRAVENOUS at 11:24

## 2020-11-14 NOTE — PROGRESS NOTE ADULT - PROBLEM SELECTOR PLAN 7
likely 2/2 to demand ischemia 2/2 to dehydration , metabolic abnormalities /lactic acidosis RULED OUT FOR AMI

## 2020-11-14 NOTE — PROGRESS NOTE ADULT - SUBJECTIVE AND OBJECTIVE BOX
Interval History:    CENTRAL LINE:   [  ] YES       [  ] NO  [place]:                 [  ] YES       [  ] NO         REVIEW OF SYSTEMS:  All Systems below were reviewed and are negative [  ]  HEENT:  ID:  Pulmonary:  Cardiac:  GI:  Renal:  Musculoskeletal:  All other systems above were reviewed and are negative   [  ]      MEDICATIONS  (STANDING):  aspirin 325 milliGRAM(s) Oral daily  atorvastatin 40 milliGRAM(s) Oral at bedtime  dextrose 40% Gel 15 Gram(s) Oral once  dextrose 5%. 1000 milliLiter(s) (50 mL/Hr) IV Continuous <Continuous>  dextrose 5%. 1000 milliLiter(s) (100 mL/Hr) IV Continuous <Continuous>  dextrose 50% Injectable 25 Gram(s) IV Push once  dextrose 50% Injectable 12.5 Gram(s) IV Push once  dextrose 50% Injectable 25 Gram(s) IV Push once  diltiazem    milliGRAM(s) Oral daily  donepezil 10 milliGRAM(s) Oral at bedtime  famotidine    Tablet 20 milliGRAM(s) Oral daily  glucagon  Injectable 1 milliGRAM(s) IntraMuscular once  heparin   Injectable 5000 Unit(s) SubCutaneous every 12 hours  insulin lispro (ADMELOG) corrective regimen sliding scale   SubCutaneous three times a day before meals  loratadine 10 milliGRAM(s) Oral daily  memantine 5 milliGRAM(s) Oral two times a day  metoprolol tartrate 25 milliGRAM(s) Oral two times a day  OLANZapine Disintegrating Tablet 10 milliGRAM(s) Oral three times a day  vancomycin  IVPB 1000 milliGRAM(s) IV Intermittent once    MEDICATIONS  (PRN):  acetaminophen   Tablet .. 650 milliGRAM(s) Oral every 6 hours PRN Temp greater or equal to 38C (100.4F), Mild Pain (1 - 3)  LORazepam   Injectable 0.5 milliGRAM(s) IV Push every 6 hours PRN SEVERE AGITATION  melatonin 5 milliGRAM(s) Oral at bedtime PRN Insomnia  OLANZapine Injectable 5 milliGRAM(s) IntraMuscular every 6 hours PRN agitation      Vital Signs Last 24 Hrs  T(C): 36.9 (14 Nov 2020 12:50), Max: 36.9 (14 Nov 2020 12:50)  T(F): 98.5 (14 Nov 2020 12:50), Max: 98.5 (14 Nov 2020 12:50)  HR: 75 (14 Nov 2020 12:50) (67 - 80)  BP: 156/69 (14 Nov 2020 17:47) (124/72 - 156/69)  BP(mean): --  RR: 17 (14 Nov 2020 12:50) (17 - 18)  SpO2: 96% (14 Nov 2020 12:50) (96% - 98%)    I&O's Summary    13 Nov 2020 07:01  -  14 Nov 2020 07:00  --------------------------------------------------------  IN: 780 mL / OUT: 1350 mL / NET: -570 mL        PHYSICAL EXAM:  HEENT: NC/AT; PERRLA  Neck: Soft; no tenderness  Lungs: CTA bilaterally; no wheezing.   Heart:  Abdomen:  Genital/ Rectal:  Extremities:  Neurologic:  Vascular:      LABORATORY:    CBC Full  -  ( 14 Nov 2020 09:34 )  WBC Count : 6.89 K/uL  RBC Count : 3.96 M/uL  Hemoglobin : 12.3 g/dL  Hematocrit : 34.9 %  Platelet Count - Automated : 172 K/uL  Mean Cell Volume : 88.1 fl  Mean Cell Hemoglobin : 31.1 pg  Mean Cell Hemoglobin Concentration : 35.2 gm/dL  Auto Neutrophil # : x  Auto Lymphocyte # : x  Auto Monocyte # : x  Auto Eosinophil # : x  Auto Basophil # : x  Auto Neutrophil % : x  Auto Lymphocyte % : x  Auto Monocyte % : x  Auto Eosinophil % : x  Auto Basophil % : x      ESR:                   11-11 @ 05:52  --    C-Reactive Protein:     11-11 @ 05:52  --    Procalcitonin:           11-11 @ 05:52   <0.05  ESR:                   11-10 @ 15:33  --    C-Reactive Protein:     11-10 @ 15:33  1.24    Procalcitonin:           11-10 @ 15:33   --  ESR:                   11-10 @ 09:41  --    C-Reactive Protein:     11-10 @ 09:41  --    Procalcitonin:           11-10 @ 09:41   <0.05      11-14    144  |  112<H>  |  8   ----------------------------<  172<H>  3.2<L>   |  25  |  0.51    Ca    8.2<L>      14 Nov 2020 09:34            Assessment and Plan:          Dorian Ruiz MD   [phone]. He is confused, uncooperative.  No fevers noted.      MEDICATIONS  (STANDING):  aspirin 325 milliGRAM(s) Oral daily  atorvastatin 40 milliGRAM(s) Oral at bedtime  dextrose 40% Gel 15 Gram(s) Oral once  dextrose 5%. 1000 milliLiter(s) (50 mL/Hr) IV Continuous <Continuous>  dextrose 5%. 1000 milliLiter(s) (100 mL/Hr) IV Continuous <Continuous>  dextrose 50% Injectable 25 Gram(s) IV Push once  dextrose 50% Injectable 12.5 Gram(s) IV Push once  dextrose 50% Injectable 25 Gram(s) IV Push once  diltiazem    milliGRAM(s) Oral daily  donepezil 10 milliGRAM(s) Oral at bedtime  famotidine    Tablet 20 milliGRAM(s) Oral daily  glucagon  Injectable 1 milliGRAM(s) IntraMuscular once  heparin   Injectable 5000 Unit(s) SubCutaneous every 12 hours  insulin lispro (ADMELOG) corrective regimen sliding scale   SubCutaneous three times a day before meals  loratadine 10 milliGRAM(s) Oral daily  memantine 5 milliGRAM(s) Oral two times a day  metoprolol tartrate 25 milliGRAM(s) Oral two times a day  OLANZapine Disintegrating Tablet 10 milliGRAM(s) Oral three times a day  vancomycin  IVPB 1000 milliGRAM(s) IV Intermittent once    MEDICATIONS  (PRN):  acetaminophen   Tablet .. 650 milliGRAM(s) Oral every 6 hours PRN Temp greater or equal to 38C (100.4F), Mild Pain (1 - 3)  LORazepam   Injectable 0.5 milliGRAM(s) IV Push every 6 hours PRN SEVERE AGITATION  melatonin 5 milliGRAM(s) Oral at bedtime PRN Insomnia  OLANZapine Injectable 5 milliGRAM(s) IntraMuscular every 6 hours PRN agitation      Vital Signs Last 24 Hrs  T(C): 36.9 (14 Nov 2020 12:50), Max: 36.9 (14 Nov 2020 12:50)  T(F): 98.5 (14 Nov 2020 12:50), Max: 98.5 (14 Nov 2020 12:50)  HR: 75 (14 Nov 2020 12:50) (67 - 80)  BP: 156/69 (14 Nov 2020 17:47) (124/72 - 156/69)  BP(mean): --  RR: 17 (14 Nov 2020 12:50) (17 - 18)  SpO2: 96% (14 Nov 2020 12:50) (96% - 98%)    I&O's Summary    13 Nov 2020 07:01  -  14 Nov 2020 07:00  --------------------------------------------------------  IN: 780 mL / OUT: 1350 mL / NET: -570 mL      PHYSICAL EXAM:  HEENT: NC/AT; PERRLA  Neck: Soft; no tenderness  Lungs: Coarse BS bilaterally; no wheezing.   Heart: RRR; no murmurs.  Abdomen: Soft; no tenderness.  Extremities: No ulcers.   Neurologic: Confused.     LABORATORY:    CBC Full  -  ( 14 Nov 2020 09:34 )  WBC Count : 6.89 K/uL  RBC Count : 3.96 M/uL  Hemoglobin : 12.3 g/dL  Hematocrit : 34.9 %  Platelet Count - Automated : 172 K/uL  Mean Cell Volume : 88.1 fl  Mean Cell Hemoglobin : 31.1 pg  Mean Cell Hemoglobin Concentration : 35.2 gm/dL  Auto Neutrophil # : x  Auto Lymphocyte # : x  Auto Monocyte # : x  Auto Eosinophil # : x  Auto Basophil # : x  Auto Neutrophil % : x  Auto Lymphocyte % : x  Auto Monocyte % : x  Auto Eosinophil % : x  Auto Basophil % : x      ESR:                   11-11 @ 05:52  --    C-Reactive Protein:     11-11 @ 05:52  --    Procalcitonin:           11-11 @ 05:52   <0.05  ESR:                   11-10 @ 15:33  --    C-Reactive Protein:     11-10 @ 15:33  1.24    Procalcitonin:           11-10 @ 15:33   --  ESR:                   11-10 @ 09:41  --    C-Reactive Protein:     11-10 @ 09:41  --    Procalcitonin:           11-10 @ 09:41   <0.05      11-14    144  |  112<H>  |  8   ----------------------------<  172<H>  3.2<L>   |  25  |  0.51    Ca    8.2<L>      14 Nov 2020 09:34    Assessment and Plan:    1. Dementia with delirikum.   2. Positive blood culture with coagulase negative Staph, likely due to skin contamination.     .   . Blood culture with coagulase negative Staph was likely due to contamination. Repeated blood cultures are negative. Will not treat.   . Monitor mental status. Neurology evaluation in progress.       Dorian Ruiz MD   [phone]. He is confused, uncooperative.  No fevers noted.      MEDICATIONS  (STANDING):  aspirin 325 milliGRAM(s) Oral daily  atorvastatin 40 milliGRAM(s) Oral at bedtime  dextrose 40% Gel 15 Gram(s) Oral once  dextrose 5%. 1000 milliLiter(s) (50 mL/Hr) IV Continuous <Continuous>  dextrose 5%. 1000 milliLiter(s) (100 mL/Hr) IV Continuous <Continuous>  dextrose 50% Injectable 25 Gram(s) IV Push once  dextrose 50% Injectable 12.5 Gram(s) IV Push once  dextrose 50% Injectable 25 Gram(s) IV Push once  diltiazem    milliGRAM(s) Oral daily  donepezil 10 milliGRAM(s) Oral at bedtime  famotidine    Tablet 20 milliGRAM(s) Oral daily  glucagon  Injectable 1 milliGRAM(s) IntraMuscular once  heparin   Injectable 5000 Unit(s) SubCutaneous every 12 hours  insulin lispro (ADMELOG) corrective regimen sliding scale   SubCutaneous three times a day before meals  loratadine 10 milliGRAM(s) Oral daily  memantine 5 milliGRAM(s) Oral two times a day  metoprolol tartrate 25 milliGRAM(s) Oral two times a day  OLANZapine Disintegrating Tablet 10 milliGRAM(s) Oral three times a day  vancomycin  IVPB 1000 milliGRAM(s) IV Intermittent once    MEDICATIONS  (PRN):  acetaminophen   Tablet .. 650 milliGRAM(s) Oral every 6 hours PRN Temp greater or equal to 38C (100.4F), Mild Pain (1 - 3)  LORazepam   Injectable 0.5 milliGRAM(s) IV Push every 6 hours PRN SEVERE AGITATION  melatonin 5 milliGRAM(s) Oral at bedtime PRN Insomnia  OLANZapine Injectable 5 milliGRAM(s) IntraMuscular every 6 hours PRN agitation      Vital Signs Last 24 Hrs  T(C): 36.9 (14 Nov 2020 12:50), Max: 36.9 (14 Nov 2020 12:50)  T(F): 98.5 (14 Nov 2020 12:50), Max: 98.5 (14 Nov 2020 12:50)  HR: 75 (14 Nov 2020 12:50) (67 - 80)  BP: 156/69 (14 Nov 2020 17:47) (124/72 - 156/69)  BP(mean): --  RR: 17 (14 Nov 2020 12:50) (17 - 18)  SpO2: 96% (14 Nov 2020 12:50) (96% - 98%)    I&O's Summary    13 Nov 2020 07:01  -  14 Nov 2020 07:00  --------------------------------------------------------  IN: 780 mL / OUT: 1350 mL / NET: -570 mL      PHYSICAL EXAM:  HEENT: NC/AT; PERRLA  Neck: Soft; no tenderness  Lungs: Coarse BS bilaterally; no wheezing.   Heart: RRR; no murmurs.  Abdomen: Soft; no tenderness.  Extremities: No ulcers.   Neurologic: Confused.     LABORATORY:    CBC Full  -  ( 14 Nov 2020 09:34 )  WBC Count : 6.89 K/uL  RBC Count : 3.96 M/uL  Hemoglobin : 12.3 g/dL  Hematocrit : 34.9 %  Platelet Count - Automated : 172 K/uL  Mean Cell Volume : 88.1 fl  Mean Cell Hemoglobin : 31.1 pg  Mean Cell Hemoglobin Concentration : 35.2 gm/dL  Auto Neutrophil # : x  Auto Lymphocyte # : x  Auto Monocyte # : x  Auto Eosinophil # : x  Auto Basophil # : x  Auto Neutrophil % : x  Auto Lymphocyte % : x  Auto Monocyte % : x  Auto Eosinophil % : x  Auto Basophil % : x      ESR:                   11-11 @ 05:52  --    C-Reactive Protein:     11-11 @ 05:52  --    Procalcitonin:           11-11 @ 05:52   <0.05  ESR:                   11-10 @ 15:33  --    C-Reactive Protein:     11-10 @ 15:33  1.24    Procalcitonin:           11-10 @ 15:33   --  ESR:                   11-10 @ 09:41  --    C-Reactive Protein:     11-10 @ 09:41  --    Procalcitonin:           11-10 @ 09:41   <0.05      11-14    144  |  112<H>  |  8   ----------------------------<  172<H>  3.2<L>   |  25  |  0.51    Ca    8.2<L>      14 Nov 2020 09:34    Assessment and Plan:    1. Dementia with delirikum.   2. Positive blood culture with coagulase negative Staph, likely due to skin contamination.     .   . Blood culture with coagulase negative Staph on admission was likely due to contamination. Repeated blood cultures are negative. Will not treat. He was given IV Vancomycin today. No further antibiotics.  . Monitor mental status. Neurology evaluation in progress.       Dorian Ruiz MD   [phone].

## 2020-11-14 NOTE — PROGRESS NOTE ADULT - PROBLEM SELECTOR PLAN 1
Zyprexa increased in dose -   70 y/o Male  from [facility] with medical  hx of Afib, HTN ,HLD ,DM ,Paranoid personality disorder, Alzheimer's dementia, sent in to ED  for combative behaviour  ID eval noted  Pulm eval noted  CXR and CT head noted  Covid PCR neg on admission  assist with ADL  left a message for the listed contacts  GOC discussion in progress  anti psychotics use PRN  monitor for needs re - orient nutrition.  Psych rx regimen optimization in progress - RRT notes reviewed - monitor for outbursts - agitation -.

## 2020-11-14 NOTE — PROVIDER CONTACT NOTE (CRITICAL VALUE NOTIFICATION) - SITUATION
Troponin 0.053
Blood cult + growth anaerobic bottle gram + cocci clusters
Lab called with a critical lab value blood culture on 11/10 growth in anaerobic bottle for gram positive cocci in clusters

## 2020-11-14 NOTE — PROGRESS NOTE ADULT - ASSESSMENT
cont rx   cont rx      THOMAS ZAPATA DOA 11/10/2020 1950 DR GEORGIA HAMILTON       REVIEW OF SYMPTOMS      Able to give ROS  NO     PHYSICAL EXAM    HEENT Unremarkable PERRLA atraumatic   RESP Fair air entry EXP prolonged    Harsh breath sound Resp distres mild   CARDIAC S1 S2 No S3     NO JVD    ABDOMEN SOFT BS PRESENT NOT DISTENDED No hepatosplenomegaly PEDAL EDEMA present No calf tenderness  NO rash     PT DATA/BEST PRACTICE  ALLERGY   nka           WT                11/10/2020 62               BMI                       11/10/2020 21                 ADVANCED DIRECTIVE     HEAD OF BED ELEVATION Yes      DVT PROPHYLAXIS.      hpsc (11/10/2020)   DIET. cons carb soft  (11/10/44094                                                                    LLOYD PROPHYLAXIS.   INFECTION PPLX                                                    OXYGENATION.   11/10/-11/404305 ra 995 - ra 96%       VITALS.   11/14/2020 afeb 75 130/80       PATIENT SUMMARY.   69 m who had tested covid posv 11/9 was sent from [facility] to er with agitation andtested covid negv On arrival 164/110 99f ra 97%   In ER pt was found to have lacticemia and pulm consulted for sepsis ro pneumonia 11/10/2020   PMH Afib alzheimers anxiety dementia DM hytn   home meds asa 325 atorvastat 40 cardizem 240 aricept 10 pepcid namenda 5 claritin 10 metformin 500 quetiapine 25.3       PROBLEM/ASSESSMENT/RECOMMENDATIONS.  SEPSIS   Doubt bacterial infection No significant fever or leukocytosis and negv cxr and negv procalc poa   defer abio   STAPH HOMINIS BC POSV ON 11/10   repeat bc 11/12 were negv Likely contaminant  COVID   ID on case Remdesevir and dexa deferred for now as oxygenation ok   LACTICEMIA   Repeat la improved on 11/10/2020   Is on iv fluids  RESP   ra po 99% poa   Monitor and target po 90-95%  ALTERED MENTAL STATE   11/10/2020 ct head 11/10/2020 was negv   observe   Control agitation with meds   A fib   On ASA cardizem   cont rx  AGITATION  constant observation  11/14/2020       TIME SPENT   Over 25 minutes aggregate care time spent on encounter; activities included   direct patient care, counseling and/or coordinating care reviewing notes, lab data/ imaging , discussion with multidisciplinary team/ patient  /family and explaining in detail risks, benefits, alternatives  of the recommendations     THOMAS MCCLELLAND 11/10/2020 1950 DR GEORGIA HAMILTON

## 2020-11-14 NOTE — PROGRESS NOTE ADULT - SUBJECTIVE AND OBJECTIVE BOX
PROGRESS NOTE  Patient is a 69y old  Male who presents with a chief complaint of ALTERED MENTAL STATUS  AND LACTICEMIA (14 Nov 2020 09:49)  Chart and available morning labs /imaging are reviewed electronically , urgent issues addressed . More information  is being added upon completion of rounds , when more information is collected and management discussed with consultants , medical staff and social service/case management on the floor     OVERNIGHT  No new issues reported by medical staff . All above noted Patient is resting in a bed comfortably .Confused ,poor mentation .No distress noted   Remains on constant observation Periods of agitation reported Potassium is replaced   HPI:  68 y/o Male  from [facility] with medical  hx of Afib, HTN ,HLD ,DM ,Paranoid personality disorder, Alzheimer's dementia, sent in to ED  for combative behaviour and AMS x 1 day.  Patient was tested  +Covid yesterday 11/09 .  Pt is apparently AAOx3 at baseline.  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 Pt wants the person responsible for sending him to the ED to be fired. Pt denies any SOB, cough, GI/ symptoms. Covid test came back negative but patient found to have lactate serum elevated 3.1 ,iv hydration given Admitted for septic workup and evaluation,send blood and urine cx,serial lactate levels,monitor vitals closley,ivfs hydration,monitor urine output and renal profile,id and pulm consults called to determine need in antibacterial tx Patient developed severe agitation and combativeness in er later ,requiring  several ativan ,haldol injections .Placed on constant observation CT BRAIN -NAD .PSYCHIATRY AND NEUROLOGY consults requested .Palliative care consult requested ,to discuss advance directives and complete MOLST  (10 Nov 2020 18:10)    PAST MEDICAL & SURGICAL HISTORY:  Paranoid personality disorder    Anxiety    Dementia    Alzheimer disease    Hypertension    Hyperlipidemia    Diabetes mellitus    Afib    DM (diabetes mellitus)    DM (diabetes mellitus)        MEDICATIONS  (STANDING):  aspirin 325 milliGRAM(s) Oral daily  atorvastatin 40 milliGRAM(s) Oral at bedtime  dextrose 40% Gel 15 Gram(s) Oral once  dextrose 5%. 1000 milliLiter(s) (50 mL/Hr) IV Continuous <Continuous>  dextrose 5%. 1000 milliLiter(s) (100 mL/Hr) IV Continuous <Continuous>  dextrose 50% Injectable 25 Gram(s) IV Push once  dextrose 50% Injectable 12.5 Gram(s) IV Push once  dextrose 50% Injectable 25 Gram(s) IV Push once  diltiazem    milliGRAM(s) Oral daily  donepezil 10 milliGRAM(s) Oral at bedtime  famotidine    Tablet 20 milliGRAM(s) Oral daily  glucagon  Injectable 1 milliGRAM(s) IntraMuscular once  heparin   Injectable 5000 Unit(s) SubCutaneous every 12 hours  insulin lispro (ADMELOG) corrective regimen sliding scale   SubCutaneous three times a day before meals  loratadine 10 milliGRAM(s) Oral daily  memantine 5 milliGRAM(s) Oral two times a day  metoprolol tartrate 25 milliGRAM(s) Oral two times a day  OLANZapine Disintegrating Tablet 10 milliGRAM(s) Oral three times a day  potassium chloride    Tablet ER 40 milliEquivalent(s) Oral once  potassium chloride  10 mEq/100 mL IVPB 10 milliEquivalent(s) IV Intermittent every 1 hour    MEDICATIONS  (PRN):  acetaminophen   Tablet .. 650 milliGRAM(s) Oral every 6 hours PRN Temp greater or equal to 38C (100.4F), Mild Pain (1 - 3)  LORazepam   Injectable 0.5 milliGRAM(s) IV Push every 6 hours PRN SEVERE AGITATION  melatonin 5 milliGRAM(s) Oral at bedtime PRN Insomnia  OLANZapine Injectable 5 milliGRAM(s) IntraMuscular every 6 hours PRN agitation      OBJECTIVE    T(C): 36.4 (11-14-20 @ 05:35), Max: 36.7 (11-13-20 @ 13:50)  HR: 80 (11-14-20 @ 05:35) (67 - 80)  BP: 124/72 (11-14-20 @ 05:35) (92/68 - 146/94)  RR: 17 (11-14-20 @ 05:35) (17 - 18)  SpO2: 96% (11-14-20 @ 05:35) (96% - 98%)  Wt(kg): --  I&O's Summary    13 Nov 2020 07:01  -  14 Nov 2020 07:00  --------------------------------------------------------  IN: 780 mL / OUT: 1350 mL / NET: -570 mL          REVIEW OF SYSTEMS:  ROS is unobtainable due to dementia and confusion PATIENT IS CONFUSED AND IS UNABLE TO GIVE ANY/RELIABLE HISTORY OR REVIEW OF SYSTEMS PLEASE ALSO SEE UNDER HPI AND ASSESSMENT FOR OBTAINED REVIEW OF SYSTEMS     PHYSICAL EXAM:  Appearance: NAD. VS past 24 hrs -as above   HEENT:   Moist oral mucosa. Conjunctiva clear b/l.   Neck : supple  Respiratory: Lungs CTAB.  Gastrointestinal:  Soft, nontender. No rebound. No rigidity. BS present	  Cardiovascular: RRR ,S1S2 present  Neurologic: Non-focal. Moving all extremities.  Extremities: No edema. No erythema. No calf tenderness.  Skin: No rashes, No ecchymoses, No cyanosis.	  wounds ,skin lesions-See skin assesment flow sheet   LABS:                        12.3   6.89  )-----------( 172      ( 14 Nov 2020 09:34 )             34.9     11-14    144  |  112<H>  |  8   ----------------------------<  172<H>  3.2<L>   |  25  |  0.51    Ca    8.2<L>      14 Nov 2020 09:34  Phos  3.1     11-12  Mg     1.7     11-12      CAPILLARY BLOOD GLUCOSE      POCT Blood Glucose.: 181 mg/dL (14 Nov 2020 07:30)  POCT Blood Glucose.: 231 mg/dL (13 Nov 2020 21:02)  POCT Blood Glucose.: 149 mg/dL (13 Nov 2020 16:36)  POCT Blood Glucose.: 210 mg/dL (13 Nov 2020 11:45)          Culture - Blood (collected 12 Nov 2020 12:38)  Source: .Blood Blood  Preliminary Report (13 Nov 2020 13:18):    No growth to date.    Culture - Urine (collected 10 Nov 2020 22:03)  Source: .Urine Clean Catch (Midstream)  Final Report (11 Nov 2020 18:40):    No growth    Culture - Blood (collected 10 Nov 2020 11:23)  Source: .Blood Blood-Peripheral  Preliminary Report (11 Nov 2020 12:01):    No growth to date.    Culture - Blood (collected 10 Nov 2020 11:23)  Source: .Blood Blood-Peripheral  Gram Stain (11 Nov 2020 14:58):    Growth in anaerobic bottle: Gram Positive Cocci in Clusters  Final Report (12 Nov 2020 14:08):    Growth in anaerobic bottle: Staphylococcus hominis    Coag Negative Staphylococcus    Single set isolate, possible contaminant. Contact    Microbiology if susceptibility testing clinically    indicated.    "Due to technical problems, Proteus sp. will Not be reported as part of    the BCID panel until further notice"    ***Blood Panel PCR results on this specimen are available    approximately 3 hours after the Gram stain result.***    Gram stain, PCR, and/or culture results may not always    correspond due to difference in methodologies.    ************************************************************    This PCR assay was performed using [employer].    The following targets are tested for: Enterococcus,    vancomycin resistant enterococci, Listeria monocytogenes,    coagulase negative staphylococci, S. aureus,    methicillin resistant S. aureus, Streptococcus agalactiae    (Group B), S. pneumoniae, S. pyogenes (Group A),    Acinetobacter baumannii, Enterobacter cloacae, E. coli,    Klebsiella oxytoca, K. pneumoniae, Proteus sp.,    Serratia marcescens, Haemophilus influenzae,    Neisseria meningitidis, Pseudomonas aeruginosa, Candida    albicans, C. glabrata, C krusei, C parapsilosis,    C. tropicalis and the KPC resistance gene.  Organism: Blood Culture PCR (12 Nov 2020 14:08)  Organism: Blood Culture PCR (12 Nov 2020 14:08)      RADIOLOGY & ADDITIONAL TESTS:   reviewed elctronically  ASSESSMENT/PLAN: 	  45minutes spent on this visit, 50% visit time spent in care co-ordination with other attendings and counselling patient  I have discussed care plan with patient and HCP,expressed understanding of problems treatment and their effect and side effects, alternatives in detail,I have asked if they have any questions and concerns and appropriately addressed them to best of my ability

## 2020-11-14 NOTE — PROGRESS NOTE ADULT - PROBLEM SELECTOR PLAN 1
metformin d/jose l(lactic acidosis)  cont humalog scale coverage alone  goal bg 100-180 in hosp setting

## 2020-11-14 NOTE — PROGRESS NOTE ADULT - ASSESSMENT
70 y/o Male  from [facility] with medical  hx of Afib, HTN ,HLD ,DM ,Paranoid personality disorder, Alzheimer's dementia, sent in to ED  for combative behaviour and AMS x 1 day.  Patient was tested  +Covid yesterday 11/09 .  Pt is apparently AAOx3 at baseline.  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 Pt wants the person responsible for sending him to the ED to be fired. Pt denies any SOB, cough, GI/ symptoms. Covid test came back negative but patient found to have lactate serum elevated 3.1 ,iv hydration given Admitted for septic workup and evaluation,send blood and urine cx,serial lactate levels,monitor vitals closley,ivfs hydration,monitor urine output and renal profile,id and pulm consults called to determine need in antibacterial tx Patient developed severe agitation and combativeness in er later ,requiring  several ativan ,haldol injections .Placed on constant observation CT BRAIN -NAD .PSYCHIATRY AND NEUROLOGY consults requested .Palliative care consult requested ,to discuss advance directives and complete MOLST

## 2020-11-14 NOTE — PROGRESS NOTE ADULT - SUBJECTIVE AND OBJECTIVE BOX
Neurology follow up note    JODI DUBOSE69yMale      Interval History:    Patient sleeping in bed     MEDICATIONS    acetaminophen   Tablet .. 650 milliGRAM(s) Oral every 6 hours PRN  aspirin 325 milliGRAM(s) Oral daily  atorvastatin 40 milliGRAM(s) Oral at bedtime  dextrose 40% Gel 15 Gram(s) Oral once  dextrose 5% + sodium chloride 0.45% with potassium chloride 20 mEq/L 1000 milliLiter(s) IV Continuous <Continuous>  dextrose 5%. 1000 milliLiter(s) IV Continuous <Continuous>  dextrose 5%. 1000 milliLiter(s) IV Continuous <Continuous>  dextrose 50% Injectable 25 Gram(s) IV Push once  dextrose 50% Injectable 12.5 Gram(s) IV Push once  dextrose 50% Injectable 25 Gram(s) IV Push once  diltiazem    milliGRAM(s) Oral daily  donepezil 10 milliGRAM(s) Oral at bedtime  famotidine    Tablet 20 milliGRAM(s) Oral daily  glucagon  Injectable 1 milliGRAM(s) IntraMuscular once  heparin   Injectable 5000 Unit(s) SubCutaneous every 12 hours  insulin lispro (ADMELOG) corrective regimen sliding scale   SubCutaneous three times a day before meals  loratadine 10 milliGRAM(s) Oral daily  LORazepam   Injectable 0.5 milliGRAM(s) IV Push every 6 hours PRN  melatonin 5 milliGRAM(s) Oral at bedtime PRN  memantine 5 milliGRAM(s) Oral two times a day  metoprolol tartrate 25 milliGRAM(s) Oral two times a day  OLANZapine Disintegrating Tablet 10 milliGRAM(s) Oral three times a day  OLANZapine Injectable 5 milliGRAM(s) IntraMuscular every 6 hours PRN      Allergies    No Known Allergies    Intolerances            Vital Signs Last 24 Hrs  T(C): 36.4 (14 Nov 2020 05:35), Max: 36.7 (13 Nov 2020 13:50)  T(F): 97.5 (14 Nov 2020 05:35), Max: 98 (13 Nov 2020 13:50)  HR: 80 (14 Nov 2020 05:35) (67 - 80)  BP: 124/72 (14 Nov 2020 05:35) (92/68 - 146/94)  BP(mean): --  RR: 17 (14 Nov 2020 05:35) (17 - 18)  SpO2: 96% (14 Nov 2020 05:35) (96% - 98%)      REVIEW OF SYSTEMS:  Very limited secondary to the patient has underlying dementia, would not answer questions accordingly.    PHYSICAL EXAMINATION:   HEENT:  Head:  Normocephalic, atraumatic.  Eyes:  No scleral icterus.  Ears:  Hearing appeared to be intact.  NECK:  Supple.  RESPIRATORY:  Good air entry bilaterally.  CARDIOVASCULAR:  S1 and S2 heard.  ABDOMEN:  Soft and nontender.  EXTREMITIES:  No clubbing or cyanosis were noted.      NEUROLOGIC:  The patient is sleeping on bed .  Upon conversing with the patient did slightly become agitated.  Positive blink to bilateral visual threat.  Speech was fluent.  The patient did follow simple commands.  Bilateral upper were 4/5, bilateral lower were 3+/5..            LABS:  CBC Full  -  ( 14 Nov 2020 09:34 )  WBC Count : 6.89 K/uL  RBC Count : 3.96 M/uL  Hemoglobin : 12.3 g/dL  Hematocrit : 34.9 %  Platelet Count - Automated : 172 K/uL  Mean Cell Volume : 88.1 fl  Mean Cell Hemoglobin : 31.1 pg  Mean Cell Hemoglobin Concentration : 35.2 gm/dL  Auto Neutrophil # : x  Auto Lymphocyte # : x  Auto Monocyte # : x  Auto Eosinophil # : x  Auto Basophil # : x  Auto Neutrophil % : x  Auto Lymphocyte % : x  Auto Monocyte % : x  Auto Eosinophil % : x  Auto Basophil % : x        Phos  3.1     11-12  Mg     1.7     11-12      Hemoglobin A1C:       Vitamin B12         RADIOLOGY      ANALYSIS AND PLAN:  This is a 69-year-old with an episode of altered mental status and agitation.  For episode of altered mental status, agitation, suspect most likely secondary to progressive underlying dementia with a personality disorder.  There are no clear signs on examinations to suggest a new cerebrovascular accident has ensued.  I would recommend a psychiatric follow-up.  Consider Seroquel.  For atrial fibrillation, risks versus benefits, continue the patient on aspirin.  For history of underlying dementia, appears to be advanced, would recommend to continue the patient on his Aricept and Namenda.  For history of diabetes, strict control of blood sugars.  For history of hypertension, monitor systolic blood pressure.  For high cholesterol, continue the patient on his statin.  Greater than 40 minutes of time was spent with the patient, plan of care, reviewing data, speaking to the multidisciplinary healthcare team with greater than 50% of that time in regards to counseling and care coordination.

## 2020-11-14 NOTE — PROGRESS NOTE ADULT - SUBJECTIVE AND OBJECTIVE BOX
JODI DUBOSE    PLV 1EAS 115 D1    Patient is a 69y old  Male who presents with a chief complaint of ALTERED MENTAL STATUS  AND LACTICEMIA (14 Nov 2020 06:17)       Allergies    No Known Allergies    Intolerances        HPI:  68 y/o Male  from [facility] with medical  hx of Afib, HTN ,HLD ,DM ,Paranoid personality disorder, Alzheimer's dementia, sent in to ED  for combative behaviour and AMS x 1 day.  Patient was tested  +Covid yesterday 11/09 .  Pt is apparently AAOx3 at baseline.  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 Pt wants the person responsible for sending him to the ED to be fired. Pt denies any SOB, cough, GI/ symptoms. Covid test came back negative but patient found to have lactate serum elevated 3.1 ,iv hydration given Admitted for septic workup and evaluation,send blood and urine cx,serial lactate levels,monitor vitals closley,ivfs hydration,monitor urine output and renal profile,id and pulm consults called to determine need in antibacterial tx Patient developed severe agitation and combativeness in er later ,requiring  several ativan ,haldol injections .Placed on constant observation CT BRAIN -NAD .PSYCHIATRY AND NEUROLOGY consults requested .Palliative care consult requested ,to discuss advance directives and complete MOLST  (10 Nov 2020 18:10)      PAST MEDICAL & SURGICAL HISTORY:  Paranoid personality disorder    Anxiety    Dementia    Alzheimer disease    Hypertension    Hyperlipidemia    Diabetes mellitus    Afib    DM (diabetes mellitus)    DM (diabetes mellitus)        FAMILY HISTORY:        MEDICATIONS   acetaminophen   Tablet .. 650 milliGRAM(s) Oral every 6 hours PRN  aspirin 325 milliGRAM(s) Oral daily  atorvastatin 40 milliGRAM(s) Oral at bedtime  dextrose 40% Gel 15 Gram(s) Oral once  dextrose 5% + sodium chloride 0.45% with potassium chloride 20 mEq/L 1000 milliLiter(s) IV Continuous <Continuous>  dextrose 5%. 1000 milliLiter(s) IV Continuous <Continuous>  dextrose 5%. 1000 milliLiter(s) IV Continuous <Continuous>  dextrose 50% Injectable 25 Gram(s) IV Push once  dextrose 50% Injectable 12.5 Gram(s) IV Push once  dextrose 50% Injectable 25 Gram(s) IV Push once  diltiazem    milliGRAM(s) Oral daily  donepezil 10 milliGRAM(s) Oral at bedtime  famotidine    Tablet 20 milliGRAM(s) Oral daily  glucagon  Injectable 1 milliGRAM(s) IntraMuscular once  heparin   Injectable 5000 Unit(s) SubCutaneous every 12 hours  insulin lispro (ADMELOG) corrective regimen sliding scale   SubCutaneous three times a day before meals  loratadine 10 milliGRAM(s) Oral daily  LORazepam   Injectable 0.5 milliGRAM(s) IV Push every 6 hours PRN  melatonin 5 milliGRAM(s) Oral at bedtime PRN  memantine 5 milliGRAM(s) Oral two times a day  metoprolol tartrate 25 milliGRAM(s) Oral two times a day  OLANZapine Disintegrating Tablet 10 milliGRAM(s) Oral three times a day  OLANZapine Injectable 5 milliGRAM(s) IntraMuscular every 6 hours PRN      Vital Signs Last 24 Hrs  T(C): 36.4 (14 Nov 2020 05:35), Max: 36.7 (13 Nov 2020 13:50)  T(F): 97.5 (14 Nov 2020 05:35), Max: 98 (13 Nov 2020 13:50)  HR: 80 (14 Nov 2020 05:35) (67 - 80)  BP: 124/72 (14 Nov 2020 05:35) (92/68 - 146/94)  BP(mean): --  RR: 17 (14 Nov 2020 05:35) (17 - 18)  SpO2: 96% (14 Nov 2020 05:35) (96% - 98%)      11-13-20 @ 07:01  -  11-14-20 @ 07:00  --------------------------------------------------------  IN: 780 mL / OUT: 1350 mL / NET: -570 mL            LABS:                        12.1   4.94  )-----------( 178      ( 12 Nov 2020 08:50 )             35.4     11-12    144  |  110<H>  |  10  ----------------------------<  106<H>  3.4<L>   |  24  |  0.65    Ca    8.5      12 Nov 2020 08:50  Phos  3.1     11-12  Mg     1.7     11-12                WBC:  WBC Count: 4.94 K/uL (11-12 @ 08:50)  WBC Count: 5.06 K/uL (11-11 @ 05:52)  WBC Count: 12.38 K/uL (11-10 @ 09:41)      MICROBIOLOGY:  RECENT CULTURES:  11-12 .Blood Blood XXXX XXXX   No growth to date.    11-10 .Urine Clean Catch (Midstream) XXXX XXXX   No growth    11-10 .Blood Blood-Peripheral Blood Culture PCR   Growth in anaerobic bottle: Gram Positive Cocci in Clusters   Growth in anaerobic bottle: Staphylococcus hominis  Coag Negative Staphylococcus  Single set isolate, possible contaminant. Contact  Microbiology if susceptibility testing clinically  indicated.  "Due to technical problems, Proteus sp. will Not be reported as part of  the BCID panel until further notice"  ***Blood Panel PCR results on this specimen are available  approximately 3 hours after the Gram stain result.***  Gram stain, PCR, and/or culture results may not always  correspond due to difference in methodologies.  ************************************************************  This PCR assay was performed using [employer].  The following targets are tested for: Enterococcus,  vancomycin resistant enterococci, Listeria monocytogenes,  coagulase negative staphylococci, S. aureus,  methicillin resistant S. aureus, Streptococcus agalactiae  (Group B), S. pneumoniae, S. pyogenes (Group A),  Acinetobacter baumannii, Enterobacter cloacae, E. coli,  Klebsiella oxytoca, K. pneumoniae, Proteus sp.,  Serratia marcescens, Haemophilus influenzae,  Neisseria meningitidis, Pseudomonas aeruginosa, Candida  albicans, C. glabrata, C krusei, C parapsilosis,  C. tropicalis and the KPC resistance gene.          CARDIAC MARKERS ( 13 Nov 2020 06:17 )  .039 ng/mL / x     / x     / x     / x      CARDIAC MARKERS ( 12 Nov 2020 10:42 )  .069 ng/mL / x     / x     / x     / x                Sodium:  Sodium, Serum: 144 mmol/L (11-12 @ 08:50)  Sodium, Serum: 145 mmol/L (11-11 @ 05:52)  Sodium, Serum: 139 mmol/L (11-10 @ 09:41)      0.65 mg/dL 11-12 @ 08:50  0.56 mg/dL 11-11 @ 05:52  1.30 mg/dL 11-10 @ 09:41      Hemoglobin:  Hemoglobin: 12.1 g/dL (11-12 @ 08:50)  Hemoglobin: 11.0 g/dL (11-11 @ 05:52)  Hemoglobin: 14.0 g/dL (11-10 @ 09:41)      Platelets: Platelet Count - Automated: 178 K/uL (11-12 @ 08:50)  Platelet Count - Automated: 160 K/uL (11-11 @ 05:52)  Platelet Count - Automated: 248 K/uL (11-10 @ 09:41)              RADIOLOGY & ADDITIONAL STUDIES:

## 2020-11-14 NOTE — PROGRESS NOTE ADULT - SUBJECTIVE AND OBJECTIVE BOX
CAPILLARY BLOOD GLUCOSE      POCT Blood Glucose.: 165 mg/dL (14 Nov 2020 16:26)  POCT Blood Glucose.: 169 mg/dL (14 Nov 2020 11:57)  POCT Blood Glucose.: 181 mg/dL (14 Nov 2020 07:30)  POCT Blood Glucose.: 231 mg/dL (13 Nov 2020 21:02)  POCT Blood Glucose.: 149 mg/dL (13 Nov 2020 16:36)      Vital Signs Last 24 Hrs  T(C): 36.9 (14 Nov 2020 12:50), Max: 36.9 (14 Nov 2020 12:50)  T(F): 98.5 (14 Nov 2020 12:50), Max: 98.5 (14 Nov 2020 12:50)  HR: 75 (14 Nov 2020 12:50) (67 - 80)  BP: 137/84 (14 Nov 2020 12:50) (92/68 - 146/94)  BP(mean): --  RR: 17 (14 Nov 2020 12:50) (17 - 18)  SpO2: 96% (14 Nov 2020 12:50) (96% - 98%)    General: WN/WD NAD  Respiratory: CTA B/L  CV: RRR, S1S2, no murmurs, rubs or gallops  Abdominal: Soft, NT, ND +BS, Last BM  Extremities: No edema, + peripheral pulses     11-14    144  |  112<H>  |  8   ----------------------------<  172<H>  3.2<L>   |  25  |  0.51    Ca    8.2<L>      14 Nov 2020 09:34        atorvastatin 40 milliGRAM(s) Oral at bedtime  dextrose 40% Gel 15 Gram(s) Oral once  dextrose 50% Injectable 25 Gram(s) IV Push once  dextrose 50% Injectable 12.5 Gram(s) IV Push once  dextrose 50% Injectable 25 Gram(s) IV Push once  glucagon  Injectable 1 milliGRAM(s) IntraMuscular once  insulin lispro (ADMELOG) corrective regimen sliding scale   SubCutaneous three times a day before meals

## 2020-11-14 NOTE — PROGRESS NOTE ADULT - SUBJECTIVE AND OBJECTIVE BOX
[facility] [place]       HPI:  68 y/o Male  from [facility] with medical  hx of Afib, HTN ,HLD ,DM ,Paranoid personality disorder, Alzheimer's dementia, sent in to ED  for combative behaviour and AMS x 1 day.  Patient was tested  +Covid yesterday 11/09 .  Pt is apparently AAOx3 at baseline.  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 Pt wants the person responsible for sending him to the ED to be fired. Pt denies any SOB, cough, GI/ symptoms. Covid test came back negative but patient found to have lactate serum elevated 3.1 ,iv hydration given Admitted for septic workup and evaluation,send blood and urine cx,serial lactate levels,monitor vitals closley,ivfs hydration,monitor urine output and renal profile,id and pulm consults called to determine need in antibacterial tx Patient developed severe agitation and combativeness in er later ,requiring  several ativan ,haldol injections .Placed on constant observation CT BRAIN -NAD .PSYCHIATRY AND NEUROLOGY consults requested .Palliative care consult requested ,to discuss advance directives and complete MOLST  (10 Nov 2020 18:10)        SUBJECTIVE:      ALLERGIES:  Allergies    No Known Allergies    Intolerances          MEDICATIONS  (STANDING):  aspirin 325 milliGRAM(s) Oral daily  atorvastatin 40 milliGRAM(s) Oral at bedtime  dextrose 40% Gel 15 Gram(s) Oral once  dextrose 5%. 1000 milliLiter(s) (100 mL/Hr) IV Continuous <Continuous>  dextrose 5%. 1000 milliLiter(s) (50 mL/Hr) IV Continuous <Continuous>  dextrose 50% Injectable 25 Gram(s) IV Push once  dextrose 50% Injectable 12.5 Gram(s) IV Push once  dextrose 50% Injectable 25 Gram(s) IV Push once  diltiazem    milliGRAM(s) Oral daily  donepezil 10 milliGRAM(s) Oral at bedtime  famotidine    Tablet 20 milliGRAM(s) Oral daily  glucagon  Injectable 1 milliGRAM(s) IntraMuscular once  heparin   Injectable 5000 Unit(s) SubCutaneous every 12 hours  insulin lispro (ADMELOG) corrective regimen sliding scale   SubCutaneous three times a day before meals  loratadine 10 milliGRAM(s) Oral daily  memantine 5 milliGRAM(s) Oral two times a day  metoprolol tartrate 25 milliGRAM(s) Oral two times a day  OLANZapine Disintegrating Tablet 10 milliGRAM(s) Oral three times a day    MEDICATIONS  (PRN):  acetaminophen   Tablet .. 650 milliGRAM(s) Oral every 6 hours PRN Temp greater or equal to 38C (100.4F), Mild Pain (1 - 3)  LORazepam   Injectable 0.5 milliGRAM(s) IV Push every 6 hours PRN SEVERE AGITATION  melatonin 5 milliGRAM(s) Oral at bedtime PRN Insomnia  OLANZapine Injectable 5 milliGRAM(s) IntraMuscular every 6 hours PRN agitation      REVIEW OF SYSTEMS:  CONSTITUTIONAL: No fever,  RESPIRATORY: No cough, wheezing, shortness of breath  CARDIOVASCULAR: No chest pain, dyspnea, palpitations, dizziness, syncope, paroxysmal nocturnal dyspnea, orthopnea, or arm or leg swelling  GASTROINTESTINAL: No abdominal  or epigastric pain, nausea, vomiting,  diarrhea  NEUROLOGICAL: No headaches,  loss of strength, numbness, or tremors    Vital Signs Last 24 Hrs  T(C): 37.3 (14 Nov 2020 20:00), Max: 37.3 (14 Nov 2020 20:00)  T(F): 99.1 (14 Nov 2020 20:00), Max: 99.1 (14 Nov 2020 20:00)  HR: 71 (14 Nov 2020 20:00) (71 - 80)  BP: 144/89 (14 Nov 2020 20:00) (124/72 - 156/69)  BP(mean): --  RR: 16 (14 Nov 2020 20:00) (16 - 17)  SpO2: 96% (14 Nov 2020 20:00) (96% - 96%)    PHYSICAL EXAM:  HEAD:  Atraumatic, Normocephalic  NECK: Supple and normal thyroid.  No JVD or carotid bruit.   HEART: S1, S2 regular , 1/6 soft ejection systolic murmur in mitral area , no thrill and no gallops .  PULMONARY: Bilateral vesicular breathing , few scattered ronchi and few scattered rales are present .  ABDOMEN: Soft nontender and positive bowl sounds   EXTREMITIES:  No clubbing, cyanosis, or pedal  edema  NEUROLOGICAL: AAOX3 , no focal deficit .    LABS:                        12.3   6.89  )-----------( 172      ( 14 Nov 2020 09:34 )             34.9     11-14    144  |  112<H>  |  8   ----------------------------<  172<H>  3.2<L>   |  25  |  0.51    Ca    8.2<L>      14 Nov 2020 09:34      CARDIAC MARKERS ( 13 Nov 2020 06:17 )  .039 ng/mL / x     / x     / x     / x              BNP      EKG:  ECHO:  IMAGING:    Assessment/Plan    Will continue to follow during hospital course and give further recommendations as needed . Thanks for your referral . if any questions please contact me at office [phone])

## 2020-11-14 NOTE — PROGRESS NOTE ADULT - SUBJECTIVE AND OBJECTIVE BOX
Date/Time Patient Seen:  		  Referring MD:   Data Reviewed	       Patient is a 69y old  Male who presents with a chief complaint of ALTERED MENTAL STATUS  AND LACTICEMIA (13 Nov 2020 19:52)      Subjective/HPI     PAST MEDICAL & SURGICAL HISTORY:  Paranoid personality disorder    Anxiety    Dementia    Alzheimer disease    Hypertension    Hyperlipidemia    Diabetes mellitus    Afib    DM (diabetes mellitus)    DM (diabetes mellitus)          Medication list         MEDICATIONS  (STANDING):  aspirin 325 milliGRAM(s) Oral daily  atorvastatin 40 milliGRAM(s) Oral at bedtime  dextrose 40% Gel 15 Gram(s) Oral once  dextrose 5% + sodium chloride 0.45% with potassium chloride 20 mEq/L 1000 milliLiter(s) (60 mL/Hr) IV Continuous <Continuous>  dextrose 5%. 1000 milliLiter(s) (50 mL/Hr) IV Continuous <Continuous>  dextrose 5%. 1000 milliLiter(s) (100 mL/Hr) IV Continuous <Continuous>  dextrose 50% Injectable 25 Gram(s) IV Push once  dextrose 50% Injectable 12.5 Gram(s) IV Push once  dextrose 50% Injectable 25 Gram(s) IV Push once  diltiazem    milliGRAM(s) Oral daily  donepezil 10 milliGRAM(s) Oral at bedtime  famotidine    Tablet 20 milliGRAM(s) Oral daily  glucagon  Injectable 1 milliGRAM(s) IntraMuscular once  heparin   Injectable 5000 Unit(s) SubCutaneous every 12 hours  insulin lispro (ADMELOG) corrective regimen sliding scale   SubCutaneous three times a day before meals  loratadine 10 milliGRAM(s) Oral daily  memantine 5 milliGRAM(s) Oral two times a day  metoprolol tartrate 25 milliGRAM(s) Oral two times a day  OLANZapine Disintegrating Tablet 10 milliGRAM(s) Oral three times a day    MEDICATIONS  (PRN):  acetaminophen   Tablet .. 650 milliGRAM(s) Oral every 6 hours PRN Temp greater or equal to 38C (100.4F), Mild Pain (1 - 3)  LORazepam   Injectable 0.5 milliGRAM(s) IV Push every 6 hours PRN SEVERE AGITATION  melatonin 5 milliGRAM(s) Oral at bedtime PRN Insomnia  OLANZapine Injectable 5 milliGRAM(s) IntraMuscular every 6 hours PRN agitation         Vitals log        ICU Vital Signs Last 24 Hrs  T(C): 36.4 (14 Nov 2020 05:35), Max: 36.7 (13 Nov 2020 13:50)  T(F): 97.5 (14 Nov 2020 05:35), Max: 98 (13 Nov 2020 13:50)  HR: 80 (14 Nov 2020 05:35) (67 - 80)  BP: 124/72 (14 Nov 2020 05:35) (92/68 - 146/94)  BP(mean): --  ABP: --  ABP(mean): --  RR: 17 (14 Nov 2020 05:35) (17 - 18)  SpO2: 96% (14 Nov 2020 05:35) (96% - 98%)           Input and Output:  I&O's Detail    12 Nov 2020 07:01  -  13 Nov 2020 07:00  --------------------------------------------------------  IN:    dextrose 5% + sodium chloride 0.45% w/ Additives: 360 mL    sodium chloride 0.9%: 350 mL  Total IN: 710 mL    OUT:  Total OUT: 0 mL    Total NET: 710 mL      13 Nov 2020 07:01  -  14 Nov 2020 06:17  --------------------------------------------------------  IN:    dextrose 5% + sodium chloride 0.45% w/ Additives: 600 mL  Total IN: 600 mL    OUT:    Voided (mL): 1350 mL  Total OUT: 1350 mL    Total NET: -750 mL          Lab Data                        12.1   4.94  )-----------( 178      ( 12 Nov 2020 08:50 )             35.4     11-12    144  |  110<H>  |  10  ----------------------------<  106<H>  3.4<L>   |  24  |  0.65    Ca    8.5      12 Nov 2020 08:50  Phos  3.1     11-12  Mg     1.7     11-12        CARDIAC MARKERS ( 13 Nov 2020 06:17 )  .039 ng/mL / x     / x     / x     / x      CARDIAC MARKERS ( 12 Nov 2020 10:42 )  .069 ng/mL / x     / x     / x     / x            Review of Systems	      Objective     Physical Examination    heart s1s2  lung dec BS  abd soft      Pertinent Lab findings & Imaging      Bianca:  NO   Adequate UO     I&O's Detail    12 Nov 2020 07:01  -  13 Nov 2020 07:00  --------------------------------------------------------  IN:    dextrose 5% + sodium chloride 0.45% w/ Additives: 360 mL    sodium chloride 0.9%: 350 mL  Total IN: 710 mL    OUT:  Total OUT: 0 mL    Total NET: 710 mL      13 Nov 2020 07:01  -  14 Nov 2020 06:17  --------------------------------------------------------  IN:    dextrose 5% + sodium chloride 0.45% w/ Additives: 600 mL  Total IN: 600 mL    OUT:    Voided (mL): 1350 mL  Total OUT: 1350 mL    Total NET: -750 mL               Discussed with:     Cultures:	        Radiology

## 2020-11-14 NOTE — PROVIDER CONTACT NOTE (CRITICAL VALUE NOTIFICATION) - TEST AND RESULT REPORTED:
lactate 3.1
Blood cult + growth anaerobic bottle gram + cocci clusters
Troponin 0.053
Troponin 0.069
blood culture on 11/10 growth in anaerobic bottle for gram positive cocci in clusters

## 2020-11-15 LAB
ANION GAP SERPL CALC-SCNC: 8 MMOL/L — SIGNIFICANT CHANGE UP (ref 5–17)
BUN SERPL-MCNC: 9 MG/DL — SIGNIFICANT CHANGE UP (ref 7–23)
CALCIUM SERPL-MCNC: 8.8 MG/DL — SIGNIFICANT CHANGE UP (ref 8.5–10.1)
CHLORIDE SERPL-SCNC: 111 MMOL/L — HIGH (ref 96–108)
CO2 SERPL-SCNC: 26 MMOL/L — SIGNIFICANT CHANGE UP (ref 22–31)
CREAT SERPL-MCNC: 0.58 MG/DL — SIGNIFICANT CHANGE UP (ref 0.5–1.3)
GLUCOSE SERPL-MCNC: 154 MG/DL — HIGH (ref 70–99)
HCT VFR BLD CALC: 38.4 % — LOW (ref 39–50)
HGB BLD-MCNC: 13.1 G/DL — SIGNIFICANT CHANGE UP (ref 13–17)
MCHC RBC-ENTMCNC: 30.4 PG — SIGNIFICANT CHANGE UP (ref 27–34)
MCHC RBC-ENTMCNC: 34.1 GM/DL — SIGNIFICANT CHANGE UP (ref 32–36)
MCV RBC AUTO: 89.1 FL — SIGNIFICANT CHANGE UP (ref 80–100)
NRBC # BLD: 0 /100 WBCS — SIGNIFICANT CHANGE UP (ref 0–0)
PLATELET # BLD AUTO: 178 K/UL — SIGNIFICANT CHANGE UP (ref 150–400)
POTASSIUM SERPL-MCNC: 3.7 MMOL/L — SIGNIFICANT CHANGE UP (ref 3.5–5.3)
POTASSIUM SERPL-SCNC: 3.7 MMOL/L — SIGNIFICANT CHANGE UP (ref 3.5–5.3)
RBC # BLD: 4.31 M/UL — SIGNIFICANT CHANGE UP (ref 4.2–5.8)
RBC # FLD: 13.8 % — SIGNIFICANT CHANGE UP (ref 10.3–14.5)
SODIUM SERPL-SCNC: 145 MMOL/L — SIGNIFICANT CHANGE UP (ref 135–145)
WBC # BLD: 5.28 K/UL — SIGNIFICANT CHANGE UP (ref 3.8–10.5)
WBC # FLD AUTO: 5.28 K/UL — SIGNIFICANT CHANGE UP (ref 3.8–10.5)

## 2020-11-15 RX ORDER — INSULIN LISPRO 100/ML
VIAL (ML) SUBCUTANEOUS AT BEDTIME
Refills: 0 | Status: DISCONTINUED | OUTPATIENT
Start: 2020-11-15 | End: 2020-11-16

## 2020-11-15 RX ADMIN — Medication 0.5 MILLIGRAM(S): at 18:45

## 2020-11-15 RX ADMIN — OLANZAPINE 10 MILLIGRAM(S): 15 TABLET, FILM COATED ORAL at 21:27

## 2020-11-15 RX ADMIN — FAMOTIDINE 20 MILLIGRAM(S): 10 INJECTION INTRAVENOUS at 12:17

## 2020-11-15 RX ADMIN — DONEPEZIL HYDROCHLORIDE 10 MILLIGRAM(S): 10 TABLET, FILM COATED ORAL at 21:27

## 2020-11-15 RX ADMIN — OLANZAPINE 10 MILLIGRAM(S): 15 TABLET, FILM COATED ORAL at 05:36

## 2020-11-15 RX ADMIN — Medication 1: at 12:17

## 2020-11-15 RX ADMIN — HEPARIN SODIUM 5000 UNIT(S): 5000 INJECTION INTRAVENOUS; SUBCUTANEOUS at 05:36

## 2020-11-15 RX ADMIN — Medication 1: at 22:03

## 2020-11-15 RX ADMIN — Medication 240 MILLIGRAM(S): at 05:36

## 2020-11-15 RX ADMIN — MEMANTINE HYDROCHLORIDE 5 MILLIGRAM(S): 10 TABLET ORAL at 18:47

## 2020-11-15 RX ADMIN — Medication 25 MILLIGRAM(S): at 18:47

## 2020-11-15 RX ADMIN — HEPARIN SODIUM 5000 UNIT(S): 5000 INJECTION INTRAVENOUS; SUBCUTANEOUS at 18:46

## 2020-11-15 RX ADMIN — LORATADINE 10 MILLIGRAM(S): 10 TABLET ORAL at 12:16

## 2020-11-15 RX ADMIN — OLANZAPINE 10 MILLIGRAM(S): 15 TABLET, FILM COATED ORAL at 13:59

## 2020-11-15 RX ADMIN — ATORVASTATIN CALCIUM 40 MILLIGRAM(S): 80 TABLET, FILM COATED ORAL at 21:27

## 2020-11-15 RX ADMIN — Medication 1: at 08:06

## 2020-11-15 RX ADMIN — Medication 25 MILLIGRAM(S): at 05:36

## 2020-11-15 RX ADMIN — Medication 0.5 MILLIGRAM(S): at 12:10

## 2020-11-15 RX ADMIN — MEMANTINE HYDROCHLORIDE 5 MILLIGRAM(S): 10 TABLET ORAL at 05:36

## 2020-11-15 RX ADMIN — Medication 325 MILLIGRAM(S): at 12:17

## 2020-11-15 NOTE — PROGRESS NOTE ADULT - PROBLEM SELECTOR PLAN 1
remains with periods of Agitation -   Zyprexa increased in dose -   68 y/o Male  from [facility] with medical  hx of Afib, HTN ,HLD ,DM ,Paranoid personality disorder, Alzheimer's dementia, sent in to ED  for combative behaviour  ID eval noted  Pulm eval noted  CXR and CT head noted  Covid PCR neg on admission  assist with ADL  GOC discussion in progress  anti psychotics use PRN  monitor for needs re - orient nutrition.  Psych rx regimen optimization in progress - RRT notes reviewed - monitor for outbursts - agitation -.

## 2020-11-15 NOTE — PROGRESS NOTE ADULT - NSHPATTENDINGPLANDISCUSS_GEN_ALL_CORE
MED STAFF ON 1 EAST ,  ,  , , ,AID AT BEDSIDE ,spoke to the HCP sister Stephanie on a phone [phone] ,update is given

## 2020-11-15 NOTE — PROGRESS NOTE ADULT - ASSESSMENT
cont rx   cont rx    THOMAS ZAPATA DOA 11/10/2020 1950 DR GEORGIA HAMILTON       REVIEW OF SYMPTOMS      Able to give ROS  NO     PHYSICAL EXAM    HEENT Unremarkable PERRLA atraumatic   RESP Fair air entry EXP prolonged    Harsh breath sound Resp distres mild   CARDIAC S1 S2 No S3     NO JVD    ABDOMEN SOFT BS PRESENT NOT DISTENDED No hepatosplenomegaly PEDAL EDEMA present No calf tenderness  NO rash     PT DATA/BEST PRACTICE  ALLERGY   nka           WT                11/10/2020 62               BMI                       11/10/2020 21                 ADVANCED DIRECTIVE     HEAD OF BED ELEVATION Yes      DVT PROPHYLAXIS.      hpsc (11/10/2020)   DIET. cons carb soft  (11/10/09611                                                                    LLOYD PROPHYLAXIS.   INFECTION PPLX                                                    OXYGENATION.   11/10/-11/179059 ra 995 - ra 96%       VITALS.   11/15/2020 99f 77 120/80       PATIENT SUMMARY.   69 m who had tested covid posv 11/9 was sent from [facility] to er with agitation andtested covid negv On arrival 164/110 99f ra 97%   In ER pt was found to have lacticemia and pulm consulted for sepsis ro pneumonia 11/10/2020   PMH Afib alzheimers anxiety dementia DM hytn   home meds asa 325 atorvastat 40 cardizem 240 aricept 10 pepcid namenda 5 claritin 10 metformin 500 quetiapine 25.3       PROBLEM/ASSESSMENT/RECOMMENDATIONS.  SEPSIS   Doubt bacterial infection No significant fever or leukocytosis and negv cxr and negv procalc poa   defer abio   STAPH HOMINIS BC POSV ON 11/10   repeat bc 11/12 were negv Likely contaminant  COVID   ID on case Remdesevir and dexa deferred for now as oxygenation ok   LACTICEMIA   Repeat la improved on 11/10/2020   Is on iv fluids  RESP   ra po 99% poa   Monitor and target po 90-95%  ALTERED MENTAL STATE   11/10/2020 ct head 11/10/2020 was negv   observe   Control agitation with meds   A fib   On ASA cardizem   cont rx  AGITATION  constant observation  11/14/2020       TIME SPENT   Over 25 minutes aggregate care time spent on encounter; activities included   direct patient care, counseling and/or coordinating care reviewing notes, lab data/ imaging , discussion with multidisciplinary team/ patient  /family and explaining in detail risks, benefits, alternatives  of the recommendations     THOMAS MCCLELLAND 11/10/2020 1950 DR GEORGIA HAMILTON

## 2020-11-15 NOTE — PROGRESS NOTE ADULT - SUBJECTIVE AND OBJECTIVE BOX
CAPILLARY BLOOD GLUCOSE      POCT Blood Glucose.: 188 mg/dL (15 Nov 2020 16:45)  POCT Blood Glucose.: 160 mg/dL (15 Nov 2020 11:40)  POCT Blood Glucose.: 151 mg/dL (15 Nov 2020 07:42)  POCT Blood Glucose.: 200 mg/dL (14 Nov 2020 21:55)      Vital Signs Last 24 Hrs  T(C): 37.6 (15 Nov 2020 13:15), Max: 37.6 (15 Nov 2020 13:15)  T(F): 99.6 (15 Nov 2020 13:15), Max: 99.6 (15 Nov 2020 13:15)  HR: 77 (15 Nov 2020 13:15) (71 - 107)  BP: 125/79 (15 Nov 2020 13:15) (125/79 - 156/69)  BP(mean): --  RR: 16 (15 Nov 2020 13:15) (16 - 17)  SpO2: 95% (15 Nov 2020 13:15) (95% - 96%)    Respiratory: CTA B/L  CV: RRR, S1S2, no murmurs, rubs or gallops  Abdominal: Soft, NT, ND +BS, Last BM  Extremities: No edema, + peripheral pulses     11-15    145  |  111<H>  |  9   ----------------------------<  154<H>  3.7   |  26  |  0.58    Ca    8.8      15 Nov 2020 09:18        atorvastatin 40 milliGRAM(s) Oral at bedtime  dextrose 40% Gel 15 Gram(s) Oral once  dextrose 50% Injectable 25 Gram(s) IV Push once  dextrose 50% Injectable 12.5 Gram(s) IV Push once  dextrose 50% Injectable 25 Gram(s) IV Push once  glucagon  Injectable 1 milliGRAM(s) IntraMuscular once  insulin lispro (ADMELOG) corrective regimen sliding scale   SubCutaneous three times a day before meals

## 2020-11-15 NOTE — PROGRESS NOTE ADULT - SUBJECTIVE AND OBJECTIVE BOX
[facility]       HPI:  70 y/o Male  from [facility] with medical  hx of Afib, HTN ,HLD ,DM ,Paranoid personality disorder, Alzheimer's dementia, sent in to ED  for combative behaviour and AMS x 1 day.  Patient was tested  +Covid yesterday 11/09 .  Pt is apparently AAOx3 at baseline.  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 Pt wants the person responsible for sending him to the ED to be fired. Pt denies any SOB, cough, GI/ symptoms. Covid test came back negative but patient found to have lactate serum elevated 3.1 ,iv hydration given Admitted for septic workup and evaluation,send blood and urine cx,serial lactate levels,monitor vitals closley,ivfs hydration,monitor urine output and renal profile,id and pulm consults called to determine need in antibacterial tx Patient developed severe agitation and combativeness in er later ,requiring  several ativan ,haldol injections .Placed on constant observation CT BRAIN -NAD .PSYCHIATRY AND NEUROLOGY consults requested .Palliative care consult requested ,to discuss advance directives and complete MOLST  (10 Nov 2020 18:10)        SUBJECTIVE:      ALLERGIES:  Allergies    No Known Allergies    Intolerances          MEDICATIONS  (STANDING):  aspirin 325 milliGRAM(s) Oral daily  atorvastatin 40 milliGRAM(s) Oral at bedtime  dextrose 40% Gel 15 Gram(s) Oral once  dextrose 5%. 1000 milliLiter(s) (50 mL/Hr) IV Continuous <Continuous>  dextrose 5%. 1000 milliLiter(s) (100 mL/Hr) IV Continuous <Continuous>  dextrose 50% Injectable 25 Gram(s) IV Push once  dextrose 50% Injectable 12.5 Gram(s) IV Push once  dextrose 50% Injectable 25 Gram(s) IV Push once  diltiazem    milliGRAM(s) Oral daily  donepezil 10 milliGRAM(s) Oral at bedtime  famotidine    Tablet 20 milliGRAM(s) Oral daily  glucagon  Injectable 1 milliGRAM(s) IntraMuscular once  heparin   Injectable 5000 Unit(s) SubCutaneous every 12 hours  insulin lispro (ADMELOG) corrective regimen sliding scale   SubCutaneous three times a day before meals  insulin lispro (ADMELOG) corrective regimen sliding scale   SubCutaneous at bedtime  loratadine 10 milliGRAM(s) Oral daily  memantine 5 milliGRAM(s) Oral two times a day  metoprolol tartrate 25 milliGRAM(s) Oral two times a day  OLANZapine Disintegrating Tablet 10 milliGRAM(s) Oral three times a day    MEDICATIONS  (PRN):  acetaminophen   Tablet .. 650 milliGRAM(s) Oral every 6 hours PRN Temp greater or equal to 38C (100.4F), Mild Pain (1 - 3)  LORazepam   Injectable 0.5 milliGRAM(s) IV Push every 6 hours PRN SEVERE AGITATION  melatonin 5 milliGRAM(s) Oral at bedtime PRN Insomnia  OLANZapine Injectable 5 milliGRAM(s) IntraMuscular every 6 hours PRN agitation      REVIEW OF SYSTEMS:  CONSTITUTIONAL: No fever,  RESPIRATORY: No cough, wheezing, shortness of breath  CARDIOVASCULAR: No chest pain, dyspnea, palpitations, dizziness, syncope, paroxysmal nocturnal dyspnea, orthopnea, or arm or leg swelling  GASTROINTESTINAL: No abdominal  or epigastric pain, nausea, vomiting,  diarrhea  NEUROLOGICAL: No headaches,  loss of strength, numbness, or tremors    Vital Signs Last 24 Hrs  T(C): 36.7 (15 Nov 2020 21:59), Max: 37.6 (15 Nov 2020 13:15)  T(F): 98 (15 Nov 2020 21:59), Max: 99.6 (15 Nov 2020 13:15)  HR: 88 (15 Nov 2020 21:59) (77 - 107)  BP: 111/76 (15 Nov 2020 21:59) (111/76 - 146/-)  BP(mean): --  RR: 17 (15 Nov 2020 21:59) (16 - 18)  SpO2: 95% (15 Nov 2020 21:59) (95% - 96%)    PHYSICAL EXAM:  HEAD:  Atraumatic, Normocephalic  NECK: Supple and normal thyroid.  No JVD or carotid bruit.   HEART: S1, S2 regular , 1/6 soft ejection systolic murmur in mitral area , no thrill and no gallops .  PULMONARY: Bilateral vesicular breathing , few scattered ronchi and few scattered rales are present .  ABDOMEN: Soft nontender and positive bowl sounds   EXTREMITIES:  No clubbing, cyanosis, or pedal  edema  NEUROLOGICAL: AAOX3 , no focal deficit .    LABS:                        13.1   5.28  )-----------( 178      ( 15 Nov 2020 09:18 )             38.4     11-15    145  |  111<H>  |  9   ----------------------------<  154<H>  3.7   |  26  |  0.58    Ca    8.8      15 Nov 2020 09:18              BNP      EKG:  ECHO:  IMAGING:    Assessment/Plan    Will continue to follow during hospital course and give further recommendations as needed . Thanks for your referral . if any questions please contact me at office ([phone])cell [phone]) ABBY FELICIANO MD [place] [address]   [address]  OFFICE : [phone]  CELL : [phone]    CARDIOLOGY F/U :       HPI:  70 y/o Male  from [facility] with medical  hx of Afib, HTN ,HLD ,DM ,Paranoid personality disorder, Alzheimer's dementia, sent in to ED  for combative behaviour and AMS x 1 day.  Patient was tested  +Covid yesterday 11/09 .  Pt is apparently AAOx3 at baseline.  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 Pt wants the person responsible for sending him to the ED to be fired. Pt denies any SOB, cough, GI/ symptoms. Covid test came back negative but patient found to have lactate serum elevated 3.1 ,iv hydration given Admitted for septic workup and evaluation,send blood and urine cx,serial lactate levels,monitor vitals closley,ivfs hydration,monitor urine output and renal profile,id and pulm consults called to determine need in antibacterial tx Patient developed severe agitation and combativeness in er later ,requiring  several ativan ,haldol injections .Placed on constant observation CT BRAIN -NAD .PSYCHIATRY AND NEUROLOGY consults requested .Palliative care consult requested ,to discuss advance directives and complete MOLST  (10 Nov 2020 18:10)        SUBJECTIVE: Patient confused but no distress .      ALLERGIES:  Allergies    No Known Allergies    Intolerances          MEDICATIONS  (STANDING):  aspirin 325 milliGRAM(s) Oral daily  atorvastatin 40 milliGRAM(s) Oral at bedtime  dextrose 40% Gel 15 Gram(s) Oral once  dextrose 5%. 1000 milliLiter(s) (50 mL/Hr) IV Continuous <Continuous>  dextrose 5%. 1000 milliLiter(s) (100 mL/Hr) IV Continuous <Continuous>  dextrose 50% Injectable 25 Gram(s) IV Push once  dextrose 50% Injectable 12.5 Gram(s) IV Push once  dextrose 50% Injectable 25 Gram(s) IV Push once  diltiazem    milliGRAM(s) Oral daily  donepezil 10 milliGRAM(s) Oral at bedtime  famotidine    Tablet 20 milliGRAM(s) Oral daily  glucagon  Injectable 1 milliGRAM(s) IntraMuscular once  heparin   Injectable 5000 Unit(s) SubCutaneous every 12 hours  insulin lispro (ADMELOG) corrective regimen sliding scale   SubCutaneous three times a day before meals  insulin lispro (ADMELOG) corrective regimen sliding scale   SubCutaneous at bedtime  loratadine 10 milliGRAM(s) Oral daily  memantine 5 milliGRAM(s) Oral two times a day  metoprolol tartrate 25 milliGRAM(s) Oral two times a day  OLANZapine Disintegrating Tablet 10 milliGRAM(s) Oral three times a day    MEDICATIONS  (PRN):  acetaminophen   Tablet .. 650 milliGRAM(s) Oral every 6 hours PRN Temp greater or equal to 38C (100.4F), Mild Pain (1 - 3)  LORazepam   Injectable 0.5 milliGRAM(s) IV Push every 6 hours PRN SEVERE AGITATION  melatonin 5 milliGRAM(s) Oral at bedtime PRN Insomnia  OLANZapine Injectable 5 milliGRAM(s) IntraMuscular every 6 hours PRN agitation      REVIEW OF SYSTEMS:  CONSTITUTIONAL: No fever,  RESPIRATORY: No cough, wheezing, shortness of breath  CARDIOVASCULAR: No chest pain, dyspnea, palpitations, dizziness, syncope, paroxysmal nocturnal dyspnea, orthopnea, or arm or leg swelling  GASTROINTESTINAL: No abdominal  or epigastric pain, nausea, vomiting,  diarrhea  NEUROLOGICAL: No headaches,   numbness, or tremors  Skin : No itching .  Hematology : No bleeding .  Endocrinology : No heat and cold intolerance .  psychiatry : Patient is confused .  Musculoskeletal : Patient has mild arthritis .    Vital Signs Last 24 Hrs  T(C): 36.7 (15 Nov 2020 21:59), Max: 37.6 (15 Nov 2020 13:15)  T(F): 98 (15 Nov 2020 21:59), Max: 99.6 (15 Nov 2020 13:15)  HR: 88 (15 Nov 2020 21:59) (77 - 107)  BP: 111/76 (15 Nov 2020 21:59) (111/76 - 146/-)  BP(mean): --  RR: 17 (15 Nov 2020 21:59) (16 - 18)  SpO2: 95% (15 Nov 2020 21:59) (95% - 96%)    PHYSICAL EXAM:  HEAD:  Atraumatic, Normocephalic  NECK: Supple and normal thyroid.  No JVD or carotid bruit.   HEART: S1, S2 irregular , 1/6 soft ejection systolic murmur in mitral area , no thrill and no gallops .  PULMONARY: Bilateral vesicular breathing , few scattered rhonchi and few scattered rales are present .  ABDOMEN: Soft nontender and positive bowl sounds   EXTREMITIES:  No clubbing, cyanosis, or pedal  edema  NEUROLOGICAL: AA and confused .   Skin : No rashes .  Musculoskeletal : No joint swellings .    LABS:                        13.1   5.28  )-----------( 178      ( 15 Nov 2020 09:18 )             38.4     11-15    145  |  111<H>  |  9   ----------------------------<  154<H>  3.7   |  26  |  0.58    Ca    8.8      15 Nov 2020 09:18              BNP      EKG:  ECHO:  IMAGING:    Assessment/Plan    Will continue to follow during hospital course and give further recommendations as needed . Thanks for your referral . if any questions please contact me at office ([phone])cell [phone]) ABBY FELICIANO MD [place] [address]   [address]  OFFICE : [id number]  CELL : [phone]    CARDIOLOGY F/U :       HPI:  68 y/o Male  from [facility] with medical  hx of Afib, HTN ,HLD ,DM ,Paranoid personality disorder, Alzheimer's dementia, sent in to ED  for combative behaviour and AMS x 1 day.  Patient was tested  +Covid yesterday 11/09 .  Pt is apparently AAOx3 at baseline.  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 Pt wants the person responsible for sending him to the ED to be fired. Pt denies any SOB, cough, GI/ symptoms. Covid test came back negative but patient found to have lactate serum elevated 3.1 ,iv hydration given Admitted for septic workup and evaluation,send blood and urine cx,serial lactate levels,monitor vitals closley,ivfs hydration,monitor urine output and renal profile,id and pulm consults called to determine need in antibacterial tx Patient developed severe agitation and combativeness in er later ,requiring  several ativan ,haldol injections .Placed on constant observation CT BRAIN -NAD .PSYCHIATRY AND NEUROLOGY consults requested .Palliative care consult requested ,to discuss advance directives and complete MOLST  (10 Nov 2020 18:10)        SUBJECTIVE: Patient confused but no distress .      ALLERGIES:  Allergies    No Known Allergies    Intolerances          MEDICATIONS  (STANDING):  aspirin 325 milliGRAM(s) Oral daily  atorvastatin 40 milliGRAM(s) Oral at bedtime  dextrose 40% Gel 15 Gram(s) Oral once  dextrose 5%. 1000 milliLiter(s) (50 mL/Hr) IV Continuous <Continuous>  dextrose 5%. 1000 milliLiter(s) (100 mL/Hr) IV Continuous <Continuous>  dextrose 50% Injectable 25 Gram(s) IV Push once  dextrose 50% Injectable 12.5 Gram(s) IV Push once  dextrose 50% Injectable 25 Gram(s) IV Push once  diltiazem    milliGRAM(s) Oral daily  donepezil 10 milliGRAM(s) Oral at bedtime  famotidine    Tablet 20 milliGRAM(s) Oral daily  glucagon  Injectable 1 milliGRAM(s) IntraMuscular once  heparin   Injectable 5000 Unit(s) SubCutaneous every 12 hours  insulin lispro (ADMELOG) corrective regimen sliding scale   SubCutaneous three times a day before meals  insulin lispro (ADMELOG) corrective regimen sliding scale   SubCutaneous at bedtime  loratadine 10 milliGRAM(s) Oral daily  memantine 5 milliGRAM(s) Oral two times a day  metoprolol tartrate 25 milliGRAM(s) Oral two times a day  OLANZapine Disintegrating Tablet 10 milliGRAM(s) Oral three times a day    MEDICATIONS  (PRN):  acetaminophen   Tablet .. 650 milliGRAM(s) Oral every 6 hours PRN Temp greater or equal to 38C (100.4F), Mild Pain (1 - 3)  LORazepam   Injectable 0.5 milliGRAM(s) IV Push every 6 hours PRN SEVERE AGITATION  melatonin 5 milliGRAM(s) Oral at bedtime PRN Insomnia  OLANZapine Injectable 5 milliGRAM(s) IntraMuscular every 6 hours PRN agitation      REVIEW OF SYSTEMS:  CONSTITUTIONAL: No fever,  RESPIRATORY: No cough, wheezing, shortness of breath  CARDIOVASCULAR: No chest pain, dyspnea, palpitations, dizziness, syncope, paroxysmal nocturnal dyspnea, orthopnea, or arm or leg swelling  GASTROINTESTINAL: No abdominal  or epigastric pain, nausea, vomiting,  diarrhea  NEUROLOGICAL: No headaches,   numbness, or tremors  Skin : No itching .  Hematology : No bleeding .  Endocrinology : No heat and cold intolerance .  psychiatry : Patient is confused .  Musculoskeletal : Patient has mild arthritis .    Vital Signs Last 24 Hrs  T(C): 36.7 (15 Nov 2020 21:59), Max: 37.6 (15 Nov 2020 13:15)  T(F): 98 (15 Nov 2020 21:59), Max: 99.6 (15 Nov 2020 13:15)  HR: 88 (15 Nov 2020 21:59) (77 - 107)  BP: 111/76 (15 Nov 2020 21:59) (111/76 - 146/-)  BP(mean): --  RR: 17 (15 Nov 2020 21:59) (16 - 18)  SpO2: 95% (15 Nov 2020 21:59) (95% - 96%)    PHYSICAL EXAM:  HEAD:  Atraumatic, Normocephalic  NECK: Supple and normal thyroid.  No JVD or carotid bruit.   HEART: S1, S2 irregular , 1/6 soft ejection systolic murmur in mitral area , no thrill and no gallops .  PULMONARY: Bilateral vesicular breathing , few scattered rhonchi and few scattered rales are present .  ABDOMEN: Soft nontender and positive bowl sounds   EXTREMITIES:  No clubbing, cyanosis, or pedal  edema  NEUROLOGICAL: AA and confused .   Skin : No rashes .  Musculoskeletal : No joint swellings .    LABS:                        13.1   5.28  )-----------( 178      ( 15 Nov 2020 09:18 )             38.4     11-15    145  |  111<H>  |  9   ----------------------------<  154<H>  3.7   |  26  |  0.58    Ca    8.8      15 Nov 2020 09:18              Assessment/Plan  Patient has :  1) Altered mental status . Agitation and Bipolar disorder .  2) Mild dehydration and improved .  3) Paroxysmal atrial fibrillation and stable .  4) Hypertension and BP stable .  5) Mild elevated Troponin I secondary to oxygen demand supply mismatch and not NON-ST EDUIN as primary event .  Plan : 1) Continue I/V hydration 2) Monitor electrolytes . 3) Cardiac stable so far . 4) Continue present medications .  Will continue to follow during hospital course and give further recommendations as needed . Thanks for your referral . if any questions please contact me at office ([phone] [phone])     Will continue to follow during hospital course and give further recommendations as needed . Thanks for your referral . if any questions please contact me at office ([phone])cell [phone])

## 2020-11-15 NOTE — PROGRESS NOTE ADULT - SUBJECTIVE AND OBJECTIVE BOX
Interval History:    CENTRAL LINE:   [  ] YES       [  ] NO  ISRA[place]:                 [  ] YES       [  ] NO         REVIEW OF SYSTEMS:  All Systems below were reviewed and are negative [  ]  HEENT:  ID:  Pulmonary:  Cardiac:  GI:  Renal:  Musculoskeletal:  All other systems above were reviewed and are negative   [  ]      MEDICATIONS  (STANDING):  aspirin 325 milliGRAM(s) Oral daily  atorvastatin 40 milliGRAM(s) Oral at bedtime  dextrose 40% Gel 15 Gram(s) Oral once  dextrose 5%. 1000 milliLiter(s) (50 mL/Hr) IV Continuous <Continuous>  dextrose 5%. 1000 milliLiter(s) (100 mL/Hr) IV Continuous <Continuous>  dextrose 50% Injectable 25 Gram(s) IV Push once  dextrose 50% Injectable 12.5 Gram(s) IV Push once  dextrose 50% Injectable 25 Gram(s) IV Push once  diltiazem    milliGRAM(s) Oral daily  donepezil 10 milliGRAM(s) Oral at bedtime  famotidine    Tablet 20 milliGRAM(s) Oral daily  glucagon  Injectable 1 milliGRAM(s) IntraMuscular once  heparin   Injectable 5000 Unit(s) SubCutaneous every 12 hours  insulin lispro (ADMELOG) corrective regimen sliding scale   SubCutaneous three times a day before meals  loratadine 10 milliGRAM(s) Oral daily  memantine 5 milliGRAM(s) Oral two times a day  metoprolol tartrate 25 milliGRAM(s) Oral two times a day  OLANZapine Disintegrating Tablet 10 milliGRAM(s) Oral three times a day    MEDICATIONS  (PRN):  acetaminophen   Tablet .. 650 milliGRAM(s) Oral every 6 hours PRN Temp greater or equal to 38C (100.4F), Mild Pain (1 - 3)  LORazepam   Injectable 0.5 milliGRAM(s) IV Push every 6 hours PRN SEVERE AGITATION  melatonin 5 milliGRAM(s) Oral at bedtime PRN Insomnia  OLANZapine Injectable 5 milliGRAM(s) IntraMuscular every 6 hours PRN agitation      Vital Signs Last 24 Hrs  T(C): 37.6 (15 Nov 2020 13:15), Max: 37.6 (15 Nov 2020 13:15)  T(F): 99.6 (15 Nov 2020 13:15), Max: 99.6 (15 Nov 2020 13:15)  HR: 92 (15 Nov 2020 18:52) (71 - 107)  BP: 146/- (15 Nov 2020 18:52) (125/79 - 146/-)  BP(mean): --  RR: 18 (15 Nov 2020 18:52) (16 - 18)  SpO2: 95% (15 Nov 2020 18:52) (95% - 96%)    I&O's Summary      PHYSICAL EXAM:  HEENT: NC/AT; PERRLA  Neck: Soft; no tenderness  Lungs: CTA bilaterally; no wheezing.   Heart:  Abdomen:  Genital/ Rectal:  Extremities:  Neurologic:  Vascular:      LABORATORY:    CBC Full  -  ( 15 Nov 2020 09:18 )  WBC Count : 5.28 K/uL  RBC Count : 4.31 M/uL  Hemoglobin : 13.1 g/dL  Hematocrit : 38.4 %  Platelet Count - Automated : 178 K/uL  Mean Cell Volume : 89.1 fl  Mean Cell Hemoglobin : 30.4 pg  Mean Cell Hemoglobin Concentration : 34.1 gm/dL  Auto Neutrophil # : x  Auto Lymphocyte # : x  Auto Monocyte # : x  Auto Eosinophil # : x  Auto Basophil # : x  Auto Neutrophil % : x  Auto Lymphocyte % : x  Auto Monocyte % : x  Auto Eosinophil % : x  Auto Basophil % : x      ESR:                   11-11 @ 05:52  --    C-Reactive Protein:     11-11 @ 05:52  --    Procalcitonin:           11-11 @ 05:52   <0.05  ESR:                   11-10 @ 15:33  --    C-Reactive Protein:     11-10 @ 15:33  1.24    Procalcitonin:           11-10 @ 15:33   --  ESR:                   11-10 @ 09:41  --    C-Reactive Protein:     11-10 @ 09:41  --    Procalcitonin:           11-10 @ 09:41   <0.05      11-15    145  |  111<H>  |  9   ----------------------------<  154<H>  3.7   |  26  |  0.58    Ca    8.8      15 Nov 2020 09:18            Assessment and Plan:          Dorian Ruiz MD   [phone].   He is afebrile  Confused.     MEDICATIONS  (STANDING):  aspirin 325 milliGRAM(s) Oral daily  atorvastatin 40 milliGRAM(s) Oral at bedtime  dextrose 40% Gel 15 Gram(s) Oral once  dextrose 5%. 1000 milliLiter(s) (50 mL/Hr) IV Continuous <Continuous>  dextrose 5%. 1000 milliLiter(s) (100 mL/Hr) IV Continuous <Continuous>  dextrose 50% Injectable 25 Gram(s) IV Push once  dextrose 50% Injectable 12.5 Gram(s) IV Push once  dextrose 50% Injectable 25 Gram(s) IV Push once  diltiazem    milliGRAM(s) Oral daily  donepezil 10 milliGRAM(s) Oral at bedtime  famotidine    Tablet 20 milliGRAM(s) Oral daily  glucagon  Injectable 1 milliGRAM(s) IntraMuscular once  heparin   Injectable 5000 Unit(s) SubCutaneous every 12 hours  insulin lispro (ADMELOG) corrective regimen sliding scale   SubCutaneous three times a day before meals  loratadine 10 milliGRAM(s) Oral daily  memantine 5 milliGRAM(s) Oral two times a day  metoprolol tartrate 25 milliGRAM(s) Oral two times a day  OLANZapine Disintegrating Tablet 10 milliGRAM(s) Oral three times a day    MEDICATIONS  (PRN):  acetaminophen   Tablet .. 650 milliGRAM(s) Oral every 6 hours PRN Temp greater or equal to 38C (100.4F), Mild Pain (1 - 3)  LORazepam   Injectable 0.5 milliGRAM(s) IV Push every 6 hours PRN SEVERE AGITATION  melatonin 5 milliGRAM(s) Oral at bedtime PRN Insomnia  OLANZapine Injectable 5 milliGRAM(s) IntraMuscular every 6 hours PRN agitation      Vital Signs Last 24 Hrs  T(C): 37.6 (15 Nov 2020 13:15), Max: 37.6 (15 Nov 2020 13:15)  T(F): 99.6 (15 Nov 2020 13:15), Max: 99.6 (15 Nov 2020 13:15)  HR: 92 (15 Nov 2020 18:52) (71 - 107)  BP: 146/- (15 Nov 2020 18:52) (125/79 - 146/-)  BP(mean): --  RR: 18 (15 Nov 2020 18:52) (16 - 18)  SpO2: 95% (15 Nov 2020 18:52) (95% - 96%)    I&O's Summary      PHYSICAL EXAM:  HEENT: NC/AT; PERRLA  Neck: Soft; no tenderness  Lungs: Coarse BS bilaterally; no wheezing.   Heart: RRR; no murmurs.   Abdomen: Soft; no tenderness.   Genital/ Rectal: No echeverria   Extremities: No ulcers  Neurologic: Awake,       LABORATORY:    CBC Full  -  ( 15 Nov 2020 09:18 )  WBC Count : 5.28 K/uL  RBC Count : 4.31 M/uL  Hemoglobin : 13.1 g/dL  Hematocrit : 38.4 %  Platelet Count - Automated : 178 K/uL  Mean Cell Volume : 89.1 fl  Mean Cell Hemoglobin : 30.4 pg  Mean Cell Hemoglobin Concentration : 34.1 gm/dL  Auto Neutrophil # : x  Auto Lymphocyte # : x  Auto Monocyte # : x  Auto Eosinophil # : x  Auto Basophil # : x  Auto Neutrophil % : x  Auto Lymphocyte % : x  Auto Monocyte % : x  Auto Eosinophil % : x  Auto Basophil % : x      ESR:                   11-11 @ 05:52  --    C-Reactive Protein:     11-11 @ 05:52  --    Procalcitonin:           11-11 @ 05:52   <0.05  ESR:                   11-10 @ 15:33  --    C-Reactive Protein:     11-10 @ 15:33  1.24    Procalcitonin:           11-10 @ 15:33   --  ESR:                   11-10 @ 09:41  --    C-Reactive Protein:     11-10 @ 09:41  --    Procalcitonin:           11-10 @ 09:41   <0.05      11-15    145  |  111<H>  |  9   ----------------------------<  154<H>  3.7   |  26  |  0.58    Ca    8.8      15 Nov 2020 09:18      Assessment and Plan:    1. Dementia with delirikum.   2. Positive blood culture with coagulase negative Staph, likely due to skin contamination.     . Blood culture with coagulase negative Staph on admission was likely due to contamination. Repeated blood cultures are negative. Will not treat. He was given IV Vancomycin today. No further antibiotics.  . Discharge planning.         Dorian Ruiz MD   [phone].

## 2020-11-15 NOTE — PROGRESS NOTE ADULT - PROBLEM SELECTOR PLAN 1
metformin d/jose l(lactic acidosis)  cont humalog scale coverage alone  goal bg 100-180 in hosp setting.

## 2020-11-15 NOTE — PROGRESS NOTE ADULT - SUBJECTIVE AND OBJECTIVE BOX
Neurology follow up note    JODI [id number]      Interval History:    Patient resting in bed     MEDICATIONS    acetaminophen   Tablet .. 650 milliGRAM(s) Oral every 6 hours PRN  aspirin 325 milliGRAM(s) Oral daily  atorvastatin 40 milliGRAM(s) Oral at bedtime  dextrose 40% Gel 15 Gram(s) Oral once  dextrose 5%. 1000 milliLiter(s) IV Continuous <Continuous>  dextrose 5%. 1000 milliLiter(s) IV Continuous <Continuous>  dextrose 50% Injectable 25 Gram(s) IV Push once  dextrose 50% Injectable 12.5 Gram(s) IV Push once  dextrose 50% Injectable 25 Gram(s) IV Push once  diltiazem    milliGRAM(s) Oral daily  donepezil 10 milliGRAM(s) Oral at bedtime  famotidine    Tablet 20 milliGRAM(s) Oral daily  glucagon  Injectable 1 milliGRAM(s) IntraMuscular once  heparin   Injectable 5000 Unit(s) SubCutaneous every 12 hours  insulin lispro (ADMELOG) corrective regimen sliding scale   SubCutaneous three times a day before meals  loratadine 10 milliGRAM(s) Oral daily  LORazepam   Injectable 0.5 milliGRAM(s) IV Push every 6 hours PRN  melatonin 5 milliGRAM(s) Oral at bedtime PRN  memantine 5 milliGRAM(s) Oral two times a day  metoprolol tartrate 25 milliGRAM(s) Oral two times a day  OLANZapine Disintegrating Tablet 10 milliGRAM(s) Oral three times a day  OLANZapine Injectable 5 milliGRAM(s) IntraMuscular every 6 hours PRN      Allergies    No Known Allergies    Intolerances            Vital Signs Last 24 Hrs  T(C): 36.5 (15 Nov 2020 05:23), Max: 37.3 (14 Nov 2020 20:00)  T(F): 97.7 (15 Nov 2020 05:23), Max: 99.1 (14 Nov 2020 20:00)  HR: 107 (15 Nov 2020 05:23) (71 - 107)  BP: 128/86 (15 Nov 2020 05:23) (128/86 - 156/69)  BP(mean): --  RR: 17 (15 Nov 2020 05:23) (16 - 17)  SpO2: 96% (15 Nov 2020 05:23) (95% - 96%)        REVIEW OF SYSTEMS:  Very limited secondary to the patient has underlying dementia, would not answer questions accordingly.    PHYSICAL EXAMINATION:   HEENT:  Head:  Normocephalic, atraumatic.  Eyes:  No scleral icterus.  Ears:  Hearing appeared to be intact.  NECK:  Supple.  RESPIRATORY:  Good air entry bilaterally.  CARDIOVASCULAR:  S1 and S2 heard.  ABDOMEN:  Soft and nontender.  EXTREMITIES:  No clubbing or cyanosis were noted.      NEUROLOGIC:  The patient is sleeping on bed .  Upon conversing with the patient did slightly become agitated.  Positive blink to bilateral visual threat.  Speech was fluent.  The patient did follow simple commands.  Bilateral upper were 4/5, bilateral lower were 3+/5..               LABS:  CBC Full  -  ( 14 Nov 2020 09:34 )  WBC Count : 6.89 K/uL  RBC Count : 3.96 M/uL  Hemoglobin : 12.3 g/dL  Hematocrit : 34.9 %  Platelet Count - Automated : 172 K/uL  Mean Cell Volume : 88.1 fl  Mean Cell Hemoglobin : 31.1 pg  Mean Cell Hemoglobin Concentration : 35.2 gm/dL  Auto Neutrophil # : x  Auto Lymphocyte # : x  Auto Monocyte # : x  Auto Eosinophil # : x  Auto Basophil # : x  Auto Neutrophil % : x  Auto Lymphocyte % : x  Auto Monocyte % : x  Auto Eosinophil % : x  Auto Basophil % : x      11-14    144  |  112<H>  |  8   ----------------------------<  172<H>  3.2<L>   |  25  |  0.51    Ca    8.2<L>      14 Nov 2020 09:34      Hemoglobin A1C:       Vitamin B12         RADIOLOGY      ANALYSIS AND PLAN:  This is a 69-year-old with an episode of altered mental status and agitation.  For episode of altered mental status, agitation, suspect most likely secondary to progressive underlying dementia with a personality disorder.  There are no clear signs on examinations to suggest a new cerebrovascular accident has ensued.  I would recommend a psychiatric follow-up.  Consider Seroquel.  For atrial fibrillation, risks versus benefits, continue the patient on aspirin.  For history of underlying dementia, appears to be advanced, would recommend to continue the patient on his Aricept and Namenda.  For history of diabetes, strict control of blood sugars.  For history of hypertension, monitor systolic blood pressure.  For high cholesterol, continue the patient on his statin.  Greater than 40 minutes of time was spent with the patient, plan of care, reviewing data, speaking to the multidisciplinary healthcare team with greater than 50% of that time in regards to counseling and care coordination.

## 2020-11-15 NOTE — PROGRESS NOTE ADULT - SUBJECTIVE AND OBJECTIVE BOX
PROGRESS NOTE -  Patient is a 69y old  Male who presents with a chief complaint of ALTERED MENTAL STATUS  AND LACTICEMIA (15 Nov 2020 09:16)  Chart and available morning labs /imaging are reviewed electronically , urgent issues addressed . More information  is being added upon completion of rounds , when more information is collected and management discussed with consultants , medical staff and social service/case management on the floor   OVERNIGHT -  Agitation was reported last night ,remains on constant observation .  Positive blood cx reported -CNS ,likely contaminated ,s/p one dose of vancomycin .Followed by ID .  HPI:  68 y/o Male  from [facility] with medical  hx of Afib, HTN ,HLD ,DM ,Paranoid personality disorder, Alzheimer's dementia, sent in to ED  for combative behaviour and AMS x 1 day.  Patient was tested  +Covid yesterday 11/09 .  Pt is apparently AAOx3 at baseline.  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 Pt wants the person responsible for sending him to the ED to be fired. Pt denies any SOB, cough, GI/ symptoms. Covid test came back negative but patient found to have lactate serum elevated 3.1 ,iv hydration given Admitted for septic workup and evaluation,send blood and urine cx,serial lactate levels,monitor vitals closley,ivfs hydration,monitor urine output and renal profile,id and pulm consults called to determine need in antibacterial tx Patient developed severe agitation and combativeness in er later ,requiring  several ativan ,haldol injections .Placed on constant observation CT BRAIN -NAD .PSYCHIATRY AND NEUROLOGY consults requested .Palliative care consult requested ,to discuss advance directives and complete MOLST  (10 Nov 2020 18:10)  PAST MEDICAL & SURGICAL HISTORY:  Paranoid personality disorder  Anxiety  Dementia  Alzheimer disease  Hypertension  Hyperlipidemia  Diabetes mellitus  Afib  DM (diabetes mellitus)  DM (diabetes mellitus)  MEDICATIONS  (STANDING):  aspirin 325 milliGRAM(s) Oral daily  atorvastatin 40 milliGRAM(s) Oral at bedtime  dextrose 40% Gel 15 Gram(s) Oral once  dextrose 5%. 1000 milliLiter(s) (50 mL/Hr) IV Continuous <Continuous>  dextrose 5%. 1000 milliLiter(s) (100 mL/Hr) IV Continuous <Continuous>  dextrose 50% Injectable 25 Gram(s) IV Push once  dextrose 50% Injectable 12.5 Gram(s) IV Push once  dextrose 50% Injectable 25 Gram(s) IV Push once  diltiazem    milliGRAM(s) Oral daily  donepezil 10 milliGRAM(s) Oral at bedtime  famotidine    Tablet 20 milliGRAM(s) Oral daily  glucagon  Injectable 1 milliGRAM(s) IntraMuscular once  heparin   Injectable 5000 Unit(s) SubCutaneous every 12 hours  insulin lispro (ADMELOG) corrective regimen sliding scale   SubCutaneous three times a day before meals  loratadine 10 milliGRAM(s) Oral daily  memantine 5 milliGRAM(s) Oral two times a day  metoprolol tartrate 25 milliGRAM(s) Oral two times a day  OLANZapine Disintegrating Tablet 10 milliGRAM(s) Oral three times a day  MEDICATIONS  (PRN):  acetaminophen   Tablet .. 650 milliGRAM(s) Oral every 6 hours PRN Temp greater or equal to 38C (100.4F), Mild Pain (1 - 3)  LORazepam   Injectable 0.5 milliGRAM(s) IV Push every 6 hours PRN SEVERE AGITATION  melatonin 5 milliGRAM(s) Oral at bedtime PRN Insomnia  OLANZapine Injectable 5 milliGRAM(s) IntraMuscular every 6 hours PRN agitation  OBJECTIVE  T(C): 36.5 (11-15-20 @ 05:23), Max: 37.3 (11-14-20 @ 20:00)  HR: 107 (11-15-20 @ 05:23) (71 - 107)  BP: 128/86 (11-15-20 @ 05:23) (128/86 - 156/69)  RR: 17 (11-15-20 @ 05:23) (16 - 17)  SpO2: 96% (11-15-20 @ 05:23) (95% - 96%)  Wt(kg): --  I&O's Summary  REVIEW OF SYSTEMS:  ROS is unobtainable due to dementia and confusion PATIENT IS CONFUSED AND IS UNABLE TO GIVE ANY/RELIABLE HISTORY OR REVIEW OF SYSTEMS PLEASE ALSO SEE UNDER HPI AND ASSESSMENT FOR OBTAINED REVIEW OF SYSTEMS     PHYSICAL EXAM:  Appearance: NAD. VS past 24 hrs -as above   HEENT:   Moist oral mucosa. Conjunctiva clear b/l.   Neck : supple  Respiratory: Lungs CTAB.  Gastrointestinal:  Soft, nontender. No rebound. No rigidity. BS present	  Cardiovascular: RRR ,S1S2 present  Neurologic: Non-focal. Moving all extremities.  Extremities: No edema. No erythema. No calf tenderness.  Skin: No rashes, No ecchymoses, No cyanosis.	  wounds ,skin lesions-See skin assesment flow sheet   LABS:                        13.1   5.28  )-----------( 178      ( 15 Nov 2020 09:18 )             38.4     11-15    145  |  111<H>  |  9   ----------------------------<  154<H>  3.7   |  26  |  0.58    Ca    8.8      15 Nov 2020 09:18  CAPILLARY BLOOD GLUCOSE  POCT Blood Glucose.: 151 mg/dL (15 Nov 2020 07:42)  POCT Blood Glucose.: 200 mg/dL (14 Nov 2020 21:55)  POCT Blood Glucose.: 165 mg/dL (14 Nov 2020 16:26)  POCT Blood Glucose.: 169 mg/dL (14 Nov 2020 11:57)  Culture - Blood (collected 12 Nov 2020 12:38)  Source: .Blood Blood  Preliminary Report (13 Nov 2020 13:18):    No growth to date.  Culture - Urine (collected 10 Nov 2020 22:03)  Source: .Urine Clean Catch (Midstream)  Final Report (11 Nov 2020 18:40):    No growth  Culture - Blood (collected 10 Nov 2020 11:23)  Source: .Blood Blood-Peripheral  Gram Stain (14 Nov 2020 18:56):    Growth in anaerobic bottle: Gram Positive Cocci in Clusters  Preliminary Report (14 Nov 2020 18:56):    Growth in anaerobic bottle: Gram Positive Cocci in Clusters    Culture - Blood (collected 10 Nov 2020 11:23)  Source: .Blood Blood-Peripheral  Gram Stain (11 Nov 2020 14:58):    Growth in anaerobic bottle: Gram Positive Cocci in Clusters  Final Report (12 Nov 2020 14:08):    Growth in anaerobic bottle: Staphylococcus hominis    Coag Negative Staphylococcus    Single set isolate, possible contaminant. Contact    Microbiology if susceptibility testing clinically    indicated.    "Due to technical problems, Proteus sp. will Not be reported as part of    the BCID panel until further notice"    ***Blood Panel PCR results on this specimen are available    approximately 3 hours after the Gram stain result.***    Gram stain, PCR, and/or culture results may not always    correspond due to difference in methodologies.    ************************************************************    This PCR assay was performed using [employer].    The following targets are tested for: Enterococcus,    vancomycin resistant enterococci, Listeria monocytogenes,    coagulase negative staphylococci, S. aureus,    methicillin resistant S. aureus, Streptococcus agalactiae    (Group B), S. pneumoniae, S. pyogenes (Group A),    Acinetobacter baumannii, Enterobacter cloacae, E. coli,    Klebsiella oxytoca, K. pneumoniae, Proteus sp.,    Serratia marcescens, Haemophilus influenzae,    Neisseria meningitidis, Pseudomonas aeruginosa, Candida    albicans, C. glabrata, C krusei, C parapsilosis,    C. tropicalis and the KPC resistance gene.  Organism: Blood Culture PCR (12 Nov 2020 14:08)  Organism: Blood Culture PCR (12 Nov 2020 14:08)  RADIOLOGY & ADDITIONAL TESTS:   reviewed electronically  ASSESSMENT/PLAN: 	  45minutes spent on this visit, 50% visit time spent in care co-ordination with other attendings and counselling patient  I have discussed care plan with patient and HCP,expressed understanding of problems treatment and their effect and side effects, alternatives in detail,I have asked if they have any questions and concerns and appropriately addressed them to best of my ability

## 2020-11-15 NOTE — PROGRESS NOTE ADULT - ASSESSMENT
70 y/o Male  from [facility] with medical  hx of Afib, HTN ,HLD ,DM ,Paranoid personality disorder, Alzheimer's dementia, sent in to ED  for combative behaviour and AMS x 1 day.  Patient was tested  +Covid yesterday 11/09 .  Pt is apparently AAOx3 at baseline.  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 Pt wants the person responsible for sending him to the ED to be fired. Pt denies any SOB, cough, GI/ symptoms. Covid test came back negative but patient found to have lactate serum elevated 3.1 ,iv hydration given Admitted for septic workup and evaluation,send blood and urine cx,serial lactate levels,monitor vitals closley,ivfs hydration,monitor urine output and renal profile,id and pulm consults called to determine need in antibacterial tx Patient developed severe agitation and combativeness in er later ,requiring  several ativan ,haldol injections .Placed on constant observation CT BRAIN -NAD .PSYCHIATRY AND NEUROLOGY consults requested .Palliative care consult requested ,to discuss advance directives and complete MOLST

## 2020-11-15 NOTE — PROGRESS NOTE ADULT - SUBJECTIVE AND OBJECTIVE BOX
Date/Time Patient Seen:  		  Referring MD:   Data Reviewed	       Patient is a 69y old  Male who presents with a chief complaint of ALTERED MENTAL STATUS  AND LACTICEMIA (14 Nov 2020 21:23)      Subjective/HPI     PAST MEDICAL & SURGICAL HISTORY:  Paranoid personality disorder    Anxiety    Dementia    Alzheimer disease    Hypertension    Hyperlipidemia    Diabetes mellitus    Afib    DM (diabetes mellitus)    DM (diabetes mellitus)          Medication list         MEDICATIONS  (STANDING):  aspirin 325 milliGRAM(s) Oral daily  atorvastatin 40 milliGRAM(s) Oral at bedtime  dextrose 40% Gel 15 Gram(s) Oral once  dextrose 5%. 1000 milliLiter(s) (50 mL/Hr) IV Continuous <Continuous>  dextrose 5%. 1000 milliLiter(s) (100 mL/Hr) IV Continuous <Continuous>  dextrose 50% Injectable 25 Gram(s) IV Push once  dextrose 50% Injectable 12.5 Gram(s) IV Push once  dextrose 50% Injectable 25 Gram(s) IV Push once  diltiazem    milliGRAM(s) Oral daily  donepezil 10 milliGRAM(s) Oral at bedtime  famotidine    Tablet 20 milliGRAM(s) Oral daily  glucagon  Injectable 1 milliGRAM(s) IntraMuscular once  heparin   Injectable 5000 Unit(s) SubCutaneous every 12 hours  insulin lispro (ADMELOG) corrective regimen sliding scale   SubCutaneous three times a day before meals  loratadine 10 milliGRAM(s) Oral daily  memantine 5 milliGRAM(s) Oral two times a day  metoprolol tartrate 25 milliGRAM(s) Oral two times a day  OLANZapine Disintegrating Tablet 10 milliGRAM(s) Oral three times a day    MEDICATIONS  (PRN):  acetaminophen   Tablet .. 650 milliGRAM(s) Oral every 6 hours PRN Temp greater or equal to 38C (100.4F), Mild Pain (1 - 3)  LORazepam   Injectable 0.5 milliGRAM(s) IV Push every 6 hours PRN SEVERE AGITATION  melatonin 5 milliGRAM(s) Oral at bedtime PRN Insomnia  OLANZapine Injectable 5 milliGRAM(s) IntraMuscular every 6 hours PRN agitation         Vitals log        ICU Vital Signs Last 24 Hrs  T(C): 36.5 (15 Nov 2020 05:23), Max: 37.3 (14 Nov 2020 20:00)  T(F): 97.7 (15 Nov 2020 05:23), Max: 99.1 (14 Nov 2020 20:00)  HR: 107 (15 Nov 2020 05:23) (71 - 107)  BP: 128/86 (15 Nov 2020 05:23) (128/86 - 156/69)  BP(mean): --  ABP: --  ABP(mean): --  RR: 17 (15 Nov 2020 05:23) (16 - 17)  SpO2: 96% (15 Nov 2020 05:23) (95% - 96%)           Input and Output:  I&O's Detail    13 Nov 2020 07:01  -  14 Nov 2020 07:00  --------------------------------------------------------  IN:    dextrose 5% + sodium chloride 0.45% w/ Additives: 780 mL  Total IN: 780 mL    OUT:    Voided (mL): 1350 mL  Total OUT: 1350 mL    Total NET: -570 mL          Lab Data                        12.3   6.89  )-----------( 172      ( 14 Nov 2020 09:34 )             34.9     11-14    144  |  112<H>  |  8   ----------------------------<  172<H>  3.2<L>   |  25  |  0.51    Ca    8.2<L>      14 Nov 2020 09:34        CARDIAC MARKERS ( 13 Nov 2020 06:17 )  .039 ng/mL / x     / x     / x     / x            Review of Systems	      Objective     Physical Examination    heart s1s2  lung dec BS  abd soft      Pertinent Lab findings & Imaging      Bianca:  NO   Adequate UO     I&O's Detail    13 Nov 2020 07:01  -  14 Nov 2020 07:00  --------------------------------------------------------  IN:    dextrose 5% + sodium chloride 0.45% w/ Additives: 780 mL  Total IN: 780 mL    OUT:    Voided (mL): 1350 mL  Total OUT: 1350 mL    Total NET: -570 mL               Discussed with:     Cultures:	        Radiology

## 2020-11-16 LAB
-  AMPICILLIN/SULBACTAM: SIGNIFICANT CHANGE UP
-  CEFAZOLIN: SIGNIFICANT CHANGE UP
-  CLINDAMYCIN: SIGNIFICANT CHANGE UP
-  ERYTHROMYCIN: SIGNIFICANT CHANGE UP
-  GENTAMICIN: SIGNIFICANT CHANGE UP
-  OXACILLIN: SIGNIFICANT CHANGE UP
-  RIFAMPIN: SIGNIFICANT CHANGE UP
-  TETRACYCLINE: SIGNIFICANT CHANGE UP
-  TRIMETHOPRIM/SULFAMETHOXAZOLE: SIGNIFICANT CHANGE UP
-  VANCOMYCIN: SIGNIFICANT CHANGE UP
ANION GAP SERPL CALC-SCNC: 8 MMOL/L — SIGNIFICANT CHANGE UP (ref 5–17)
BUN SERPL-MCNC: 21 MG/DL — SIGNIFICANT CHANGE UP (ref 7–23)
CALCIUM SERPL-MCNC: 9.5 MG/DL — SIGNIFICANT CHANGE UP (ref 8.5–10.1)
CHLORIDE SERPL-SCNC: 110 MMOL/L — HIGH (ref 96–108)
CO2 SERPL-SCNC: 25 MMOL/L — SIGNIFICANT CHANGE UP (ref 22–31)
CREAT SERPL-MCNC: 0.78 MG/DL — SIGNIFICANT CHANGE UP (ref 0.5–1.3)
CULTURE RESULTS: SIGNIFICANT CHANGE UP
GLUCOSE SERPL-MCNC: 241 MG/DL — HIGH (ref 70–99)
HCT VFR BLD CALC: 40.9 % — SIGNIFICANT CHANGE UP (ref 39–50)
HGB BLD-MCNC: 14.1 G/DL — SIGNIFICANT CHANGE UP (ref 13–17)
MCHC RBC-ENTMCNC: 30.9 PG — SIGNIFICANT CHANGE UP (ref 27–34)
MCHC RBC-ENTMCNC: 34.5 GM/DL — SIGNIFICANT CHANGE UP (ref 32–36)
MCV RBC AUTO: 89.5 FL — SIGNIFICANT CHANGE UP (ref 80–100)
METHOD TYPE: SIGNIFICANT CHANGE UP
NRBC # BLD: 0 /100 WBCS — SIGNIFICANT CHANGE UP (ref 0–0)
ORGANISM # SPEC MICROSCOPIC CNT: SIGNIFICANT CHANGE UP
ORGANISM # SPEC MICROSCOPIC CNT: SIGNIFICANT CHANGE UP
PLATELET # BLD AUTO: 221 K/UL — SIGNIFICANT CHANGE UP (ref 150–400)
POTASSIUM SERPL-MCNC: 4 MMOL/L — SIGNIFICANT CHANGE UP (ref 3.5–5.3)
POTASSIUM SERPL-SCNC: 4 MMOL/L — SIGNIFICANT CHANGE UP (ref 3.5–5.3)
RBC # BLD: 4.57 M/UL — SIGNIFICANT CHANGE UP (ref 4.2–5.8)
RBC # FLD: 13.9 % — SIGNIFICANT CHANGE UP (ref 10.3–14.5)
SODIUM SERPL-SCNC: 143 MMOL/L — SIGNIFICANT CHANGE UP (ref 135–145)
SPECIMEN SOURCE: SIGNIFICANT CHANGE UP
WBC # BLD: 8.66 K/UL — SIGNIFICANT CHANGE UP (ref 3.8–10.5)
WBC # FLD AUTO: 8.66 K/UL — SIGNIFICANT CHANGE UP (ref 3.8–10.5)

## 2020-11-16 RX ORDER — INSULIN LISPRO 100/ML
VIAL (ML) SUBCUTANEOUS AT BEDTIME
Refills: 0 | Status: DISCONTINUED | OUTPATIENT
Start: 2020-11-16 | End: 2020-11-18

## 2020-11-16 RX ORDER — INSULIN LISPRO 100/ML
VIAL (ML) SUBCUTANEOUS
Refills: 0 | Status: DISCONTINUED | OUTPATIENT
Start: 2020-11-16 | End: 2020-11-18

## 2020-11-16 RX ORDER — METOPROLOL TARTRATE 50 MG
50 TABLET ORAL
Refills: 0 | Status: DISCONTINUED | OUTPATIENT
Start: 2020-11-16 | End: 2020-11-18

## 2020-11-16 RX ADMIN — Medication 50 MILLIGRAM(S): at 17:00

## 2020-11-16 RX ADMIN — HEPARIN SODIUM 5000 UNIT(S): 5000 INJECTION INTRAVENOUS; SUBCUTANEOUS at 17:00

## 2020-11-16 RX ADMIN — Medication 4: at 11:57

## 2020-11-16 RX ADMIN — DONEPEZIL HYDROCHLORIDE 10 MILLIGRAM(S): 10 TABLET, FILM COATED ORAL at 21:15

## 2020-11-16 RX ADMIN — OLANZAPINE 10 MILLIGRAM(S): 15 TABLET, FILM COATED ORAL at 05:16

## 2020-11-16 RX ADMIN — Medication 325 MILLIGRAM(S): at 11:58

## 2020-11-16 RX ADMIN — MEMANTINE HYDROCHLORIDE 5 MILLIGRAM(S): 10 TABLET ORAL at 05:16

## 2020-11-16 RX ADMIN — FAMOTIDINE 20 MILLIGRAM(S): 10 INJECTION INTRAVENOUS at 11:57

## 2020-11-16 RX ADMIN — OLANZAPINE 10 MILLIGRAM(S): 15 TABLET, FILM COATED ORAL at 13:51

## 2020-11-16 RX ADMIN — MEMANTINE HYDROCHLORIDE 5 MILLIGRAM(S): 10 TABLET ORAL at 17:00

## 2020-11-16 RX ADMIN — ATORVASTATIN CALCIUM 40 MILLIGRAM(S): 80 TABLET, FILM COATED ORAL at 21:15

## 2020-11-16 RX ADMIN — Medication 1: at 07:44

## 2020-11-16 RX ADMIN — Medication 50 MILLIGRAM(S): at 05:24

## 2020-11-16 RX ADMIN — Medication 240 MILLIGRAM(S): at 05:16

## 2020-11-16 RX ADMIN — OLANZAPINE 10 MILLIGRAM(S): 15 TABLET, FILM COATED ORAL at 21:15

## 2020-11-16 RX ADMIN — LORATADINE 10 MILLIGRAM(S): 10 TABLET ORAL at 11:57

## 2020-11-16 RX ADMIN — HEPARIN SODIUM 5000 UNIT(S): 5000 INJECTION INTRAVENOUS; SUBCUTANEOUS at 05:16

## 2020-11-16 RX ADMIN — Medication 2: at 17:00

## 2020-11-16 NOTE — PROGRESS NOTE ADULT - SUBJECTIVE AND OBJECTIVE BOX
CAPILLARY BLOOD GLUCOSE      POCT Blood Glucose.: 199 mg/dL (16 Nov 2020 07:38)  POCT Blood Glucose.: 262 mg/dL (15 Nov 2020 21:28)  POCT Blood Glucose.: 188 mg/dL (15 Nov 2020 16:45)  POCT Blood Glucose.: 160 mg/dL (15 Nov 2020 11:40)      Vital Signs Last 24 Hrs  T(C): 36.4 (16 Nov 2020 05:06), Max: 37.6 (15 Nov 2020 13:15)  T(F): 97.5 (16 Nov 2020 05:06), Max: 99.6 (15 Nov 2020 13:15)  HR: 109 (16 Nov 2020 05:06) (77 - 109)  BP: 135/82 (16 Nov 2020 05:06) (111/76 - 146/-)  BP(mean): --  RR: 17 (16 Nov 2020 05:06) (16 - 18)  SpO2: 94% (16 Nov 2020 05:06) (94% - 95%)    General: WN/WD NAD  Respiratory: CTA B/L  CV: RRR, S1S2, no murmurs, rubs or gallops  Abdominal: Soft, NT, ND +BS, Last BM  Extremities: No edema, + peripheral pulses     11-15    145  |  111<H>  |  9   ----------------------------<  154<H>  3.7   |  26  |  0.58    Ca    8.8      15 Nov 2020 09:18        atorvastatin 40 milliGRAM(s) Oral at bedtime  dextrose 40% Gel 15 Gram(s) Oral once  dextrose 50% Injectable 25 Gram(s) IV Push once  dextrose 50% Injectable 25 Gram(s) IV Push once  dextrose 50% Injectable 12.5 Gram(s) IV Push once  glucagon  Injectable 1 milliGRAM(s) IntraMuscular once  insulin lispro (ADMELOG) corrective regimen sliding scale   SubCutaneous at bedtime  insulin lispro (ADMELOG) corrective regimen sliding scale   SubCutaneous three times a day before meals

## 2020-11-16 NOTE — PROGRESS NOTE ADULT - SUBJECTIVE AND OBJECTIVE BOX
Date/Time Patient Seen:  		  Referring MD:   Data Reviewed	       Patient is a 69y old  Male who presents with a chief complaint of ALTERED MENTAL STATUS  AND LACTICEMIA (16 Nov 2020 16:12)      Subjective/HPI     PAST MEDICAL & SURGICAL HISTORY:  Paranoid personality disorder    Anxiety    Dementia    Alzheimer disease    Hypertension    Hyperlipidemia    Diabetes mellitus    Afib    DM (diabetes mellitus)    DM (diabetes mellitus)          Medication list         MEDICATIONS  (STANDING):  aspirin 325 milliGRAM(s) Oral daily  atorvastatin 40 milliGRAM(s) Oral at bedtime  dextrose 40% Gel 15 Gram(s) Oral once  dextrose 5%. 1000 milliLiter(s) (50 mL/Hr) IV Continuous <Continuous>  dextrose 5%. 1000 milliLiter(s) (100 mL/Hr) IV Continuous <Continuous>  dextrose 50% Injectable 25 Gram(s) IV Push once  dextrose 50% Injectable 12.5 Gram(s) IV Push once  dextrose 50% Injectable 25 Gram(s) IV Push once  diltiazem    milliGRAM(s) Oral daily  donepezil 10 milliGRAM(s) Oral at bedtime  famotidine    Tablet 20 milliGRAM(s) Oral daily  glucagon  Injectable 1 milliGRAM(s) IntraMuscular once  heparin   Injectable 5000 Unit(s) SubCutaneous every 12 hours  insulin lispro (ADMELOG) corrective regimen sliding scale   SubCutaneous at bedtime  insulin lispro (ADMELOG) corrective regimen sliding scale   SubCutaneous three times a day before meals  loratadine 10 milliGRAM(s) Oral daily  memantine 5 milliGRAM(s) Oral two times a day  metoprolol tartrate 50 milliGRAM(s) Oral two times a day  OLANZapine Disintegrating Tablet 10 milliGRAM(s) Oral three times a day    MEDICATIONS  (PRN):  acetaminophen   Tablet .. 650 milliGRAM(s) Oral every 6 hours PRN Temp greater or equal to 38C (100.4F), Mild Pain (1 - 3)  LORazepam   Injectable 0.5 milliGRAM(s) IV Push every 6 hours PRN SEVERE AGITATION  melatonin 5 milliGRAM(s) Oral at bedtime PRN Insomnia  OLANZapine Injectable 5 milliGRAM(s) IntraMuscular every 6 hours PRN agitation         Vitals log        ICU Vital Signs Last 24 Hrs  T(C): 37.3 (16 Nov 2020 13:56), Max: 37.3 (16 Nov 2020 13:56)  T(F): 99.2 (16 Nov 2020 13:56), Max: 99.2 (16 Nov 2020 13:56)  HR: 103 (16 Nov 2020 16:58) (77 - 109)  BP: 136/75 (16 Nov 2020 16:58) (111/76 - 146/-)  BP(mean): --  ABP: --  ABP(mean): --  RR: 18 (16 Nov 2020 13:56) (17 - 18)  SpO2: 95% (16 Nov 2020 13:56) (94% - 95%)           Input and Output:  I&O's Detail      Lab Data                        14.1   8.66  )-----------( 221      ( 16 Nov 2020 09:39 )             40.9     11-16    143  |  110<H>  |  21  ----------------------------<  241<H>  4.0   |  25  |  0.78    Ca    9.5      16 Nov 2020 09:39              Review of Systems	      Objective     Physical Examination    heart s1s2  lung dec BS  abd soft  head nc  head at      Pertinent Lab findings & Imaging      Bianca:  COREY   Adequate UO     I&O's Detail           Discussed with:     Cultures:	        Radiology

## 2020-11-16 NOTE — PROGRESS NOTE ADULT - ASSESSMENT
68 y/o Male  from [facility] with medical  hx of Afib, HTN ,HLD ,DM ,Paranoid personality disorder, Alzheimer's dementia, sent in to ED  for combative behaviour and AMS x 1 day.  Patient was tested  +Covid yesterday 11/09 .  Pt is apparently AAOx3 at baseline.  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 Pt wants the person responsible for sending him to the ED to be fired. Pt denies any SOB, cough, GI/ symptoms. Covid test came back negative but patient found to have lactate serum elevated 3.1 ,iv hydration given Admitted for septic workup and evaluation,send blood and urine cx,serial lactate levels,monitor vitals closley,ivfs hydration,monitor urine output and renal profile,id and pulm consults called to determine need in antibacterial tx Patient developed severe agitation and combativeness in er later ,requiring  several ativan ,haldol injections .Placed on constant observation CT BRAIN -NAD .PSYCHIATRY AND NEUROLOGY consults requested .Palliative care consult requested ,to discuss advance directives and complete MOLST

## 2020-11-16 NOTE — PROGRESS NOTE ADULT - SUBJECTIVE AND OBJECTIVE BOX
Interval History:    CENTRAL LINE:   [  ] YES       [  ] NO  ISRAEL:                 [  ] YES       [  ] NO         REVIEW OF SYSTEMS:  All Systems below were reviewed and are negative [  ]  HEENT:  ID:  Pulmonary:  Cardiac:  GI:  Renal:  Musculoskeletal:  All other systems above were reviewed and are negative   [  ]      MEDICATIONS  (STANDING):  aspirin 325 milliGRAM(s) Oral daily  atorvastatin 40 milliGRAM(s) Oral at bedtime  dextrose 40% Gel 15 Gram(s) Oral once  dextrose 5%. 1000 milliLiter(s) (50 mL/Hr) IV Continuous <Continuous>  dextrose 5%. 1000 milliLiter(s) (100 mL/Hr) IV Continuous <Continuous>  dextrose 50% Injectable 25 Gram(s) IV Push once  dextrose 50% Injectable 12.5 Gram(s) IV Push once  dextrose 50% Injectable 25 Gram(s) IV Push once  diltiazem    milliGRAM(s) Oral daily  donepezil 10 milliGRAM(s) Oral at bedtime  famotidine    Tablet 20 milliGRAM(s) Oral daily  glucagon  Injectable 1 milliGRAM(s) IntraMuscular once  heparin   Injectable 5000 Unit(s) SubCutaneous every 12 hours  insulin lispro (ADMELOG) corrective regimen sliding scale   SubCutaneous three times a day before meals  insulin lispro (ADMELOG) corrective regimen sliding scale   SubCutaneous at bedtime  loratadine 10 milliGRAM(s) Oral daily  memantine 5 milliGRAM(s) Oral two times a day  metoprolol tartrate 50 milliGRAM(s) Oral two times a day  OLANZapine Disintegrating Tablet 10 milliGRAM(s) Oral three times a day    MEDICATIONS  (PRN):  acetaminophen   Tablet .. 650 milliGRAM(s) Oral every 6 hours PRN Temp greater or equal to 38C (100.4F), Mild Pain (1 - 3)  LORazepam   Injectable 0.5 milliGRAM(s) IV Push every 6 hours PRN SEVERE AGITATION  melatonin 5 milliGRAM(s) Oral at bedtime PRN Insomnia  OLANZapine Injectable 5 milliGRAM(s) IntraMuscular every 6 hours PRN agitation      Vital Signs Last 24 Hrs  T(C): 37.3 (16 Nov 2020 13:56), Max: 37.3 (16 Nov 2020 13:56)  T(F): 99.2 (16 Nov 2020 13:56), Max: 99.2 (16 Nov 2020 13:56)  HR: 77 (16 Nov 2020 13:56) (77 - 109)  BP: 123/72 (16 Nov 2020 13:56) (111/76 - 146/-)  BP(mean): --  RR: 18 (16 Nov 2020 13:56) (17 - 18)  SpO2: 95% (16 Nov 2020 13:56) (94% - 95%)    I&O's Summary      PHYSICAL EXAM:  HEENT: NC/AT; PERRLA  Neck: Soft; no tenderness  Lungs: CTA bilaterally; no wheezing.   Heart:  Abdomen:  Genital/ Rectal:  Extremities:  Neurologic:  Vascular:      LABORATORY:    CBC Full  -  ( 16 Nov 2020 09:39 )  WBC Count : 8.66 K/uL  RBC Count : 4.57 M/uL  Hemoglobin : 14.1 g/dL  Hematocrit : 40.9 %  Platelet Count - Automated : 221 K/uL  Mean Cell Volume : 89.5 fl  Mean Cell Hemoglobin : 30.9 pg  Mean Cell Hemoglobin Concentration : 34.5 gm/dL  Auto Neutrophil # : x  Auto Lymphocyte # : x  Auto Monocyte # : x  Auto Eosinophil # : x  Auto Basophil # : x  Auto Neutrophil % : x  Auto Lymphocyte % : x  Auto Monocyte % : x  Auto Eosinophil % : x  Auto Basophil % : x      ESR:                   11-11 @ 05:52  --    C-Reactive Protein:     11-11 @ 05:52  --    Procalcitonin:           11-11 @ 05:52   <0.05  ESR:                   11-10 @ 15:33  --    C-Reactive Protein:     11-10 @ 15:33  1.24    Procalcitonin:           11-10 @ 15:33   --  ESR:                   11-10 @ 09:41  --    C-Reactive Protein:     11-10 @ 09:41  --    Procalcitonin:           11-10 @ 09:41   <0.05      11-16    143  |  110<H>  |  21  ----------------------------<  241<H>  4.0   |  25  |  0.78    Ca    9.5      16 Nov 2020 09:39            Assessment and Plan:          Dorian Ruiz MD   [phone]. No fevers  Confused      MEDICATIONS  (STANDING):  aspirin 325 milliGRAM(s) Oral daily  atorvastatin 40 milliGRAM(s) Oral at bedtime  dextrose 40% Gel 15 Gram(s) Oral once  dextrose 5%. 1000 milliLiter(s) (50 mL/Hr) IV Continuous <Continuous>  dextrose 5%. 1000 milliLiter(s) (100 mL/Hr) IV Continuous <Continuous>  dextrose 50% Injectable 25 Gram(s) IV Push once  dextrose 50% Injectable 12.5 Gram(s) IV Push once  dextrose 50% Injectable 25 Gram(s) IV Push once  diltiazem    milliGRAM(s) Oral daily  donepezil 10 milliGRAM(s) Oral at bedtime  famotidine    Tablet 20 milliGRAM(s) Oral daily  glucagon  Injectable 1 milliGRAM(s) IntraMuscular once  heparin   Injectable 5000 Unit(s) SubCutaneous every 12 hours  insulin lispro (ADMELOG) corrective regimen sliding scale   SubCutaneous three times a day before meals  insulin lispro (ADMELOG) corrective regimen sliding scale   SubCutaneous at bedtime  loratadine 10 milliGRAM(s) Oral daily  memantine 5 milliGRAM(s) Oral two times a day  metoprolol tartrate 50 milliGRAM(s) Oral two times a day  OLANZapine Disintegrating Tablet 10 milliGRAM(s) Oral three times a day    MEDICATIONS  (PRN):  acetaminophen   Tablet .. 650 milliGRAM(s) Oral every 6 hours PRN Temp greater or equal to 38C (100.4F), Mild Pain (1 - 3)  LORazepam   Injectable 0.5 milliGRAM(s) IV Push every 6 hours PRN SEVERE AGITATION  melatonin 5 milliGRAM(s) Oral at bedtime PRN Insomnia  OLANZapine Injectable 5 milliGRAM(s) IntraMuscular every 6 hours PRN agitation      Vital Signs Last 24 Hrs  T(C): 37.3 (16 Nov 2020 13:56), Max: 37.3 (16 Nov 2020 13:56)  T(F): 99.2 (16 Nov 2020 13:56), Max: 99.2 (16 Nov 2020 13:56)  HR: 77 (16 Nov 2020 13:56) (77 - 109)  BP: 123/72 (16 Nov 2020 13:56) (111/76 - 146/-)  BP(mean): --  RR: 18 (16 Nov 2020 13:56) (17 - 18)  SpO2: 95% (16 Nov 2020 13:56) (94% - 95%)    I&O's Summary      PHYSICAL EXAM:  HEENT: NC/AT; PERRLA  Neck: Soft; no tenderness  Lungs: CTA bilaterally; no wheezing.   Heart:  Abdomen:  Genital/ Rectal:  Extremities:  Neurologic:  Vascular:      LABORATORY:    CBC Full  -  ( 16 Nov 2020 09:39 )  WBC Count : 8.66 K/uL  RBC Count : 4.57 M/uL  Hemoglobin : 14.1 g/dL  Hematocrit : 40.9 %  Platelet Count - Automated : 221 K/uL  Mean Cell Volume : 89.5 fl  Mean Cell Hemoglobin : 30.9 pg  Mean Cell Hemoglobin Concentration : 34.5 gm/dL  Auto Neutrophil # : x  Auto Lymphocyte # : x  Auto Monocyte # : x  Auto Eosinophil # : x  Auto Basophil # : x  Auto Neutrophil % : x  Auto Lymphocyte % : x  Auto Monocyte % : x  Auto Eosinophil % : x  Auto Basophil % : x      ESR:                   11-11 @ 05:52  --    C-Reactive Protein:     11-11 @ 05:52  --    Procalcitonin:           11-11 @ 05:52   <0.05  ESR:                   11-10 @ 15:33  --    C-Reactive Protein:     11-10 @ 15:33  1.24    Procalcitonin:           11-10 @ 15:33   --  ESR:                   11-10 @ 09:41  --    C-Reactive Protein:     11-10 @ 09:41  --    Procalcitonin:           11-10 @ 09:41   <0.05      11-16    143  |  110<H>  |  21  ----------------------------<  241<H>  4.0   |  25  |  0.78    Ca    9.5      16 Nov 2020 09:39      Assessment and Plan:    1. Dementia with delirikum.   2. Positive blood culture with coagulase negative Staph, likely due to skin contamination.     . Blood culture with coagulase negative Staph on admission was likely due to contamination. Repeated blood cultures are negative. Will not treat. He was given IV Vancomycin. No further antibiotics.  . Discharge planning.         Dorian Ruiz MD   [phone].

## 2020-11-16 NOTE — PROGRESS NOTE ADULT - SUBJECTIVE AND OBJECTIVE BOX
PROGRESS NOTE  Patient is a 69y old  Male who presents with a chief complaint of ALTERED MENTAL STATUS  AND LACTICEMIA (16 Nov 2020 09:52)  Chart and available morning labs /imaging are reviewed electronically , urgent issues addressed . More information  is being added upon completion of rounds , when more information is collected and management discussed with consultants , medical staff and social service/case management on the floor   LATE ENTRY- patient was seen and examined earlier today . Plan of care was discussed with med staff and unit coordinator .   OVERNIGHT  On enchanced supervision   Patient is resting in a bed comfortably .Confused ,poor mentation .No distress noted   HPI:  68 y/o Male  from [facility] with medical  hx of Afib, HTN ,HLD ,DM ,Paranoid personality disorder, Alzheimer's dementia, sent in to ED  for combative behaviour and AMS x 1 day.  Patient was tested  +Covid yesterday 11/09 .  Pt is apparently AAOx3 at baseline.  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 Pt wants the person responsible for sending him to the ED to be fired. Pt denies any SOB, cough, GI/ symptoms. Covid test came back negative but patient found to have lactate serum elevated 3.1 ,iv hydration given Admitted for septic workup and evaluation,send blood and urine cx,serial lactate levels,monitor vitals closley,ivfs hydration,monitor urine output and renal profile,id and pulm consults called to determine need in antibacterial tx Patient developed severe agitation and combativeness in er later ,requiring  several ativan ,haldol injections .Placed on constant observation CT BRAIN -NAD .PSYCHIATRY AND NEUROLOGY consults requested .Palliative care consult requested ,to discuss advance directives and complete MOLST  (10 Nov 2020 18:10)    PAST MEDICAL & SURGICAL HISTORY:  Paranoid personality disorder    Anxiety    Dementia    Alzheimer disease    Hypertension    Hyperlipidemia    Diabetes mellitus    Afib    DM (diabetes mellitus)    DM (diabetes mellitus)        MEDICATIONS  (STANDING):  aspirin 325 milliGRAM(s) Oral daily  atorvastatin 40 milliGRAM(s) Oral at bedtime  dextrose 40% Gel 15 Gram(s) Oral once  dextrose 5%. 1000 milliLiter(s) (50 mL/Hr) IV Continuous <Continuous>  dextrose 5%. 1000 milliLiter(s) (100 mL/Hr) IV Continuous <Continuous>  dextrose 50% Injectable 25 Gram(s) IV Push once  dextrose 50% Injectable 12.5 Gram(s) IV Push once  dextrose 50% Injectable 25 Gram(s) IV Push once  diltiazem    milliGRAM(s) Oral daily  donepezil 10 milliGRAM(s) Oral at bedtime  famotidine    Tablet 20 milliGRAM(s) Oral daily  glucagon  Injectable 1 milliGRAM(s) IntraMuscular once  heparin   Injectable 5000 Unit(s) SubCutaneous every 12 hours  insulin lispro (ADMELOG) corrective regimen sliding scale   SubCutaneous three times a day before meals  insulin lispro (ADMELOG) corrective regimen sliding scale   SubCutaneous at bedtime  loratadine 10 milliGRAM(s) Oral daily  memantine 5 milliGRAM(s) Oral two times a day  metoprolol tartrate 50 milliGRAM(s) Oral two times a day  OLANZapine Disintegrating Tablet 10 milliGRAM(s) Oral three times a day    MEDICATIONS  (PRN):  acetaminophen   Tablet .. 650 milliGRAM(s) Oral every 6 hours PRN Temp greater or equal to 38C (100.4F), Mild Pain (1 - 3)  LORazepam   Injectable 0.5 milliGRAM(s) IV Push every 6 hours PRN SEVERE AGITATION  melatonin 5 milliGRAM(s) Oral at bedtime PRN Insomnia  OLANZapine Injectable 5 milliGRAM(s) IntraMuscular every 6 hours PRN agitation      OBJECTIVE    T(C): 36.4 (11-16-20 @ 05:06), Max: 37.6 (11-15-20 @ 13:15)  HR: 109 (11-16-20 @ 05:06) (77 - 109)  BP: 135/82 (11-16-20 @ 05:06) (111/76 - 146/-)  RR: 17 (11-16-20 @ 05:06) (16 - 18)  SpO2: 94% (11-16-20 @ 05:06) (94% - 95%)  Wt(kg): --  I&O's Summary        REVIEW OF SYSTEMS:  ROS is unobtainable due to dementia and confusion PATIENT IS CONFUSED AND IS UNABLE TO GIVE ANY/RELIABLE HISTORY OR REVIEW OF SYSTEMS PLEASE ALSO SEE UNDER HPI AND ASSESSMENT FOR OBTAINED REVIEW OF SYSTEMS     PHYSICAL EXAM:  Appearance: NAD. VS past 24 hrs -as above   HEENT:   Moist oral mucosa. Conjunctiva clear b/l.   Neck : supple  Respiratory: Lungs CTAB.  Gastrointestinal:  Soft, nontender. No rebound. No rigidity. BS present	  Cardiovascular: RRR ,S1S2 present  Neurologic: Non-focal. Moving all extremities.  Extremities: No edema. No erythema. No calf tenderness.  Skin: No rashes, No ecchymoses, No cyanosis.	  wounds ,skin lesions-See skin assesment flow sheet   LABS:                        14.1   8.66  )-----------( 221      ( 16 Nov 2020 09:39 )             40.9     11-16    143  |  110<H>  |  21  ----------------------------<  241<H>  4.0   |  25  |  0.78    Ca    9.5      16 Nov 2020 09:39      CAPILLARY BLOOD GLUCOSE      POCT Blood Glucose.: 222 mg/dL (16 Nov 2020 11:53)  POCT Blood Glucose.: 199 mg/dL (16 Nov 2020 07:38)  POCT Blood Glucose.: 262 mg/dL (15 Nov 2020 21:28)  POCT Blood Glucose.: 188 mg/dL (15 Nov 2020 16:45)          Culture - Blood (collected 12 Nov 2020 12:38)  Source: .Blood Blood  Preliminary Report (13 Nov 2020 13:18):    No growth to date.    Culture - Urine (collected 10 Nov 2020 22:03)  Source: .Urine Clean Catch (Midstream)  Final Report (11 Nov 2020 18:40):    No growth    Culture - Blood (collected 10 Nov 2020 11:23)  Source: .Blood Blood-Peripheral  Gram Stain (14 Nov 2020 18:56):    Growth in anaerobic bottle: Gram Positive Cocci in Clusters  Preliminary Report (15 Nov 2020 17:23):    Growth in anaerobic bottle: Staphylococcus hominis    Culture - Blood (collected 10 Nov 2020 11:23)  Source: .Blood Blood-Peripheral  Gram Stain (11 Nov 2020 14:58):    Growth in anaerobic bottle: Gram Positive Cocci in Clusters  Final Report (12 Nov 2020 14:08):    Growth in anaerobic bottle: Staphylococcus hominis    Coag Negative Staphylococcus    Single set isolate, possible contaminant. Contact    Microbiology if susceptibility testing clinically    indicated.    "Due to technical problems, Proteus sp. will Not be reported as part of    the BCID panel until further notice"    ***Blood Panel PCR results on this specimen are available    approximately 3 hours after the Gram stain result.***    Gram stain, PCR, and/or culture results may not always    correspond due to difference in methodologies.    ************************************************************    This PCR assay was performed using [employer].    The following targets are tested for: Enterococcus,    vancomycin resistant enterococci, Listeria monocytogenes,    coagulase negative staphylococci, S. aureus,    methicillin resistant S. aureus, Streptococcus agalactiae    (Group B), S. pneumoniae, S. pyogenes (Group A),    Acinetobacter baumannii, Enterobacter cloacae, E. coli,    Klebsiella oxytoca, K. pneumoniae, Proteus sp.,    Serratia marcescens, Haemophilus influenzae,    Neisseria meningitidis, Pseudomonas aeruginosa, Candida    albicans, C. glabrata, C krusei, C parapsilosis,    C. tropicalis and the KPC resistance gene.  Organism: Blood Culture PCR (12 Nov 2020 14:08)  Organism: Blood Culture PCR (12 Nov 2020 14:08)      RADIOLOGY & ADDITIONAL TESTS:   reviewed elctronically  ASSESSMENT/PLAN: 	  45minutes spent on this visit, 50% visit time spent in care co-ordination with other attendings and counselling patient  I have discussed care plan with patient and HCP,expressed understanding of problems treatment and their effect and side effects, alternatives in detail,I have asked if they have any questions and concerns and appropriately addressed them to best of my ability

## 2020-11-16 NOTE — PROGRESS NOTE ADULT - ASSESSMENT
cont rx   cont rx      THOMAS ZAPATA DOA 11/10/2020 1950 DR GEORGIA HAMILTON       REVIEW OF SYMPTOMS      Able to give ROS  NO     PHYSICAL EXAM    HEENT Unremarkable PERRLA atraumatic   RESP Fair air entry EXP prolonged    Harsh breath sound Resp distres mild   CARDIAC S1 S2 No S3     NO JVD    ABDOMEN SOFT BS PRESENT NOT DISTENDED No hepatosplenomegaly PEDAL EDEMA present No calf tenderness  NO rash     PT DATA/BEST PRACTICE  ALLERGY   nka           WT                11/10/2020 62               BMI                       11/10/2020 21                 ADVANCED DIRECTIVE     HEAD OF BED ELEVATION Yes      DVT PROPHYLAXIS.      hpsc (11/10/2020)   DIET. cons carb soft  (11/10/17750                                                                    LLOYD PROPHYLAXIS.   INFECTION PPLX                                                    OXYGENATION.   11/10/-11/029745 ra 995 - ra 96%       VITALS.   11/16/2020 99f 100 120/70       PATIENT SUMMARY.   69 m who had tested covid posv 11/9 was sent from [facility] to er with agitation andtested covid negv On arrival 164/110 99f ra 97%   In ER pt was found to have lacticemia and pulm consulted for sepsis ro pneumonia 11/10/2020   PMH Afib alzheimers anxiety dementia DM hytn   home meds asa 325 atorvastat 40 cardizem 240 aricept 10 pepcid namenda 5 claritin 10 metformin 500 quetiapine 25.3       PROBLEM/ASSESSMENT/RECOMMENDATIONS.  SEPSIS   Doubt bacterial infection No significant fever or leukocytosis and negv cxr and negv procalc poa   defer abio   STAPH HOMINIS BC POSV ON 11/10   repeat bc 11/12 were negv Likely contaminant  COVID   ID on case Remdesevir and dexa deferred for now as oxygenation ok   LACTICEMIA   Repeat la improved on 11/10/2020   Is on iv fluids  RESP   ra po 99% poa   Monitor and target po 90-95%  ALTERED MENTAL STATE   11/10/2020 ct head 11/10/2020 was negv   observe   Control agitation with meds   A fib   On ASA cardizem   cont rx  AGITATION  constant observation  11/14/2020       TIME SPENT   Over 25 minutes aggregate care time spent on encounter; activities included   direct patient care, counseling and/or coordinating care reviewing notes, lab data/ imaging , discussion with multidisciplinary team/ patient  /family and explaining in detail risks, benefits, alternatives  of the recommendations     THOMAS MCCLELLAND 11/10/2020 [address] DR GEORGIA HAMILTON

## 2020-11-16 NOTE — PROGRESS NOTE ADULT - PROBLEM SELECTOR PLAN 1
68 y/o Male  from [facility] with medical  hx of Afib, HTN ,HLD ,DM ,Paranoid personality disorder, Alzheimer's dementia, sent in to ED  for combative behaviour  ID eval noted  Pulm eval noted  CXR and CT head noted  Covid PCR neg on admission  assist with ADL  GOC discussion in progress  anti psychotics use PRN  monitor for needs re - orient nutrition.  Psych rx regimen optimization in progress - RRT notes reviewed - monitor for outbursts - agitation -

## 2020-11-16 NOTE — PROGRESS NOTE ADULT - SUBJECTIVE AND OBJECTIVE BOX
JODI DUBOSE    PLV 1EAS 106 D1    Patient is a 69y old  Male who presents with a chief complaint of ALTERED MENTAL STATUS  AND LACTICEMIA (16 Nov 2020 09:18)       Allergies    No Known Allergies    Intolerances        HPI:  68 y/o Male  from [facility] with medical  hx of Afib, HTN ,HLD ,DM ,Paranoid personality disorder, Alzheimer's dementia, sent in to ED  for combative behaviour and AMS x 1 day.  Patient was tested  +Covid yesterday 11/09 .  Pt is apparently AAOx3 at baseline.  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 Pt wants the person responsible for sending him to the ED to be fired. Pt denies any SOB, cough, GI/ symptoms. Covid test came back negative but patient found to have lactate serum elevated 3.1 ,iv hydration given Admitted for septic workup and evaluation,send blood and urine cx,serial lactate levels,monitor vitals closley,ivfs hydration,monitor urine output and renal profile,id and pulm consults called to determine need in antibacterial tx Patient developed severe agitation and combativeness in er later ,requiring  several ativan ,haldol injections .Placed on constant observation CT BRAIN -NAD .PSYCHIATRY AND NEUROLOGY consults requested .Palliative care consult requested ,to discuss advance directives and complete MOLST  (10 Nov 2020 18:10)      PAST MEDICAL & SURGICAL HISTORY:  Paranoid personality disorder    Anxiety    Dementia    Alzheimer disease    Hypertension    Hyperlipidemia    Diabetes mellitus    Afib    DM (diabetes mellitus)    DM (diabetes mellitus)        FAMILY HISTORY:        MEDICATIONS   acetaminophen   Tablet .. 650 milliGRAM(s) Oral every 6 hours PRN  aspirin 325 milliGRAM(s) Oral daily  atorvastatin 40 milliGRAM(s) Oral at bedtime  dextrose 40% Gel 15 Gram(s) Oral once  dextrose 5%. 1000 milliLiter(s) IV Continuous <Continuous>  dextrose 5%. 1000 milliLiter(s) IV Continuous <Continuous>  dextrose 50% Injectable 25 Gram(s) IV Push once  dextrose 50% Injectable 12.5 Gram(s) IV Push once  dextrose 50% Injectable 25 Gram(s) IV Push once  diltiazem    milliGRAM(s) Oral daily  donepezil 10 milliGRAM(s) Oral at bedtime  famotidine    Tablet 20 milliGRAM(s) Oral daily  glucagon  Injectable 1 milliGRAM(s) IntraMuscular once  heparin   Injectable 5000 Unit(s) SubCutaneous every 12 hours  insulin lispro (ADMELOG) corrective regimen sliding scale   SubCutaneous three times a day before meals  insulin lispro (ADMELOG) corrective regimen sliding scale   SubCutaneous at bedtime  loratadine 10 milliGRAM(s) Oral daily  LORazepam   Injectable 0.5 milliGRAM(s) IV Push every 6 hours PRN  melatonin 5 milliGRAM(s) Oral at bedtime PRN  memantine 5 milliGRAM(s) Oral two times a day  metoprolol tartrate 50 milliGRAM(s) Oral two times a day  OLANZapine Disintegrating Tablet 10 milliGRAM(s) Oral three times a day  OLANZapine Injectable 5 milliGRAM(s) IntraMuscular every 6 hours PRN      Vital Signs Last 24 Hrs  T(C): 36.4 (16 Nov 2020 05:06), Max: 37.6 (15 Nov 2020 13:15)  T(F): 97.5 (16 Nov 2020 05:06), Max: 99.6 (15 Nov 2020 13:15)  HR: 109 (16 Nov 2020 05:06) (77 - 109)  BP: 135/82 (16 Nov 2020 05:06) (111/76 - 146/-)  BP(mean): --  RR: 17 (16 Nov 2020 05:06) (16 - 18)  SpO2: 94% (16 Nov 2020 05:06) (94% - 95%)            LABS:                        13.1   5.28  )-----------( 178      ( 15 Nov 2020 09:18 )             38.4     11-15    145  |  111<H>  |  9   ----------------------------<  154<H>  3.7   |  26  |  0.58    Ca    8.8      15 Nov 2020 09:18                WBC:  WBC Count: 5.28 K/uL (11-15 @ 09:18)  WBC Count: 6.89 K/uL (11-14 @ 09:34)      MICROBIOLOGY:  RECENT CULTURES:  11-12 .Blood Blood XXXX XXXX   No growth to date.    11-10 .Urine Clean Catch (Midstream) XXXX XXXX   No growth    11-10 .Blood Blood-Peripheral Blood Culture PCR   Growth in anaerobic bottle: Gram Positive Cocci in Clusters   Growth in anaerobic bottle: Staphylococcus hominis  Coag Negative Staphylococcus  Single set isolate, possible contaminant. Contact  Microbiology if susceptibility testing clinically  indicated.  "Due to technical problems, Proteus sp. will Not be reported as part of  the BCID panel until further notice"  ***Blood Panel PCR results on this specimen are available  approximately 3 hours after the Gram stain result.***  Gram stain, PCR, and/or culture results may not always  correspond due to difference in methodologies.  ************************************************************  This PCR assay was performed using [employer].  The following targets are tested for: Enterococcus,  vancomycin resistant enterococci, Listeria monocytogenes,  coagulase negative staphylococci, S. aureus,  methicillin resistant S. aureus, Streptococcus agalactiae  (Group B), S. pneumoniae, S. pyogenes (Group A),  Acinetobacter baumannii, Enterobacter cloacae, E. coli,  Klebsiella oxytoca, K. pneumoniae, Proteus sp.,  Serratia marcescens, Haemophilus influenzae,  Neisseria meningitidis, Pseudomonas aeruginosa, Candida  albicans, C. glabrata, C krusei, C parapsilosis,  C. tropicalis and the KPC resistance gene.                    Sodium:  Sodium, Serum: 145 mmol/L (11-15 @ 09:18)  Sodium, Serum: 144 mmol/L (11-14 @ 09:34)      0.58 mg/dL 11-15 @ 09:18  0.51 mg/dL 11-14 @ 09:34      Hemoglobin:  Hemoglobin: 13.1 g/dL (11-15 @ 09:18)  Hemoglobin: 12.3 g/dL (11-14 @ 09:34)      Platelets: Platelet Count - Automated: 178 K/uL (11-15 @ 09:18)  Platelet Count - Automated: 172 K/uL (11-14 @ 09:34)              RADIOLOGY & ADDITIONAL STUDIES:

## 2020-11-16 NOTE — PROGRESS NOTE ADULT - SUBJECTIVE AND OBJECTIVE BOX
Neurology follow up note    JODI DUBOSE69yMale      Interval History:    Patient awake in bed     MEDICATIONS    acetaminophen   Tablet .. 650 milliGRAM(s) Oral every 6 hours PRN  aspirin 325 milliGRAM(s) Oral daily  atorvastatin 40 milliGRAM(s) Oral at bedtime  dextrose 40% Gel 15 Gram(s) Oral once  dextrose 5%. 1000 milliLiter(s) IV Continuous <Continuous>  dextrose 5%. 1000 milliLiter(s) IV Continuous <Continuous>  dextrose 50% Injectable 25 Gram(s) IV Push once  dextrose 50% Injectable 12.5 Gram(s) IV Push once  dextrose 50% Injectable 25 Gram(s) IV Push once  diltiazem    milliGRAM(s) Oral daily  donepezil 10 milliGRAM(s) Oral at bedtime  famotidine    Tablet 20 milliGRAM(s) Oral daily  glucagon  Injectable 1 milliGRAM(s) IntraMuscular once  heparin   Injectable 5000 Unit(s) SubCutaneous every 12 hours  insulin lispro (ADMELOG) corrective regimen sliding scale   SubCutaneous three times a day before meals  insulin lispro (ADMELOG) corrective regimen sliding scale   SubCutaneous at bedtime  loratadine 10 milliGRAM(s) Oral daily  LORazepam   Injectable 0.5 milliGRAM(s) IV Push every 6 hours PRN  melatonin 5 milliGRAM(s) Oral at bedtime PRN  memantine 5 milliGRAM(s) Oral two times a day  metoprolol tartrate 50 milliGRAM(s) Oral two times a day  OLANZapine Disintegrating Tablet 10 milliGRAM(s) Oral three times a day  OLANZapine Injectable 5 milliGRAM(s) IntraMuscular every 6 hours PRN      Allergies    No Known Allergies    Intolerances            Vital Signs Last 24 Hrs  T(C): 36.4 (16 Nov 2020 05:06), Max: 37.6 (15 Nov 2020 13:15)  T(F): 97.5 (16 Nov 2020 05:06), Max: 99.6 (15 Nov 2020 13:15)  HR: 109 (16 Nov 2020 05:06) (77 - 109)  BP: 135/82 (16 Nov 2020 05:06) (111/76 - 146/-)  BP(mean): --  RR: 17 (16 Nov 2020 05:06) (16 - 18)  SpO2: 94% (16 Nov 2020 05:06) (94% - 95%)          REVIEW OF SYSTEMS:  Very limited secondary to the patient has underlying dementia, would not answer questions accordingly.    PHYSICAL EXAMINATION:   HEENT:  Head:  Normocephalic, atraumatic.  Eyes:  No scleral icterus.  Ears:  Hearing appeared to be intact.  NECK:  Supple.  RESPIRATORY:  Good air entry bilaterally.  CARDIOVASCULAR:  S1 and S2 heard.  ABDOMEN:  Soft and nontender.  EXTREMITIES:  No clubbing or cyanosis were noted.      NEUROLOGIC:  The patient is sleeping on bed .  Upon conversing with the patient did slightly become agitated.  Positive blink to bilateral visual threat.  Speech was fluent.  The patient did follow simple commands.  Bilateral upper were 4/5, bilateral lower were 3+/5..             LABS:  CBC Full  -  ( 15 Nov 2020 09:18 )  WBC Count : 5.28 K/uL  RBC Count : 4.31 M/uL  Hemoglobin : 13.1 g/dL  Hematocrit : 38.4 %  Platelet Count - Automated : 178 K/uL  Mean Cell Volume : 89.1 fl  Mean Cell Hemoglobin : 30.4 pg  Mean Cell Hemoglobin Concentration : 34.1 gm/dL  Auto Neutrophil # : x  Auto Lymphocyte # : x  Auto Monocyte # : x  Auto Eosinophil # : x  Auto Basophil # : x  Auto Neutrophil % : x  Auto Lymphocyte % : x  Auto Monocyte % : x  Auto Eosinophil % : x  Auto Basophil % : x      11-15    145  |  111<H>  |  9   ----------------------------<  154<H>  3.7   |  26  |  0.58    Ca    8.8      15 Nov 2020 09:18      Hemoglobin A1C:       Vitamin B12         RADIOLOGY    ANALYSIS AND PLAN:  This is a 69-year-old with an episode of altered mental status and agitation.  For episode of altered mental status, agitation, suspect most likely secondary to progressive underlying dementia with a personality disorder.  There are no clear signs on examinations to suggest a new cerebrovascular accident has ensued.  I would recommend a psychiatric follow-up.  Consider Seroquel or alternate medications   For atrial fibrillation, risks versus benefits, continue the patient on aspirin.  For history of underlying dementia, appears to be advanced, would recommend to continue the patient on his Aricept and Namenda.  For history of diabetes, strict control of blood sugars.  For history of hypertension, monitor systolic blood pressure.  For high cholesterol, continue the patient on his statin.  Greater than 40 minutes of time was spent with the patient, plan of care, reviewing data, speaking to the multidisciplinary healthcare team with greater than 50% of that time in regards to counseling and care coordination.

## 2020-11-16 NOTE — PROGRESS NOTE ADULT - NSHPATTENDINGPLANDISCUSS_GEN_ALL_CORE
MED STAFF ON 1 EAST ,  ,  , , ,SW ON 1 E ,spoke to the HCP sister Stephanie on a phone [phone] ,update is given

## 2020-11-16 NOTE — PROGRESS NOTE ADULT - SUBJECTIVE AND OBJECTIVE BOX
[facility] [place]       HPI:  68 y/o Male  from [facility] with medical  hx of Afib, HTN ,HLD ,DM ,Paranoid personality disorder, Alzheimer's dementia, sent in to ED  for combative behaviour and AMS x 1 day.  Patient was tested  +Covid yesterday 11/09 .  Pt is apparently AAOx3 at baseline.  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 Pt wants the person responsible for sending him to the ED to be fired. Pt denies any SOB, cough, GI/ symptoms. Covid test came back negative but patient found to have lactate serum elevated 3.1 ,iv hydration given Admitted for septic workup and evaluation,send blood and urine cx,serial lactate levels,monitor vitals closley,ivfs hydration,monitor urine output and renal profile,id and pulm consults called to determine need in antibacterial tx Patient developed severe agitation and combativeness in er later ,requiring  several ativan ,haldol injections .Placed on constant observation CT BRAIN -NAD .PSYCHIATRY AND NEUROLOGY consults requested .Palliative care consult requested ,to discuss advance directives and complete MOLST  (10 Nov 2020 18:10)        SUBJECTIVE:      ALLERGIES:  Allergies    No Known Allergies    Intolerances          MEDICATIONS  (STANDING):  aspirin 325 milliGRAM(s) Oral daily  atorvastatin 40 milliGRAM(s) Oral at bedtime  dextrose 40% Gel 15 Gram(s) Oral once  dextrose 5%. 1000 milliLiter(s) (50 mL/Hr) IV Continuous <Continuous>  dextrose 5%. 1000 milliLiter(s) (100 mL/Hr) IV Continuous <Continuous>  dextrose 50% Injectable 25 Gram(s) IV Push once  dextrose 50% Injectable 12.5 Gram(s) IV Push once  dextrose 50% Injectable 25 Gram(s) IV Push once  diltiazem    milliGRAM(s) Oral daily  donepezil 10 milliGRAM(s) Oral at bedtime  famotidine    Tablet 20 milliGRAM(s) Oral daily  glucagon  Injectable 1 milliGRAM(s) IntraMuscular once  heparin   Injectable 5000 Unit(s) SubCutaneous every 12 hours  insulin lispro (ADMELOG) corrective regimen sliding scale   SubCutaneous three times a day before meals  insulin lispro (ADMELOG) corrective regimen sliding scale   SubCutaneous at bedtime  loratadine 10 milliGRAM(s) Oral daily  memantine 5 milliGRAM(s) Oral two times a day  metoprolol tartrate 50 milliGRAM(s) Oral two times a day  OLANZapine Disintegrating Tablet 10 milliGRAM(s) Oral three times a day    MEDICATIONS  (PRN):  acetaminophen   Tablet .. 650 milliGRAM(s) Oral every 6 hours PRN Temp greater or equal to 38C (100.4F), Mild Pain (1 - 3)  LORazepam   Injectable 0.5 milliGRAM(s) IV Push every 6 hours PRN SEVERE AGITATION  melatonin 5 milliGRAM(s) Oral at bedtime PRN Insomnia  OLANZapine Injectable 5 milliGRAM(s) IntraMuscular every 6 hours PRN agitation      REVIEW OF SYSTEMS:  CONSTITUTIONAL: No fever,  RESPIRATORY: No cough, wheezing, shortness of breath  CARDIOVASCULAR: No chest pain, dyspnea, palpitations, dizziness, syncope, paroxysmal nocturnal dyspnea, orthopnea, or arm or leg swelling  GASTROINTESTINAL: No abdominal  or epigastric pain, nausea, vomiting,  diarrhea  NEUROLOGICAL: No headaches,  loss of strength, numbness, or tremors    Vital Signs Last 24 Hrs  T(C): 37.3 (16 Nov 2020 13:56), Max: 37.3 (16 Nov 2020 13:56)  T(F): 99.2 (16 Nov 2020 13:56), Max: 99.2 (16 Nov 2020 13:56)  HR: 77 (16 Nov 2020 13:56) (77 - 109)  BP: 123/72 (16 Nov 2020 13:56) (111/76 - 146/-)  BP(mean): --  RR: 18 (16 Nov 2020 13:56) (17 - 18)  SpO2: 95% (16 Nov 2020 13:56) (94% - 95%)    PHYSICAL EXAM:  HEAD:  Atraumatic, Normocephalic  NECK: Supple and normal thyroid.  No JVD or carotid bruit.   HEART: S1, S2 regular , 1/6 soft ejection systolic murmur in mitral area , no thrill and no gallops .  PULMONARY: Bilateral vesicular breathing , few scattered ronchi and few scattered rales are present .  ABDOMEN: Soft nontender and positive bowl sounds   EXTREMITIES:  No clubbing, cyanosis, or pedal  edema  NEUROLOGICAL: AAOX3 , no focal deficit .    LABS:                        14.1   8.66  )-----------( 221      ( 16 Nov 2020 09:39 )             40.9     11-16    143  |  110<H>  |  21  ----------------------------<  241<H>  4.0   |  25  |  0.78    Ca    9.5      16 Nov 2020 09:39              BNP      EKG:  ECHO:  IMAGING:    Assessment/Plan    Will continue to follow during hospital course and give further recommendations as needed . Thanks for your referral . if any questions please contact me at office ([phone])wbyd [phone]) ABBY FELICIANO MD [place] [address]  [address]  OFFICE : [phone]  CELL : [phone]    CARDIOLOGY F/U:      HPI:  68 y/o Male  from [facility] with medical  hx of Afib, HTN ,HLD ,DM ,Paranoid personality disorder, Alzheimer's dementia, sent in to ED  for combative behaviour and AMS x 1 day.  Patient was tested  +Covid yesterday 11/09 .  Pt is apparently AAOx3 at baseline.  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 Pt wants the person responsible for sending him to the ED to be fired. Pt denies any SOB, cough, GI/ symptoms. Covid test came back negative but patient found to have lactate serum elevated 3.1 ,iv hydration given Admitted for septic workup and evaluation,send blood and urine cx,serial lactate levels,monitor vitals closley,ivfs hydration,monitor urine output and renal profile,id and pulm consults called to determine need in antibacterial tx Patient developed severe agitation and combativeness in er later ,requiring  several ativan ,haldol injections .Placed on constant observation CT BRAIN -NAD .PSYCHIATRY AND NEUROLOGY consults requested .Palliative care consult requested ,to discuss advance directives and complete MOLST  (10 Nov 2020 18:10)        SUBJECTIVE: Patient confused but no distress .      ALLERGIES:  Allergies    No Known Allergies    Intolerances          MEDICATIONS  (STANDING):  aspirin 325 milliGRAM(s) Oral daily  atorvastatin 40 milliGRAM(s) Oral at bedtime  dextrose 40% Gel 15 Gram(s) Oral once  dextrose 5%. 1000 milliLiter(s) (50 mL/Hr) IV Continuous <Continuous>  dextrose 5%. 1000 milliLiter(s) (100 mL/Hr) IV Continuous <Continuous>  dextrose 50% Injectable 25 Gram(s) IV Push once  dextrose 50% Injectable 12.5 Gram(s) IV Push once  dextrose 50% Injectable 25 Gram(s) IV Push once  diltiazem    milliGRAM(s) Oral daily  donepezil 10 milliGRAM(s) Oral at bedtime  famotidine    Tablet 20 milliGRAM(s) Oral daily  glucagon  Injectable 1 milliGRAM(s) IntraMuscular once  heparin   Injectable 5000 Unit(s) SubCutaneous every 12 hours  insulin lispro (ADMELOG) corrective regimen sliding scale   SubCutaneous three times a day before meals  insulin lispro (ADMELOG) corrective regimen sliding scale   SubCutaneous at bedtime  loratadine 10 milliGRAM(s) Oral daily  memantine 5 milliGRAM(s) Oral two times a day  metoprolol tartrate 50 milliGRAM(s) Oral two times a day  OLANZapine Disintegrating Tablet 10 milliGRAM(s) Oral three times a day    MEDICATIONS  (PRN):  acetaminophen   Tablet .. 650 milliGRAM(s) Oral every 6 hours PRN Temp greater or equal to 38C (100.4F), Mild Pain (1 - 3)  LORazepam   Injectable 0.5 milliGRAM(s) IV Push every 6 hours PRN SEVERE AGITATION  melatonin 5 milliGRAM(s) Oral at bedtime PRN Insomnia  OLANZapine Injectable 5 milliGRAM(s) IntraMuscular every 6 hours PRN agitation      REVIEW OF SYSTEMS:  CONSTITUTIONAL: No fever,  RESPIRATORY: No cough, wheezing, shortness of breath  CARDIOVASCULAR: No chest pain, dyspnea, palpitations, dizziness, syncope, paroxysmal nocturnal dyspnea, orthopnea, or arm or leg swelling  GASTROINTESTINAL: No abdominal  or epigastric pain, nausea, vomiting,  diarrhea  NEUROLOGICAL: No headaches,  numbness, or tremors  Skin : No itching .  Hematology : No bleeding .  Endocrinology : No heat and cold intolerance .  Psychiatry : Patient is confused .  Musculoskeletal ; Patient has mild chronic arthritis .      Vital Signs Last 24 Hrs  T(C): 37.3 (16 Nov 2020 13:56), Max: 37.3 (16 Nov 2020 13:56)  T(F): 99.2 (16 Nov 2020 13:56), Max: 99.2 (16 Nov 2020 13:56)  HR: 77 (16 Nov 2020 13:56) (77 - 109)  BP: 123/72 (16 Nov 2020 13:56) (111/76 - 146/-)  BP(mean): --  RR: 18 (16 Nov 2020 13:56) (17 - 18)  SpO2: 95% (16 Nov 2020 13:56) (94% - 95%)    PHYSICAL EXAM:  HEAD:  Atraumatic, Normocephalic  NECK: Supple and normal thyroid.  No JVD or carotid bruit.   HEART: S1, S2 irregular , 1/6 soft ejection systolic murmur in mitral area , no thrill and no gallops .  PULMONARY: Bilateral vesicular breathing , few scattered rhonchi and few scattered rales are present .  ABDOMEN: Soft nontender and positive bowl sounds   EXTREMITIES:  No clubbing, cyanosis, or pedal  edema  NEUROLOGICAL: AA and mild confused .   Skin : No rashes .  Musculoskeletal : No joint swellings .    LABS:                        14.1   8.66  )-----------( 221      ( 16 Nov 2020 09:39 )             40.9     11-16    143  |  110<H>  |  21  ----------------------------<  241<H>  4.0   |  25  |  0.78    Ca    9.5      16 Nov 2020 09:39              Assessment/Plan  Patient has :  1) Altered mental status . Agitation and Bipolar disorder .  2) Mild dehydration and improved .  3) Paroxysmal atrial fibrillation and stable .  4) Hypertension and BP stable .  5) Mild elevated Troponin I secondary to oxygen demand supply mismatch and not NON-ST EDUIN as primary event .  Plan : 1) Continue I/V hydration 2) Monitor electrolytes . 3) Cardiac stable so far . 4) Continue present medications .  Will continue to follow during hospital course and give further recommendations as needed . Thanks for your referral . if any questions please contact me at office [phone]

## 2020-11-16 NOTE — PROGRESS NOTE ADULT - PROBLEM SELECTOR PLAN 1
metformin d/jose l (lactic acidosis); change humalog scale coverage mod dose   goal bg 100-180 in hosp setting

## 2020-11-17 ENCOUNTER — TRANSCRIPTION ENCOUNTER (OUTPATIENT)
Age: 70
End: 2020-11-17

## 2020-11-17 LAB
ANION GAP SERPL CALC-SCNC: 6 MMOL/L — SIGNIFICANT CHANGE UP (ref 5–17)
BUN SERPL-MCNC: 25 MG/DL — HIGH (ref 7–23)
CALCIUM SERPL-MCNC: 9.2 MG/DL — SIGNIFICANT CHANGE UP (ref 8.5–10.1)
CHLORIDE SERPL-SCNC: 112 MMOL/L — HIGH (ref 96–108)
CO2 SERPL-SCNC: 27 MMOL/L — SIGNIFICANT CHANGE UP (ref 22–31)
CREAT SERPL-MCNC: 0.65 MG/DL — SIGNIFICANT CHANGE UP (ref 0.5–1.3)
CULTURE RESULTS: SIGNIFICANT CHANGE UP
GLUCOSE SERPL-MCNC: 166 MG/DL — HIGH (ref 70–99)
HCT VFR BLD CALC: 39.1 % — SIGNIFICANT CHANGE UP (ref 39–50)
HGB BLD-MCNC: 13.4 G/DL — SIGNIFICANT CHANGE UP (ref 13–17)
MCHC RBC-ENTMCNC: 31.2 PG — SIGNIFICANT CHANGE UP (ref 27–34)
MCHC RBC-ENTMCNC: 34.3 GM/DL — SIGNIFICANT CHANGE UP (ref 32–36)
MCV RBC AUTO: 91.1 FL — SIGNIFICANT CHANGE UP (ref 80–100)
NRBC # BLD: 0 /100 WBCS — SIGNIFICANT CHANGE UP (ref 0–0)
PLATELET # BLD AUTO: 194 K/UL — SIGNIFICANT CHANGE UP (ref 150–400)
POTASSIUM SERPL-MCNC: 3.7 MMOL/L — SIGNIFICANT CHANGE UP (ref 3.5–5.3)
POTASSIUM SERPL-SCNC: 3.7 MMOL/L — SIGNIFICANT CHANGE UP (ref 3.5–5.3)
RBC # BLD: 4.29 M/UL — SIGNIFICANT CHANGE UP (ref 4.2–5.8)
RBC # FLD: 14.4 % — SIGNIFICANT CHANGE UP (ref 10.3–14.5)
SODIUM SERPL-SCNC: 145 MMOL/L — SIGNIFICANT CHANGE UP (ref 135–145)
SPECIMEN SOURCE: SIGNIFICANT CHANGE UP
WBC # BLD: 7.27 K/UL — SIGNIFICANT CHANGE UP (ref 3.8–10.5)
WBC # FLD AUTO: 7.27 K/UL — SIGNIFICANT CHANGE UP (ref 3.8–10.5)

## 2020-11-17 RX ORDER — DILTIAZEM HCL 120 MG
1 CAPSULE, EXT RELEASE 24 HR ORAL
Qty: 0 | Refills: 0 | DISCHARGE

## 2020-11-17 RX ORDER — OLANZAPINE 15 MG/1
1 TABLET, FILM COATED ORAL
Qty: 0 | Refills: 0 | DISCHARGE
Start: 2020-11-17

## 2020-11-17 RX ORDER — METOPROLOL TARTRATE 50 MG
1 TABLET ORAL
Qty: 0 | Refills: 0 | DISCHARGE
Start: 2020-11-17

## 2020-11-17 RX ORDER — DILTIAZEM HCL 120 MG
1 CAPSULE, EXT RELEASE 24 HR ORAL
Qty: 0 | Refills: 0 | DISCHARGE
Start: 2020-11-17

## 2020-11-17 RX ORDER — LANOLIN ALCOHOL/MO/W.PET/CERES
1 CREAM (GRAM) TOPICAL
Qty: 0 | Refills: 0 | DISCHARGE
Start: 2020-11-17

## 2020-11-17 RX ORDER — INSULIN LISPRO 100/ML
0 VIAL (ML) SUBCUTANEOUS
Qty: 0 | Refills: 0 | DISCHARGE
Start: 2020-11-17

## 2020-11-17 RX ORDER — FAMOTIDINE 10 MG/ML
1 INJECTION INTRAVENOUS
Qty: 0 | Refills: 0 | DISCHARGE
Start: 2020-11-17

## 2020-11-17 RX ADMIN — Medication 5 MILLIGRAM(S): at 21:06

## 2020-11-17 RX ADMIN — Medication 50 MILLIGRAM(S): at 05:54

## 2020-11-17 RX ADMIN — MEMANTINE HYDROCHLORIDE 5 MILLIGRAM(S): 10 TABLET ORAL at 17:15

## 2020-11-17 RX ADMIN — Medication 2: at 12:09

## 2020-11-17 RX ADMIN — OLANZAPINE 10 MILLIGRAM(S): 15 TABLET, FILM COATED ORAL at 21:06

## 2020-11-17 RX ADMIN — Medication 2: at 17:00

## 2020-11-17 RX ADMIN — HEPARIN SODIUM 5000 UNIT(S): 5000 INJECTION INTRAVENOUS; SUBCUTANEOUS at 05:54

## 2020-11-17 RX ADMIN — Medication 2: at 08:02

## 2020-11-17 RX ADMIN — Medication 325 MILLIGRAM(S): at 11:27

## 2020-11-17 RX ADMIN — Medication 50 MILLIGRAM(S): at 17:20

## 2020-11-17 RX ADMIN — LORATADINE 10 MILLIGRAM(S): 10 TABLET ORAL at 11:27

## 2020-11-17 RX ADMIN — ATORVASTATIN CALCIUM 40 MILLIGRAM(S): 80 TABLET, FILM COATED ORAL at 21:06

## 2020-11-17 RX ADMIN — Medication 240 MILLIGRAM(S): at 05:54

## 2020-11-17 RX ADMIN — OLANZAPINE 10 MILLIGRAM(S): 15 TABLET, FILM COATED ORAL at 13:41

## 2020-11-17 RX ADMIN — DONEPEZIL HYDROCHLORIDE 10 MILLIGRAM(S): 10 TABLET, FILM COATED ORAL at 21:06

## 2020-11-17 RX ADMIN — FAMOTIDINE 20 MILLIGRAM(S): 10 INJECTION INTRAVENOUS at 11:26

## 2020-11-17 RX ADMIN — HEPARIN SODIUM 5000 UNIT(S): 5000 INJECTION INTRAVENOUS; SUBCUTANEOUS at 17:15

## 2020-11-17 RX ADMIN — MEMANTINE HYDROCHLORIDE 5 MILLIGRAM(S): 10 TABLET ORAL at 05:54

## 2020-11-17 RX ADMIN — OLANZAPINE 10 MILLIGRAM(S): 15 TABLET, FILM COATED ORAL at 05:54

## 2020-11-17 RX ADMIN — Medication 0.5 MILLIGRAM(S): at 01:22

## 2020-11-17 RX ADMIN — Medication 0.5 MILLIGRAM(S): at 21:06

## 2020-11-17 NOTE — PROGRESS NOTE ADULT - PROBLEM SELECTOR PLAN 1
cont humalog mod dose scale coverage alone  cont cons cho diet  metformin d/jose l(lactic acidosis)  goal bg conservative mngmt cont humalog mod dose scale coverage alone  cont cons cho diet  metformin d/jose l(lactic acidosis)  goal bg conservative mngmt  to switch to tradjenta 5mg daily upon d/c

## 2020-11-17 NOTE — PROGRESS NOTE ADULT - SUBJECTIVE AND OBJECTIVE BOX
CAPILLARY BLOOD GLUCOSE      POCT Blood Glucose.: 186 mg/dL (16 Nov 2020 22:00)  POCT Blood Glucose.: 168 mg/dL (16 Nov 2020 16:48)  POCT Blood Glucose.: 222 mg/dL (16 Nov 2020 11:53)  POCT Blood Glucose.: 199 mg/dL (16 Nov 2020 07:38)      Vital Signs Last 24 Hrs  T(C): 36.5 (17 Nov 2020 04:59), Max: 37.3 (16 Nov 2020 13:56)  T(F): 97.7 (17 Nov 2020 04:59), Max: 99.2 (16 Nov 2020 13:56)  HR: 73 (17 Nov 2020 04:59) (73 - 103)  BP: 120/67 (17 Nov 2020 04:59) (120/67 - 136/75)  BP(mean): --  RR: 18 (17 Nov 2020 04:59) (17 - 18)  SpO2: 94% (17 Nov 2020 04:59) (94% - 96%)    General: WN/WD NAD  Respiratory: CTA B/L  CV: RRR, S1S2, no murmurs, rubs or gallops  Abdominal: Soft, NT, ND +BS, Last BM  Extremities: No edema, + peripheral pulses     11-16    143  |  110<H>  |  21  ----------------------------<  241<H>  4.0   |  25  |  0.78    Ca    9.5      16 Nov 2020 09:39        atorvastatin 40 milliGRAM(s) Oral at bedtime  dextrose 40% Gel 15 Gram(s) Oral once  dextrose 50% Injectable 25 Gram(s) IV Push once  dextrose 50% Injectable 12.5 Gram(s) IV Push once  dextrose 50% Injectable 25 Gram(s) IV Push once  glucagon  Injectable 1 milliGRAM(s) IntraMuscular once  insulin lispro (ADMELOG) corrective regimen sliding scale   SubCutaneous three times a day before meals  insulin lispro (ADMELOG) corrective regimen sliding scale   SubCutaneous at bedtime

## 2020-11-17 NOTE — DISCHARGE NOTE PROVIDER - NSDCCPCAREPLAN_GEN_ALL_CORE_FT
PRINCIPAL DISCHARGE DIAGNOSIS  Diagnosis: Altered mental status  Assessment and Plan of Treatment: Altered mental status      SECONDARY DISCHARGE DIAGNOSES  Diagnosis: Alzheimer's disease of other onset with behavioral disturbance  Assessment and Plan of Treatment:     Diagnosis: Paranoid personality disorder  Assessment and Plan of Treatment: Paranoid personality disorder    Diagnosis: Diabetes mellitus type 2, uncontrolled  Assessment and Plan of Treatment: Diabetes mellitus type 2, uncontrolled    Diagnosis: Troponin I above reference range  Assessment and Plan of Treatment: Troponin I above reference range    Diagnosis: Lactate blood increase  Assessment and Plan of Treatment: Lactate blood increase    Diagnosis: Afib  Assessment and Plan of Treatment: Afib    Diagnosis: Hyperlipidemia  Assessment and Plan of Treatment: Hyperlipidemia    Diagnosis: Hypertension  Assessment and Plan of Treatment: Hypertension    Diagnosis: Palliative care encounter  Assessment and Plan of Treatment: Palliative care encounter

## 2020-11-17 NOTE — CHART NOTE - NSCHARTNOTEFT_GEN_A_CORE
Assessment: Pt seen for nutrition follow-up. As per chart pt is a male who presents from [facility] with a PMH of Afib, HTN ,HLD ,DM ,Paranoid personality disorder, Alzheimer's dementia, sent in to ED  for combative behaviour and AMS x 1 day.    Pt seen at bedside, confused unable to answer questions. Pt is on enhanced supervision, spoke to CNA at bedside, who reports the pt did not consume any breakfast this morning due to being given Ativan. Pt was awake at time of visit and seems eager to consume lunch. CNA reports that the pt is consuming the Glucerna. No GI distress noted at this time, +BM 11/16.       Factors impacting intake: [ ] none [ ] nausea  [ ] vomiting [ ] diarrhea [ ] constipation  [ ]chewing problems [ ] swallowing issues  [x ] other: AMS     Diet Presciption: Diet, Consistent Carbohydrate w/Evening Snack:   Soft  DASH/TLC {Sodium & Cholesterol Restricted}  Supplement Feeding Modality:  Oral  Glucerna Shake Cans or Servings Per Day:  1       Frequency:  Two Times a day (11-12-20 @ 10:15)    Intake: poor     Current Weight: 113lbs  Admission Weight: 112.4lbs  Weight per transfer record: 138lbs  % Weight Change- question pt's current weight, continue to monitor     Pertinent Medications: MEDICATIONS  (STANDING):  aspirin 325 milliGRAM(s) Oral daily  atorvastatin 40 milliGRAM(s) Oral at bedtime  dextrose 40% Gel 15 Gram(s) Oral once  dextrose 5%. 1000 milliLiter(s) (100 mL/Hr) IV Continuous <Continuous>  dextrose 5%. 1000 milliLiter(s) (50 mL/Hr) IV Continuous <Continuous>  dextrose 50% Injectable 25 Gram(s) IV Push once  dextrose 50% Injectable 12.5 Gram(s) IV Push once  dextrose 50% Injectable 25 Gram(s) IV Push once  diltiazem    milliGRAM(s) Oral daily  donepezil 10 milliGRAM(s) Oral at bedtime  famotidine    Tablet 20 milliGRAM(s) Oral daily  glucagon  Injectable 1 milliGRAM(s) IntraMuscular once  heparin   Injectable 5000 Unit(s) SubCutaneous every 12 hours  insulin lispro (ADMELOG) corrective regimen sliding scale   SubCutaneous three times a day before meals  insulin lispro (ADMELOG) corrective regimen sliding scale   SubCutaneous at bedtime  loratadine 10 milliGRAM(s) Oral daily  memantine 5 milliGRAM(s) Oral two times a day  metoprolol tartrate 50 milliGRAM(s) Oral two times a day  OLANZapine Disintegrating Tablet 10 milliGRAM(s) Oral three times a day    MEDICATIONS  (PRN):  acetaminophen   Tablet .. 650 milliGRAM(s) Oral every 6 hours PRN Temp greater or equal to 38C (100.4F), Mild Pain (1 - 3)  LORazepam   Injectable 0.5 milliGRAM(s) IV Push every 6 hours PRN SEVERE AGITATION  melatonin 5 milliGRAM(s) Oral at bedtime PRN Insomnia  OLANZapine Injectable 5 milliGRAM(s) IntraMuscular every 6 hours PRN agitation    Pertinent Labs: 11-17 Na145 mmol/L Glu 166 mg/dL<H> K+ 3.7 mmol/L Cr  0.65 mg/dL BUN 25 mg/dL<H>, Chloride 112, 11-12 Phos 3.1 mg/dL 11-11 Alb 3.5 g/dL 11-12 Chol 122 mg/dL LDL --    HDL 53 mg/dL Trig 42 mg/dL     CAPILLARY BLOOD GLUCOSE    POCT Blood Glucose.: 151 mg/dL (17 Nov 2020 11:59)  POCT Blood Glucose.: 165 mg/dL (17 Nov 2020 07:41)  POCT Blood Glucose.: 186 mg/dL (16 Nov 2020 22:00)  POCT Blood Glucose.: 168 mg/dL (16 Nov 2020 16:48)    Skin: No edema or pressure injuries noted at this time     Estimated Needs:   [x ] no change since previous assessment  [ ] recalculated:     Previous Nutrition Diagnosis:   [x ] Underweight     Nutrition Diagnosis is [x ] ongoing-being addressed with providing oral nutritional supplement     New Nutrition Diagnosis: [x ] not applicable       Interventions: Continue with current Consistent CHO w/evening snack, DASH/TLC, Soft  Recommend  [ ] Change Diet To:  [x ] Nutrition Supplement: Continue with Glucerna BID   [ ] Nutrition Support  [ x] Other:   1) Encourage adequate PO intake   2) Monitor pt's PO intake, weight, skin, edema, GI distress     Monitoring and Evaluation:   [x ] PO intake [ x ] Tolerance to diet prescription [ x ] weights [ x ] labs[ x ] follow up per protocol  [x ] other: RD to remain available

## 2020-11-17 NOTE — PROGRESS NOTE ADULT - PROBLEM SELECTOR PLAN 1
70 y/o Male  from [facility] with medical  hx of Afib, HTN ,HLD ,DM ,Paranoid personality disorder, Alzheimer's dementia, sent in to ED  for combative behaviour  ID eval noted  Pulm eval noted  CXR and CT head noted  Covid PCR neg on admission  assist with ADL  GOC discussion in progress  anti psychotics use PRN  monitor for needs re - orient nutrition.  Psych rx regimen optimization in progress - RRT notes reviewed - monitor for outbursts - agitation -.

## 2020-11-17 NOTE — PROGRESS NOTE ADULT - NSHPATTENDINGPLANDISCUSS_GEN_ALL_CORE
MED STAFF [facility] ,  ,  , , ,SW ON 1 E ,spoke to the HCP sister Stephanie on a phone [phone] ,update is given MED STAFF [facility] ,  ,  , , , ON [facility] E

## 2020-11-17 NOTE — PROGRESS NOTE ADULT - SUBJECTIVE AND OBJECTIVE BOX
PROGRESS NOTE  Patient is a 69y old  Male who presents with a chief complaint of ALTERED MENTAL STATUS  AND LACTICEMIA (17 Nov 2020 06:26)  Chart and available morning labs /imaging are reviewed electronically , urgent issues addressed . More information  is being added upon completion of rounds , when more information is collected and management discussed with consultants , medical staff and social service/case management on the floor     OVERNIGHT      HPI:  68 y/o Male  from [facility] with medical  hx of Afib, HTN ,HLD ,DM ,Paranoid personality disorder, Alzheimer's dementia, sent in to ED  for combative behaviour and AMS x 1 day.  Patient was tested  +Covid yesterday 11/09 .  Pt is apparently AAOx3 at baseline.  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 Pt wants the person responsible for sending him to the ED to be fired. Pt denies any SOB, cough, GI/ symptoms. Covid test came back negative but patient found to have lactate serum elevated 3.1 ,iv hydration given Admitted for septic workup and evaluation,send blood and urine cx,serial lactate levels,monitor vitals closley,ivfs hydration,monitor urine output and renal profile,id and pulm consults called to determine need in antibacterial tx Patient developed severe agitation and combativeness in er later ,requiring  several ativan ,haldol injections .Placed on constant observation CT BRAIN -NAD .PSYCHIATRY AND NEUROLOGY consults requested .Palliative care consult requested ,to discuss advance directives and complete MOLST  (10 Nov 2020 18:10)    PAST MEDICAL & SURGICAL HISTORY:  Paranoid personality disorder    Anxiety    Dementia    Alzheimer disease    Hypertension    Hyperlipidemia    Diabetes mellitus    Afib    DM (diabetes mellitus)    DM (diabetes mellitus)        MEDICATIONS  (STANDING):  aspirin 325 milliGRAM(s) Oral daily  atorvastatin 40 milliGRAM(s) Oral at bedtime  dextrose 40% Gel 15 Gram(s) Oral once  dextrose 5%. 1000 milliLiter(s) (50 mL/Hr) IV Continuous <Continuous>  dextrose 5%. 1000 milliLiter(s) (100 mL/Hr) IV Continuous <Continuous>  dextrose 50% Injectable 25 Gram(s) IV Push once  dextrose 50% Injectable 12.5 Gram(s) IV Push once  dextrose 50% Injectable 25 Gram(s) IV Push once  diltiazem    milliGRAM(s) Oral daily  donepezil 10 milliGRAM(s) Oral at bedtime  famotidine    Tablet 20 milliGRAM(s) Oral daily  glucagon  Injectable 1 milliGRAM(s) IntraMuscular once  heparin   Injectable 5000 Unit(s) SubCutaneous every 12 hours  insulin lispro (ADMELOG) corrective regimen sliding scale   SubCutaneous three times a day before meals  insulin lispro (ADMELOG) corrective regimen sliding scale   SubCutaneous at bedtime  loratadine 10 milliGRAM(s) Oral daily  memantine 5 milliGRAM(s) Oral two times a day  metoprolol tartrate 50 milliGRAM(s) Oral two times a day  OLANZapine Disintegrating Tablet 10 milliGRAM(s) Oral three times a day    MEDICATIONS  (PRN):  acetaminophen   Tablet .. 650 milliGRAM(s) Oral every 6 hours PRN Temp greater or equal to 38C (100.4F), Mild Pain (1 - 3)  LORazepam   Injectable 0.5 milliGRAM(s) IV Push every 6 hours PRN SEVERE AGITATION  melatonin 5 milliGRAM(s) Oral at bedtime PRN Insomnia  OLANZapine Injectable 5 milliGRAM(s) IntraMuscular every 6 hours PRN agitation      OBJECTIVE    T(C): 36.5 (11-17-20 @ 04:59), Max: 37.3 (11-16-20 @ 13:56)  HR: 73 (11-17-20 @ 04:59) (73 - 103)  BP: 120/67 (11-17-20 @ 04:59) (120/67 - 136/75)  RR: 18 (11-17-20 @ 04:59) (17 - 18)  SpO2: 94% (11-17-20 @ 04:59) (94% - 96%)  Wt(kg): --  I&O's Summary        REVIEW OF SYSTEMS:  ROS is unobtainable due to dementia and confusion PATIENT IS CONFUSED AND IS UNABLE TO GIVE ANY/RELIABLE HISTORY OR REVIEW OF SYSTEMS PLEASE ALSO SEE UNDER HPI AND ASSESSMENT FOR OBTAINED REVIEW OF SYSTEMS     PHYSICAL EXAM:  Appearance: NAD. VS past 24 hrs -as above   HEENT:   Moist oral mucosa. Conjunctiva clear b/l.   Neck : supple  Respiratory: Lungs CTAB.  Gastrointestinal:  Soft, nontender. No rebound. No rigidity. BS present	  Cardiovascular: RRR ,S1S2 present  Neurologic: Non-focal. Moving all extremities.  Extremities: No edema. No erythema. No calf tenderness.  Skin: No rashes, No ecchymoses, No cyanosis.	  wounds ,skin lesions-See skin assesment flow sheet   LABS:                        14.1   8.66  )-----------( 221      ( 16 Nov 2020 09:39 )             40.9     11-16    143  |  110<H>  |  21  ----------------------------<  241<H>  4.0   |  25  |  0.78    Ca    9.5      16 Nov 2020 09:39      CAPILLARY BLOOD GLUCOSE      POCT Blood Glucose.: 186 mg/dL (16 Nov 2020 22:00)  POCT Blood Glucose.: 168 mg/dL (16 Nov 2020 16:48)  POCT Blood Glucose.: 222 mg/dL (16 Nov 2020 11:53)  POCT Blood Glucose.: 199 mg/dL (16 Nov 2020 07:38)          Culture - Blood (collected 12 Nov 2020 12:38)  Source: .Blood Blood  Preliminary Report (13 Nov 2020 13:18):    No growth to date.    Culture - Urine (collected 10 Nov 2020 22:03)  Source: .Urine Clean Catch (Midstream)  Final Report (11 Nov 2020 18:40):    No growth    Culture - Blood (collected 10 Nov 2020 11:23)  Source: .Blood Blood-Peripheral  Gram Stain (14 Nov 2020 18:56):    Growth in anaerobic bottle: Gram Positive Cocci in Clusters  Final Report (16 Nov 2020 23:09):    Growth in anaerobic bottle: Staphylococcus hominis  Organism: Staphylococcus hominis (16 Nov 2020 23:09)  Organism: Staphylococcus hominis (16 Nov 2020 23:09)    Culture - Blood (collected 10 Nov 2020 11:23)  Source: .Blood Blood-Peripheral  Gram Stain (11 Nov 2020 14:58):    Growth in anaerobic bottle: Gram Positive Cocci in Clusters  Final Report (12 Nov 2020 14:08):    Growth in anaerobic bottle: Staphylococcus hominis    Coag Negative Staphylococcus    Single set isolate, possible contaminant. Contact    Microbiology if susceptibility testing clinically    indicated.    "Due to technical problems, Proteus sp. will Not be reported as part of    the BCID panel until further notice"    ***Blood Panel PCR results on this specimen are available    approximately 3 hours after the Gram stain result.***    Gram stain, PCR, and/or culture results may not always    correspond due to difference in methodologies.    ************************************************************    This PCR assay was performed using [employer].    The following targets are tested for: Enterococcus,    vancomycin resistant enterococci, Listeria monocytogenes,    coagulase negative staphylococci, S. aureus,    methicillin resistant S. aureus, Streptococcus agalactiae    (Group B), S. pneumoniae, S. pyogenes (Group A),    Acinetobacter baumannii, Enterobacter cloacae, E. coli,    Klebsiella oxytoca, K. pneumoniae, Proteus sp.,    Serratia marcescens, Haemophilus influenzae,    Neisseria meningitidis, Pseudomonas aeruginosa, Candida    albicans, C. glabrata, C krusei, C parapsilosis,    C. tropicalis and the KPC resistance gene.  Organism: Blood Culture PCR (12 Nov 2020 14:08)  Organism: Blood Culture PCR (12 Nov 2020 14:08)      RADIOLOGY & ADDITIONAL TESTS:   reviewed elctronically  ASSESSMENT/PLAN: 	     PROGRESS NOTE  Patient is a 69y old  Male who presents with a chief complaint of ALTERED MENTAL STATUS  AND LACTICEMIA (17 Nov 2020 06:26)  Chart and available morning labs /imaging are reviewed electronically , urgent issues addressed . More information  is being added upon completion of rounds , when more information is collected and management discussed with consultants , medical staff and social service/case management on the floor   LATE ENTRY- patient was seen and examined earlier today . Plan of care was discussed with med staff and unit coordinator .   OVERNIGHT    No new issues reported by medical staff . All above noted Patient is resting in a bed comfortably .Confused ,poor mentation .No distress noted   HPI:  68 y/o Male  from [facility] with medical  hx of Afib, HTN ,HLD ,DM ,Paranoid personality disorder, Alzheimer's dementia, sent in to ED  for combative behaviour and AMS x 1 day.  Patient was tested  +Covid yesterday 11/09 .  Pt is apparently AAOx3 at baseline.  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 Pt wants the person responsible for sending him to the ED to be fired. Pt denies any SOB, cough, GI/ symptoms. Covid test came back negative but patient found to have lactate serum elevated 3.1 ,iv hydration given Admitted for septic workup and evaluation,send blood and urine cx,serial lactate levels,monitor vitals closley,ivfs hydration,monitor urine output and renal profile,id and pulm consults called to determine need in antibacterial tx Patient developed severe agitation and combativeness in er later ,requiring  several ativan ,haldol injections .Placed on constant observation CT BRAIN -NAD .PSYCHIATRY AND NEUROLOGY consults requested .Palliative care consult requested ,to discuss advance directives and complete MOLST  (10 Nov 2020 18:10)    PAST MEDICAL & SURGICAL HISTORY:  Paranoid personality disorder    Anxiety    Dementia    Alzheimer disease    Hypertension    Hyperlipidemia    Diabetes mellitus    Afib    DM (diabetes mellitus)    DM (diabetes mellitus)        MEDICATIONS  (STANDING):  aspirin 325 milliGRAM(s) Oral daily  atorvastatin 40 milliGRAM(s) Oral at bedtime  dextrose 40% Gel 15 Gram(s) Oral once  dextrose 5%. 1000 milliLiter(s) (50 mL/Hr) IV Continuous <Continuous>  dextrose 5%. 1000 milliLiter(s) (100 mL/Hr) IV Continuous <Continuous>  dextrose 50% Injectable 25 Gram(s) IV Push once  dextrose 50% Injectable 12.5 Gram(s) IV Push once  dextrose 50% Injectable 25 Gram(s) IV Push once  diltiazem    milliGRAM(s) Oral daily  donepezil 10 milliGRAM(s) Oral at bedtime  famotidine    Tablet 20 milliGRAM(s) Oral daily  glucagon  Injectable 1 milliGRAM(s) IntraMuscular once  heparin   Injectable 5000 Unit(s) SubCutaneous every 12 hours  insulin lispro (ADMELOG) corrective regimen sliding scale   SubCutaneous three times a day before meals  insulin lispro (ADMELOG) corrective regimen sliding scale   SubCutaneous at bedtime  loratadine 10 milliGRAM(s) Oral daily  memantine 5 milliGRAM(s) Oral two times a day  metoprolol tartrate 50 milliGRAM(s) Oral two times a day  OLANZapine Disintegrating Tablet 10 milliGRAM(s) Oral three times a day    MEDICATIONS  (PRN):  acetaminophen   Tablet .. 650 milliGRAM(s) Oral every 6 hours PRN Temp greater or equal to 38C (100.4F), Mild Pain (1 - 3)  LORazepam   Injectable 0.5 milliGRAM(s) IV Push every 6 hours PRN SEVERE AGITATION  melatonin 5 milliGRAM(s) Oral at bedtime PRN Insomnia  OLANZapine Injectable 5 milliGRAM(s) IntraMuscular every 6 hours PRN agitation      OBJECTIVE    T(C): 36.5 (11-17-20 @ 04:59), Max: 37.3 (11-16-20 @ 13:56)  HR: 73 (11-17-20 @ 04:59) (73 - 103)  BP: 120/67 (11-17-20 @ 04:59) (120/67 - 136/75)  RR: 18 (11-17-20 @ 04:59) (17 - 18)  SpO2: 94% (11-17-20 @ 04:59) (94% - 96%)  Wt(kg): --  I&O's Summary        REVIEW OF SYSTEMS:  ROS is unobtainable due to dementia and confusion PATIENT IS CONFUSED AND IS UNABLE TO GIVE ANY/RELIABLE HISTORY OR REVIEW OF SYSTEMS PLEASE ALSO SEE UNDER HPI AND ASSESSMENT FOR OBTAINED REVIEW OF SYSTEMS     PHYSICAL EXAM:  Appearance: NAD. VS past 24 hrs -as above   HEENT:   Moist oral mucosa. Conjunctiva clear b/l.   Neck : supple  Respiratory: Lungs CTAB.  Gastrointestinal:  Soft, nontender. No rebound. No rigidity. BS present	  Cardiovascular: RRR ,S1S2 present  Neurologic: Non-focal. Moving all extremities.  Extremities: No edema. No erythema. No calf tenderness.  Skin: No rashes, No ecchymoses, No cyanosis.	  wounds ,skin lesions-See skin assesment flow sheet   LABS:                        14.1   8.66  )-----------( 221      ( 16 Nov 2020 09:39 )             40.9     11-16    143  |  110<H>  |  21  ----------------------------<  241<H>  4.0   |  25  |  0.78    Ca    9.5      16 Nov 2020 09:39      CAPILLARY BLOOD GLUCOSE      POCT Blood Glucose.: 186 mg/dL (16 Nov 2020 22:00)  POCT Blood Glucose.: 168 mg/dL (16 Nov 2020 16:48)  POCT Blood Glucose.: 222 mg/dL (16 Nov 2020 11:53)  POCT Blood Glucose.: 199 mg/dL (16 Nov 2020 07:38)          Culture - Blood (collected 12 Nov 2020 12:38)  Source: .Blood Blood  Preliminary Report (13 Nov 2020 13:18):    No growth to date.    Culture - Urine (collected 10 Nov 2020 22:03)  Source: .Urine Clean Catch (Midstream)  Final Report (11 Nov 2020 18:40):    No growth    Culture - Blood (collected 10 Nov 2020 11:23)  Source: .Blood Blood-Peripheral  Gram Stain (14 Nov 2020 18:56):    Growth in anaerobic bottle: Gram Positive Cocci in Clusters  Final Report (16 Nov 2020 23:09):    Growth in anaerobic bottle: Staphylococcus hominis  Organism: Staphylococcus hominis (16 Nov 2020 23:09)  Organism: Staphylococcus hominis (16 Nov 2020 23:09)    Culture - Blood (collected 10 Nov 2020 11:23)  Source: .Blood Blood-Peripheral  Gram Stain (11 Nov 2020 14:58):    Growth in anaerobic bottle: Gram Positive Cocci in Clusters  Final Report (12 Nov 2020 14:08):    Growth in anaerobic bottle: Staphylococcus hominis    Coag Negative Staphylococcus    Single set isolate, possible contaminant. Contact    Microbiology if susceptibility testing clinically    indicated.    "Due to technical problems, Proteus sp. will Not be reported as part of    the BCID panel until further notice"    ***Blood Panel PCR results on this specimen are available    approximately 3 hours after the Gram stain result.***    Gram stain, PCR, and/or culture results may not always    correspond due to difference in methodologies.    ************************************************************    This PCR assay was performed using [employer].    The following targets are tested for: Enterococcus,    vancomycin resistant enterococci, Listeria monocytogenes,    coagulase negative staphylococci, S. aureus,    methicillin resistant S. aureus, Streptococcus agalactiae    (Group B), S. pneumoniae, S. pyogenes (Group A),    Acinetobacter baumannii, Enterobacter cloacae, E. coli,    Klebsiella oxytoca, K. pneumoniae, Proteus sp.,    Serratia marcescens, Haemophilus influenzae,    Neisseria meningitidis, Pseudomonas aeruginosa, Candida    albicans, C. glabrata, C krusei, C parapsilosis,    C. tropicalis and the KPC resistance gene.  Organism: Blood Culture PCR (12 Nov 2020 14:08)  Organism: Blood Culture PCR (12 Nov 2020 14:08)      RADIOLOGY & ADDITIONAL TESTS:   reviewed elctronically  ASSESSMENT/PLAN: 	  45minutes spent on this visit, 50% visit time spent in care co-ordination with other attendings and counselling patient  I have discussed care plan with patient and HCP,expressed understanding of problems treatment and their effect and side effects, alternatives in detail,I have asked if they have any questions and concerns and appropriately addressed them to best of my ability

## 2020-11-17 NOTE — PROGRESS NOTE ADULT - SUBJECTIVE AND OBJECTIVE BOX
Date/Time Patient Seen:  		  Referring MD:   Data Reviewed	       Patient is a 69y old  Male who presents with a chief complaint of ALTERED MENTAL STATUS  AND LACTICEMIA (16 Nov 2020 17:35)      Subjective/HPI     PAST MEDICAL & SURGICAL HISTORY:  Paranoid personality disorder    Anxiety    Dementia    Alzheimer disease    Hypertension    Hyperlipidemia    Diabetes mellitus    Afib    DM (diabetes mellitus)    DM (diabetes mellitus)          Medication list         MEDICATIONS  (STANDING):  aspirin 325 milliGRAM(s) Oral daily  atorvastatin 40 milliGRAM(s) Oral at bedtime  dextrose 40% Gel 15 Gram(s) Oral once  dextrose 5%. 1000 milliLiter(s) (100 mL/Hr) IV Continuous <Continuous>  dextrose 5%. 1000 milliLiter(s) (50 mL/Hr) IV Continuous <Continuous>  dextrose 50% Injectable 25 Gram(s) IV Push once  dextrose 50% Injectable 25 Gram(s) IV Push once  dextrose 50% Injectable 12.5 Gram(s) IV Push once  diltiazem    milliGRAM(s) Oral daily  donepezil 10 milliGRAM(s) Oral at bedtime  famotidine    Tablet 20 milliGRAM(s) Oral daily  glucagon  Injectable 1 milliGRAM(s) IntraMuscular once  heparin   Injectable 5000 Unit(s) SubCutaneous every 12 hours  insulin lispro (ADMELOG) corrective regimen sliding scale   SubCutaneous three times a day before meals  insulin lispro (ADMELOG) corrective regimen sliding scale   SubCutaneous at bedtime  loratadine 10 milliGRAM(s) Oral daily  memantine 5 milliGRAM(s) Oral two times a day  metoprolol tartrate 50 milliGRAM(s) Oral two times a day  OLANZapine Disintegrating Tablet 10 milliGRAM(s) Oral three times a day    MEDICATIONS  (PRN):  acetaminophen   Tablet .. 650 milliGRAM(s) Oral every 6 hours PRN Temp greater or equal to 38C (100.4F), Mild Pain (1 - 3)  LORazepam   Injectable 0.5 milliGRAM(s) IV Push every 6 hours PRN SEVERE AGITATION  melatonin 5 milliGRAM(s) Oral at bedtime PRN Insomnia  OLANZapine Injectable 5 milliGRAM(s) IntraMuscular every 6 hours PRN agitation         Vitals log        ICU Vital Signs Last 24 Hrs  T(C): 36.5 (17 Nov 2020 04:59), Max: 37.3 (16 Nov 2020 13:56)  T(F): 97.7 (17 Nov 2020 04:59), Max: 99.2 (16 Nov 2020 13:56)  HR: 73 (17 Nov 2020 04:59) (73 - 103)  BP: 120/67 (17 Nov 2020 04:59) (120/67 - 136/75)  BP(mean): --  ABP: --  ABP(mean): --  RR: 18 (17 Nov 2020 04:59) (17 - 18)  SpO2: 94% (17 Nov 2020 04:59) (94% - 96%)           Input and Output:  I&O's Detail      Lab Data                        14.1   8.66  )-----------( 221      ( 16 Nov 2020 09:39 )             40.9     11-16    143  |  110<H>  |  21  ----------------------------<  241<H>  4.0   |  25  |  0.78    Ca    9.5      16 Nov 2020 09:39              Review of Systems	      Objective     Physical Examination    heart s1s2  lung dec BS  abd soft      Pertinent Lab findings & Imaging      Bianca:  COREY   Adequate UO     I&O's Detail           Discussed with:     Cultures:	        Radiology

## 2020-11-17 NOTE — PROGRESS NOTE ADULT - ASSESSMENT
cont rx   cont rx    THOMAS ZAPATA DOA 11/10/2020 1950 DR GEORGIA HAMILTON       REVIEW OF SYMPTOMS      Able to give ROS  NO     PHYSICAL EXAM    HEENT Unremarkable PERRLA atraumatic   RESP Fair air entry EXP prolonged    Harsh breath sound Resp distres mild   CARDIAC S1 S2 No S3     NO JVD    ABDOMEN SOFT BS PRESENT NOT DISTENDED No hepatosplenomegaly PEDAL EDEMA present No calf tenderness  NO rash     PT DATA/BEST PRACTICE  ALLERGY   nka           WT                11/10/2020 62               BMI                       11/10/2020 21                 ADVANCED DIRECTIVE     HEAD OF BED ELEVATION Yes      DVT PROPHYLAXIS.      hpsc (11/10/2020)   DIET. cons carb soft  (11/10/71851                                                                    LLOYD PROPHYLAXIS.   INFECTION PPLX                                                    OXYGENATION.   11/10/-11/[id number] ra 995 - ra 96%       VITALS.   11/17/2020 afebv 77 120/70       PATIENT SUMMARY.   69 m who had tested covid posv 11/9 was sent from [facility] to er with agitation andtested covid negv On arrival 164/110 99f ra 97%   In ER pt was found to have lacticemia and pulm consulted for sepsis ro pneumonia 11/10/2020   PMH Afib alzheimers anxiety dementia DM hytn   home meds asa 325 atorvastat 40 cardizem 240 aricept 10 pepcid namenda 5 claritin 10 metformin 500 quetiapine 25.3       PROBLEM/ASSESSMENT/RECOMMENDATIONS.  SEPSIS   Doubt bacterial infection No significant fever or leukocytosis and negv cxr and negv procalc poa   defer abio   STAPH HOMINIS BC POSV ON 11/10   repeat bc 11/12 were negv Likely contaminant  COVID   ID on case Remdesevir and dexa deferred for now as oxygenation ok   LACTICEMIA   Repeat la improved on 11/10/2020   Is on iv fluids  RESP   ra po 99% poa   Monitor and target po 90-95%  ALTERED MENTAL STATE   11/10/2020 ct head 11/10/2020 was negv   observe   Control agitation with meds   A fib   On ASA cardizem   cont rx  AGITATION  constant observation  11/14/2020       TIME SPENT   Over 25 minutes aggregate care time spent on encounter; activities included   direct patient care, counseling and/or coordinating care reviewing notes, lab data/ imaging , discussion with multidisciplinary team/ patient  /family and explaining in detail risks, benefits, alternatives  of the recommendations     THOMAS MCCLELLAND 11/10/2020 1950 DR GEORGIA HAMILTON

## 2020-11-17 NOTE — PROGRESS NOTE ADULT - SUBJECTIVE AND OBJECTIVE BOX
JODI DUBOSE    PLV 1EAS 106 D1    Patient is a 69y old  Male who presents with a chief complaint of ALTERED MENTAL STATUS  AND LACTICEMIA (17 Nov 2020 07:35)       Allergies    No Known Allergies    Intolerances        HPI:  70 y/o Male  from [facility] with medical  hx of Afib, HTN ,HLD ,DM ,Paranoid personality disorder, Alzheimer's dementia, sent in to ED  for combative behaviour and AMS x 1 day.  Patient was tested  +Covid yesterday 11/09 .  Pt is apparently AAOx3 at baseline.  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 Pt wants the person responsible for sending him to the ED to be fired. Pt denies any SOB, cough, GI/ symptoms. Covid test came back negative but patient found to have lactate serum elevated 3.1 ,iv hydration given Admitted for septic workup and evaluation,send blood and urine cx,serial lactate levels,monitor vitals closley,ivfs hydration,monitor urine output and renal profile,id and pulm consults called to determine need in antibacterial tx Patient developed severe agitation and combativeness in er later ,requiring  several ativan ,haldol injections .Placed on constant observation CT BRAIN -NAD .PSYCHIATRY AND NEUROLOGY consults requested .Palliative care consult requested ,to discuss advance directives and complete MOLST  (10 Nov 2020 18:10)      PAST MEDICAL & SURGICAL HISTORY:  Paranoid personality disorder    Anxiety    Dementia    Alzheimer disease    Hypertension    Hyperlipidemia    Diabetes mellitus    Afib    DM (diabetes mellitus)    DM (diabetes mellitus)        FAMILY HISTORY:        MEDICATIONS   acetaminophen   Tablet .. 650 milliGRAM(s) Oral every 6 hours PRN  aspirin 325 milliGRAM(s) Oral daily  atorvastatin 40 milliGRAM(s) Oral at bedtime  dextrose 40% Gel 15 Gram(s) Oral once  dextrose 5%. 1000 milliLiter(s) IV Continuous <Continuous>  dextrose 5%. 1000 milliLiter(s) IV Continuous <Continuous>  dextrose 50% Injectable 25 Gram(s) IV Push once  dextrose 50% Injectable 12.5 Gram(s) IV Push once  dextrose 50% Injectable 25 Gram(s) IV Push once  diltiazem    milliGRAM(s) Oral daily  donepezil 10 milliGRAM(s) Oral at bedtime  famotidine    Tablet 20 milliGRAM(s) Oral daily  glucagon  Injectable 1 milliGRAM(s) IntraMuscular once  heparin   Injectable 5000 Unit(s) SubCutaneous every 12 hours  insulin lispro (ADMELOG) corrective regimen sliding scale   SubCutaneous three times a day before meals  insulin lispro (ADMELOG) corrective regimen sliding scale   SubCutaneous at bedtime  loratadine 10 milliGRAM(s) Oral daily  LORazepam   Injectable 0.5 milliGRAM(s) IV Push every 6 hours PRN  melatonin 5 milliGRAM(s) Oral at bedtime PRN  memantine 5 milliGRAM(s) Oral two times a day  metoprolol tartrate 50 milliGRAM(s) Oral two times a day  OLANZapine Disintegrating Tablet 10 milliGRAM(s) Oral three times a day  OLANZapine Injectable 5 milliGRAM(s) IntraMuscular every 6 hours PRN      Vital Signs Last 24 Hrs  T(C): 36.5 (17 Nov 2020 04:59), Max: 37.3 (16 Nov 2020 13:56)  T(F): 97.7 (17 Nov 2020 04:59), Max: 99.2 (16 Nov 2020 13:56)  HR: 73 (17 Nov 2020 04:59) (73 - 103)  BP: 120/67 (17 Nov 2020 04:59) (120/67 - 136/75)  BP(mean): --  RR: 18 (17 Nov 2020 04:59) (17 - 18)  SpO2: 94% (17 Nov 2020 04:59) (94% - 96%)            LABS:                        13.4   7.27  )-----------( 194      ( 17 Nov 2020 09:52 )             39.1     11-17    145  |  112<H>  |  25<H>  ----------------------------<  166<H>  3.7   |  27  |  0.65    Ca    9.2      17 Nov 2020 09:52                WBC:  WBC Count: 7.27 K/uL (11-17 @ 09:52)  WBC Count: 8.66 K/uL (11-16 @ 09:39)  WBC Count: 5.28 K/uL (11-15 @ 09:18)  WBC Count: 6.89 K/uL (11-14 @ 09:34)      MICROBIOLOGY:  RECENT CULTURES:  11-12 .Blood Blood XXXX XXXX   No growth to date.    11-10 .Urine Clean Catch (Midstream) XXXX XXXX   No growth                    Sodium:  Sodium, Serum: 145 mmol/L (11-17 @ 09:52)  Sodium, Serum: 143 mmol/L (11-16 @ 09:39)  Sodium, Serum: 145 mmol/L (11-15 @ 09:18)  Sodium, Serum: 144 mmol/L (11-14 @ 09:34)      0.65 mg/dL 11-17 @ 09:52  0.78 mg/dL 11-16 @ 09:39  0.58 mg/dL 11-15 @ 09:18  0.51 mg/dL 11-14 @ 09:34      Hemoglobin:  Hemoglobin: 13.4 g/dL (11-17 @ 09:52)  Hemoglobin: 14.1 g/dL (11-16 @ 09:39)  Hemoglobin: 13.1 g/dL (11-15 @ 09:18)  Hemoglobin: 12.3 g/dL (11-14 @ 09:34)      Platelets: Platelet Count - Automated: 194 K/uL (11-17 @ 09:52)  Platelet Count - Automated: 221 K/uL (11-16 @ 09:39)  Platelet Count - Automated: 178 K/uL (11-15 @ 09:18)  Platelet Count - Automated: 172 K/uL (11-14 @ 09:34)              RADIOLOGY & ADDITIONAL STUDIES:

## 2020-11-17 NOTE — DISCHARGE NOTE PROVIDER - CARE PROVIDER_API CALL
Miguel Watkins  PSYCHIATRY  [address] [place] [address]  Phone: [phone]  Fax: [phone]  Follow Up Time: 2 weeks

## 2020-11-17 NOTE — DISCHARGE NOTE PROVIDER - NSDCMRMEDTOKEN_GEN_ALL_CORE_FT
acetaminophen 325 mg oral tablet: 2 tab(s) orally every 6 hours, As Needed  Aricept 10 mg oral tablet: 1 tab(s) orally once a day (at bedtime)  aspirin 325 mg oral tablet: 1 tab(s) orally once a day  aspirin 325 mg oral tablet: 1 tab(s) orally once a day  atorvastatin 40 mg oral tablet: 1 tab(s) orally once a day (at bedtime)  atorvastatin 40 mg oral tablet: 1 tab(s) orally once a day  Claritin 10 mg oral tablet: 1 tab(s) orally once a day  dilTIAZem 240 mg/24 hours oral capsule, extended release: 1 cap(s) orally once a day  donepezil 5 mg oral tablet: 1 tab(s) orally once a day (at bedtime)  enoxaparin: 40 milligram(s) subcutaneous once a day  famotidine 20 mg oral tablet: 1 tab(s) orally once a day  insulin glargine: 7 unit(s) subcutaneous once a day (at bedtime)  insulin lispro 100 units/mL injectable solution:  injectable corrective regimen sliding scale   melatonin 5 mg oral tablet: 1 tab(s) orally once a day (at bedtime), As needed, Insomnia  metFORMIN 500 mg oral tablet: 2 tab(s) orally 2 times a day  metoprolol tartrate 50 mg oral tablet: 1 tab(s) orally 2 times a day  Namenda 5 mg oral tablet: 1 tab(s) orally 2 times a day  OLANZapine 10 mg oral tablet, disintegratin tab(s) orally 3 times a day  QUEtiapine 25 mg oral tablet: 1 tab(s) orally   QUEtiapine 25 mg oral tablet: 1 tab(s) orally once a day  QUEtiapine 25 mg oral tablet: 1 tab(s) orally 3 times a day   acetaminophen 325 mg oral tablet: 2 tab(s) orally every 6 hours, As Needed  Aricept 10 mg oral tablet: 1 tab(s) orally once a day (at bedtime)  aspirin 325 mg oral tablet: 1 tab(s) orally once a day  atorvastatin 40 mg oral tablet: 1 tab(s) orally once a day (at bedtime)  Claritin 10 mg oral tablet: 1 tab(s) orally once a day  dilTIAZem 240 mg/24 hours oral capsule, extended release: 1 cap(s) orally once a day  enoxaparin: 40 milligram(s) subcutaneous once a day  famotidine 20 mg oral tablet: 1 tab(s) orally once a day  insulin lispro 100 units/mL injectable solution:  injectable corrective regimen sliding scale   melatonin 5 mg oral tablet: 1 tab(s) orally once a day (at bedtime), As needed, Insomnia  metoprolol tartrate 50 mg oral tablet: 1 tab(s) orally 2 times a day  Namenda 5 mg oral tablet: 1 tab(s) orally 2 times a day  OLANZapine 10 mg oral tablet, disintegratin tab(s) orally 3 times a day

## 2020-11-17 NOTE — DISCHARGE NOTE PROVIDER - NSDCCAREPROVSEEN_GEN_ALL_CORE_FT
Miguel Watkins, Brennan Barnett, Luther Ruiz, Andrew Perlman, Tristan Flaherty, Jose R Brownlee, Chelsea Cameron, Elvin

## 2020-11-17 NOTE — PROGRESS NOTE ADULT - ASSESSMENT
68 y/o Male  from [facility] with medical  hx of Afib, HTN ,HLD ,DM ,Paranoid personality disorder, Alzheimer's dementia, sent in to ED  for combative behaviour and AMS x 1 day.  Patient was tested  +Covid yesterday 11/09 .  Pt is apparently AAOx3 at baseline.  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 Pt wants the person responsible for sending him to the ED to be fired. Pt denies any SOB, cough, GI/ symptoms. Covid test came back negative but patient found to have lactate serum elevated 3.1 ,iv hydration given Admitted for septic workup and evaluation,send blood and urine cx,serial lactate levels,monitor vitals closley,ivfs hydration,monitor urine output and renal profile,id and pulm consults called to determine need in antibacterial tx Patient developed severe agitation and combativeness in er later ,requiring  several ativan ,haldol injections .Placed on constant observation CT BRAIN -NAD .PSYCHIATRY AND NEUROLOGY consults requested .Palliative care consult requested ,to discuss advance directives and complete MOLST

## 2020-11-17 NOTE — PROGRESS NOTE ADULT - SUBJECTIVE AND OBJECTIVE BOX
Neurology follow up note    JODI DUOBSE69yMale      Interval History:    Patient feels ok no new complaints.    MEDICATIONS    acetaminophen   Tablet .. 650 milliGRAM(s) Oral every 6 hours PRN  aspirin 325 milliGRAM(s) Oral daily  atorvastatin 40 milliGRAM(s) Oral at bedtime  dextrose 40% Gel 15 Gram(s) Oral once  dextrose 5%. 1000 milliLiter(s) IV Continuous <Continuous>  dextrose 5%. 1000 milliLiter(s) IV Continuous <Continuous>  dextrose 50% Injectable 25 Gram(s) IV Push once  dextrose 50% Injectable 12.5 Gram(s) IV Push once  dextrose 50% Injectable 25 Gram(s) IV Push once  diltiazem    milliGRAM(s) Oral daily  donepezil 10 milliGRAM(s) Oral at bedtime  famotidine    Tablet 20 milliGRAM(s) Oral daily  glucagon  Injectable 1 milliGRAM(s) IntraMuscular once  heparin   Injectable 5000 Unit(s) SubCutaneous every 12 hours  insulin lispro (ADMELOG) corrective regimen sliding scale   SubCutaneous three times a day before meals  insulin lispro (ADMELOG) corrective regimen sliding scale   SubCutaneous at bedtime  loratadine 10 milliGRAM(s) Oral daily  LORazepam   Injectable 0.5 milliGRAM(s) IV Push every 6 hours PRN  melatonin 5 milliGRAM(s) Oral at bedtime PRN  memantine 5 milliGRAM(s) Oral two times a day  metoprolol tartrate 50 milliGRAM(s) Oral two times a day  OLANZapine Disintegrating Tablet 10 milliGRAM(s) Oral three times a day  OLANZapine Injectable 5 milliGRAM(s) IntraMuscular every 6 hours PRN      Allergies    No Known Allergies    Intolerances            Vital Signs Last 24 Hrs  T(C): 36.5 (17 Nov 2020 04:59), Max: 37.3 (16 Nov 2020 13:56)  T(F): 97.7 (17 Nov 2020 04:59), Max: 99.2 (16 Nov 2020 13:56)  HR: 73 (17 Nov 2020 04:59) (73 - 103)  BP: 120/67 (17 Nov 2020 04:59) (120/67 - 136/75)  BP(mean): --  RR: 18 (17 Nov 2020 04:59) (17 - 18)  SpO2: 94% (17 Nov 2020 04:59) (94% - 96%)      REVIEW OF SYSTEMS:  Very limited secondary to the patient has underlying dementia, would not answer questions accordingly.    PHYSICAL EXAMINATION:   HEENT:  Head:  Normocephalic, atraumatic.  Eyes:  No scleral icterus.  Ears:  Hearing appeared to be intact.  NECK:  Supple.  RESPIRATORY:  Good air entry bilaterally.  CARDIOVASCULAR:  S1 and S2 heard.  ABDOMEN:  Soft and nontender.  EXTREMITIES:  No clubbing or cyanosis were noted.      NEUROLOGIC:  The patient is sleeping on bed .  Upon conversing with the patient did slightly become agitated.  Positive blink to bilateral visual threat.  Speech was fluent.  The patient did follow simple commands.  Bilateral upper were 4/5, bilateral lower were 3+/5..                LABS:  CBC Full  -  ( 17 Nov 2020 09:52 )  WBC Count : 7.27 K/uL  RBC Count : 4.29 M/uL  Hemoglobin : 13.4 g/dL  Hematocrit : 39.1 %  Platelet Count - Automated : 194 K/uL  Mean Cell Volume : 91.1 fl  Mean Cell Hemoglobin : 31.2 pg  Mean Cell Hemoglobin Concentration : 34.3 gm/dL  Auto Neutrophil # : x  Auto Lymphocyte # : x  Auto Monocyte # : x  Auto Eosinophil # : x  Auto Basophil # : x  Auto Neutrophil % : x  Auto Lymphocyte % : x  Auto Monocyte % : x  Auto Eosinophil % : x  Auto Basophil % : x      11-17    145  |  112<H>  |  25<H>  ----------------------------<  166<H>  3.7   |  27  |  0.65    Ca    9.2      17 Nov 2020 09:52      Hemoglobin A1C:       Vitamin B12         RADIOLOGY    ANALYSIS AND PLAN:  This is a 69-year-old with an episode of altered mental status and agitation.  For episode of altered mental status, agitation, suspect most likely secondary to progressive underlying dementia with a personality disorder.  There are no clear signs on examinations to suggest a new cerebrovascular accident has ensued.  I would recommend a psychiatric follow-up.  Consider Seroquel or alternate medications   For atrial fibrillation, risks versus benefits, continue the patient on aspirin.  For history of underlying dementia, appears to be advanced, would recommend to continue the patient on his Aricept and Namenda.  For history of diabetes, strict control of blood sugars.  For history of hypertension, monitor systolic blood pressure.  For high cholesterol, continue the patient on his statin.  Greater than 40 minutes of time was spent with the patient, plan of care, reviewing data, speaking to the multidisciplinary healthcare team with greater than 50% of that time in regards to counseling and care coordination.

## 2020-11-17 NOTE — DISCHARGE NOTE PROVIDER - HOSPITAL COURSE
70 y/o Male  from [facility] with medical  hx of Afib, HTN ,HLD ,DM ,Paranoid personality disorder, Alzheimer's dementia, sent in to ED  for combative behaviour and AMS x 1 day.  Patient was tested  +Covid yesterday 11/09 .  Pt is apparently AAOx3 at baseline.  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 Pt wants the person responsible for sending him to the ED to be fired. Pt denies any SOB, cough, GI/ symptoms. Covid test came back negative but patient found to have lactate serum elevated 3.1 ,iv hydration given Admitted for septic workup and evaluation,send blood and urine cx,serial lactate levels,monitor vitals closley,ivfs hydration,monitor urine output and renal profile,id and pulm consults called to determine need in antibacterial tx Patient developed severe agitation and combativeness in er later ,requiring  several ativan ,haldol injections .Placed on constant observation CT BRAIN -NAD .PSYCHIATRY AND NEUROLOGY consults requested .Palliative care consult requested ,to discuss advance directives and complete MOLST     Problem/Plan - 1:  ·  Problem: Altered mental status.  Plan: LIKELY 2/2 TO PROGRESSION OF DEMENTIA WITH BEHAVIORAL DISTURBANCE ,RULE OUT ACUTE METABOLIC ENCEPHALOPATHY AND  INFECTIOUS SOURCES OF AMS.     Problem/Plan - 2:  ·  Problem: Alzheimer disease.  Plan: WITH BEHAVIORAL DISTURBANCE ,POA -ZYPREXA AND ATIVAN PRN  ,PSYCHIATRY CONSULT ,SEROQUEL DOSE AS PER  , CONTINUE SUPPORTIVE CARE.     Problem/Plan - 3:  ·  Problem: Paranoid personality disorder.  Plan: continue home medications ,psychiatry consult for medication optimization.     Problem/Plan - 4:  ·  Problem: Lactate blood increase.  Plan: LIKELY 2/2 TO METFORMIN - serial bmp and lactate levels ,continue iv hydration ,stop metformin .Septic workup and id evaluation in view of recent covid positive test ,repeated test is negative Lactate is WNL now.     Problem/Plan - 5:  ·  Problem: Diabetes mellitus.  Plan: Corrective regimen sliding scale .ENDOCRINOLOGY to advise on further oral agent.     Problem/Plan - 6:  Problem: Afib. Plan: continue home medications ,cardiology eval noted Dr Barnett is informed about TROPONIN elevation ,he will follow the patient.    Problem/Plan - 7:  ·  Problem: Troponin I above reference range.  Plan: likely 2/2 to demand ischemia 2/2 to dehydration , metabolic abnormalities /lactic acidosis RULED OUT FOR AMI.     Problem/Plan - 8:  ·  Problem: Hypertension.  Plan: continue home medications.     Problem/Plan - 9:  ·  Problem: Hyperlipidemia.  Plan: on statin drug.     Problem/Plan - 10:  Problem: Prophylactic measure. Plan; Gastrointestinal stress ulcer prophylaxis and DVT prophylaxis administered.

## 2020-11-18 ENCOUNTER — TRANSCRIPTION ENCOUNTER (OUTPATIENT)
Age: 70
End: 2020-11-18

## 2020-11-18 VITALS
OXYGEN SATURATION: 95 % | SYSTOLIC BLOOD PRESSURE: 124 MMHG | RESPIRATION RATE: 17 BRPM | TEMPERATURE: 98 F | HEART RATE: 74 BPM | DIASTOLIC BLOOD PRESSURE: 65 MMHG

## 2020-11-18 LAB — SARS-COV-2 RNA SPEC QL NAA+PROBE: SIGNIFICANT CHANGE UP

## 2020-11-18 PROCEDURE — 93306 TTE W/DOPPLER COMPLETE: CPT

## 2020-11-18 PROCEDURE — U0003: CPT

## 2020-11-18 PROCEDURE — 96361 HYDRATE IV INFUSION ADD-ON: CPT

## 2020-11-18 PROCEDURE — 86140 C-REACTIVE PROTEIN: CPT

## 2020-11-18 PROCEDURE — 82962 GLUCOSE BLOOD TEST: CPT

## 2020-11-18 PROCEDURE — 85027 COMPLETE CBC AUTOMATED: CPT

## 2020-11-18 PROCEDURE — 87150 DNA/RNA AMPLIFIED PROBE: CPT

## 2020-11-18 PROCEDURE — 84484 ASSAY OF TROPONIN QUANT: CPT

## 2020-11-18 PROCEDURE — 87086 URINE CULTURE/COLONY COUNT: CPT

## 2020-11-18 PROCEDURE — 86769 SARS-COV-2 COVID-19 ANTIBODY: CPT

## 2020-11-18 PROCEDURE — 85379 FIBRIN DEGRADATION QUANT: CPT

## 2020-11-18 PROCEDURE — 96374 THER/PROPH/DIAG INJ IV PUSH: CPT

## 2020-11-18 PROCEDURE — 96376 TX/PRO/DX INJ SAME DRUG ADON: CPT

## 2020-11-18 PROCEDURE — 83036 HEMOGLOBIN GLYCOSYLATED A1C: CPT

## 2020-11-18 PROCEDURE — 93005 ELECTROCARDIOGRAM TRACING: CPT

## 2020-11-18 PROCEDURE — 87635 SARS-COV-2 COVID-19 AMP PRB: CPT

## 2020-11-18 PROCEDURE — 81003 URINALYSIS AUTO W/O SCOPE: CPT

## 2020-11-18 PROCEDURE — 84443 ASSAY THYROID STIM HORMONE: CPT

## 2020-11-18 PROCEDURE — 85025 COMPLETE CBC W/AUTO DIFF WBC: CPT

## 2020-11-18 PROCEDURE — 84100 ASSAY OF PHOSPHORUS: CPT

## 2020-11-18 PROCEDURE — 36415 COLL VENOUS BLD VENIPUNCTURE: CPT

## 2020-11-18 PROCEDURE — 80053 COMPREHEN METABOLIC PANEL: CPT

## 2020-11-18 PROCEDURE — 82728 ASSAY OF FERRITIN: CPT

## 2020-11-18 PROCEDURE — 83735 ASSAY OF MAGNESIUM: CPT

## 2020-11-18 PROCEDURE — 96372 THER/PROPH/DIAG INJ SC/IM: CPT

## 2020-11-18 PROCEDURE — 83605 ASSAY OF LACTIC ACID: CPT

## 2020-11-18 PROCEDURE — 80048 BASIC METABOLIC PNL TOTAL CA: CPT

## 2020-11-18 PROCEDURE — 87040 BLOOD CULTURE FOR BACTERIA: CPT

## 2020-11-18 PROCEDURE — 84145 PROCALCITONIN (PCT): CPT

## 2020-11-18 PROCEDURE — 80061 LIPID PANEL: CPT

## 2020-11-18 PROCEDURE — 85610 PROTHROMBIN TIME: CPT

## 2020-11-18 PROCEDURE — 86803 HEPATITIS C AB TEST: CPT

## 2020-11-18 PROCEDURE — 71045 X-RAY EXAM CHEST 1 VIEW: CPT

## 2020-11-18 PROCEDURE — 87186 SC STD MICRODIL/AGAR DIL: CPT

## 2020-11-18 PROCEDURE — 70450 CT HEAD/BRAIN W/O DYE: CPT

## 2020-11-18 PROCEDURE — 99285 EMERGENCY DEPT VISIT HI MDM: CPT

## 2020-11-18 RX ORDER — QUETIAPINE FUMARATE 200 MG/1
1 TABLET, FILM COATED ORAL
Qty: 0 | Refills: 0 | DISCHARGE

## 2020-11-18 RX ORDER — ATORVASTATIN CALCIUM 80 MG/1
1 TABLET, FILM COATED ORAL
Qty: 0 | Refills: 0 | DISCHARGE

## 2020-11-18 RX ORDER — METFORMIN HYDROCHLORIDE 850 MG/1
2 TABLET ORAL
Qty: 0 | Refills: 0 | DISCHARGE

## 2020-11-18 RX ORDER — ASPIRIN/CALCIUM CARB/MAGNESIUM 324 MG
1 TABLET ORAL
Qty: 0 | Refills: 0 | DISCHARGE
Start: 2020-11-18

## 2020-11-18 RX ORDER — ASPIRIN/CALCIUM CARB/MAGNESIUM 324 MG
1 TABLET ORAL
Qty: 0 | Refills: 0 | DISCHARGE

## 2020-11-18 RX ADMIN — Medication 0.5 MILLIGRAM(S): at 11:03

## 2020-11-18 RX ADMIN — Medication 50 MILLIGRAM(S): at 11:09

## 2020-11-18 RX ADMIN — OLANZAPINE 10 MILLIGRAM(S): 15 TABLET, FILM COATED ORAL at 11:09

## 2020-11-18 RX ADMIN — MEMANTINE HYDROCHLORIDE 5 MILLIGRAM(S): 10 TABLET ORAL at 11:09

## 2020-11-18 RX ADMIN — Medication 240 MILLIGRAM(S): at 11:15

## 2020-11-18 RX ADMIN — LORATADINE 10 MILLIGRAM(S): 10 TABLET ORAL at 11:15

## 2020-11-18 RX ADMIN — Medication 325 MILLIGRAM(S): at 11:09

## 2020-11-18 RX ADMIN — FAMOTIDINE 20 MILLIGRAM(S): 10 INJECTION INTRAVENOUS at 11:09

## 2020-11-18 RX ADMIN — Medication 2: at 17:31

## 2020-11-18 RX ADMIN — HEPARIN SODIUM 5000 UNIT(S): 5000 INJECTION INTRAVENOUS; SUBCUTANEOUS at 05:11

## 2020-11-18 NOTE — PROGRESS NOTE ADULT - ATTENDING COMMENTS
68 y/o Male  from [facility] with medical  hx of Afib, HTN ,HLD ,DM ,Paranoid personality disorder, Alzheimer's dementia, sent in to ED  for combative behaviour and AMS x 1 day.  Patient was tested  +Covid yesterday 11/09 .  Pt is apparently AAOx3 at baseline.  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 Pt wants the person responsible for sending him to the ED to be fired. Pt denies any SOB, cough, GI/ symptoms. Covid test came back negative but patient found to have lactate serum elevated 3.1 ,iv hydration given Admitted for septic workup and evaluation,send blood and urine cx,serial lactate levels,monitor vitals closley,ivfs hydration,monitor urine output and renal profile,id and pulm consults called to determine need in antibacterial tx Patient developed severe agitation and combativeness in er later ,requiring  several ativan ,haldol injections .Placed on constant observation CT BRAIN -NAD .PSYCHIATRY AND NEUROLOGY consults requested .Palliative care consult requested ,to discuss advance directives and complete MOLST
68 y/o Male  from [facility] with medical  hx of Afib, HTN ,HLD ,DM ,Paranoid personality disorder, Alzheimer's dementia, sent in to ED  for combative behaviour and AMS x 1 day.  Patient was tested  +Covid yesterday 11/09 .  Pt is apparently AAOx3 at baseline.  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 Pt wants the person responsible for sending him to the ED to be fired. Pt denies any SOB, cough, GI/ symptoms. Covid test came back negative but patient found to have lactate serum elevated 3.1 ,iv hydration given Admitted for septic workup and evaluation,send blood and urine cx,serial lactate levels,monitor vitals closley,ivfs hydration,monitor urine output and renal profile,id and pulm consults called to determine need in antibacterial tx Patient developed severe agitation and combativeness in er later ,requiring  several ativan ,haldol injections .Placed on constant observation CT BRAIN -NAD .PSYCHIATRY AND NEUROLOGY consults requested .Palliative care consult requested ,to discuss advance directives and complete MOLST
68 y/o Male  from [facility] with medical  hx of Afib, HTN ,HLD ,DM ,Paranoid personality disorder, Alzheimer's dementia, sent in to ED  for combative behaviour and AMS x 1 day.  Patient was tested  +Covid yesterday 11/09 .  Pt is apparently AAOx3 at baseline.  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 Pt wants the person responsible for sending him to the ED to be fired. Pt denies any SOB, cough, GI/ symptoms. Covid test came back negative but patient found to have lactate serum elevated 3.1 ,iv hydration given Admitted for septic workup and evaluation,send blood and urine cx,serial lactate levels,monitor vitals closley,ivfs hydration,monitor urine output and renal profile,id and pulm consults called to determine need in antibacterial tx Patient developed severe agitation and combativeness in er later ,requiring  several ativan ,haldol injections .Placed on constant observation CT BRAIN -NAD .PSYCHIATRY AND NEUROLOGY consults requested .Palliative care consult requested ,to discuss advance directives and complete MOLST
70 y/o Male  from [facility] with medical  hx of Afib, HTN ,HLD ,DM ,Paranoid personality disorder, Alzheimer's dementia, sent in to ED  for combative behaviour and AMS x 1 day.  Patient was tested  +Covid yesterday 11/09 .  Pt is apparently AAOx3 at baseline.  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 Pt wants the person responsible for sending him to the ED to be fired. Pt denies any SOB, cough, GI/ symptoms. Covid test came back negative but patient found to have lactate serum elevated 3.1 ,iv hydration given Admitted for septic workup and evaluation,send blood and urine cx,serial lactate levels,monitor vitals closley,ivfs hydration,monitor urine output and renal profile,id and pulm consults called to determine need in antibacterial tx Patient developed severe agitation and combativeness in er later ,requiring  several ativan ,haldol injections .Placed on constant observation CT BRAIN -NAD .PSYCHIATRY AND NEUROLOGY consults requested .Palliative care consult requested ,to discuss advance directives and complete MOLST
70 [facility]/o [facility]  [facility]  hx of Afib, HTN ,HLD ,DM ,Paranoid personality disorder, Alzheimer's dementia, sent in to ED  for combative behaviour and AMS x 1 day.  Patient was tested  +Covid yesterday 11/09 .  Pt is apparently AAOx3 at baseline.  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 Pt wants the person responsible for sending him to the ED to be fired. Pt denies any SOB, cough, GI/ symptoms. Covid test came back negative but patient found to have lactate serum elevated 3.1 ,iv hydration given Admitted for septic workup and evaluation,send blood and urine cx,serial lactate levels,monitor vitals closley,ivfs hydration,monitor urine output and renal profile,id and pulm consults called to determine need in antibacterial tx Patient developed severe agitation and combativeness in er later ,requiring  several ativan ,haldol injections .Placed on constant observation CT BRAIN -NAD .PSYCHIATRY AND NEUROLOGY consults requested .Palliative care consult requested ,to discuss advance directives and complete MOLST
70 y/o Male  from [facility] with medical  hx of Afib, HTN ,HLD ,DM ,Paranoid personality disorder, Alzheimer's dementia, sent in to ED  for combative behaviour and AMS x 1 day.  Patient was tested  +Covid yesterday 11/09 .  Pt is apparently AAOx3 at baseline.  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 Pt wants the person responsible for sending him to the ED to be fired. Pt denies any SOB, cough, GI/ symptoms. Covid test came back negative but patient found to have lactate serum elevated 3.1 ,iv hydration given Admitted for septic workup and evaluation,send blood and urine cx,serial lactate levels,monitor vitals closley,ivfs hydration,monitor urine output and renal profile,id and pulm consults called to determine need in antibacterial tx Patient developed severe agitation and combativeness in er later ,requiring  several ativan ,haldol injections .Placed on constant observation CT BRAIN -NAD .PSYCHIATRY AND NEUROLOGY consults requested .Palliative care consult requested ,to discuss advance directives and complete MOLST
70 y/o Male  from [facility] with medical  hx of Afib, HTN ,HLD ,DM ,Paranoid personality disorder, Alzheimer's dementia, sent in to ED  for combative behaviour and AMS x 1 day.  Patient was tested  +Covid yesterday 11/09 .  Pt is apparently AAOx3 at baseline.  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 Pt wants the person responsible for sending him to the ED to be fired. Pt denies any SOB, cough, GI/ symptoms. Covid test came back negative but patient found to have lactate serum elevated 3.1 ,iv hydration given Admitted for septic workup and evaluation,send blood and urine cx,serial lactate levels,monitor vitals closley,ivfs hydration,monitor urine output and renal profile,id and pulm consults called to determine need in antibacterial tx Patient developed severe agitation and combativeness in er later ,requiring  several ativan ,haldol injections .Placed on constant observation CT BRAIN -NAD .PSYCHIATRY AND NEUROLOGY consults requested .Palliative care consult requested ,to discuss advance directives and complete MOLST
70 y/o Male  from [facility] with medical  hx of Afib, HTN ,HLD ,DM ,Paranoid personality disorder, Alzheimer's dementia, sent in to ED  for combative behaviour and AMS x 1 day.  Patient was tested  +Covid yesterday 11/09 .  Pt is apparently AAOx3 at baseline.  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 Pt wants the person responsible for sending him to the ED to be fired. Pt denies any SOB, cough, GI/ symptoms. Covid test came back negative but patient found to have lactate serum elevated 3.1 ,iv hydration given Admitted for septic workup and evaluation,send blood and urine cx,serial lactate levels,monitor vitals closley,ivfs hydration,monitor urine output and renal profile,id and pulm consults called to determine need in antibacterial tx Patient developed severe agitation and combativeness in er later ,requiring  several ativan ,haldol injections .Placed on constant observation CT BRAIN -NAD .PSYCHIATRY AND NEUROLOGY consults requested .Palliative care consult requested ,to discuss advance directives and complete MOLST

## 2020-11-18 NOTE — PROGRESS NOTE ADULT - SUBJECTIVE AND OBJECTIVE BOX
CAPILLARY BLOOD GLUCOSE      POCT Blood Glucose.: 171 mg/dL (18 Nov 2020 07:41)  POCT Blood Glucose.: 240 mg/dL (17 Nov 2020 21:45)  POCT Blood Glucose.: 200 mg/dL (17 Nov 2020 16:32)  POCT Blood Glucose.: 151 mg/dL (17 Nov 2020 11:59)      Vital Signs Last 24 Hrs  T(C): 36.3 (18 Nov 2020 05:13), Max: 36.8 (17 Nov 2020 14:53)  T(F): 97.3 (18 Nov 2020 05:13), Max: 98.3 (17 Nov 2020 20:39)  HR: 75 (18 Nov 2020 05:13) (70 - 90)  BP: 113/81 (18 Nov 2020 05:13) (113/81 - 140/70)  BP(mean): --  RR: 18 (18 Nov 2020 05:13) (17 - 18)  SpO2: 98% (18 Nov 2020 05:13) (95% - 98%)      Respiratory: CTA B/L  CV: RRR, S1S2, no murmurs, rubs or gallops  Abdominal: Soft, NT, ND +BS, Last BM  Extremities: No edema, + peripheral pulses     11-17    145  |  112<H>  |  25<H>  ----------------------------<  166<H>  3.7   |  27  |  0.65    Ca    9.2      17 Nov 2020 09:52        atorvastatin 40 milliGRAM(s) Oral at bedtime  dextrose 40% Gel 15 Gram(s) Oral once  dextrose 50% Injectable 25 Gram(s) IV Push once  dextrose 50% Injectable 12.5 Gram(s) IV Push once  dextrose 50% Injectable 25 Gram(s) IV Push once  glucagon  Injectable 1 milliGRAM(s) IntraMuscular once  insulin lispro (ADMELOG) corrective regimen sliding scale   SubCutaneous three times a day before meals  insulin lispro (ADMELOG) corrective regimen sliding scale   SubCutaneous at bedtime

## 2020-11-18 NOTE — DISCHARGE NOTE NURSING/CASE MANAGEMENT/SOCIAL WORK - NSDCPETBCESMAN_GEN_ALL_CORE
If you are a smoker, it is important for your health to stop smoking. Please be aware that second hand smoke is also harmful. Attending Only

## 2020-11-18 NOTE — DISCHARGE NOTE NURSING/CASE MANAGEMENT/SOCIAL WORK - PATIENT PORTAL LINK FT
You can access the FollowMyHealth Patient Portal offered by [facility] by registering at the following website: [email or web address]. By joining [facility]’s FollowMyHealth portal, you will also be able to view your health information using other applications (apps) compatible with our system.

## 2020-11-18 NOTE — PROGRESS NOTE ADULT - NSHPATTENDINGPLANDISCUSS_GEN_ALL_CORE
MED STAFF ON 1 EAST ,  ,  , , , ON 1 E MED STAFF ON 1 EAST ,  ,  , , ,RHINA ON 1 E .Transfer to ltc facility when arranged by sw

## 2020-11-18 NOTE — PROGRESS NOTE ADULT - SUBJECTIVE AND OBJECTIVE BOX
Date/Time Patient Seen:  		  Referring MD:   Data Reviewed	       Patient is a 69y old  Male who presents with a chief complaint of ALTERED MENTAL STATUS  AND LACTICEMIA (18 Nov 2020 00:50)      Subjective/HPI     PAST MEDICAL & SURGICAL HISTORY:  Paranoid personality disorder    Anxiety    Dementia    Alzheimer disease    Hypertension    Hyperlipidemia    Diabetes mellitus    Afib    DM (diabetes mellitus)    DM (diabetes mellitus)          Medication list         MEDICATIONS  (STANDING):  aspirin 325 milliGRAM(s) Oral daily  atorvastatin 40 milliGRAM(s) Oral at bedtime  dextrose 40% Gel 15 Gram(s) Oral once  dextrose 5%. 1000 milliLiter(s) (50 mL/Hr) IV Continuous <Continuous>  dextrose 5%. 1000 milliLiter(s) (100 mL/Hr) IV Continuous <Continuous>  dextrose 50% Injectable 25 Gram(s) IV Push once  dextrose 50% Injectable 12.5 Gram(s) IV Push once  dextrose 50% Injectable 25 Gram(s) IV Push once  diltiazem    milliGRAM(s) Oral daily  donepezil 10 milliGRAM(s) Oral at bedtime  famotidine    Tablet 20 milliGRAM(s) Oral daily  glucagon  Injectable 1 milliGRAM(s) IntraMuscular once  heparin   Injectable 5000 Unit(s) SubCutaneous every 12 hours  insulin lispro (ADMELOG) corrective regimen sliding scale   SubCutaneous three times a day before meals  insulin lispro (ADMELOG) corrective regimen sliding scale   SubCutaneous at bedtime  loratadine 10 milliGRAM(s) Oral daily  memantine 5 milliGRAM(s) Oral two times a day  metoprolol tartrate 50 milliGRAM(s) Oral two times a day  OLANZapine Disintegrating Tablet 10 milliGRAM(s) Oral three times a day    MEDICATIONS  (PRN):  acetaminophen   Tablet .. 650 milliGRAM(s) Oral every 6 hours PRN Temp greater or equal to 38C (100.4F), Mild Pain (1 - 3)  LORazepam   Injectable 0.5 milliGRAM(s) IV Push every 6 hours PRN SEVERE AGITATION  melatonin 5 milliGRAM(s) Oral at bedtime PRN Insomnia  OLANZapine Injectable 5 milliGRAM(s) IntraMuscular every 6 hours PRN agitation         Vitals log        ICU Vital Signs Last 24 Hrs  T(C): 36.3 (18 Nov 2020 05:13), Max: 36.8 (17 Nov 2020 14:53)  T(F): 97.3 (18 Nov 2020 05:13), Max: 98.3 (17 Nov 2020 20:39)  HR: 75 (18 Nov 2020 05:13) (70 - 90)  BP: 113/81 (18 Nov 2020 05:13) (113/81 - 140/70)  BP(mean): --  ABP: --  ABP(mean): --  RR: 18 (18 Nov 2020 05:13) (17 - 18)  SpO2: 98% (18 Nov 2020 05:13) (95% - 98%)           Input and Output:  I&O's Detail    17 Nov 2020 07:01  -  18 Nov 2020 05:36  --------------------------------------------------------  IN:    Oral Fluid: 700 mL  Total IN: 700 mL    OUT:  Total OUT: 0 mL    Total NET: 700 mL          Lab Data                        13.4   7.27  )-----------( 194      ( 17 Nov 2020 09:52 )             39.1     11-17    145  |  112<H>  |  25<H>  ----------------------------<  166<H>  3.7   |  27  |  0.65    Ca    9.2      17 Nov 2020 09:52              Review of Systems	      Objective     Physical Examination  heart s1s2  lung dec BS  abd soft  on RA        Pertinent Lab findings & Imaging      Bianca:  NO   Adequate UO     I&O's Detail    17 Nov 2020 07:01  -  18 Nov 2020 05:36  --------------------------------------------------------  IN:    Oral Fluid: 700 mL  Total IN: 700 mL    OUT:  Total OUT: 0 mL    Total NET: 700 mL               Discussed with:     Cultures:	        Radiology

## 2020-11-18 NOTE — PROGRESS NOTE ADULT - SUBJECTIVE AND OBJECTIVE BOX
Neurology follow up note    JODI DUBOSE69yMale      Interval History:    Patient events noted received ativan  sleeping in bed     MEDICATIONS    acetaminophen   Tablet .. 650 milliGRAM(s) Oral every 6 hours PRN  aspirin 325 milliGRAM(s) Oral daily  atorvastatin 40 milliGRAM(s) Oral at bedtime  dextrose 40% Gel 15 Gram(s) Oral once  dextrose 5%. 1000 milliLiter(s) IV Continuous <Continuous>  dextrose 5%. 1000 milliLiter(s) IV Continuous <Continuous>  dextrose 50% Injectable 25 Gram(s) IV Push once  dextrose 50% Injectable 12.5 Gram(s) IV Push once  dextrose 50% Injectable 25 Gram(s) IV Push once  diltiazem    milliGRAM(s) Oral daily  donepezil 10 milliGRAM(s) Oral at bedtime  famotidine    Tablet 20 milliGRAM(s) Oral daily  glucagon  Injectable 1 milliGRAM(s) IntraMuscular once  heparin   Injectable 5000 Unit(s) SubCutaneous every 12 hours  insulin lispro (ADMELOG) corrective regimen sliding scale   SubCutaneous three times a day before meals  insulin lispro (ADMELOG) corrective regimen sliding scale   SubCutaneous at bedtime  loratadine 10 milliGRAM(s) Oral daily  LORazepam   Injectable 0.5 milliGRAM(s) IV Push every 6 hours PRN  melatonin 5 milliGRAM(s) Oral at bedtime PRN  memantine 5 milliGRAM(s) Oral two times a day  metoprolol tartrate 50 milliGRAM(s) Oral two times a day  OLANZapine Disintegrating Tablet 10 milliGRAM(s) Oral three times a day  OLANZapine Injectable 5 milliGRAM(s) IntraMuscular every 6 hours PRN      Allergies    No Known Allergies    Intolerances            Vital Signs Last 24 Hrs  T(C): 36.3 (18 Nov 2020 05:13), Max: 36.8 (17 Nov 2020 14:53)  T(F): 97.3 (18 Nov 2020 05:13), Max: 98.3 (17 Nov 2020 20:39)  HR: 75 (18 Nov 2020 05:13) (70 - 90)  BP: 113/81 (18 Nov 2020 05:13) (113/81 - 140/70)  BP(mean): --  RR: 18 (18 Nov 2020 05:13) (17 - 18)  SpO2: 98% (18 Nov 2020 05:13) (95% - 98%)        REVIEW OF SYSTEMS:  Very limited secondary to the patient has underlying dementia, would not answer questions accordingly.    PHYSICAL EXAMINATION:   HEENT:  Head:  Normocephalic, atraumatic.  Eyes:  No scleral icterus.  Ears:  Hearing appeared to be intact.  NECK:  Supple.  RESPIRATORY:  Good air entry bilaterally.  CARDIOVASCULAR:  S1 and S2 heard.  ABDOMEN:  Soft and nontender.  EXTREMITIES:  No clubbing or cyanosis were noted.      NEUROLOGIC:  The patient is sleeping on bed .  Upon conversing with the patient did slightly become agitated.  Positive blink to bilateral visual threat.  Speech ,mumbles  The patient did not follow simple commands.  Bilateral upper were 4/5, bilateral lower were 3+/5..             LABS:  CBC Full  -  ( 17 Nov 2020 09:52 )  WBC Count : 7.27 K/uL  RBC Count : 4.29 M/uL  Hemoglobin : 13.4 g/dL  Hematocrit : 39.1 %  Platelet Count - Automated : 194 K/uL  Mean Cell Volume : 91.1 fl  Mean Cell Hemoglobin : 31.2 pg  Mean Cell Hemoglobin Concentration : 34.3 gm/dL  Auto Neutrophil # : x  Auto Lymphocyte # : x  Auto Monocyte # : x  Auto Eosinophil # : x  Auto Basophil # : x  Auto Neutrophil % : x  Auto Lymphocyte % : x  Auto Monocyte % : x  Auto Eosinophil % : x  Auto Basophil % : x      11-17    145  |  112<H>  |  25<H>  ----------------------------<  166<H>  3.7   |  27  |  0.65    Ca    9.2      17 Nov 2020 09:52      Hemoglobin A1C:       Vitamin B12         RADIOLOGY    ANALYSIS AND PLAN:  This is a 69-year-old with an episode of altered mental status and agitation.  For episode of altered mental status, agitation, suspect most likely secondary to progressive underlying dementia with a personality disorder.  There are no clear signs on examinations to suggest a new cerebrovascular accident has ensued.  psychiatric follow-up.  For atrial fibrillation, risks versus benefits, continue the patient on aspirin.  For history of underlying dementia, appears to be advanced, would recommend to continue the patient on his Aricept and Namenda.  For history of diabetes, strict control of blood sugars.  For history of hypertension, monitor systolic blood pressure.  For high cholesterol, continue the patient on his statin.  adjust medications as needed for agitation   Greater than 30 minutes of time was spent with the patient, plan of care, reviewing data, speaking to the multidisciplinary healthcare team with greater than 50% of that time in regards to counseling and care coordination.

## 2020-11-18 NOTE — PROGRESS NOTE ADULT - ASSESSMENT
70 y/o Male  from [facility] with medical  hx of Afib, HTN ,HLD ,DM ,Paranoid personality disorder, Alzheimer's dementia, sent in to ED  for combative behaviour and AMS x 1 day.  Patient was tested  +Covid yesterday 11/09 .  Pt is apparently AAOx3 at baseline.  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 Pt wants the person responsible for sending him to the ED to be fired. Pt denies any SOB, cough, GI/ symptoms. Covid test came back negative but patient found to have lactate serum elevated 3.1 ,iv hydration given Admitted for septic workup and evaluation,send blood and urine cx,serial lactate levels,monitor vitals closley,ivfs hydration,monitor urine output and renal profile,id and pulm consults called to determine need in antibacterial tx Patient developed severe agitation and combativeness in er later ,requiring  several ativan ,haldol injections .Placed on constant observation CT BRAIN -NAD .PSYCHIATRY AND NEUROLOGY consults requested .Palliative care consult requested ,to discuss advance directives and complete MOLST

## 2020-11-18 NOTE — PROGRESS NOTE ADULT - PROBLEM SELECTOR PLAN 1
cont mod dose humalog scale coverage   goal bg conservative mngmt  metformin d/jose l  cons cho diet

## 2020-11-18 NOTE — PROGRESS NOTE ADULT - PROBLEM SELECTOR PROBLEM 1
Diabetes mellitus type 2, uncontrolled
Palliative care encounter
Altered mental status
Stable

## 2020-11-18 NOTE — PROGRESS NOTE ADULT - REASON FOR ADMISSION
ALTERED MENTAL STATUS  AND LACTICEMIA

## 2020-11-18 NOTE — PROGRESS NOTE ADULT - PROBLEM SELECTOR PLAN 1
COMPA placement search under [facility] -  notes reviewed -   68 y/o Male  from [facility] with medical  hx of Afib, HTN ,HLD ,DM ,Paranoid personality disorder, Alzheimer's dementia, sent in to ED  for combative behaviour  ID eval noted  Pulm eval noted  CXR and CT head noted  Covid PCR neg on admission  assist with ADL  GOC discussion in progress  anti psychotics use PRN  monitor for needs re - orient nutrition.  Psych rx regimen optimization in progress - RRT notes reviewed - monitor for outbursts - agitation -.

## 2020-11-18 NOTE — PROGRESS NOTE ADULT - ASSESSMENT
THOMAS ZAPATA DOA 11/10/2020 [address] DR [address]OR[address]AMILTON       REVIEW OF SYMPTOMS      Able to give ROS  NO     PHYSICAL EXAM    HEENT Unremarkable PERRLA atraumatic   RESP Fair air entry EXP prolonged    Harsh breath sound Resp distres mild   CARDIAC S1 S2 No S3     NO JVD    ABDOMEN SOFT BS PRESENT NOT DISTENDED No hepatosplenomegaly PEDAL EDEMA present No calf tenderness  NO rash     PT DATA/BEST PRACTICE  ALLERGY   nka           WT                11/10/2020 62               BMI                       11/10/2020 21                 ADVANCED DIRECTIVE     HEAD OF BED ELEVATION Yes      DVT PROPHYLAXIS.      hpsc (11/10/2020)   DIET. cons carb soft  (11/10/95715                                                                    LLOYD PROPHYLAXIS.   INFECTION PPLX                                                    OXYGENATION.   11/10/-11/117850 ra 995 - ra 96%       VITALS.   11/18/2020 afeb 70 120/60       PATIENT SUMMARY.   69 m who had tested covid posv 11/9 was sent from [employer] [facility] to er with agitation andtested covid negv On arrival 164/110 99f ra 97%   In ER pt was found to have lacticemia and pulm consulted for sepsis ro pneumonia 11/10/2020   PMH Afib alzheimers anxiety dementia DM hytn   home meds asa 325 atorvastat 40 cardizem 240 aricept 10 pepcid namenda 5 claritin 10 metformin 500 quetiapine 25.3       PROBLEM/ASSESSMENT/RECOMMENDATIONS.  SEPSIS   Doubt bacterial infection No significant fever or leukocytosis and negv cxr and negv procalc poa   defer abio   STAPH HOMINIS BC POSV ON 11/10   repeat bc 11/12 were negv Likely contaminant  COVID   ID on case Remdesevir and dexa deferred for now as oxygenation ok   LACTICEMIA   Repeat la improved on 11/10/2020   Is on iv fluids  RESP   ra po 99% poa   Monitor and target po 90-95%  ALTERED MENTAL STATE   11/10/2020 ct head 11/10/2020 was negv   observe   Control agitation with meds   A fib   On ASA cardizem   cont rx  AGITATION  constant observation  11/14/2020     TIME SPENT   Over 25 minutes aggregate care time spent on encounter; activities included   direct patient care, counseling and/or coordinating care reviewing notes, lab data/ imaging , discussion with multidisciplinary team/ patient  /family and explaining in detail risks, benefits, alternatives  of the recommendations     THOMAS MCCLELLAND 11/10/2020 [address] DR [address]ORG[address] HAMILTON

## 2020-11-18 NOTE — PROGRESS NOTE ADULT - SUBJECTIVE AND OBJECTIVE BOX
JODI DUBOSE    PLV 1EAS 106 D1    Patient is a 69y old  Male who presents with a chief complaint of ALTERED MENTAL STATUS  AND LACTICEMIA (17 Nov 2020 12:00)       Allergies    No Known Allergies    Intolerances        HPI:  68 y/o Male  from [facility] with medical  hx of Afib, HTN ,HLD ,DM ,Paranoid personality disorder, Alzheimer's dementia, sent in to ED  for combative behaviour and AMS x 1 day.  Patient was tested  +Covid yesterday 11/09 .  Pt is apparently AAOx3 at baseline.  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 Pt wants the person responsible for sending him to the ED to be fired. Pt denies any SOB, cough, GI/ symptoms. Covid test came back negative but patient found to have lactate serum elevated 3.1 ,iv hydration given Admitted for septic workup and evaluation,send blood and urine cx,serial lactate levels,monitor vitals closley,ivfs hydration,monitor urine output and renal profile,id and pulm consults called to determine need in antibacterial tx Patient developed severe agitation and combativeness in er later ,requiring  several ativan ,haldol injections .Placed on constant observation CT BRAIN -NAD .PSYCHIATRY AND NEUROLOGY consults requested .Palliative care consult requested ,to discuss advance directives and complete MOLST  (10 Nov 2020 18:10)      PAST MEDICAL & SURGICAL HISTORY:  Paranoid personality disorder    Anxiety    Dementia    Alzheimer disease    Hypertension    Hyperlipidemia    Diabetes mellitus    Afib    DM (diabetes mellitus)    DM (diabetes mellitus)        FAMILY HISTORY:        MEDICATIONS   acetaminophen   Tablet .. 650 milliGRAM(s) Oral every 6 hours PRN  aspirin 325 milliGRAM(s) Oral daily  atorvastatin 40 milliGRAM(s) Oral at bedtime  dextrose 40% Gel 15 Gram(s) Oral once  dextrose 5%. 1000 milliLiter(s) IV Continuous <Continuous>  dextrose 5%. 1000 milliLiter(s) IV Continuous <Continuous>  dextrose 50% Injectable 25 Gram(s) IV Push once  dextrose 50% Injectable 12.5 Gram(s) IV Push once  dextrose 50% Injectable 25 Gram(s) IV Push once  diltiazem    milliGRAM(s) Oral daily  donepezil 10 milliGRAM(s) Oral at bedtime  famotidine    Tablet 20 milliGRAM(s) Oral daily  glucagon  Injectable 1 milliGRAM(s) IntraMuscular once  heparin   Injectable 5000 Unit(s) SubCutaneous every 12 hours  insulin lispro (ADMELOG) corrective regimen sliding scale   SubCutaneous three times a day before meals  insulin lispro (ADMELOG) corrective regimen sliding scale   SubCutaneous at bedtime  loratadine 10 milliGRAM(s) Oral daily  LORazepam   Injectable 0.5 milliGRAM(s) IV Push every 6 hours PRN  melatonin 5 milliGRAM(s) Oral at bedtime PRN  memantine 5 milliGRAM(s) Oral two times a day  metoprolol tartrate 50 milliGRAM(s) Oral two times a day  OLANZapine Disintegrating Tablet 10 milliGRAM(s) Oral three times a day  OLANZapine Injectable 5 milliGRAM(s) IntraMuscular every 6 hours PRN      Vital Signs Last 24 Hrs  T(C): 36.8 (17 Nov 2020 20:39), Max: 36.8 (17 Nov 2020 14:53)  T(F): 98.3 (17 Nov 2020 20:39), Max: 98.3 (17 Nov 2020 20:39)  HR: 77 (17 Nov 2020 20:39) (70 - 90)  BP: 121/75 (17 Nov 2020 20:39) (119/67 - 140/70)  BP(mean): --  RR: 17 (17 Nov 2020 20:39) (17 - 18)  SpO2: 96% (17 Nov 2020 20:39) (94% - 96%)      11-17-20 @ 07:01  -  11-18-20 @ 00:50  --------------------------------------------------------  IN: 700 mL / OUT: 0 mL / NET: 700 mL            LABS:                        13.4   7.27  )-----------( 194      ( 17 Nov 2020 09:52 )             39.1     11-17    145  |  112<H>  |  25<H>  ----------------------------<  166<H>  3.7   |  27  |  0.65    Ca    9.2      17 Nov 2020 09:52                WBC:  WBC Count: 7.27 K/uL (11-17 @ 09:52)  WBC Count: 8.66 K/uL (11-16 @ 09:39)  WBC Count: 5.28 K/uL (11-15 @ 09:18)  WBC Count: 6.89 K/uL (11-14 @ 09:34)      MICROBIOLOGY:  RECENT CULTURES:  11-16 .Blood Blood XXXX XXXX   No growth to date.    11-12 .Blood Blood XXXX XXXX   No Growth Final                    Sodium:  Sodium, Serum: 145 mmol/L (11-17 @ 09:52)  Sodium, Serum: 143 mmol/L (11-16 @ 09:39)  Sodium, Serum: 145 mmol/L (11-15 @ 09:18)  Sodium, Serum: 144 mmol/L (11-14 @ 09:34)      0.65 mg/dL 11-17 @ 09:52  0.78 mg/dL 11-16 @ 09:39  0.58 mg/dL 11-15 @ 09:18  0.51 mg/dL 11-14 @ 09:34      Hemoglobin:  Hemoglobin: 13.4 g/dL (11-17 @ 09:52)  Hemoglobin: 14.1 g/dL (11-16 @ 09:39)  Hemoglobin: 13.1 g/dL (11-15 @ 09:18)  Hemoglobin: 12.3 g/dL (11-14 @ 09:34)      Platelets: Platelet Count - Automated: 194 K/uL (11-17 @ 09:52)  Platelet Count - Automated: 221 K/uL (11-16 @ 09:39)  Platelet Count - Automated: 178 K/uL (11-15 @ 09:18)  Platelet Count - Automated: 172 K/uL (11-14 @ 09:34)              RADIOLOGY & ADDITIONAL STUDIES:

## 2020-11-18 NOTE — PROGRESS NOTE ADULT - SUBJECTIVE AND OBJECTIVE BOX
PROGRESS NOTE  Patient is a 69y old  Male who presents with a chief complaint of ALTERED MENTAL STATUS  AND LACTICEMIA (18 Nov 2020 05:36)  Chart and available morning labs /imaging are reviewed electronically , urgent issues addressed . More information  is being added upon completion of rounds , when more information is collected and management discussed with consultants , medical staff and social service/case management on the floor   OVERNIGHT  No new issues reported by medical staff . All above noted   Awaiting for placement in [facility]  facility Followed by Dr Watkins ,cleared for discharge   HPI:  70 y/o Male  from [facility] with medical  hx of Afib, HTN ,HLD ,DM ,Paranoid personality disorder, Alzheimer's dementia, sent in to ED  for combative behaviour and AMS x 1 day.  Patient was tested  +Covid yesterday 11/09 .  Pt is apparently AAOx3 at baseline.  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 Pt wants the person responsible for sending him to the ED to be fired. Pt denies any SOB, cough, GI/ symptoms. Covid test came back negative but patient found to have lactate serum elevated 3.1 ,iv hydration given Admitted for septic workup and evaluation,send blood and urine cx,serial lactate levels,monitor vitals closley,ivfs hydration,monitor urine output and renal profile,id and pulm consults called to determine need in antibacterial tx Patient developed severe agitation and combativeness in er later ,requiring  several ativan ,haldol injections .Placed on constant observation CT BRAIN -NAD .PSYCHIATRY AND NEUROLOGY consults requested .Palliative care consult requested ,to discuss advance directives and complete MOLST  (10 Nov 2020 18:10)    PAST MEDICAL & SURGICAL HISTORY:  Paranoid personality disorder    Anxiety    Dementia    Alzheimer disease    Hypertension    Hyperlipidemia    Diabetes mellitus    Afib    DM (diabetes mellitus)    DM (diabetes mellitus)        MEDICATIONS  (STANDING):  aspirin 325 milliGRAM(s) Oral daily  atorvastatin 40 milliGRAM(s) Oral at bedtime  dextrose 40% Gel 15 Gram(s) Oral once  dextrose 5%. 1000 milliLiter(s) (50 mL/Hr) IV Continuous <Continuous>  dextrose 5%. 1000 milliLiter(s) (100 mL/Hr) IV Continuous <Continuous>  dextrose 50% Injectable 25 Gram(s) IV Push once  dextrose 50% Injectable 12.5 Gram(s) IV Push once  dextrose 50% Injectable 25 Gram(s) IV Push once  diltiazem    milliGRAM(s) Oral daily  donepezil 10 milliGRAM(s) Oral at bedtime  famotidine    Tablet 20 milliGRAM(s) Oral daily  glucagon  Injectable 1 milliGRAM(s) IntraMuscular once  heparin   Injectable 5000 Unit(s) SubCutaneous every 12 hours  insulin lispro (ADMELOG) corrective regimen sliding scale   SubCutaneous three times a day before meals  insulin lispro (ADMELOG) corrective regimen sliding scale   SubCutaneous at bedtime  loratadine 10 milliGRAM(s) Oral daily  memantine 5 milliGRAM(s) Oral two times a day  metoprolol tartrate 50 milliGRAM(s) Oral two times a day  OLANZapine Disintegrating Tablet 10 milliGRAM(s) Oral three times a day    MEDICATIONS  (PRN):  acetaminophen   Tablet .. 650 milliGRAM(s) Oral every 6 hours PRN Temp greater or equal to 38C (100.4F), Mild Pain (1 - 3)  LORazepam   Injectable 0.5 milliGRAM(s) IV Push every 6 hours PRN SEVERE AGITATION  melatonin 5 milliGRAM(s) Oral at bedtime PRN Insomnia  OLANZapine Injectable 5 milliGRAM(s) IntraMuscular every 6 hours PRN agitation      OBJECTIVE    T(C): 36.3 (11-18-20 @ 05:13), Max: 36.8 (11-17-20 @ 14:53)  HR: 75 (11-18-20 @ 05:13) (70 - 90)  BP: 113/81 (11-18-20 @ 05:13) (113/81 - 140/70)  RR: 18 (11-18-20 @ 05:13) (17 - 18)  SpO2: 98% (11-18-20 @ 05:13) (95% - 98%)  Wt(kg): --  I&O's Summary  17 Nov 2020 07:01  -  18 Nov 2020 06:59  --------------------------------------------------------  IN: 760 mL / OUT: 0 mL / NET: 760 mL  REVIEW OF SYSTEMS:  ROS is unobtainable due to dementia and confusion PATIENT IS CONFUSED AND IS UNABLE TO GIVE ANY/RELIABLE HISTORY OR REVIEW OF SYSTEMS PLEASE ALSO SEE UNDER HPI AND ASSESSMENT FOR OBTAINED REVIEW OF SYSTEMS   PHYSICAL EXAM:  Appearance: NAD. VS past 24 hrs -as above   HEENT:   Moist oral mucosa. Conjunctiva clear b/l.   Neck : supple  Respiratory: Lungs CTAB.  Gastrointestinal:  Soft, nontender. No rebound. No rigidity. BS present	  Cardiovascular: RRR ,S1S2 present  Neurologic: Non-focal. Moving all extremities.  Extremities: No edema. No erythema. No calf tenderness.  Skin: No rashes, No ecchymoses, No cyanosis.	  wounds ,skin lesions-See skin assesment flow sheet   LABS:                        13.4   7.27  )-----------( 194      ( 17 Nov 2020 09:52 )             39.1     11-17    145  |  112<H>  |  25<H>  ----------------------------<  166<H>  3.7   |  27  |  0.65    Ca    9.2      17 Nov 2020 09:52      CAPILLARY BLOOD GLUCOSE      POCT Blood Glucose.: 240 mg/dL (17 Nov 2020 21:45)  POCT Blood Glucose.: 200 mg/dL (17 Nov 2020 16:32)  POCT Blood Glucose.: 151 mg/dL (17 Nov 2020 11:59)  POCT Blood Glucose.: 165 mg/dL (17 Nov 2020 07:41)          Culture - Blood (collected 16 Nov 2020 12:38)  Source: .Blood Blood  Preliminary Report (17 Nov 2020 13:01):    No growth to date.    Culture - Blood (collected 12 Nov 2020 12:38)  Source: .Blood Blood  Final Report (17 Nov 2020 13:01):    No Growth Final    Culture - Urine (collected 10 Nov 2020 22:03)  Source: .Urine Clean Catch (Midstream)  Final Report (11 Nov 2020 18:40):    No growth    Culture - Blood (collected 10 Nov 2020 11:23)  Source: .Blood Blood-Peripheral  Gram Stain (14 Nov 2020 18:56):    Growth in anaerobic bottle: Gram Positive Cocci in Clusters  Final Report (16 Nov 2020 23:09):    Growth in anaerobic bottle: Staphylococcus hominis  Organism: Staphylococcus hominis (16 Nov 2020 23:09)  Organism: Staphylococcus hominis (16 Nov 2020 23:09)    Culture - Blood (collected 10 Nov 2020 11:23)  Source: .Blood Blood-Peripheral  Gram Stain (11 Nov 2020 14:58):    Growth in anaerobic bottle: Gram Positive Cocci in Clusters  Final Report (12 Nov 2020 14:08):    Growth in anaerobic bottle: Staphylococcus hominis    Coag Negative Staphylococcus    Single set isolate, possible contaminant. Contact    Microbiology if susceptibility testing clinically    indicated.    "Due to technical problems, Proteus sp. will Not be reported as part of    the BCID panel until further notice"    ***Blood Panel PCR results on this specimen are available    approximately 3 hours after the Gram stain result.***    Gram stain, PCR, and/or culture results may not always    correspond due to difference in methodologies.    ************************************************************    This PCR assay was performed using [employer].    The following targets are tested for: Enterococcus,    vancomycin resistant enterococci, Listeria monocytogenes,    coagulase negative staphylococci, S. aureus,    methicillin resistant S. aureus, Streptococcus agalactiae    (Group B), S. pneumoniae, S. pyogenes (Group A),    Acinetobacter baumannii, Enterobacter cloacae, E. coli,    Klebsiella oxytoca, K. pneumoniae, Proteus sp.,    Serratia marcescens, Haemophilus influenzae,    Neisseria meningitidis, Pseudomonas aeruginosa, Candida    albicans, C. glabrata, C krusei, C parapsilosis,    C. tropicalis and the KPC resistance gene.  Organism: Blood Culture PCR (12 Nov 2020 14:08)  Organism: Blood Culture PCR (12 Nov 2020 14:08)      RADIOLOGY & ADDITIONAL TESTS:   reviewed elctronically  ASSESSMENT/PLAN: 	     PROGRESS NOTE  Patient is a 69y old  Male who presents with a chief complaint of ALTERED MENTAL STATUS  AND LACTICEMIA (18 Nov 2020 05:36)  Chart and available morning labs /imaging are reviewed electronically , urgent issues addressed . More information  is being added upon completion of rounds , when more information is collected and management discussed with consultants , medical staff and social service/case management on the floor   OVERNIGHT  No new issues reported by medical staff . All above noted   Awaiting for placement in [facility]  facility Followed by Dr Watkins ,cleared for discharge   HPI:  68 y/o Male  from [facility] with medical  hx of Afib, HTN ,HLD ,DM ,Paranoid personality disorder, Alzheimer's dementia, sent in to ED  for combative behaviour and AMS x 1 day.  Patient was tested  +Covid yesterday 11/09 .  Pt is apparently AAOx3 at baseline.  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 Pt wants the person responsible for sending him to the ED to be fired. Pt denies any SOB, cough, GI/ symptoms. Covid test came back negative but patient found to have lactate serum elevated 3.1 ,iv hydration given Admitted for septic workup and evaluation,send blood and urine cx,serial lactate levels,monitor vitals closley,ivfs hydration,monitor urine output and renal profile,id and pulm consults called to determine need in antibacterial tx Patient developed severe agitation and combativeness in er later ,requiring  several ativan ,haldol injections .Placed on constant observation CT BRAIN -NAD .PSYCHIATRY AND NEUROLOGY consults requested .Palliative care consult requested ,to discuss advance directives and complete MOLST  (10 Nov 2020 18:10)    PAST MEDICAL & SURGICAL HISTORY:  Paranoid personality disorder    Anxiety    Dementia    Alzheimer disease    Hypertension    Hyperlipidemia    Diabetes mellitus    Afib    DM (diabetes mellitus)    DM (diabetes mellitus)        MEDICATIONS  (STANDING):  aspirin 325 milliGRAM(s) Oral daily  atorvastatin 40 milliGRAM(s) Oral at bedtime  dextrose 40% Gel 15 Gram(s) Oral once  dextrose 5%. 1000 milliLiter(s) (50 mL/Hr) IV Continuous <Continuous>  dextrose 5%. 1000 milliLiter(s) (100 mL/Hr) IV Continuous <Continuous>  dextrose 50% Injectable 25 Gram(s) IV Push once  dextrose 50% Injectable 12.5 Gram(s) IV Push once  dextrose 50% Injectable 25 Gram(s) IV Push once  diltiazem    milliGRAM(s) Oral daily  donepezil 10 milliGRAM(s) Oral at bedtime  famotidine    Tablet 20 milliGRAM(s) Oral daily  glucagon  Injectable 1 milliGRAM(s) IntraMuscular once  heparin   Injectable 5000 Unit(s) SubCutaneous every 12 hours  insulin lispro (ADMELOG) corrective regimen sliding scale   SubCutaneous three times a day before meals  insulin lispro (ADMELOG) corrective regimen sliding scale   SubCutaneous at bedtime  loratadine 10 milliGRAM(s) Oral daily  memantine 5 milliGRAM(s) Oral two times a day  metoprolol tartrate 50 milliGRAM(s) Oral two times a day  OLANZapine Disintegrating Tablet 10 milliGRAM(s) Oral three times a day    MEDICATIONS  (PRN):  acetaminophen   Tablet .. 650 milliGRAM(s) Oral every 6 hours PRN Temp greater or equal to 38C (100.4F), Mild Pain (1 - 3)  LORazepam   Injectable 0.5 milliGRAM(s) IV Push every 6 hours PRN SEVERE AGITATION  melatonin 5 milliGRAM(s) Oral at bedtime PRN Insomnia  OLANZapine Injectable 5 milliGRAM(s) IntraMuscular every 6 hours PRN agitation      OBJECTIVE    T(C): 36.3 (11-18-20 @ 05:13), Max: 36.8 (11-17-20 @ 14:53)  HR: 75 (11-18-20 @ 05:13) (70 - 90)  BP: 113/81 (11-18-20 @ 05:13) (113/81 - 140/70)  RR: 18 (11-18-20 @ 05:13) (17 - 18)  SpO2: 98% (11-18-20 @ 05:13) (95% - 98%)  Wt(kg): --  I&O's Summary  17 Nov 2020 07:01  -  18 Nov 2020 06:59  --------------------------------------------------------  IN: 760 mL / OUT: 0 mL / NET: 760 mL  REVIEW OF SYSTEMS:  ROS is unobtainable due to dementia and confusion PATIENT IS CONFUSED AND IS UNABLE TO GIVE ANY/RELIABLE HISTORY OR REVIEW OF SYSTEMS PLEASE ALSO SEE UNDER HPI AND ASSESSMENT FOR OBTAINED REVIEW OF SYSTEMS   PHYSICAL EXAM:  Appearance: NAD. VS past 24 hrs -as above   HEENT:   Moist oral mucosa. Conjunctiva clear b/l.   Neck : supple  Respiratory: Lungs CTAB.  Gastrointestinal:  Soft, nontender. No rebound. No rigidity. BS present	  Cardiovascular: RRR ,S1S2 present  Neurologic: Non-focal. Moving all extremities.  Extremities: No edema. No erythema. No calf tenderness.  Skin: No rashes, No ecchymoses, No cyanosis.	  wounds ,skin lesions-See skin assesment flow sheet   LABS:                        13.4   7.27  )-----------( 194      ( 17 Nov 2020 09:52 )             39.1     11-17    145  |  112<H>  |  25<H>  ----------------------------<  166<H>  3.7   |  27  |  0.65    Ca    9.2      17 Nov 2020 09:52      CAPILLARY BLOOD GLUCOSE      POCT Blood Glucose.: 240 mg/dL (17 Nov 2020 21:45)  POCT Blood Glucose.: 200 mg/dL (17 Nov 2020 16:32)  POCT Blood Glucose.: 151 mg/dL (17 Nov 2020 11:59)  POCT Blood Glucose.: 165 mg/dL (17 Nov 2020 07:41)          Culture - Blood (collected 16 Nov 2020 12:38)  Source: .Blood Blood  Preliminary Report (17 Nov 2020 13:01):    No growth to date.    Culture - Blood (collected 12 Nov 2020 12:38)  Source: .Blood Blood  Final Report (17 Nov 2020 13:01):    No Growth Final    Culture - Urine (collected 10 Nov 2020 22:03)  Source: .Urine Clean Catch (Midstream)  Final Report (11 Nov 2020 18:40):    No growth    Culture - Blood (collected 10 Nov 2020 11:23)  Source: .Blood Blood-Peripheral  Gram Stain (14 Nov 2020 18:56):    Growth in anaerobic bottle: Gram Positive Cocci in Clusters  Final Report (16 Nov 2020 23:09):    Growth in anaerobic bottle: Staphylococcus hominis  Organism: Staphylococcus hominis (16 Nov 2020 23:09)  Organism: Staphylococcus hominis (16 Nov 2020 23:09)    Culture - Blood (collected 10 Nov 2020 11:23)  Source: .Blood Blood-Peripheral  Gram Stain (11 Nov 2020 14:58):    Growth in anaerobic bottle: Gram Positive Cocci in Clusters  Final Report (12 Nov 2020 14:08):    Growth in anaerobic bottle: Staphylococcus hominis    Coag Negative Staphylococcus    Single set isolate, possible contaminant. Contact    Microbiology if susceptibility testing clinically    indicated.    "Due to technical problems, Proteus sp. will Not be reported as part of    the BCID panel until further notice"    ***Blood Panel PCR results on this specimen are available    approximately 3 hours after the Gram stain result.***    Gram stain, PCR, and/or culture results may not always    correspond due to difference in methodologies.    ************************************************************    This PCR assay was performed using [employer].    The following targets are tested for: Enterococcus,    vancomycin resistant enterococci, Listeria monocytogenes,    coagulase negative staphylococci, S. aureus,    methicillin resistant S. aureus, Streptococcus agalactiae    (Group B), S. pneumoniae, S. pyogenes (Group A),    Acinetobacter baumannii, Enterobacter cloacae, E. coli,    Klebsiella oxytoca, K. pneumoniae, Proteus sp.,    Serratia marcescens, Haemophilus influenzae,    Neisseria meningitidis, Pseudomonas aeruginosa, Candida    albicans, C. glabrata, C krusei, C parapsilosis,    C. tropicalis and the KPC resistance gene.  Organism: Blood Culture PCR (12 Nov 2020 14:08)  Organism: Blood Culture PCR (12 Nov 2020 14:08)      RADIOLOGY & ADDITIONAL TESTS:   reviewed elctronically  ASSESSMENT/PLAN: 	    Patient was seen and examined on a day of discharge . Plan of care , discharge medications and recommendations discussed with consultants and clearance for discharge obtained .Social service , case management  and medical staff are aware of plan. Family is notified. Discharge summary  is  prepared electronically 75minutes spent on this visit, 50% visit time spent in care co-ordination with other attendings and counselling patient  I have discussed care plan with patient and HCP,expressed understanding of problems treatment and their effect and side effects, alternatives in detail,I have asked if they have any questions and concerns and appropriately addressed them to best of my ability

## 2020-11-21 LAB
CULTURE RESULTS: SIGNIFICANT CHANGE UP
SPECIMEN SOURCE: SIGNIFICANT CHANGE UP

## 2021-06-11 NOTE — PROGRESS NOTE ADULT - SUBJECTIVE AND OBJECTIVE BOX
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  [phone]

## 2022-02-14 NOTE — PATIENT PROFILE ADULT - DOES PATIENT HAVE ADVANCE DIRECTIVE
"Deandre Moore is a 83 year old male patient that presents today in clinic for the following:    Chief Complaint   Patient presents with     RECHECK     Follow-up     The patient's allergies and medications were reviewed as noted. A set of vitals were recorded as noted without incident: /56 (BP Location: Right arm, Patient Position: Sitting, Cuff Size: Adult Regular)   Pulse 70   Resp 16   Ht 1.854 m (6' 1\")   Wt 93.9 kg (207 lb 1.6 oz)   SpO2 95%   BMI 27.32 kg/m  . The patient was asked if they had any of the following symptoms in the last forty-eight hours: (1) fever or chills, (2) cough, (3) shortness of breath or difficulty breathing, (4) fatigue, (5) muscle or body aches, (6) headache, (7) new loss of taste or smell, (8) sore throat, (9) congestion or runny nose, (10) nausea or vomiting, and (11) diarrhea. Deandre Moore denies having any of the following symptoms in the last forty-eight hours: (1) fever or chills, (2) cough, (3) shortness of breath or difficulty breathing, (4) fatigue, (5) muscle or body aches, (6) headache, (7) new loss of taste or smell, (8) sore throat, (9) congestion or runny nose, (10) nausea or vomiting, and (11) diarrhea. The patient does not have any other questions for the provider.    KAYKAY Eddy at 12:04 PM on 2/14/2022  Primary Care Center  [facility]  "
no

## 2022-03-16 NOTE — PATIENT PROFILE ADULT - OVER THE PAST TWO WEEKS, HAVE YOU FELT LITTLE INTEREST OR PLEASURE IN DOING THINGS?
GASTROENTEROLOGY CONSULTATION      Date of Admission:  3/16/2022           Reason for Consultation:   We were asked to evaluate this patient with hx liver transplant and make recommendations about her immunosuppression           ASSESSMENT AND RECOMMENDATIONS:   Assessment:  57 year old female with a history of Liver transplant (10/2010) for Hep C, obesity, SURI and CKD Stage IIIa, admitted to the hospital for SBO and also has elevated creatinine. GI is consulted for management of her liver transplant immunosuppression.      #CASTILLO on CKD IIIa  #SBO  For her elevated Cr (1.37), is most likely prerenal precipitated by SBO and decreased PO intake. We will agree with conservative management of SBO (NPO, NG suction, IVF, diet advancement as tolerated). Her UA shows some WBC, if patient has symptoms for UTI, will suggest treating her with Abx as that may also be contributing to her elevated cr.     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 She underwent a percutaneous liver biopsy on 01/04/2011 which showed mild-to-moderate rejection. She was treated with IV steroids 500 mg bolus at that time. She was on tacrolimus initially, which was changed to cyclosporine due to concerns neurotoxicity in 3/2011. She has been maintained on cyclosporine and mycophenolate since then and follows with Dr. Anne in the clinic. During her last visit on 7/2021, cyclosporine dose was adjusted to 75mg AM and 50mg PM because of her elevated Cr and cyclosporine levels of 150.   We will suggest to check cyclosporine trough levels in the AM, and it should be repeated 3 times a week. We will follow-up the levels and adjust dose accordingly. We will maintain her on the current dose in the meantime.     Recommendations  -We will get cyclosporine trough levels in the AM. Follow up cyclosporine troughs levels (three times a week). We adjust/maintain the dose of cyclosporine  accordingly. In the meantime, continue with cyclosporine 75mg AM and 50mg PM and mycophenolate.   -Agree with conservative management of SBO, keep NPO, IVF, maintain NG tube with suction, advance diet as tolerated.   -GI will continue to monitor immunosuppression regimen       Gastroenterology outpatient follow up recommendations: Patient will follow-up with Dr. Anne upon discharge.    Thank you for involving us in this patient's care. Please do not hesitate to contact the GI service with any questions or concerns.     Pt care plan discussed with Dr. Leventhal, GI staff physician.    CAIT RODRIGUEZ MD    -------------------------------------------------------------------------------------------------------------------           History of Present Illness:   Flor Navarro is a 57 year old female with a history of  HTN and liver transplant (2010) for Hep C, obesity, SURI and CKD Stage IIIa who is admitted on 3/16/2022 for evaluation of abdominal pain found to have small bowel obstruction on CT A/P. Her labs also show elevated creatinine of 1.37. GI was consulted for management of her immunosuppression for liver transplant.     Patient seen at bedside. She was sleeping comfortably with stable vitals.            Past Medical History:   Reviewed and edited as appropriate  Past Medical History:   Diagnosis Date     Anemia in CKD (chronic kidney disease)      Cataract      CKD (chronic kidney disease) stage 3, GFR 30-59 ml/min (H)      Hepatitis C     cleared virus spontaneously 2013     High risk medication use      Hypertension, renal      Immunosuppressed status (H)      Liver replaced by transplant (H) 01/01/2010     Osteoporosis      Recurrent pregnancy loss without current pregnancy      Recurrent UTI 07/12/2021     Stroke, hemorrhagic (H) 01/01/2008     Syncope      Unspecified viral hepatitis C without hepatic coma             Past Surgical History:   Reviewed and edited as appropriate   Past Surgical History:    Procedure Laterality Date     CATARACT IOL, RT/LT Right 2/18/15      SECTION       Incisional Hernia Repair  2004     INSERT SHUNT PORTAL TRANSJUGULAR INTRAHEPTIC  2005    shunt placement for liver failure     LAPAROSCOPIC SALPINGO-OOPHORECTOMY      left     NECK SURGERY  2010    fracture, in halo x 7months     TRANSPLANT LIVER RECIPIENT  DONOR  10/26/10     Upper GI Endoscopy with Band Ligation of Esoph/Gastric Varic. .              Previous Endoscopy:   No results found. However, due to the size of the patient record, not all encounters were searched. Please check Results Review for a complete set of results.         Social History:   Reviewed and edited as appropriate  Social History     Socioeconomic History     Marital status: Single     Spouse name: Not on file     Number of children: Not on file     Years of education: Not on file     Highest education level: Not on file   Occupational History     Not on file   Tobacco Use     Smoking status: Never Smoker     Smokeless tobacco: Never Used   Substance and Sexual Activity     Alcohol use: No     Drug use: No     Sexual activity: Not Currently   Other Topics Concern     Parent/sibling w/ CABG, MI or angioplasty before 65F 55M? Not Asked      Service No     Blood Transfusions Yes     Caffeine Concern No     Occupational Exposure No     Hobby Hazards No     Sleep Concern No     Stress Concern Yes     Comment: needs help at home for cares     Weight Concern Yes     Comment: wants to lose weight not gain weight     Special Diet No     Back Care Yes     Comment: uses a patch to relieve pain     Exercise Not Asked     Bike Helmet Not Asked     Seat Belt Yes     Self-Exams Not Asked   Social History Narrative    One son Carleen        GynHx:             Gets transportation via insurance - needs assistance due to unsteady gait from CVA     Social Determinants of Health     Financial Resource Strain: High Risk     Difficulty of Paying  Living Expenses: Very hard   Food Insecurity: Food Insecurity Present     Worried About Running Out of Food in the Last Year: Sometimes true     Ran Out of Food in the Last Year: Sometimes true   Transportation Needs: Unmet Transportation Needs     Lack of Transportation (Medical): Yes     Lack of Transportation (Non-Medical): Yes   Physical Activity: Not on file   Stress: Stress Concern Present     Feeling of Stress : Rather much   Social Connections: Socially Isolated     Frequency of Communication with Friends and Family: Once a week     Frequency of Social Gatherings with Friends and Family: Once a week     Attends Scientologist Services: Never     Active Member of Clubs or Organizations: No     Attends Club or Organization Meetings: Never     Marital Status:    Intimate Partner Violence: Not At Risk     Fear of Current or Ex-Partner: No     Emotionally Abused: No     Physically Abused: No     Sexually Abused: No   Housing Stability: Not on file            Family History:   Reviewed and edited as appropriate  Family History   Problem Relation Age of Onset     Hepatitis Other         Hep C, still in [place]     Cerebrovascular Disease Other      Cancer No family hx of      Diabetes No family hx of      Hypertension No family hx of      Thyroid Disease No family hx of      Glaucoma No family hx of      Macular Degeneration No family hx of             Allergies:   Reviewed and edited as appropriate     Allergies   Allergen Reactions     Aspirin      3/31/16 Per pt, tolerates 81 mg daily dose without ADR.     325 mg dose caused itchiness and hives.     Clarithromycin      Allergic reaction         Contrast Dye      Iodine      Pcn [Penicillins]             Medications:     Current Facility-Administered Medications   Medication     albuterol (PROVENTIL) neb solution 2.5 mg     albuterol (PROVENTIL) neb solution 2.5 mg     amLODIPine (NORVASC) tablet 10 mg     cycloSPORINE modified (GENERIC EQUIVALENT) capsule 75 mg     And     cycloSPORINE modified (GENERIC EQUIVALENT) capsule 50 mg     HYDROmorphone (DILAUDID) injection 0.2-0.4 mg     lidocaine (LMX4) cream     lidocaine 1 % 0.1-1 mL     melatonin tablet 6 mg     mycophenolate (GENERIC EQUIVALENT) capsule 500 mg     olopatadine (PATANOL) 0.1 % ophthalmic solution 1 drop     ondansetron (ZOFRAN-ODT) ODT tab 4 mg    Or     ondansetron (ZOFRAN) injection 4 mg     pantoprazole (PROTONIX) EC tablet 40 mg     sodium chloride (PF) 0.9% PF flush 3 mL     sodium chloride (PF) 0.9% PF flush 3 mL     sodium chloride 0.9% infusion     Current Outpatient Medications   Medication Sig     ACE/ARB/ARNI NOT PRESCRIBED (INTENTIONAL) Please choose reason not prescribed from choices below.     acetaminophen (TYLENOL) 500 MG tablet Take 1 tablet (500 mg) by mouth 3 times daily as needed for mild pain     albuterol (ACCUNEB) 0.63 MG/3ML neb solution Take 3 mLs (0.63 mg) by nebulization every 4 hours (while awake)     albuterol (PROAIR HFA/PROVENTIL HFA/VENTOLIN HFA) 108 (90 Base) MCG/ACT inhaler Inhale 2 puffs into the lungs 4 times daily     alendronate (FOSAMAX) 70 MG tablet Take 1 tablet (70 mg) by mouth every 7 days     amLODIPine (NORVASC) 10 MG tablet Take 1 tablet (10 mg) by mouth daily     calcitRIOL (ROCALTROL) 0.25 MCG capsule Take 1 capsule (0.25 mcg) by mouth daily     calcium carbonate 600 mg-vitamin D 400 units (CALTRATE) 600-400 MG-UNIT per tablet Take 1 tablet by mouth 2 times daily     capsaicin (ZOSTRIX) 0.025 % external cream Apply three times a day as needed to legs. Wash hands after applying.     carboxymethylcellulose PF (REFRESH PLUS) 0.5 % ophthalmic solution Place 1 drop into both eyes 4 times daily as needed for dry eyes     cetirizine (ZYRTEC) 10 MG tablet Take 1 tablet (10 mg) by mouth daily     COMPOUNDED NON-CONTROLLED SUBSTANCE (CMPD RX) - PHARMACY TO MIX COMPOUNDED MEDICATION Equal parts of 2% Benadryl, 2% vicious lidocaine and TC suspension maalox,  Swish and spit 5  ml every 6 hrs as needed for mouth pain     cycloSPORINE modified (GENERIC EQUIVALENT) 25 MG capsule Take 3 capsules (75 mg) by mouth every morning AND 2 capsules (50 mg) At Bedtime.     Denture Care Products (EFFERDENT DENTURE CLEANSER) TBEF Use nightly to clean oral appliance     dimethicone (AVEENO DAILY MOISTURIZING) 1.3 % LOTN lotion Externally apply topically daily     febuxostat (ULORIC) 40 MG TABS tablet Take 1 tablet (40 mg) by mouth daily     furosemide (LASIX) 20 MG tablet Take 1 tablet (20 mg) by mouth 2 times daily     gabapentin (NEURONTIN) 300 MG capsule Take 1 capsule (300 mg) by mouth At Bedtime     lidocaine (XYLOCAINE) 5 % external ointment Apply topically as needed for moderate pain     magnesium hydroxide (MILK OF MAGNESIA) 400 MG/5ML suspension Take 15 mLs by mouth daily as needed for constipation or heartburn     melatonin 3 MG tablet TAKE ONE TABLET BY MOUTH EVERY NIGHT AS NEEDED FOR SLEEP.     mineral oil-white petrolatum (EUCERIN) CREA cream Apply topically 2 times daily     Multiple Vitamin (DAILY-SUMA MULTIVITAMIN) TABS Take 1 tablet by mouth every morning     multivitamin w/minerals (MULTI-VITAMIN) tablet Take 1 tablet by mouth daily Needs 4 month supply for International Travel     mycophenolate (GENERIC EQUIVALENT) 250 MG capsule TAKE TWO CAPSULES BY MOUTH EVERY 12 HOURS     neomycin-polymyxin-HC 1 % Place 4 drops in ear(s) 2 times daily     olopatadine (PATADAY) 0.2 % ophthalmic solution Place 1 drop into both eyes daily     omeprazole (PRILOSEC) 20 MG DR capsule Take 1 capsule (20 mg) by mouth 2 times daily el     order for DME Equipment being ordered: compression stockings bilateral  Please measure and fit patient for stockings   Strength 15-30mmHg  Disp 2 pairs ( 4 socks)  1 refill over the time of 1 year     order for DME Equipment being ordered:   1) cane  2) compression stockings 15mmHg, 3 pairs  Dx: gait stability, falls, edema     order for DME Equipment being ordered:  "Nebulizer with adult mask and tubing     psyllium (METAMUCIL/KONSYL) 0.52 g capsule Take 2 capsules by mouth 2 times daily     senna-docusate (SENOKOT-S/PERICOLACE) 8.6-50 MG tablet Take 2 tablets by mouth 2 times daily Needs 4 month supply for International Travel     STATIN NOT PRESCRIBED, INTENTIONAL, Not prescribed     Vitamin D3 (CHOLECALCIFEROL) 25 mcg (1000 units) tablet Take 1 tablet (25 mcg) by mouth daily             Review of Systems:     A complete 10 point review of systems was performed and is negative except as noted in the HPI           Physical Exam:   /75   Pulse 66   Temp 98.3  F (36.8  C) (Oral)   Resp 24   Ht 1.549 m (5' 1\")   Wt 104.3 kg (230 lb)   LMP  (LMP Unknown)   SpO2 97%   BMI 43.46 kg/m    Wt:   Wt Readings from Last 2 Encounters:   03/16/22 104.3 kg (230 lb)   01/28/22 106.1 kg (234 lb)      Constitutional: No acute distress, sleeping comfortably in bed, no icterus noted. Breathing comfortably on NC.           Data:   Labs and imaging below were independently reviewed and interpreted    BMP  Recent Labs   Lab 03/16/22  0315      POTASSIUM 4.9   CHLORIDE 105   JUAN 9.3   CO2 31   BUN 26   CR 1.37*   *     CBC  Recent Labs   Lab 03/16/22  0315   WBC 7.2   RBC 4.80   HGB 14.0   HCT 44.3   MCV 92   MCH 29.2   MCHC 31.6   RDW 13.5        INRNo lab results found in last 7 days.  LFTs  Recent Labs   Lab 03/16/22  0315   ALKPHOS 92   AST 29   ALT 33   BILITOTAL 0.8   PROTTOTAL 8.2   ALBUMIN 3.7      PANC  Recent Labs   Lab 03/16/22  0315   LIPASE 155       Imaging:     CT Abd 3/16:    IMPRESSION:   1.  Dilatation of multiple small bowel loops in the mid abdomen with mesenteric edema and fecal material throughout the lumen. Findings compatible with small bowel obstruction. A discrete transition point difficult to visualize but likely in the anterior   lower mid abdomen.     2.  Severe renal atrophy on the right hip. Mild dilatation of the distal left ureter down " to the level of the iliac vessels where there is abrupt tapering to the normal caliber ureter. This could be due to ureteral stricture. No hydronephrosis.     3.  Hepatic cirrhosis.     4.  Diffuse enlargement of the splenic artery. This is somewhat difficult to visualize due to lack of IV contrast material and adjacent splenic vein. Consider follow-up contrast enhanced study for further evaluation   pt confuse

## 2022-03-21 ENCOUNTER — INPATIENT (INPATIENT)
Facility: HOSPITAL | Age: 72
LOS: 6 days | Discharge: NURSING, REHAB OR LONG-TERM CARE | DRG: 871 | End: 2022-03-28
Attending: INTERNAL MEDICINE | Admitting: INTERNAL MEDICINE
Payer: MEDICARE

## 2022-03-21 VITALS
OXYGEN SATURATION: 97 % | WEIGHT: 121.03 LBS | SYSTOLIC BLOOD PRESSURE: 127 MMHG | TEMPERATURE: 102 F | DIASTOLIC BLOOD PRESSURE: 80 MMHG | HEIGHT: 71 IN | RESPIRATION RATE: 22 BRPM | HEART RATE: 108 BPM

## 2022-03-21 DIAGNOSIS — A41.9 SEPSIS, UNSPECIFIED ORGANISM: ICD-10-CM

## 2022-03-21 PROBLEM — F41.9 ANXIETY DISORDER, UNSPECIFIED: Chronic | Status: ACTIVE | Noted: 2020-11-10

## 2022-03-21 PROBLEM — F03.90 UNSPECIFIED DEMENTIA WITHOUT BEHAVIORAL DISTURBANCE: Chronic | Status: ACTIVE | Noted: 2020-11-10

## 2022-03-21 PROBLEM — F60.0 PARANOID PERSONALITY DISORDER: Chronic | Status: ACTIVE | Noted: 2020-11-10

## 2022-03-21 PROBLEM — G30.9 ALZHEIMER'S DISEASE, UNSPECIFIED: Chronic | Status: ACTIVE | Noted: 2020-11-10

## 2022-03-21 PROBLEM — I48.91 UNSPECIFIED ATRIAL FIBRILLATION: Chronic | Status: ACTIVE | Noted: 2020-11-10

## 2022-03-21 PROBLEM — I10 ESSENTIAL (PRIMARY) HYPERTENSION: Chronic | Status: ACTIVE | Noted: 2020-11-10

## 2022-03-21 PROBLEM — E78.5 HYPERLIPIDEMIA, UNSPECIFIED: Chronic | Status: ACTIVE | Noted: 2020-11-10

## 2022-03-21 LAB
ALBUMIN SERPL ELPH-MCNC: 3.3 G/DL — SIGNIFICANT CHANGE UP (ref 3.3–5)
ALP SERPL-CCNC: 69 U/L — SIGNIFICANT CHANGE UP (ref 30–120)
ALT FLD-CCNC: 20 U/L DA — SIGNIFICANT CHANGE UP (ref 10–60)
ANION GAP SERPL CALC-SCNC: 10 MMOL/L — SIGNIFICANT CHANGE UP (ref 5–17)
ANION GAP SERPL CALC-SCNC: 12 MMOL/L — SIGNIFICANT CHANGE UP (ref 5–17)
APPEARANCE UR: CLEAR — SIGNIFICANT CHANGE UP
APTT BLD: 24.5 SEC — LOW (ref 27.5–35.5)
AST SERPL-CCNC: 26 U/L — SIGNIFICANT CHANGE UP (ref 10–40)
BACTERIA # UR AUTO: ABNORMAL
BASOPHILS # BLD AUTO: 0.02 K/UL — SIGNIFICANT CHANGE UP (ref 0–0.2)
BASOPHILS NFR BLD AUTO: 0.2 % — SIGNIFICANT CHANGE UP (ref 0–2)
BILIRUB SERPL-MCNC: 0.6 MG/DL — SIGNIFICANT CHANGE UP (ref 0.2–1.2)
BILIRUB UR-MCNC: NEGATIVE — SIGNIFICANT CHANGE UP
BUN SERPL-MCNC: 58 MG/DL — HIGH (ref 7–23)
BUN SERPL-MCNC: 61 MG/DL — HIGH (ref 7–23)
CALCIUM SERPL-MCNC: 8.1 MG/DL — LOW (ref 8.4–10.5)
CALCIUM SERPL-MCNC: 9.3 MG/DL — SIGNIFICANT CHANGE UP (ref 8.4–10.5)
CHLORIDE SERPL-SCNC: 134 MMOL/L — HIGH (ref 96–108)
CHLORIDE SERPL-SCNC: 134 MMOL/L — HIGH (ref 96–108)
CO2 SERPL-SCNC: 24 MMOL/L — SIGNIFICANT CHANGE UP (ref 22–31)
CO2 SERPL-SCNC: 26 MMOL/L — SIGNIFICANT CHANGE UP (ref 22–31)
COLOR SPEC: YELLOW — SIGNIFICANT CHANGE UP
COMMENT - URINE: SIGNIFICANT CHANGE UP
CREAT SERPL-MCNC: 1.22 MG/DL — SIGNIFICANT CHANGE UP (ref 0.5–1.3)
CREAT SERPL-MCNC: 1.28 MG/DL — SIGNIFICANT CHANGE UP (ref 0.5–1.3)
DIFF PNL FLD: ABNORMAL
EGFR: 60 ML/MIN/1.73M2 — SIGNIFICANT CHANGE UP
EGFR: 63 ML/MIN/1.73M2 — SIGNIFICANT CHANGE UP
EOSINOPHIL # BLD AUTO: 0 K/UL — SIGNIFICANT CHANGE UP (ref 0–0.5)
EOSINOPHIL NFR BLD AUTO: 0 % — SIGNIFICANT CHANGE UP (ref 0–6)
EPI CELLS # UR: NEGATIVE — SIGNIFICANT CHANGE UP
GLUCOSE BLDC GLUCOMTR-MCNC: 254 MG/DL — HIGH (ref 70–99)
GLUCOSE BLDC GLUCOMTR-MCNC: 262 MG/DL — HIGH (ref 70–99)
GLUCOSE SERPL-MCNC: 207 MG/DL — HIGH (ref 70–99)
GLUCOSE SERPL-MCNC: 309 MG/DL — HIGH (ref 70–99)
GLUCOSE UR QL: 1000 MG/DL
HCOV PNL SPEC NAA+PROBE: DETECTED
HCT VFR BLD CALC: 46.3 % — SIGNIFICANT CHANGE UP (ref 39–50)
HGB BLD-MCNC: 14 G/DL — SIGNIFICANT CHANGE UP (ref 13–17)
IMM GRANULOCYTES NFR BLD AUTO: 0.5 % — SIGNIFICANT CHANGE UP (ref 0–1.5)
INR BLD: 0.99 RATIO — SIGNIFICANT CHANGE UP (ref 0.88–1.16)
KETONES UR-MCNC: ABNORMAL
LACTATE SERPL-SCNC: 1.3 MMOL/L — SIGNIFICANT CHANGE UP (ref 0.7–2)
LEUKOCYTE ESTERASE UR-ACNC: ABNORMAL
LYMPHOCYTES # BLD AUTO: 1.8 K/UL — SIGNIFICANT CHANGE UP (ref 1–3.3)
LYMPHOCYTES # BLD AUTO: 13.9 % — SIGNIFICANT CHANGE UP (ref 13–44)
MCHC RBC-ENTMCNC: 30.2 GM/DL — LOW (ref 32–36)
MCHC RBC-ENTMCNC: 30.2 PG — SIGNIFICANT CHANGE UP (ref 27–34)
MCV RBC AUTO: 99.8 FL — SIGNIFICANT CHANGE UP (ref 80–100)
MONOCYTES # BLD AUTO: 1.07 K/UL — HIGH (ref 0–0.9)
MONOCYTES NFR BLD AUTO: 8.3 % — SIGNIFICANT CHANGE UP (ref 2–14)
NEUTROPHILS # BLD AUTO: 9.98 K/UL — HIGH (ref 1.8–7.4)
NEUTROPHILS NFR BLD AUTO: 77.1 % — HIGH (ref 43–77)
NITRITE UR-MCNC: NEGATIVE — SIGNIFICANT CHANGE UP
NRBC # BLD: 0 /100 WBCS — SIGNIFICANT CHANGE UP (ref 0–0)
PH UR: 5 — SIGNIFICANT CHANGE UP (ref 5–8)
PLATELET # BLD AUTO: 139 K/UL — LOW (ref 150–400)
POTASSIUM SERPL-MCNC: 3.4 MMOL/L — LOW (ref 3.5–5.3)
POTASSIUM SERPL-MCNC: 3.4 MMOL/L — LOW (ref 3.5–5.3)
POTASSIUM SERPL-SCNC: 3.4 MMOL/L — LOW (ref 3.5–5.3)
POTASSIUM SERPL-SCNC: 3.4 MMOL/L — LOW (ref 3.5–5.3)
PROT SERPL-MCNC: 7.5 G/DL — SIGNIFICANT CHANGE UP (ref 6–8.3)
PROT UR-MCNC: 100 MG/DL
PROTHROM AB SERPL-ACNC: 11.4 SEC — SIGNIFICANT CHANGE UP (ref 10.5–13.4)
RAPID RVP RESULT: DETECTED
RBC # BLD: 4.64 M/UL — SIGNIFICANT CHANGE UP (ref 4.2–5.8)
RBC # FLD: 15.9 % — HIGH (ref 10.3–14.5)
RBC CASTS # UR COMP ASSIST: ABNORMAL /HPF (ref 0–4)
SARS-COV-2 RNA SPEC QL NAA+PROBE: SIGNIFICANT CHANGE UP
SODIUM SERPL-SCNC: 168 MMOL/L — CRITICAL HIGH (ref 135–145)
SODIUM SERPL-SCNC: 172 MMOL/L — CRITICAL HIGH (ref 135–145)
SP GR SPEC: 1.02 — SIGNIFICANT CHANGE UP (ref 1.01–1.02)
UROBILINOGEN FLD QL: NEGATIVE MG/DL — SIGNIFICANT CHANGE UP
WBC # BLD: 12.93 K/UL — HIGH (ref 3.8–10.5)
WBC # FLD AUTO: 12.93 K/UL — HIGH (ref 3.8–10.5)
WBC UR QL: ABNORMAL

## 2022-03-21 PROCEDURE — 93010 ELECTROCARDIOGRAM REPORT: CPT | Mod: 76

## 2022-03-21 PROCEDURE — 76705 ECHO EXAM OF ABDOMEN: CPT | Mod: 26,RT

## 2022-03-21 PROCEDURE — 99223 1ST HOSP IP/OBS HIGH 75: CPT

## 2022-03-21 PROCEDURE — 71045 X-RAY EXAM CHEST 1 VIEW: CPT | Mod: 26

## 2022-03-21 PROCEDURE — 99285 EMERGENCY DEPT VISIT HI MDM: CPT

## 2022-03-21 PROCEDURE — 74177 CT ABD & PELVIS W/CONTRAST: CPT | Mod: 26,MA

## 2022-03-21 PROCEDURE — 71260 CT THORAX DX C+: CPT | Mod: 26,MA

## 2022-03-21 PROCEDURE — 70450 CT HEAD/BRAIN W/O DYE: CPT | Mod: 26,MA

## 2022-03-21 RX ORDER — INSULIN LISPRO 100/ML
VIAL (ML) SUBCUTANEOUS EVERY 6 HOURS
Refills: 0 | Status: DISCONTINUED | OUTPATIENT
Start: 2022-03-21 | End: 2022-03-25

## 2022-03-21 RX ORDER — DEXTROSE 50 % IN WATER 50 %
25 SYRINGE (ML) INTRAVENOUS ONCE
Refills: 0 | Status: DISCONTINUED | OUTPATIENT
Start: 2022-03-21 | End: 2022-03-28

## 2022-03-21 RX ORDER — SODIUM CHLORIDE 9 MG/ML
1000 INJECTION, SOLUTION INTRAVENOUS
Refills: 0 | Status: DISCONTINUED | OUTPATIENT
Start: 2022-03-21 | End: 2022-03-28

## 2022-03-21 RX ORDER — IPRATROPIUM/ALBUTEROL SULFATE 18-103MCG
3 AEROSOL WITH ADAPTER (GRAM) INHALATION EVERY 6 HOURS
Refills: 0 | Status: DISCONTINUED | OUTPATIENT
Start: 2022-03-21 | End: 2022-03-28

## 2022-03-21 RX ORDER — PIPERACILLIN AND TAZOBACTAM 4; .5 G/20ML; G/20ML
3.38 INJECTION, POWDER, LYOPHILIZED, FOR SOLUTION INTRAVENOUS ONCE
Refills: 0 | Status: COMPLETED | OUTPATIENT
Start: 2022-03-21 | End: 2022-03-21

## 2022-03-21 RX ORDER — METOPROLOL TARTRATE 50 MG
2.5 TABLET ORAL EVERY 6 HOURS
Refills: 0 | Status: DISCONTINUED | OUTPATIENT
Start: 2022-03-21 | End: 2022-03-22

## 2022-03-21 RX ORDER — SODIUM CHLORIDE 9 MG/ML
1000 INJECTION, SOLUTION INTRAVENOUS
Refills: 0 | Status: DISCONTINUED | OUTPATIENT
Start: 2022-03-21 | End: 2022-03-22

## 2022-03-21 RX ORDER — ACETAMINOPHEN 500 MG
1000 TABLET ORAL ONCE
Refills: 0 | Status: COMPLETED | OUTPATIENT
Start: 2022-03-21 | End: 2022-03-21

## 2022-03-21 RX ORDER — DEXTROSE 50 % IN WATER 50 %
15 SYRINGE (ML) INTRAVENOUS ONCE
Refills: 0 | Status: DISCONTINUED | OUTPATIENT
Start: 2022-03-21 | End: 2022-03-28

## 2022-03-21 RX ORDER — DEXTROSE 50 % IN WATER 50 %
12.5 SYRINGE (ML) INTRAVENOUS ONCE
Refills: 0 | Status: DISCONTINUED | OUTPATIENT
Start: 2022-03-21 | End: 2022-03-28

## 2022-03-21 RX ORDER — VANCOMYCIN HCL 1 G
1000 VIAL (EA) INTRAVENOUS ONCE
Refills: 0 | Status: COMPLETED | OUTPATIENT
Start: 2022-03-21 | End: 2022-03-21

## 2022-03-21 RX ORDER — ACETAMINOPHEN 500 MG
650 TABLET ORAL ONCE
Refills: 0 | Status: DISCONTINUED | OUTPATIENT
Start: 2022-03-21 | End: 2022-03-21

## 2022-03-21 RX ORDER — PIPERACILLIN AND TAZOBACTAM 4; .5 G/20ML; G/20ML
3.38 INJECTION, POWDER, LYOPHILIZED, FOR SOLUTION INTRAVENOUS EVERY 8 HOURS
Refills: 0 | Status: DISCONTINUED | OUTPATIENT
Start: 2022-03-21 | End: 2022-03-28

## 2022-03-21 RX ORDER — METOPROLOL TARTRATE 50 MG
2.5 TABLET ORAL ONCE
Refills: 0 | Status: COMPLETED | OUTPATIENT
Start: 2022-03-21 | End: 2022-03-21

## 2022-03-21 RX ORDER — GLUCAGON INJECTION, SOLUTION 0.5 MG/.1ML
1 INJECTION, SOLUTION SUBCUTANEOUS ONCE
Refills: 0 | Status: DISCONTINUED | OUTPATIENT
Start: 2022-03-21 | End: 2022-03-28

## 2022-03-21 RX ORDER — FAMOTIDINE 10 MG/ML
20 INJECTION INTRAVENOUS DAILY
Refills: 0 | Status: DISCONTINUED | OUTPATIENT
Start: 2022-03-21 | End: 2022-03-25

## 2022-03-21 RX ORDER — SODIUM CHLORIDE 9 MG/ML
300 INJECTION INTRAMUSCULAR; INTRAVENOUS; SUBCUTANEOUS ONCE
Refills: 0 | Status: COMPLETED | OUTPATIENT
Start: 2022-03-21 | End: 2022-03-21

## 2022-03-21 RX ORDER — ACETAMINOPHEN 500 MG
650 TABLET ORAL EVERY 6 HOURS
Refills: 0 | Status: DISCONTINUED | OUTPATIENT
Start: 2022-03-21 | End: 2022-03-25

## 2022-03-21 RX ORDER — SODIUM CHLORIDE 9 MG/ML
700 INJECTION INTRAMUSCULAR; INTRAVENOUS; SUBCUTANEOUS ONCE
Refills: 0 | Status: COMPLETED | OUTPATIENT
Start: 2022-03-21 | End: 2022-03-21

## 2022-03-21 RX ORDER — SODIUM CHLORIDE 9 MG/ML
1000 INJECTION INTRAMUSCULAR; INTRAVENOUS; SUBCUTANEOUS ONCE
Refills: 0 | Status: COMPLETED | OUTPATIENT
Start: 2022-03-21 | End: 2022-03-21

## 2022-03-21 RX ORDER — ENOXAPARIN SODIUM 100 MG/ML
40 INJECTION SUBCUTANEOUS EVERY 24 HOURS
Refills: 0 | Status: DISCONTINUED | OUTPATIENT
Start: 2022-03-21 | End: 2022-03-21

## 2022-03-21 RX ORDER — SODIUM CHLORIDE 9 MG/ML
1000 INJECTION, SOLUTION INTRAVENOUS
Refills: 0 | Status: DISCONTINUED | OUTPATIENT
Start: 2022-03-21 | End: 2022-03-21

## 2022-03-21 RX ORDER — ENOXAPARIN SODIUM 100 MG/ML
30 INJECTION SUBCUTANEOUS EVERY 24 HOURS
Refills: 0 | Status: DISCONTINUED | OUTPATIENT
Start: 2022-03-21 | End: 2022-03-28

## 2022-03-21 RX ADMIN — Medication 1000 MILLIGRAM(S): at 13:41

## 2022-03-21 RX ADMIN — SODIUM CHLORIDE 1000 MILLILITER(S): 9 INJECTION INTRAMUSCULAR; INTRAVENOUS; SUBCUTANEOUS at 13:26

## 2022-03-21 RX ADMIN — SODIUM CHLORIDE 700 MILLILITER(S): 9 INJECTION INTRAMUSCULAR; INTRAVENOUS; SUBCUTANEOUS at 14:00

## 2022-03-21 RX ADMIN — PIPERACILLIN AND TAZOBACTAM 200 GRAM(S): 4; .5 INJECTION, POWDER, LYOPHILIZED, FOR SOLUTION INTRAVENOUS at 16:59

## 2022-03-21 RX ADMIN — SODIUM CHLORIDE 1000 MILLILITER(S): 9 INJECTION INTRAMUSCULAR; INTRAVENOUS; SUBCUTANEOUS at 14:20

## 2022-03-21 RX ADMIN — SODIUM CHLORIDE 300 MILLILITER(S): 9 INJECTION INTRAMUSCULAR; INTRAVENOUS; SUBCUTANEOUS at 15:51

## 2022-03-21 RX ADMIN — Medication 2.5 MILLIGRAM(S): at 16:58

## 2022-03-21 RX ADMIN — SODIUM CHLORIDE 700 MILLILITER(S): 9 INJECTION INTRAMUSCULAR; INTRAVENOUS; SUBCUTANEOUS at 14:57

## 2022-03-21 RX ADMIN — SODIUM CHLORIDE 300 MILLILITER(S): 9 INJECTION INTRAMUSCULAR; INTRAVENOUS; SUBCUTANEOUS at 14:57

## 2022-03-21 RX ADMIN — Medication 2.5 MILLIGRAM(S): at 21:43

## 2022-03-21 RX ADMIN — Medication 250 MILLIGRAM(S): at 16:59

## 2022-03-21 RX ADMIN — Medication 3: at 23:51

## 2022-03-21 RX ADMIN — Medication 400 MILLIGRAM(S): at 13:26

## 2022-03-21 RX ADMIN — Medication 650 MILLIGRAM(S): at 23:53

## 2022-03-21 RX ADMIN — Medication 3: at 21:28

## 2022-03-21 RX ADMIN — PIPERACILLIN AND TAZOBACTAM 25 GRAM(S): 4; .5 INJECTION, POWDER, LYOPHILIZED, FOR SOLUTION INTRAVENOUS at 23:53

## 2022-03-21 RX ADMIN — SODIUM CHLORIDE 75 MILLILITER(S): 9 INJECTION, SOLUTION INTRAVENOUS at 18:50

## 2022-03-21 NOTE — ED PROVIDER NOTE - NS ED ATTENDING STATEMENT MOD
This was a shared visit with the ADAM. I reviewed and verified the documentation and independently performed the documented:

## 2022-03-21 NOTE — PATIENT PROFILE ADULT - FALL HARM RISK - PATIENT NEEDS ASSISTANCE
Toileting Transposition Flap Text: The defect edges were debeveled with a #15 scalpel blade.  Given the location of the defect and the proximity to free margins a transposition flap was deemed most appropriate.  Using a sterile surgical marker, an appropriate transposition flap was drawn incorporating the defect.    The area thus outlined was incised deep to adipose tissue with a #15 scalpel blade.  The skin margins were undermined to an appropriate distance in all directions utilizing iris scissors.

## 2022-03-21 NOTE — H&P ADULT - NSHPPHYSICALEXAM_GEN_ALL_CORE
PHYSICAL EXAM:  Vital Signs Last 24 Hrs  T(C): 37.8 (21 Mar 2022 14:00), Max: 38.7 (21 Mar 2022 12:53)  T(F): 100.1 (21 Mar 2022 14:00), Max: 101.7 (21 Mar 2022 12:53)  HR: 91 (21 Mar 2022 15:48) (90 - 144)  BP: 134/87 (21 Mar 2022 15:48) (122/80 - 134/87)  RR: 15 (21 Mar 2022 15:48) (15 - 22)  SpO2: 97% (21 Mar 2022 15:48) (94% - 97%)    GENERAL: Lethargic, protecting airway, cachectic  HEAD:  Atraumatic, Normocephalic  EYES: PERRLA, conjunctiva and sclera clear  ENMT: Dry mucous membranes, no lesions  NECK: Supple, No JVD  NERVOUS SYSTEM:  Eye opening to voice, moving all four extremities  CHEST/LUNG: CTA B/L; No rales, rhonchi, wheezing, or rubs  HEART: Irregular, rate regular, + S1/S2  ABDOMEN: Concave, soft, nontender, nondistended, bowel sounds present  EXTREMITIES:  2+ peripheral pulses, no clubbing, cyanosis, or edema

## 2022-03-21 NOTE — ED PROVIDER NOTE - CLINICAL SUMMARY MEDICAL DECISION MAKING FREE TEXT BOX
72 y/o M w/ PMHx of Afib, DM, alzheimer's, dementia, anxiety, nonverbal pt BIBEMS  from SNF fever AMS x 2 days. Pt had CXR at facility w/ no acute findings. Pt given zosyn at SNF. Plan EKG, CXR, labs, Tylenol, IV fluids. 70 y/o M w/ PMHx of Afib, DM, alzheimer's, dementia, anxiety, nonverbal pt BIBEMS  from SNF fever AMS x 2 days. Pt had CXR at facility w/ no acute findings. Pt given zosyn at SNF. Plan EKG, CXR, CT, labs, Tylenol, IV fluids.

## 2022-03-21 NOTE — ED ADULT NURSE NOTE - NSICDXPASTMEDICALHX_GEN_ALL_CORE_FT
PAST MEDICAL HISTORY:  Afib     Alzheimer disease     Anxiety     Dementia     Diabetes mellitus     DM (diabetes mellitus)     DM (diabetes mellitus)     Hyperlipidemia     Hypertension     Paranoid personality disorder

## 2022-03-21 NOTE — ED ADULT NURSE NOTE - OBJECTIVE STATEMENT
72yo male BIBA, as per ems "he was sent here because he has a fever". pt is nonverbal. pt is warm to touch. pt doesn't appear to be in any more/distress.

## 2022-03-21 NOTE — H&P ADULT - NSHPLABSRESULTS_GEN_ALL_CORE
Labs:               14.0   12.93 )-----------( 139      ( 21 Mar 2022 13:25 )             46.3         172<HH>  |  134<H>  |  61<H>  ----------------------------<  207<H>  3.4<L>   |  26  |  1.28    Ca    9.3      21 Mar 2022 13:22    TPro  7.5  /  Alb  3.3  /  TBili  0.6  /  DBili  x   /  AST  26  /  ALT  20  /  AlkPhos  69          Urinalysis Basic - ( 21 Mar 2022 16:04 )    Color: Yellow / Appearance: Clear / S.020 / pH: x  Gluc: x / Ketone: Trace  / Bili: Negative / Urobili: Negative mg/dL   Blood: x / Protein: 100 mg/dL / Nitrite: Negative   Leuk Esterase: Trace / RBC: 6-10 /HPF / WBC 6-10   Sq Epi: x / Non Sq Epi: Negative / Bacteria: Occasional    PT/INR - ( 21 Mar 2022 13:22 )   PT: 11.4 sec;   INR: 0.99 ratio    PTT - ( 21 Mar 2022 13:22 )  PTT:24.5 sec    Lactate Trend   @ 13:22 Lactate: 1.3     < from: CT Head No Cont (22 @ 16:25) >    IMPRESSION:  Stable exam.    No mass effect, hemorrhage or evidence of acute intracranial pathology.    < end of copied text >    < from: Xray Chest 1 View-PORTABLE IMMEDIATE (22 @ 13:06) >    IMPRESSION: Hyperinflated lungs. No active infiltrates.    < end of copied text >

## 2022-03-21 NOTE — CONSULT NOTE ADULT - SUBJECTIVE AND OBJECTIVE BOX
History of Present Illness: The patient is a 71 year old male with a history of HTN, HL, DM, paroxysmal atrial fibrillation, dementia who presents with AMS. The patient is unable to provide additional history. He was noted to be febrile at NH.    Past Medical/Surgical History:  HTN, HL, DM, paroxysmal atrial fibrillation, dementia    Medications:  Home Medications:  acetaminophen 325 mg oral tablet: 2 tab(s) orally every 6 hours, As Needed (2020 15:)  Aricept 10 mg oral tablet: 1 tab(s) orally once a day (at bedtime) (2020 15:)  aspirin 325 mg oral tablet: 1 tab(s) orally once a day (2020 12:58)  atorvastatin 40 mg oral tablet: 1 tab(s) orally once a day (at bedtime) (2020 11:25)  Claritin 10 mg oral tablet: 1 tab(s) orally once a day (2020 15:)  dilTIAZem 240 mg/24 hours oral capsule, extended release: 1 cap(s) orally once a day (:29)  enoxaparin: 40 milligram(s) subcutaneous once a day (2020 11:25)  famotidine 20 mg oral tablet: 1 tab(s) orally once a day (:29)  insulin lispro 100 units/mL injectable solution:  injectable corrective regimen sliding scale  (:)  melatonin 5 mg oral tablet: 1 tab(s) orally once a day (at bedtime), As needed, Insomnia (:)  metoprolol tartrate 50 mg oral tablet: 1 tab(s) orally 2 times a day (:29)  Namenda 5 mg oral tablet: 1 tab(s) orally 2 times a day (2020 15:11)  OLANZapine 10 mg oral tablet, disintegratin tab(s) orally 3 times a day (:29)      Family History: Non-contributory family history of premature cardiovascular atherosclerotic disease    Social History: Unknown    Review of Systems:  Unable to obtain    Physical Exam:  Vitals:        Vital Signs Last 24 Hrs  T(C): 37.8 (21 Mar 2022 14:00), Max: 38.7 (21 Mar 2022 12:53)  T(F): 100.1 (21 Mar 2022 14:00), Max: 101.7 (21 Mar 2022 12:53)  HR: 91 (21 Mar 2022 15:48) (90 - 144)  BP: 134/87 (21 Mar 2022 15:48) (122/80 - 134/87)  BP(mean): --  RR: 15 (21 Mar 2022 15:48) (15 - 22)  SpO2: 97% (21 Mar 2022 15:48) (94% - 97%)  General: NAD  HEENT: MMM  Neck: No JVD, no carotid bruit  Lungs: CTAB  CV: RRR, nl S1/S2, no M/R/G  Abdomen: S/NT/ND, +BS  Extremities: No LE edema, no cyanosis  Neuro: AAOx3, non-focal  Skin: No rash    Labs:                        14.0   12.93 )-----------( 139      ( 21 Mar 2022 13:25 )             46.3     03-21    172<HH>  |  134<H>  |  61<H>  ----------------------------<  207<H>  3.4<L>   |  26  |  1.28    Ca    9.3      21 Mar 2022 13:22    TPro  7.5  /  Alb  3.3  /  TBili  0.6  /  DBili  x   /  AST  26  /  ALT  20  /  AlkPhos  69  03-21        PT/INR - ( 21 Mar 2022 13:22 )   PT: 11.4 sec;   INR: 0.99 ratio         PTT - ( 21 Mar 2022 13:22 )  PTT:24.5 sec    Telemetry: Sinus rhythm, frequent brief episodes of AF

## 2022-03-21 NOTE — ED PROVIDER NOTE - OBJECTIVE STATEMENT
pt is a 70yo male with pmhx of A-FIB, HTN, DM, DEMENTIA, PERSONALITY D/O, BIB EMS FROM [place] PRESENTS WITH AMS. per ems pt has had fever with ams for 2 days. pt had cxr without acute findings. pt given zosyn pta.

## 2022-03-21 NOTE — PATIENT PROFILE ADULT - FALL HARM RISK - HARM RISK INTERVENTIONS

## 2022-03-21 NOTE — H&P ADULT - HISTORY OF PRESENT ILLNESS
71 y.o male w/ PMHx of Afib, DMT2, HTN, HLD, paranoid personality disorder, anxiety, GERD,  Alzheimer's dementia who was BIBEMS from [facility] w/ fever and AMS x 2 days. Pt. had CXR at facility w/ no acute findings. Pt. given zosyn at SNF. Unable to obtain information from pt. 2/2 AMS. Will need admission to medicine for treatment and monitoring of hypernatremia, ROSALBA, and sepsis.    ED Course:  - EKG done  - Labs sent, including: CBC, CMP, lactate, Coags  - Blood and urine cultures sent  - RVP and COVID swabs sent  - Imaging done, including: CT A/P, chest and head, CXR  - 1gm tylenol given for fever  - 2L IVF given

## 2022-03-21 NOTE — H&P ADULT - NSICDXPASTMEDICALHX_GEN_ALL_CORE_FT
PAST MEDICAL HISTORY:  Afib     Alzheimer disease     Anxiety     Dementia     DM (diabetes mellitus)     GERD (gastroesophageal reflux disease)     Hyperlipidemia     Hypertension     Paranoid personality disorder

## 2022-03-21 NOTE — PATIENT PROFILE ADULT - PATIENT'S SEXUAL ORIENTATION
"Bedside Paracentesis Without  Radiology  Date/Time: 2/1/2020 9:44 AM  Performed by: Max Riggs MD  Authorized by: Max Riggs MD   Consent: Verbal consent obtained. Written consent obtained.  Consent given by: patient  Patient understanding: patient states understanding of the procedure being performed  Patient consent: the patient's understanding of the procedure matches consent given  Procedure consent: procedure consent matches procedure scheduled  Relevant documents: relevant documents present and verified  Test results: test results available and properly labeled  Site marked: the operative site was marked  Imaging studies: imaging studies available  Patient identity confirmed: verbally with patient, arm band and provided demographic data  Time out: Immediately prior to procedure a \"time out\" was called to verify the correct patient, procedure, equipment, support staff and site/side marked as required.  Initial or subsequent exam: initial  Procedure purpose: diagnostic and therapeutic  Indications: abdominal discomfort secondary to ascites and suspected peritonitis  Anesthesia: local infiltration    Anesthesia:  Local Anesthetic: lidocaine 1% without epinephrine  Preparation: Patient was prepped and draped in the usual sterile fashion.  Needle gauge: 20  Ultrasound guidance: yes  Puncture site: right lower quadrant  Fluid removed: 1200(ml)  Fluid appearance: clear  Dressing: 4x4 sterile gauze  Patient tolerance: Patient tolerated the procedure well with no immediate complications        "
Unknown

## 2022-03-21 NOTE — CONSULT NOTE ADULT - ASSESSMENT
The patient is a 71 year old male with a history of HTN, HL, DM, paroxysmal atrial fibrillation, dementia who presents with AMS in the setting of severe dehydration, sepsis, possible PNA.    Plan:  - Telemetry consistent with frequent brief episodes of AF  - Unable to take PO  - Hold PO diltiazem and metoprolol - resume when able to take PO  - Ordered for metoprolol 2.5 mg IV q6h. Can give additional prn doses as needed.  - If AF sustains, transition to diltiazem drip  - Not a candidate for long term anticoagulation for AF given dementia and overall poor prognosis  - Continue IV fluids for severe hypernatremia  - CT with mucoid impaction of bronchi  - On zosyn for possible PNA The patient is a 71 year old male with a history of HTN, HL, DM, paroxysmal atrial fibrillation, dementia who presents with AMS in the setting of severe dehydration, sepsis, possible PNA, viral infection.    Plan:  - Telemetry consistent with frequent brief episodes of AF  - Unable to take PO  - Hold PO diltiazem and metoprolol - resume when able to take PO  - Ordered for metoprolol 2.5 mg IV q6h. Can give additional prn doses as needed.  - If AF sustains, transition to diltiazem drip  - Not a candidate for long term anticoagulation for AF given dementia and overall poor prognosis  - Continue IV fluids for severe hypernatremia  - CT with mucoid impaction of bronchi  - Coronavirus (non-COVID) positive  - On zosyn for possible PNA

## 2022-03-21 NOTE — H&P ADULT - ATTENDING COMMENTS
71M Alzheimer's Dementia, DM2, HTN, Afib, HLD, Paranoid personality disorder, GERD, Anxiety, sent from SNF for AMS, Fever, leukocytosis.  In ED found to have Hypernatremia, ROSALBA, failed dysphagia screen, seasonal corona virus.  Also with severe protein calorie malnutrition    #sepsis (Fever, leukocytosis, ROSALBA, Metabolic encephalopathy) with unclear infection source  - positive for seasonal coronavirus but another source should be ruled out, was treated for UTI recently at [facility]  - blood, urine cultures pending, ordered RUQ sono  - Empiric Zosyn, ID consult Dr Rizo called.     # hypernatremia and ROSALBA  - normal creat approx 0.6, now 1.3  - Hypotonic IVF  - recheck at 6pm for accuracy    #Afib/ HTN  - not rate controlled right now but probably missed oral medications  - metoprolol 2.5mg q6h IVP while NPO, may need higher dosing or cardizem drip    #severe protein calorie malnutrition with possible dysphagia  - failed dysphagia screen in ED, Full NPO for now  - S&S eval in AM  - if cannot start diet need to consider NGT/PEG    #DM2  - FS check q6h for now while NPO    #Dementia, anxiety, Paranoid personality disorder  - currently lethargic and non-verbal due to acute metabolic encephalopathy  - hold zyprexa, namenda and aricept while npo, consider restarting when more alert    #GERD  - IV Pepcid for now    #HLD  - hold statin while NPO    #DVT proph  - Lovenox 30mg SQ daily    Palliative care consult for Goals of Care.

## 2022-03-21 NOTE — H&P ADULT - ASSESSMENT
71 y.o male w/ PMHx of Afib, DMT2, HTN, HLD, paranoid personality disorder, anxiety, GERD,  Alzheimer's dementia who was BIBEMS from [facility] w/ fever and AMS x 2 days. Pt. had CXR at facility w/ no acute findings. Pt. given zosyn at [facility]. Unable to obtain information from pt. 2/2 AMS. Will need admission to medicine for treatment and monitoring of hypernatremia, ROSALBA and sepsis.    # AMS 2/2 acute hypernatremia 2/2 dehydration  - Present on admission  - Na: 172, repeat stat  - Free water deficit: 2.8L, received 2L in ED, give addition 1L  - NPO w/ maintenance IVF w/ D5 @ 75mL/hr  - Trend BMPs  - Head CT pending official read  - Monitor neuro status    # Sepsis most likely 2/2 UTI  - Present on admission  - Evidenced by fever, leukocytosis w/ bandemia, and tachycardia  - UA consistent w/ infection  - Follow up w/ blood and urine cultures  - Received zosyn at [facility]  - Continue broad spectrum coverage w/ zosyn and vanco until sensitivities result  - Received 2L in ED, give addition 1L  - CXR not suggestive of PNA  - CT of chest and A/P pending official read  - Trend WBCs    # ROSALBA  - Present on admission  - Baseline BUN/Cr 17/0.62  - Admission BUN/Cr. 61/1.28  - Place echeverria for strict I/Os  - Received 2L in ED, give addition 1L  - Avoid nephrotoxic medications  - Trend BMPs    # Afib    # DMT2  - Hold oral hypoglycemics  - ISS w/ fingersticks per protocol    # HTN    # HLD    # Paranoid personality disorder, Anxiety    # GERD    # Alzheimer's dementia 71 y.o male w/ PMHx of Afib, DMT2, HTN, HLD, paranoid personality disorder, anxiety, GERD,  Alzheimer's dementia who was BIBEMS from [facility] w/ fever and AMS x 2 days. Pt. had CXR at facility w/ no acute findings. Pt. given zosyn at [facility]. Unable to obtain information from pt. 2/2 AMS. Will need admission to medicine for treatment and monitoring of hypernatremia, ROSABLA and sepsis.    # AMS 2/2 acute hypernatremia 2/2 dehydration  - Present on admission  - Na: 172, repeat stat  - Free water deficit: 2.8L, received 2L in ED, give addition 1L  - NPO w/ maintenance IVF w/ D5 @ 75mL/hr  - Trend BMPs  - Head CT pending official read  - Monitor neuro status    # Sepsis most likely 2/2 UTI  - Present on admission  - Evidenced by fever, leukocytosis w/ bandemia, and tachycardia  - UA w/ possible infection  - RVP and coronavirus positive  - COVID negative  - Follow up w/ blood and urine cultures  - Received zosyn at [facility]  - Continue broad spectrum coverage w/ zosyn and vanco until sensitivities result  - Received 2L in ED, give addition 1L  - CXR not suggestive of PNA  - LFTs and RUQ to eval for cholecystis  - CT of chest and A/P pending official read  - Trend WBCs    # ROSALBA  - Present on admission  - Baseline BUN/Cr 17/0.62  - Admission BUN/Cr. 61/1.28  - Place echeverria for strict I/Os  - Received 2L in ED, give addition 1L  - Avoid nephrotoxic medications  - Trend BMPs    # Afib  - Tele monitoring  - Switch PO metoprolol to IV  - Hold ASA while NPO  - Hold cardizem while NPO, can give IV if RVR    # DMT2  - Hold oral hypoglycemics  - ISS w/ fingersticks per protocol  - Hypoglycemia protocol    # HTN  - Switch PO metoprolol to IV    # HLD  - Hold atorvastatin while NPO    # Paranoid personality disorder, anxiety  - Hold olanzapine 2/2 AMS    # GERD  - Switch PO pepcid to IV     # Alzheimer's dementia  - Hold aricept and namenda while NPO 71 y.o male w/ PMHx of Afib, DMT2, HTN, HLD, paranoid personality disorder, anxiety, GERD,  Alzheimer's dementia who was BIBEMS from [facility] w/ fever and AMS x 2 days. Pt. had CXR at facility w/ no acute findings. Pt. given zosyn at [facility]. Unable to obtain information from pt. 2/2 AMS. Will need admission to medicine for treatment and monitoring of hypernatremia, ROSALBA and sepsis.    # AMS 2/2 acute hypernatremia 2/2 dehydration  - Present on admission  - Na: 172, repeat stat  - Free water deficit: 2.8L, received 2L in ED, give addition 1L  - NPO w/ maintenance IVF w/ D5 @ 75mL/hr  - Trend BMPs  - Head CT pending official read  - Monitor neuro status    # Sepsis most likely 2/2 UTI  - Present on admission  - Evidenced by fever, leukocytosis w/ bandemia, and tachycardia  - UA w/ possible infection  - RVP and coronavirus positive  - COVID negative  - Follow up w/ blood and urine cultures  - Received zosyn at [facility]  - Continue broad spectrum coverage w/ zosyn and vanco until sensitivities result  - Received 2L in ED, give addition 1L  - CXR not suggestive of PNA  - LFTs and RUQ to eval for cholecystis  - CT of chest and A/P pending official read  - Trend WBCs    # ROSALBA  - Present on admission  - Baseline BUN/Cr 17/0.62  - Admission BUN/Cr. 61/1.28  - Place echeverria for strict I/Os  - Received 2L in ED, give addition 1L  - Avoid nephrotoxic medications  - Trend BMPs    # Afib  - Tele monitoring  - Switch PO metoprolol to IV  - Hold ASA while NPO  - Hold cardizem while NPO, can give IV if RVR    # DMT2  - Hold oral hypoglycemics  - ISS w/ fingersticks per protocol  - Hypoglycemia protocol    # HTN  - Switch PO metoprolol to IV    # HLD  - Hold atorvastatin while NPO    # Paranoid personality disorder, anxiety  - Hold olanzapine 2/2 AMS    # GERD  - Switch PO pepcid to IV     # Alzheimer's dementia  - Hold aricept and namenda while NPO    # DTV prophylaxis  - Renally dosed lovenox 30mg SQ    # ACP  - Son updated  - Full code 71 y.o male w/ PMHx of Afib, DMT2, HTN, HLD, paranoid personality disorder, anxiety, GERD,  Alzheimer's dementia who was BIBEMS from [facility] w/ fever and AMS x 2 days. Pt. had CXR at facility w/ no acute findings. Pt. given zosyn at [facility]. Unable to obtain information from pt. 2/2 AMS. Will need admission to medicine for treatment and monitoring of hypernatremia, ROSALBA and sepsis.    # AMS 2/2 acute hypernatremia 2/2 dehydration  - Present on admission  - Na: 172, repeat stat  - Free water deficit: 2.8L, received 2L in ED, give addition 1L  - NPO w/ maintenance IVF w/ D5 @ 75mL/hr  - Trend BMPs  - Head CT pending official read  - Monitor neuro status    # Sepsis most likely 2/2 UTI  - Present on admission  - Evidenced by fever, leukocytosis w/ bandemia, and tachycardia  - UA w/ possible infection  - RVP and coronavirus positive  - COVID negative  - Follow up w/ blood and urine cultures  - Received zosyn at [facility]  - Continue broad spectrum coverage w/ zosyn and vanco until sensitivities result  - Received 2L in ED, give addition 1L  - CXR not suggestive of PNA  - LFTs and RUQ to eval for cholecystis  - CT of chest and A/P pending official read  - Trend WBCs  - ID consulted    # ROSALBA  - Present on admission  - Baseline BUN/Cr 17/0.62  - Admission BUN/Cr. 61/1.28  - Place echeverria for strict I/Os  - Received 2L in ED, give addition 1L  - Avoid nephrotoxic medications  - Trend BMPs    # Afib  - Tele monitoring  - Switch PO metoprolol to IV  - Hold ASA while NPO  - Hold cardizem while NPO, can give IV if RVR    # HTN  - Switch PO metoprolol to IV    # DMT2  - Hold oral hypoglycemics  - ISS w/ fingersticks per protocol  - Hypoglycemia protocol    # HLD  - Hold atorvastatin while NPO    # Paranoid personality disorder, anxiety  - Hold olanzapine 2/2 AMS    # Alzheimer's dementia  - Hold aricept and namenda while NPO    # GERD  - Switch PO pepcid to IV     # DTV prophylaxis  - Renally dosed lovenox 30mg SQ    # ACP  - Son updated  - Full code  - Consult palliative care for GOC discussion

## 2022-03-22 LAB
A1C WITH ESTIMATED AVERAGE GLUCOSE RESULT: 8.2 % — HIGH (ref 4–5.6)
ALBUMIN SERPL ELPH-MCNC: 2.9 G/DL — LOW (ref 3.3–5)
ALP SERPL-CCNC: 63 U/L — SIGNIFICANT CHANGE UP (ref 30–120)
ALT FLD-CCNC: 21 U/L DA — SIGNIFICANT CHANGE UP (ref 10–60)
ANION GAP SERPL CALC-SCNC: 13 MMOL/L — SIGNIFICANT CHANGE UP (ref 5–17)
ANION GAP SERPL CALC-SCNC: 9 MMOL/L — SIGNIFICANT CHANGE UP (ref 5–17)
AST SERPL-CCNC: 30 U/L — SIGNIFICANT CHANGE UP (ref 10–40)
BILIRUB SERPL-MCNC: 0.8 MG/DL — SIGNIFICANT CHANGE UP (ref 0.2–1.2)
BUN SERPL-MCNC: 46 MG/DL — HIGH (ref 7–23)
BUN SERPL-MCNC: 53 MG/DL — HIGH (ref 7–23)
CALCIUM SERPL-MCNC: 8.4 MG/DL — SIGNIFICANT CHANGE UP (ref 8.4–10.5)
CALCIUM SERPL-MCNC: 8.6 MG/DL — SIGNIFICANT CHANGE UP (ref 8.4–10.5)
CHLORIDE SERPL-SCNC: 133 MMOL/L — HIGH (ref 96–108)
CHLORIDE SERPL-SCNC: 135 MMOL/L — HIGH (ref 96–108)
CO2 SERPL-SCNC: 22 MMOL/L — SIGNIFICANT CHANGE UP (ref 22–31)
CO2 SERPL-SCNC: 26 MMOL/L — SIGNIFICANT CHANGE UP (ref 22–31)
CREAT SERPL-MCNC: 1.16 MG/DL — SIGNIFICANT CHANGE UP (ref 0.5–1.3)
CREAT SERPL-MCNC: 1.16 MG/DL — SIGNIFICANT CHANGE UP (ref 0.5–1.3)
CULTURE RESULTS: NO GROWTH — SIGNIFICANT CHANGE UP
EGFR: 67 ML/MIN/1.73M2 — SIGNIFICANT CHANGE UP
EGFR: 67 ML/MIN/1.73M2 — SIGNIFICANT CHANGE UP
ESTIMATED AVERAGE GLUCOSE: 189 MG/DL — HIGH (ref 68–114)
FOLATE SERPL-MCNC: 18.9 NG/ML — SIGNIFICANT CHANGE UP
GLUCOSE BLDC GLUCOMTR-MCNC: 227 MG/DL — HIGH (ref 70–99)
GLUCOSE BLDC GLUCOMTR-MCNC: 259 MG/DL — HIGH (ref 70–99)
GLUCOSE BLDC GLUCOMTR-MCNC: 274 MG/DL — HIGH (ref 70–99)
GLUCOSE BLDC GLUCOMTR-MCNC: 312 MG/DL — HIGH (ref 70–99)
GLUCOSE SERPL-MCNC: 284 MG/DL — HIGH (ref 70–99)
GLUCOSE SERPL-MCNC: 298 MG/DL — HIGH (ref 70–99)
HCT VFR BLD CALC: 46.8 % — SIGNIFICANT CHANGE UP (ref 39–50)
HGB BLD-MCNC: 14 G/DL — SIGNIFICANT CHANGE UP (ref 13–17)
MAGNESIUM SERPL-MCNC: 3 MG/DL — HIGH (ref 1.6–2.6)
MCHC RBC-ENTMCNC: 29.9 GM/DL — LOW (ref 32–36)
MCHC RBC-ENTMCNC: 30.4 PG — SIGNIFICANT CHANGE UP (ref 27–34)
MCV RBC AUTO: 101.5 FL — HIGH (ref 80–100)
NRBC # BLD: 0 /100 WBCS — SIGNIFICANT CHANGE UP (ref 0–0)
PHOSPHATE SERPL-MCNC: 2.4 MG/DL — LOW (ref 2.5–4.5)
PLATELET # BLD AUTO: 113 K/UL — LOW (ref 150–400)
POTASSIUM SERPL-MCNC: 3.5 MMOL/L — SIGNIFICANT CHANGE UP (ref 3.5–5.3)
POTASSIUM SERPL-MCNC: 3.5 MMOL/L — SIGNIFICANT CHANGE UP (ref 3.5–5.3)
POTASSIUM SERPL-SCNC: 3.5 MMOL/L — SIGNIFICANT CHANGE UP (ref 3.5–5.3)
POTASSIUM SERPL-SCNC: 3.5 MMOL/L — SIGNIFICANT CHANGE UP (ref 3.5–5.3)
PROT SERPL-MCNC: 6.4 G/DL — SIGNIFICANT CHANGE UP (ref 6–8.3)
RBC # BLD: 4.61 M/UL — SIGNIFICANT CHANGE UP (ref 4.2–5.8)
RBC # FLD: 15.9 % — HIGH (ref 10.3–14.5)
SODIUM SERPL-SCNC: 168 MMOL/L — CRITICAL HIGH (ref 135–145)
SODIUM SERPL-SCNC: 170 MMOL/L — CRITICAL HIGH (ref 135–145)
SPECIMEN SOURCE: SIGNIFICANT CHANGE UP
VIT B12 SERPL-MCNC: 1266 PG/ML — HIGH (ref 232–1245)
WBC # BLD: 12.17 K/UL — HIGH (ref 3.8–10.5)
WBC # FLD AUTO: 12.17 K/UL — HIGH (ref 3.8–10.5)

## 2022-03-22 PROCEDURE — 99233 SBSQ HOSP IP/OBS HIGH 50: CPT

## 2022-03-22 PROCEDURE — 99497 ADVNCD CARE PLAN 30 MIN: CPT

## 2022-03-22 RX ORDER — SODIUM CHLORIDE 9 MG/ML
1000 INJECTION, SOLUTION INTRAVENOUS
Refills: 0 | Status: DISCONTINUED | OUTPATIENT
Start: 2022-03-22 | End: 2022-03-24

## 2022-03-22 RX ORDER — POTASSIUM PHOSPHATE, MONOBASIC POTASSIUM PHOSPHATE, DIBASIC 236; 224 MG/ML; MG/ML
30 INJECTION, SOLUTION INTRAVENOUS ONCE
Refills: 0 | Status: COMPLETED | OUTPATIENT
Start: 2022-03-22 | End: 2022-03-22

## 2022-03-22 RX ORDER — METOPROLOL TARTRATE 50 MG
2.5 TABLET ORAL ONCE
Refills: 0 | Status: COMPLETED | OUTPATIENT
Start: 2022-03-22 | End: 2022-03-22

## 2022-03-22 RX ORDER — INSULIN GLARGINE 100 [IU]/ML
5 INJECTION, SOLUTION SUBCUTANEOUS
Refills: 0 | Status: DISCONTINUED | OUTPATIENT
Start: 2022-03-22 | End: 2022-03-23

## 2022-03-22 RX ORDER — DILTIAZEM HCL 120 MG
10 CAPSULE, EXT RELEASE 24 HR ORAL
Qty: 125 | Refills: 0 | Status: DISCONTINUED | OUTPATIENT
Start: 2022-03-22 | End: 2022-03-23

## 2022-03-22 RX ADMIN — Medication 10 MG/HR: at 19:41

## 2022-03-22 RX ADMIN — FAMOTIDINE 20 MILLIGRAM(S): 10 INJECTION INTRAVENOUS at 11:48

## 2022-03-22 RX ADMIN — ENOXAPARIN SODIUM 30 MILLIGRAM(S): 100 INJECTION SUBCUTANEOUS at 06:10

## 2022-03-22 RX ADMIN — PIPERACILLIN AND TAZOBACTAM 25 GRAM(S): 4; .5 INJECTION, POWDER, LYOPHILIZED, FOR SOLUTION INTRAVENOUS at 15:37

## 2022-03-22 RX ADMIN — POTASSIUM PHOSPHATE, MONOBASIC POTASSIUM PHOSPHATE, DIBASIC 83.33 MILLIMOLE(S): 236; 224 INJECTION, SOLUTION INTRAVENOUS at 10:50

## 2022-03-22 RX ADMIN — Medication 3: at 07:15

## 2022-03-22 RX ADMIN — Medication 650 MILLIGRAM(S): at 00:18

## 2022-03-22 RX ADMIN — INSULIN GLARGINE 5 UNIT(S): 100 INJECTION, SOLUTION SUBCUTANEOUS at 11:47

## 2022-03-22 RX ADMIN — Medication 2.5 MILLIGRAM(S): at 02:58

## 2022-03-22 RX ADMIN — PIPERACILLIN AND TAZOBACTAM 25 GRAM(S): 4; .5 INJECTION, POWDER, LYOPHILIZED, FOR SOLUTION INTRAVENOUS at 23:24

## 2022-03-22 RX ADMIN — Medication 650 MILLIGRAM(S): at 17:27

## 2022-03-22 RX ADMIN — Medication 3 MILLILITER(S): at 01:27

## 2022-03-22 RX ADMIN — Medication 2: at 17:42

## 2022-03-22 RX ADMIN — PIPERACILLIN AND TAZOBACTAM 25 GRAM(S): 4; .5 INJECTION, POWDER, LYOPHILIZED, FOR SOLUTION INTRAVENOUS at 07:23

## 2022-03-22 RX ADMIN — Medication 2.5 MILLIGRAM(S): at 01:26

## 2022-03-22 RX ADMIN — Medication 3 MILLILITER(S): at 07:40

## 2022-03-22 RX ADMIN — SODIUM CHLORIDE 100 MILLILITER(S): 9 INJECTION, SOLUTION INTRAVENOUS at 07:16

## 2022-03-22 RX ADMIN — Medication 4: at 23:24

## 2022-03-22 RX ADMIN — Medication 10 MG/HR: at 08:13

## 2022-03-22 RX ADMIN — Medication 3 MILLILITER(S): at 13:51

## 2022-03-22 RX ADMIN — Medication 3 MILLILITER(S): at 20:14

## 2022-03-22 RX ADMIN — Medication 3: at 11:47

## 2022-03-22 RX ADMIN — SODIUM CHLORIDE 150 MILLILITER(S): 9 INJECTION, SOLUTION INTRAVENOUS at 15:58

## 2022-03-22 NOTE — DIETITIAN INITIAL EVALUATION ADULT. - OTHER INFO
Per H&P, pt is a "71 y.o male w/ PMHx of Afib, DMT2, HTN, HLD, paranoid personality disorder, anxiety, GERD,  Alzheimer's dementia who was BIBEMS from [facility] w/ fever and AMS x 2 days. Pt. had CXR at facility w/ no acute findings. Pt. given zosyn at SNF. Unable to obtain information from pt. 2/2 AMS. Will need admission to medicine for treatment and monitoring of hypernatremia, ROSALBA, and sepsis."    Nutrition consult ordered for stage II or > pressure ulcer.  Per chart, pt admitted with stage I pressure ucler to sacrum and R suspected DTI to sacrum, DTI to L sacrum.  Pt with hx Alzheimer's dementia, visited while resting in bed - minimally verbal, unable to provide nutrition related hx.  Pt is a resident of [facility], per transfer documents, pt on regular diet with thin liquids (RANJANA, NCS) with Glucerna nutrition supplement 8oz TID.  NPO status maintained secondary to dysphagia - pt seen for bedside swallowing evaluation today 3/22 - ral nutrition/hydration/medication is contraindicated as pt was unable to maintain adequate level of alertness for PO trials.  Pt on D5KCL@100ml/hr for hydration given NPO/hypernatremia/dehydration.  Nephro consulted given severe hypernatremia.  Noted pt is a full code; palliative care consulted.  Anticipate patient may require NGT placement for short-term enteral nutrition as well as administration of free water.  If medically appropriate, recommend Glucerna 1.5, start at 20ml/hr, increase by 10ml every 6hrs until goal 60ml x 20hrs is reached (to provide 1200ml TV formula, 1800kcal based on 32kcal/kg CBW, 99g protein based on 1.2g/kg IBW), recommend free water flushes per renal.  Adm wt 121# (consistent with transfer ht 5'11", wt 121#, bed scale wt 118.3# (3/22), per comprehensive chart review, pt weighed 138# in Nov 2020, indicating 17#/12% wt loss x >1year.  Pt underweight in appearance, BMI 16.9; NFPE completed at time of visit.  Based on BMI <19 and moderate-severe subcutaneous fat and muscle mass loss, pt meets clinical criteria of severe protein malnutrition in context of chronic illness.  RD to follow up and will continue to monitor pt's nutrition status.

## 2022-03-22 NOTE — DIETITIAN INITIAL EVALUATION ADULT. - PERTINENT MEDS FT
MEDICATIONS  (STANDING):  albuterol/ipratropium for Nebulization 3 milliLiter(s) Nebulizer every 6 hours  dextrose 40% Gel 15 Gram(s) Oral once  dextrose 5% 1000 milliLiter(s) (100 mL/Hr) IV Continuous <Continuous>  dextrose 5%. 1000 milliLiter(s) (50 mL/Hr) IV Continuous <Continuous>  dextrose 5%. 1000 milliLiter(s) (100 mL/Hr) IV Continuous <Continuous>  dextrose 50% Injectable 25 Gram(s) IV Push once  dextrose 50% Injectable 12.5 Gram(s) IV Push once  dextrose 50% Injectable 25 Gram(s) IV Push once  diltiazem Infusion 10 mG/Hr (10 mL/Hr) IV Continuous <Continuous>  enoxaparin Injectable 30 milliGRAM(s) SubCutaneous every 24 hours  famotidine Injectable 20 milliGRAM(s) IV Push daily  glucagon  Injectable 1 milliGRAM(s) IntraMuscular once  insulin glargine Injectable (LANTUS) 5 Unit(s) SubCutaneous <User Schedule>  insulin lispro (ADMELOG) corrective regimen sliding scale   SubCutaneous every 6 hours  piperacillin/tazobactam IVPB.. 3.375 Gram(s) IV Intermittent every 8 hours    MEDICATIONS  (PRN):  acetaminophen  Suppository .. 650 milliGRAM(s) Rectal every 6 hours PRN Temp greater or equal to 38C (100.4F), Mild Pain (1 - 3)

## 2022-03-22 NOTE — PROGRESS NOTE ADULT - SUBJECTIVE AND OBJECTIVE BOX
Chief Complaint: AMS    Interval Events: Frequent episodes of AF persist.    Review of Systems:  Unable to obtain    Physical Exam:  Vitals:        Vital Signs Last 24 Hrs  T(C): 37.9 (22 Mar 2022 07:50), Max: 38.7 (21 Mar 2022 12:53)  T(F): 100.2 (22 Mar 2022 07:50), Max: 101.7 (21 Mar 2022 12:53)  HR: 73 (22 Mar 2022 07:50) (56 - 144)  BP: 114/72 (22 Mar 2022 07:50) (100/46 - 134/87)  BP(mean): --  RR: 18 (22 Mar 2022 07:50) (15 - 22)  SpO2: 96% (22 Mar 2022 07:50) (91% - 99%)  General: Cachectic  HEENT: Dry MM  Neck: No JVD, no carotid bruit  Lungs: CTAB  CV: RRR, nl S1/S2, no M/R/G  Abdomen: S/NT/ND, +BS  Extremities: No LE edema, no cyanosis  Neuro: AAOx0  Skin: No rash    Labs:                        14.0   12.17 )-----------( 113      ( 22 Mar 2022 07:18 )             46.8     03-22    170<HH>  |  135<H>  |  46<H>  ----------------------------<  284<H>  3.5   |  26  |  1.16    Ca    8.6      22 Mar 2022 07:18  Phos  2.4     03-22  Mg     3.0     03-22    TPro  6.4  /  Alb  2.9<L>  /  TBili  0.8  /  DBili  x   /  AST  30  /  ALT  21  /  AlkPhos  63  03-22        PT/INR - ( 21 Mar 2022 13:22 )   PT: 11.4 sec;   INR: 0.99 ratio         PTT - ( 21 Mar 2022 13:22 )  PTT:24.5 sec    Telemetry: Sinus rhythm, frequent episodes of AF

## 2022-03-22 NOTE — PROVIDER CONTACT NOTE (OTHER) - ASSESSMENT
AxO x0 at present time, opens eyes spontaneously but sleeps between care. NA+ level in 160's range, currently on IV D5 20 mEq K+ at 100 mL/hr. /72. Rectal temp 100.2. No resp distress. Unable to assess for chest pain due to cognitive limitations. On standing IVP 2.5 Lopressor q6hrs. NSR with PACs in between bouts of SVT

## 2022-03-22 NOTE — DIETITIAN INITIAL EVALUATION ADULT. - ENTERAL
If medically appropriate, recommend consider short term enteral nutrition: Glucerna 1.5, start at 20ml/hr, increase by 10ml every 6hrs until goal 60ml x 20hrs is reached

## 2022-03-22 NOTE — SWALLOW BEDSIDE ASSESSMENT ADULT - COMMENTS
Upon arrival, pt sleeping in bed. Pt was mildly responsive to noxious stim, but unable to maintain adequate level of alertness despite persistent cueing. Pt with minimal verbalizations during session, vocal quality/intensity was grossly WFL. Nonverbal pain scale 0/10.    CT head 3/21: "Stable exam. No mass effect, hemorrhage or evidence of acute intracranial pathology."  CT chest 3/21: "There is mucoid impaction distal trachea, right mainstem bronchus and right lower lobe bronchus. There is no lobar lung consolidation. No pleural effusion or pneumothorax. Tiny calcified granuloma periphery right middle lobe." Pt's WBC is elevated, +fever upon admission.

## 2022-03-22 NOTE — CONSULT NOTE ADULT - SUBJECTIVE AND OBJECTIVE BOX
HPI:  70YO M PMHx of Afib, DMT2, HTN, HLD, paranoid personality disorder, anxiety, GERD,  Alzheimer's dementia resident of [facility] who presented to the hospital with CC of fever and AMS. Pt unable to provide history. In ED pt was febrile to 101.7. WBC 12K. CT CAP with Mucus plugging and RLL pneumonia and possible prostatitis/cystitis. RVP + coronavirus ( NOT covid 19 ) Also with severe hypernatremia .    Infectious Disease consult was called to evaluate pt and for antibiotic management.      Past Medical & Surgical Hx:  PAST MEDICAL & SURGICAL HISTORY:  DM (diabetes mellitus)  Afib  Hyperlipidemia  Hypertension  Alzheimer disease  Dementia  Anxiety  Paranoid personality disorder  GERD (gastroesophageal reflux disease)      Social History--  EtOH: denies   Tobacco: denies   Drug Use: denies     FAMILY HISTORY:  Noncontributory    Allergies  No Known Allergies    Intolerances  NONE    Home Medications:  acetaminophen 325 mg oral tablet: 2 tab(s) orally every 6 hours, As Needed (2020 15:11)  Aricept 10 mg oral tablet: 1 tab(s) orally once a day (at bedtime) (2020 15:11)  aspirin 325 mg oral tablet: 1 tab(s) orally once a day (2020 12:58)  atorvastatin 40 mg oral tablet: 1 tab(s) orally once a day (at bedtime) (2020 11:25)  Claritin 10 mg oral tablet: 1 tab(s) orally once a day (2020 15:11)  dilTIAZem 240 mg/24 hours oral capsule, extended release: 1 cap(s) orally once a day (2020 06:29)  enoxaparin: 40 milligram(s) subcutaneous once a day (2020 11:25)  famotidine 20 mg oral tablet: 1 tab(s) orally once a day (2020 06:29)  insulin lispro 100 units/mL injectable solution:  injectable corrective regimen sliding scale  (:29)  melatonin 5 mg oral tablet: 1 tab(s) orally once a day (at bedtime), As needed, Insomnia (:29)  metoprolol tartrate 50 mg oral tablet: 1 tab(s) orally 2 times a day (2020 06:29)  Namenda 5 mg oral tablet: 1 tab(s) orally 2 times a day (2020 15:11)  OLANZapine 10 mg oral tablet, disintegratin tab(s) orally 3 times a day (2020 06:29)      Current Inpatient Medications :    ANTIBIOTICS:   piperacillin/tazobactam IVPB.. 3.375 Gram(s) IV Intermittent every 8 hours      OTHER RELEVANT MEDICATIONS :  acetaminophen  Suppository .. 650 milliGRAM(s) Rectal every 6 hours PRN  albuterol/ipratropium for Nebulization 3 milliLiter(s) Nebulizer every 6 hours  dextrose 40% Gel 15 Gram(s) Oral once  dextrose 5% 1000 milliLiter(s) IV Continuous <Continuous>  dextrose 5%. 1000 milliLiter(s) IV Continuous <Continuous>  dextrose 5%. 1000 milliLiter(s) IV Continuous <Continuous>  dextrose 50% Injectable 25 Gram(s) IV Push once  dextrose 50% Injectable 12.5 Gram(s) IV Push once  dextrose 50% Injectable 25 Gram(s) IV Push once  diltiazem Infusion 10 mG/Hr IV Continuous <Continuous>  enoxaparin Injectable 30 milliGRAM(s) SubCutaneous every 24 hours  famotidine Injectable 20 milliGRAM(s) IV Push daily  glucagon  Injectable 1 milliGRAM(s) IntraMuscular once  insulin glargine Injectable (LANTUS) 5 Unit(s) SubCutaneous <User Schedule>  insulin lispro (ADMELOG) corrective regimen sliding scale   SubCutaneous every 6 hours      ROS:  Unable to obtain due to : Dementia      I&O's Detail    21 Mar 2022 07:01  -  22 Mar 2022 07:00  --------------------------------------------------------  IN:    dextrose 5% w/ Additives: 925 mL  Total IN: 925 mL    OUT:  Total OUT: 0 mL    Total NET: 925 mL        Physical Exam:  Vital Signs Last 24 Hrs  T(C): 37.1 (22 Mar 2022 09:30), Max: 38.3 (21 Mar 2022 22:16)  T(F): 98.7 (22 Mar 2022 09:30), Max: 101 (21 Mar 2022 22:16)  HR: 74 (22 Mar 2022 13:51) (56 - 144)  BP: 106/55 (22 Mar 2022 09:30) (100/46 - 134/87)  RR: 18 (22 Mar 2022 09:30) (15 - 20)  SpO2: 97% (22 Mar 2022 13:51) (91% - 99%)      General: weak frail no acute distress  Neck: supple, trachea midline  Lungs: Decreased no wheeze/rhonchi  Cardiovascular: regular rate and rhythm, S1 S2  Abdomen: soft, nontender, ND, bowel sounds normal  Neurological:  awake confused  Skin: no rash  Extremities: no edema      Labs:                         14.0   12.17 )-----------( 113      ( 22 Mar 2022 07:18 )             46.8   03-22    170<HH>  |  135<H>  |  46<H>  ----------------------------<  284<H>  3.5   |  26  |  1.16    Ca    8.6      22 Mar 2022 07:18  Phos  2.4     03-  Mg     3.0     -22    TPro  6.4  /  Alb  2.9<L>  /  TBili  0.8  /  DBili  x   /  AST  30  /  ALT  21  /  AlkPhos  63  03-22    Urinalysis Basic - ( 21 Mar 2022 16:04 )    Color: Yellow / Appearance: Clear / S.020 / pH: x  Gluc: x / Ketone: Trace  / Bili: Negative / Urobili: Negative mg/dL   Blood: x / Protein: 100 mg/dL / Nitrite: Negative   Leuk Esterase: Trace / RBC: 6-10 /HPF / WBC 6-10   Sq Epi: x / Non Sq Epi: Negative / Bacteria: Occasional      RECENT CULTURES:          RADIOLOGY & ADDITIONAL STUDIES:    ACC: [id number] EXAM:  CT ABDOMEN AND PELVIS IC                        ACC: [id number] EXAM:  CT CHEST IC                          PROCEDURE DATE:  2022          INTERPRETATION:  CLINICAL INFORMATION: Fever, sepsis. Altered mental   status.    COMPARISON: None.    CONTRAST/COMPLICATIONS:  IV Contrast: Omnipaque 350 (accession [id number]), IV contrast documented   in associated exam (accession [id number])  90 cc administered (accession   [id number]), 0 cc administered (accession [id number])   10 cc discarded   (accession [id number]), 0 cc discarded (accession [id number])  Oral Contrast: NONE  Complications: None reported at time of study completion    PROCEDURE:  CT of the Chest, Abdomen and Pelvis was performed.  Sagittal and coronal reformats were performed.    FINDINGS:    CHEST:    LUNGS AND LARGE AIRWAYS: PLEURA:  There is mucoid impaction distal trachea, right mainstem bronchus and   right lower lobe bronchus.    There is no lobar lung consolidation.  No pleural effusion or pneumothorax.    Tiny calcified granuloma periphery right middle lobe.    VESSELS:  Atherosclerotic changes thoracic aorta and coronary artery calcifications.    HEART: Heart size is normal. No pericardial effusion.    MEDIASTINUM AND MADYSON:  No enlarged external lymphadenopathy.    CHEST WALL AND LOWER NECK: Within normal limits.    ABDOMEN AND PELVIS:  Respiratory motion artifact degrades image quality.    LIVER: Within normal limits.  BILE DUCTS: Normal caliber.  GALLBLADDER: Within normal limits.  SPLEEN: Within normal limits.  PANCREAS: Within normal limits.  ADRENALS: Within normal limits.  KIDNEYS/URETERS:  Right renal cyst.  No hydronephrosis.    BLADDER: Possible mild circumferential bladder wall thickening, correlate   clinically for cystitis.  There is punctate foci of air within the nondependent bladder wall.  REPRODUCTIVE ORGANS: Heterogeneous enlargement of the prostate gland.    BOWEL: Evaluation of the stomach is limited without distention.  Generalized colonic fecal retention with moderate bowel distention.  No bowel obstruction.  The appendix is not clearly visualized on this exam.  PERITONEUM: No ascites.    VESSELS: Atherosclerotic changes.  RETROPERITONEUM/LYMPH NODES: No lymphadenopathy.    ABDOMINAL WALL: Within normal limits.    BONES:  Chronic fracture deformity right inferior and superior pubic rami and   superior puboacetabular junction.  Probable chronic right sacral insufficiency fracture.    IMPRESSION:    Mucoid impaction distal trachea, right mainstem and right lower lobe   bronchus.    Enlarged prostate with heterogeneous enhancement, correlate clinically   for prostatitis.  Possible mild bladder wall thickening with multiple foci of air within   the nondependent bladder wall, correlate for infection.    Assessment :   70YO M PMHx of Afib, DMT2, HTN, HLD, paranoid personality disorder, anxiety, GERD,  Alzheimer's dementia resident of [facility] who presented to the hospital with CC of fever and AMS. Pt unable to provide history. In ED pt was febrile to 101.7. WBC 12K. CT CAP with Mucus plugging and RLL pneumonia and possible prostatitis/cystitis. RVP + coronavirus ( NOT covid 19 ) Also with severe hypernatremia .  Likely Asp pneumonia  Severe dehydration       Plan :   Cont Zosyn  Fu cultures  Trend temps and cbc  Strict Asp precuations  IVF per renal  Pulm toileting  Increase activity    Continue with present regiment .  Approptiate use of antibiotics and adverse effects reviewed.          > 45 minutes spent in direct patient care reviewing  the notes, lab data/ imaging , discussion with multidisciplinary team. All questions were addressed and answered to the best of my capacity .    Thank you for allowing me to participate in the care of your patient .      Rimma Rizo MD  Infectious Disease  [phone]

## 2022-03-22 NOTE — PROGRESS NOTE ADULT - SUBJECTIVE AND OBJECTIVE BOX
Patient is a 71y old  Male who presents with a chief complaint of AMS    INTERVAL HPI/OVERNIGHT EVENTS:  - Chart and consult notes reviewed  - Pt. seen and examined at bedside, remains altered but is more alert this AM and in NAD  - Cards consulted this AM for possible burst of SVT, cardizem gtt ordered per cards  - Sodium remains elevated    MEDICATIONS  (STANDING):  albuterol/ipratropium for Nebulization 3 milliLiter(s) Nebulizer every 6 hours  dextrose 40% Gel 15 Gram(s) Oral once  dextrose 5% 1000 milliLiter(s) (100 mL/Hr) IV Continuous <Continuous>  diltiazem Infusion 10 mG/Hr (10 mL/Hr) IV Continuous <Continuous>  enoxaparin Injectable 30 milliGRAM(s) SubCutaneous every 24 hours  famotidine Injectable 20 milliGRAM(s) IV Push daily  insulin lispro (ADMELOG) corrective regimen sliding scale   SubCutaneous every 6 hours  metoprolol tartrate Injectable 2.5 milliGRAM(s) IV Push every 6 hours  piperacillin/tazobactam IVPB.. 3.375 Gram(s) IV Intermittent every 8 hours    MEDICATIONS  (PRN):  acetaminophen  Suppository .. 650 milliGRAM(s) Rectal every 6 hours PRN Temp greater or equal to 38C (100.4F), Mild Pain (1 - 3)  dextrose 5%. 1000 milliLiter(s) (50 mL/Hr) IV Continuous <Continuous>  dextrose 5%. 1000 milliLiter(s) (100 mL/Hr) IV Continuous <Continuous>  dextrose 50% Injectable 25 Gram(s) IV Push once  dextrose 50% Injectable 12.5 Gram(s) IV Push once  dextrose 50% Injectable 25 Gram(s) IV Push once  glucagon  Injectable 1 milliGRAM(s) IntraMuscular once    No Known Allergies    Unable to obtain meaningful ROS due to AMS    Vital Signs Last 24 Hrs  T(C): 37.9 (22 Mar 2022 07:50), Max: 38.7 (21 Mar 2022 12:53)  T(F): 100.2 (22 Mar 2022 07:50), Max: 101.7 (21 Mar 2022 12:53)  HR: 73 (22 Mar 2022 07:50) (56 - 144)  BP: 114/72 (22 Mar 2022 07:50) (100/46 - 134/87)  RR: 18 (22 Mar 2022 07:50) (15 - 22)  SpO2: 99% (22 Mar 2022 07:40) (91% - 99%)    GENERAL: Awake, cachectic  HEAD:  Atraumatic, Normocephalic  EYES: PERRLA, conjunctiva and sclera clear  ENMT: Moist mucous membranes, no lesions  NECK: Supple, No JVD  NERVOUS SYSTEM:  Alert, moving all four extremities  CHEST/LUNG: CTA B/L; No rales, rhonchi, wheezing, or rubs  HEART: Irregular, rate regular, + S1/S2  ABDOMEN: Concave, soft, nontender, nondistended, bowel sounds present  EXTREMITIES:  2+ peripheral pulses, no clubbing, cyanosis, or edema    LABS:                        14.0   12.17 )-----------( 113      ( 22 Mar 2022 07:18 )             46.8     22 Mar 2022 07:18    170    |  135    |  46     ----------------------------<  284    3.5     |  26     |  1.16     Ca    8.6        22 Mar 2022 07:18  Phos  2.4       22 Mar 2022 07:18  Mg     3.0       22 Mar 2022 07:18    TPro  6.4    /  Alb  2.9    /  TBili  0.8    /  DBili  x      /  AST  30     /  ALT  21     /  AlkPhos  63     22 Mar 2022 07:18    PT/INR - ( 21 Mar 2022 13:22 )   PT: 11.4 sec;   INR: 0.99 ratio    PTT - ( 21 Mar 2022 13:22 )  PTT:24.5 sec    Urinalysis Basic - ( 21 Mar 2022 16:04 )    Color: Yellow / Appearance: Clear / S.020 / pH: x  Gluc: x / Ketone: Trace  / Bili: Negative / Urobili: Negative mg/dL   Blood: x / Protein: 100 mg/dL / Nitrite: Negative   Leuk Esterase: Trace / RBC: 6-10 /HPF / WBC 6-10   Sq Epi: x / Non Sq Epi: Negative / Bacteria: Occasional    CAPILLARY BLOOD GLUCOSE  POCT Blood Glucose.: 259 mg/dL (22 Mar 2022 06:17)  POCT Blood Glucose.: 262 mg/dL (21 Mar 2022 23:35)  POCT Blood Glucose.: 254 mg/dL (21 Mar 2022 20:50)    < from: US Abdomen Upper Quadrant Right (22 @ 17:54) >  IMPRESSION:    Distended gallbladder without stones or biliary dilatation.    < end of copied text >    < from: CT Abdomen and Pelvis w/ IV Cont (22 @ 16:41) >  IMPRESSION:    Mucoid impaction distal trachea, right mainstem and right lower lobe   bronchus.    Enlarged prostate with heterogeneous enhancement, correlate clinically   for prostatitis.  Possible mild bladder wall thickening with multiple foci of air within   the nondependent bladder wall, correlate for infection.    < end of copied text >    < from: CT Head No Cont (22 @ 16:25) >  IMPRESSION:  Stable exam.    No mass effect, hemorrhage or evidence of acute intracranial pathology.    < end of copied text >

## 2022-03-22 NOTE — PROGRESS NOTE ADULT - ATTENDING COMMENTS
71M Alzheimer's Dementia, DM2, HTN, Afib, HLD, Paranoid personality disorder, GERD, Anxiety, sent from SNF for AMS, Fever, leukocytosis.  In ED found to have Hypernatremia, ROSALBA, failed dysphagia screen, seasonal corona virus.  Also with severe protein calorie malnutrition    #sepsis (Fever, leukocytosis, ROSALBA, Metabolic encephalopathy) with unclear infection source  - positive for seasonal coronavirus but another source should be ruled out, was treated for UTI recently at [facility]  - blood, urine cultures pending  - RUQ sono showed distended GB without stones or obstruction  - Empiric Zosyn, ID consult Dr Rizo called.     # hypernatremia and ROSALBA  - normal creat approx 0.6, now 1.3 -> 1.1  - Hypotonic IVF  - Renal consult called.  - monitor electrolytes    #Afib/ HTN  - not rate controlled right now but probably due to missed oral medications  - cardiology consult appreciated.  Afib with RVR now on fixed dose cardizem drip    #severe protein calorie malnutrition with possible dysphagia  - failed S&S, Full NPO for now  - if cannot start diet soon need to consider NGT/PEG    #DM2  - FS check q6h for now while NPO  - Add Lantus 5 units daily for continued hyperglycemia    #Dementia, anxiety, Paranoid personality disorder  - currently lethargic and non-verbal due to acute metabolic encephalopathy  - hold zyprexa, namenda and aricept while npo, consider restarting when more alert    #GERD  - IV Pepcid for now    #HLD  - hold statin while NPO    #DVT proph  - Lovenox 30mg SQ daily    Palliative care consult for Goals of Care. 71M Alzheimer's Dementia, DM2, HTN, Afib, HLD, Paranoid personality disorder, GERD, Anxiety, sent from SNF for AMS, Fever, leukocytosis.  In ED found to have Hypernatremia, ROSALBA, failed dysphagia screen, seasonal corona virus.  Also with severe protein calorie malnutrition    #sepsis (Fever, leukocytosis, ROSALBA, Metabolic encephalopathy) with unclear infection source  - positive for seasonal coronavirus but another source should be ruled out, was treated for UTI recently at [facility]  - blood, urine cultures pending  - RUQ sono showed distended GB without stones or obstruction  - Empiric Zosyn, ID consult Dr Rizo called.     # hypernatremia and ROSALBA  - normal creat approx 0.6, now 1.3 -> 1.1  - Hypotonic IVF  - Renal consult called.  - monitor electrolytes    #Afib/ HTN  - not rate controlled right now but probably due to missed oral medications  - cardiology consult appreciated.  Afib with RVR now on fixed dose cardizem drip    #severe protein calorie malnutrition with possible dysphagia/ underweight/ cachexia   - failed S&S, Full NPO for now  - if cannot start diet soon need to consider NGT/PEG    #DM2  - FS check q6h for now while NPO  - Add Lantus 5 units daily for continued hyperglycemia    #Dementia, anxiety, Paranoid personality disorder  - currently lethargic and non-verbal due to acute metabolic encephalopathy  - hold zyprexa, namenda and aricept while npo, consider restarting when more alert    #GERD  - IV Pepcid for now    #HLD  - hold statin while NPO    #DVT proph  - Lovenox 30mg SQ daily    Palliative care consult for Goals of Care.

## 2022-03-22 NOTE — PROGRESS NOTE ADULT - NUTRITIONAL ASSESSMENT
# hypernatremia and ROSALBA  - normal creat approx 0.6, now 1.3  - Hypotonic IVF  - recheck at 6pm for accuracy    #Afib/ HTN  - not rate controlled right now but probably missed oral medications  - metoprolol 2.5mg q6h IVP while NPO, may need higher dosing or cardizem drip    #severe protein calorie malnutrition with possible dysphagia  - failed dysphagia screen in ED, Full NPO for now  - S&S eval in AM  - if cannot start diet need to consider NGT/PEG    #DM2  - FS check q6h for now while NPO    #Dementia, anxiety, Paranoid personality disorder  - currently lethargic and non-verbal due to acute metabolic encephalopathy  - hold zyprexa, namenda and aricept while npo, consider restarting when more alert    #GERD  - IV Pepcid for now    #HLD  - hold statin while NPO    #DVT proph  - Lovenox 30mg SQ daily Diet, WAGNER (03-21-22 @ 16:47) [Active]

## 2022-03-22 NOTE — GOALS OF CARE CONVERSATION - ADVANCED CARE PLANNING - CONVERSATION DETAILS
Writer met with pts sister and by phone son/HCP Gurpreet. Reviewed patient's medical and social history as well as events leading to patient's hospitalization. Writer discussed patient's current diagnosis (sepsis,GERD,paranoid personality disorder,Alzheimers,HTN,HLD,DM,A/f),  medical condition and management,  prognosis, and life expectancy. Inquired about patient's wishes regarding extent of medical care to be provided including escalation of medical care into the ICU and use of vasopressor support. In addition, the writer inquired about thoughts regarding cardiopulmnary resuscitation, artificial nutrition and hydration including use of feeding tubes and IVF, antibiotics, and further investigative studies such as blood draws and radiology. Gurpreet showed good insight into medical condition. All questions answered. Writer recommended MOLST. Gurpreet consented to DNR/DNI ,no feeding tube status. MOLST form filled out and placed in chart. Gurpreet was grieving appropriately. Psychosocial support provided.

## 2022-03-22 NOTE — CONSULT NOTE ADULT - SUBJECTIVE AND OBJECTIVE BOX
NEPHROLOGY CONSULTATION    CHIEF COMPLAINT:  Hypernatremia      HPI:  Sent in from SNF with fever and altered mental status.  Found very hypernatremic and started on IV D5W with little improvement so far.  He is presently NPO.      ROS:  denies vomiting, diarrhea;  appetite has been weak according to family      PAST MEDICAL & SURGICAL HISTORY:  DM (diabetes mellitus)    Afib    Hyperlipidemia    Hypertension    Alzheimer disease    Dementia    Anxiety    Paranoid personality disorder    GERD (gastroesophageal reflux disease)        SOCIAL HISTORY:  NA    FAMILY HISTORY:  NA      MEDICATIONS  (STANDING):  albuterol/ipratropium for Nebulization 3 milliLiter(s) Nebulizer every 6 hours  dextrose 40% Gel 15 Gram(s) Oral once  dextrose 5% 1000 milliLiter(s) (100 mL/Hr) IV Continuous <Continuous>  dextrose 5%. 1000 milliLiter(s) (50 mL/Hr) IV Continuous <Continuous>  dextrose 5%. 1000 milliLiter(s) (100 mL/Hr) IV Continuous <Continuous>  dextrose 50% Injectable 25 Gram(s) IV Push once  dextrose 50% Injectable 12.5 Gram(s) IV Push once  dextrose 50% Injectable 25 Gram(s) IV Push once  diltiazem Infusion 10 mG/Hr (10 mL/Hr) IV Continuous <Continuous>  enoxaparin Injectable 30 milliGRAM(s) SubCutaneous every 24 hours  famotidine Injectable 20 milliGRAM(s) IV Push daily  glucagon  Injectable 1 milliGRAM(s) IntraMuscular once  insulin glargine Injectable (LANTUS) 5 Unit(s) SubCutaneous <User Schedule>  insulin lispro (ADMELOG) corrective regimen sliding scale   SubCutaneous every 6 hours  piperacillin/tazobactam IVPB.. 3.375 Gram(s) IV Intermittent every 8 hours      PHYSICAL EXAMINATION:  T(F): 98.7 (22 @ 09:30)  HR: 74 (22 @ 13:51)  BP: 106/55 (22 @ 09:30)  RR: 18 (22 @ 09:30)  SpO2: 97% (22 @ 13:51)  Conversant but confused  PERRLA, pink conjunctivae, no ptosis  Oral mucous membranes dry  Neck non tender, no mass, no JVD  Normal respiratory effort, lungs clear to auscultation  Heart with RRR, no murmurs or rubs, no peripheral edema  Abdomen soft, no masses, no organomegaly  Skin no rashes, ulcers or lesions, diminished turgor      LABS:                        14.0   12.17 )-----------( 113      ( 22 Mar 2022 07:18 )             46.8     -    170<HH>  |  135<H>  |  46<H>  ----------------------------<  284<H>  3.5   |  26  |  1.16    Ca    8.6      22 Mar 2022 07:18  Phos  2.4     -  Mg     3.0     -    TPro  6.4  /  Alb  2.9<L>  /  TBili  0.8  /  DBili  x   /  AST  30  /  ALT  21  /  AlkPhos  63  -    Urinalysis Basic - ( 21 Mar 2022 16:04 )  Color: Yellow / Appearance: Clear / S.020 / pH: x  Gluc: x / Ketone: Trace  / Bili: Negative / Urobili: Negative mg/dL   Blood: x / Protein: 100 mg/dL / Nitrite: Negative   Leuk Esterase: Trace / RBC: 6-10 /HPF / WBC 6-10   Sq Epi: x / Non Sq Epi: Negative / Bacteria: Occasional        ASSESSMENT:  1.  Hypernatremia/dehydration, impaired thirst and access to water    PLAN:  Increase D5W to 150 mL/hr for time being  Manage hyperglycemia with more insulin  Zosyn not the most ideal choice in this patient since it entails a large salt load

## 2022-03-22 NOTE — DIETITIAN NUTRITION RISK NOTIFICATION - ADDITIONAL COMMENTS/DIETITIAN RECOMMENDATIONS
If medically appropriate, recommend consider short-term enteral nutrition/NGT placement: Glucerna 1.5, start at 20ml/hr, increase by 10ml every 6hrs until goal 60ml x 20hrs is reached

## 2022-03-22 NOTE — PROGRESS NOTE ADULT - ASSESSMENT
The patient is a 71 year old male with a history of HTN, HL, DM, paroxysmal atrial fibrillation, dementia who presents with AMS in the setting of severe dehydration, sepsis, possible PNA, viral infection.    Plan:  - Telemetry consistent with frequent brief episodes of AF  - Unable to take PO  - Hold PO diltiazem and metoprolol - resume when able to take PO  - Discontinue metoprolol IV  - Start diltiazem drip at 10 mg/hr  - Given sinus rates are in the 70s, monitor for bradycardia  - Not a candidate for long term anticoagulation for AF given dementia and overall poor prognosis  - Continue IV fluids for severe hypernatremia  - May need increased rate of D5W  - CT with mucoid impaction of bronchi  - Coronavirus (non-COVID) positive  - On zosyn for possible PNA

## 2022-03-22 NOTE — PROVIDER CONTACT NOTE (CRITICAL VALUE NOTIFICATION) - ASSESSMENT
pt still lethaggic
AxO x0, opening eyes at times but lethargic. Intermittent SVT/rapid afib on tele, cardizem gtt to initiate per Dr. ROSALBA Cortés. No resp distress. NPO currently due to lethargy. Occasional non productive cough. IV fluids infusing at 100 mL/hr, unable to tolerate PO at this time, failed Speech/Swallow
Liveborn infant by vaginal delivery

## 2022-03-22 NOTE — PROGRESS NOTE ADULT - ASSESSMENT
71 y.o male w/ PMHx of Afib, DMT2, HTN, HLD, paranoid personality disorder, anxiety, GERD,  Alzheimer's dementia who was BIBEMS from [facility] w/ fever and AMS x 2 days. Pt. had CXR at facility w/ no acute findings. Pt. given zosyn at [facility]. Unable to obtain information from pt. 2/2 AMS. Will need admission to medicine for treatment and monitoring of hypernatremia, ROSALBA, sepsis and severe protein calorie malnutrition.    # Metabolic encephalopathy 2/2 acute hypernatremia 2/2 dehydration   - Present on admission  - Na: 172, repeat this AM: 170  - Free water deficit: 4.7L  - NPO w/ maintenance IVF changed overnight to D5+20KCL @ 100mL/hr  - NGT for administration of free water given pt.'s hyperglycemia and little improvement in sodium levels  - Head CT unremarkable  - Monitor neuro status  - Trend BMPs  - ? Nephrology consult    # Sepsis most likely 2/2 UTI vs. CAP  - Present on admission  - Evidenced by fever, leukocytosis w/ bandemia, tachycardia  - WBC remains elevated @ 12.17 w/ low grade fevers  - UA w/ possible infection  - RVP and coronavirus positive  - COVID negative  - Follow up w/ blood and urine cultures  - Received zosyn at [facility]  - Continue broad spectrum coverage w/ zosyn until sensitivities result  - MRSA swab > if positive > vanco by level   - CXR not suggestive of PNA  - LFTs and RUQ to eval for cholecystis  - CT of chest and A/P pending official read  - Trend WBCs  - ID consulted    # ROSALBA  - Present on admission  - Baseline BUN/Cr 17/0.62  - Admission BUN/Cr. 61/1.28  - Place echeverria for strict I/Os  - Avoid nephrotoxic medications  - Trend BMPs  - ? Nephrology consult    # Severe protein calorie malnutrition  - Underweight (BMI 16.9)  - Failed dysphagia screen  - Consider NGT for nutritional management until pt. able to take PO  - Monitor albumin and protein levels    # Afib  - Tele monitoring  - Switch PO metoprolol to IV  - Hold ASA while NPO  - Hold cardizem while NPO, can give IV if RVR  - Cards consulted for possible SVT bursts this AM    # HTN  - Continue lopressor w/ hold parameters    # DMT2  - Hold oral hypoglycemics  - ISS w/ fingersticks per protocol  - Increase ISS  - Consider NGT for administration of free water given pt.'s hyperglycemia  - Hypoglycemia protocol    # HLD  - Hold atorvastatin while NPO    # Paranoid personality disorder, anxiety  - Hold olanzapine 2/2 AMS    # Alzheimer's dementia  - Hold aricept and namenda while NPO    # GERD  - Continue pepcid    # DTV prophylaxis  - Continue renally dosed lovenox 30mg SQ    # ACP  - Son updated  - Full code  - Consult palliative care for GOC discussion 71 y.o male w/ PMHx of Afib, DMT2, HTN, HLD, paranoid personality disorder, anxiety, GERD,  Alzheimer's dementia who was BIBEMS from [facility] w/ fever and AMS x 2 days. Pt. had CXR at facility w/ no acute findings. Pt. given zosyn at [facility]. Unable to obtain information from pt. 2/2 AMS. Will need admission to medicine for treatment and monitoring of hypernatremia, ROSALBA, sepsis and severe protein calorie malnutrition.    # Metabolic encephalopathy 2/2 acute hypernatremia 2/2 dehydration   - Present on admission  - Na: 172, repeat this AM: 170  - Free water deficit: 4.7L  - NPO w/ maintenance IVF changed overnight to D5+20KCL @ 100mL/hr  - NGT for administration of free water given pt.'s hyperglycemia and little improvement in sodium levels  - Head CT unremarkable  - Monitor neuro status  - Trend BMPs  - Nephrology consult for persistent hypernatremia    # Sepsis most likely 2/2 UTI vs. CAP  - Present on admission  - Evidenced by fever, leukocytosis w/ bandemia, tachycardia  - WBC remains elevated @ 12.17 w/ low grade fevers  - UA w/ possible infection  - RVP and coronavirus positive  - COVID negative  - Follow up w/ blood and urine cultures  - Received zosyn at [facility]  - Continue broad spectrum coverage w/ zosyn until sensitivities result  - MRSA swab > if positive > vanco by level   - CXR not suggestive of PNA  - LFTs and RUQ to eval for cholecystis  - CT of chest and A/P pending official read  - Trend WBCs  - ID consulted    # ROSALBA  - Present on admission  - Baseline BUN/Cr 17/0.62  - Admission BUN/Cr. 61/1.28  - Place echeverria for strict I/Os  - Avoid nephrotoxic medications  - Trend BMPs  - Nephrology consult    # Severe protein calorie malnutrition  - Underweight (BMI 16.9)  - Failed dysphagia screen  - Consider NGT for nutritional management until pt. able to take PO  - Monitor albumin and protein levels    # Afib  - Tele monitoring  - Increase lopressor to 5mg w/ hold parameters 2/2 breakthrough tachycardia   - Hold ASA while NPO  - Hold cardizem while NPO, can give IV if RVR  - Cards consulted for possible SVT bursts this AM    # HTN  - Continue lopressor w/ hold parameters    # DMT2  - Hold oral hypoglycemics  - ISS w/ fingersticks per protocol  - Increase ISS  - Consider NGT for administration of free water given pt.'s hyperglycemia  - Hypoglycemia protocol    # HLD  - Hold atorvastatin while NPO    # Paranoid personality disorder, anxiety  - Hold olanzapine 2/2 AMS    # Alzheimer's dementia  - Hold aricept and namenda while NPO    # GERD  - Continue pepcid    # DTV prophylaxis  - Continue renally dosed lovenox 30mg SQ    # ACP  - Son updated  - Full code  - Consult palliative care for GOC discussion 71 y.o male w/ PMHx of Afib, DMT2, HTN, HLD, paranoid personality disorder, anxiety, GERD,  Alzheimer's dementia who was BIBEMS from [facility] w/ fever and AMS x 2 days. Pt. had CXR at facility w/ no acute findings. Pt. given zosyn at [facility]. Unable to obtain information from pt. 2/2 AMS. Will need admission to medicine for treatment and monitoring of hypernatremia, ROSALBA, sepsis and severe protein calorie malnutrition.    # Metabolic encephalopathy 2/2 acute hypernatremia 2/2 dehydration   - Present on admission  - Na: 172, repeat this AM: 170  - Free water deficit: 4.7L  - NPO w/ maintenance IVF changed overnight to D5+20KCL @ 100mL/hr  - NGT for administration of free water given pt.'s hyperglycemia and little improvement in sodium levels  - Head CT unremarkable  - Monitor neuro status  - Trend BMPs  - Nephrology consult for persistent hypernatremia    # Sepsis most likely 2/2 UTI vs. CAP  - Present on admission  - Evidenced by fever, leukocytosis w/ bandemia, tachycardia  - WBC remains elevated @ 12.17 w/ low grade fevers  - UA w/ possible infection  - RVP and coronavirus positive  - COVID negative  - Follow up w/ blood and urine cultures  - Received zosyn at [facility]  - Continue broad spectrum coverage w/ zosyn until sensitivities result  - MRSA swab > if positive > vanco by level   - CXR not suggestive of PNA  - LFTs and RUQ to eval for cholecystis  - CT of chest and A/P pending official read  - Trend WBCs  - ID consulted    # ROSALBA  - Present on admission  - Baseline BUN/Cr 17/0.62  - Admission BUN/Cr. 61/1.28  - Place echeverria for strict I/Os  - Avoid nephrotoxic medications  - Trend BMPs  - Nephrology consult    # Severe protein calorie malnutrition  - Underweight (BMI 16.9)  - Failed dysphagia screen  - Consider NGT for nutritional management until pt. able to take PO  - Monitor albumin and protein levels    # Afib  - Tele monitoring  - Hold ASA while NPO  - Increase lopressor to 5mg w/ hold parameters 2/2 breakthrough tachycardia   - IV diltiazem infusion per cards   - Cards consulted, appreciate recs    # HTN  - Continue lopressor w/ hold parameters    # DMT2  - Hold oral hypoglycemics  - ISS w/ fingersticks per protocol  - Increase ISS  - Consider NGT for administration of free water given pt.'s hyperglycemia  - Hypoglycemia protocol    # HLD  - Hold atorvastatin while NPO    # Paranoid personality disorder, anxiety  - Hold olanzapine 2/2 AMS    # Alzheimer's dementia  - Hold aricept and namenda while NPO    # GERD  - Continue pepcid    # DTV prophylaxis  - Continue renally dosed lovenox 30mg SQ    # ACP  - Son updated  - Full code  - Consult palliative care for GOC discussion 71 y.o male w/ PMHx of Afib, DMT2, HTN, HLD, paranoid personality disorder, anxiety, GERD,  Alzheimer's dementia who was BIBEMS from [facility] w/ fever and AMS x 2 days. Pt. had CXR at facility w/ no acute findings. Pt. given zosyn at SNF. Unable to obtain information from pt. 2/2 AMS. Will need admission to medicine for treatment and monitoring of hypernatremia, ROSALBA, sepsis and severe protein calorie malnutrition.    # Metabolic encephalopathy 2/2 acute hypernatremia 2/2 dehydration   - Present on admission  - Na: 172, repeat this AM: 170  - Free water deficit: 4.7L  - NPO w/ maintenance IVF changed overnight to D5+20KCL @ 100mL/hr  - NGT for administration of free water given pt.'s hyperglycemia and little improvement in sodium levels  - Head CT unremarkable  - Monitor neuro status  - Trend BMPs  - Nephrology consult for persistent hypernatremia    # Sepsis most likely 2/2 UTI vs. CAP  - Present on admission  - Evidenced by fever, leukocytosis w/ bandemia, tachycardia  - WBC remains elevated @ 12.17 w/ low grade fevers  - UA w/ possible infection  - CT chest showing mucoid impaction distal trachea, right mainstem and RLL bronchus  - CT A/P showing mild bladder thickening consistent w/ possible infection  - RUQ negative for acute cholecystitis   - RVP and coronavirus positive  - COVID negative  - Follow up w/ blood and urine cultures  - Received zosyn at [facility]  - Continue broad spectrum coverage w/ zosyn until sensitivities result  - MRSA swab > if positive > vanco by level   - Trend WBCs  - ID consulted    # ROSALBA  - Present on admission  - Baseline BUN/Cr 17/0.62  - Admission BUN/Cr. 61/1.28  - Place echeverria for strict I/Os  - Avoid nephrotoxic medications  - Trend BMPs  - Nephrology consult    # Severe protein calorie malnutrition  - Underweight (BMI 16.9)  - Failed dysphagia screen  - Consider NGT for nutritional management until pt. able to take PO  - Monitor albumin and protein levels    # Afib  - Tele monitoring  - Hold ASA while NPO  - Increase lopressor to 5mg w/ hold parameters 2/2 breakthrough tachycardia   - IV diltiazem infusion per cards   - Cards consulted, appreciate recs    # HTN  - Continue lopressor w/ hold parameters    # DMT2  - Hold oral hypoglycemics  - ISS w/ fingersticks per protocol  - Increase ISS  - Consider NGT for administration of free water given pt.'s hyperglycemia  - Hypoglycemia protocol    # HLD  - Hold atorvastatin while NPO    # Paranoid personality disorder, anxiety  - Hold olanzapine 2/2 AMS    # Alzheimer's dementia  - Hold aricept and namenda while NPO    # GERD  - Continue pepcid    # DTV prophylaxis  - Continue renally dosed lovenox 30mg SQ    # ACP  - Son updated  - Full code  - Consult palliative care for GOC discussion 71 y.o male w/ PMHx of Afib, DMT2, HTN, HLD, paranoid personality disorder, anxiety, GERD,  Alzheimer's dementia who was BIBEMS from [facility] w/ fever and AMS x 2 days. Pt. had CXR at facility w/ no acute findings. Pt. given zosyn at SNF. Unable to obtain information from pt. 2/2 AMS. Will need admission to medicine for treatment and monitoring of hypernatremia, ROSALBA, sepsis and severe protein calorie malnutrition.    # Metabolic encephalopathy 2/2 acute hypernatremia 2/2 dehydration   - Present on admission  - Na: 172, repeat this AM: 170  - Free water deficit: 4.7L  - NPO w/ maintenance IVF changed overnight to D5+20KCL @ 100mL/hr  - NGT for administration of free water given pt.'s hyperglycemia and little improvement in sodium levels  - Head CT unremarkable  - Monitor neuro status  - Trend BMPs  - Nephrology consult for persistent hypernatremia    # Sepsis most likely 2/2 UTI vs. CAP  - Present on admission  - Evidenced by fever, leukocytosis w/ bandemia, tachycardia  - WBC remains elevated @ 12.17 w/ low grade fevers  - UA w/ possible infection  - CT chest showing mucoid impaction distal trachea, right mainstem and RLL bronchus  - CT A/P showing mild bladder thickening consistent w/ possible infection  - RUQ negative for acute cholecystitis   - RVP and coronavirus positive  - COVID negative  - Follow up w/ blood and urine cultures  - Received zosyn at [facility]  - Continue broad spectrum coverage w/ zosyn until sensitivities result  - MRSA swab > if positive > vanco by level   - Trend WBCs  - ID consulted    # ROSALBA  - Present on admission  - Baseline BUN/Cr 17/0.62  - Admission BUN/Cr. 61/1.28  - Place echeverria for strict I/Os  - Avoid nephrotoxic medications  - Trend BMPs  - Nephrology consult    # Severe protein calorie malnutrition  - Underweight (BMI 16.9)  - Failed dysphagia screen  - Consider NGT for nutritional management until pt. able to take PO  - Monitor albumin and protein levels    # Afib  - Tele monitoring  - Hold ASA while NPO  - Increase lopressor to 5mg w/ hold parameters 2/2 breakthrough tachycardia   - IV diltiazem infusion per cards   - Cards consulted, appreciate recs    # HTN  - Continue lopressor w/ hold parameters    # DMT2  - Hold oral hypoglycemics  - ISS w/ fingersticks per protocol  - Lantus 5 units SQ ordered for hyperglycemia  - Consider NGT for administration of free water given pt.'s hyperglycemia  - Hypoglycemia protocol    # HLD  - Hold atorvastatin while NPO    # Paranoid personality disorder, anxiety  - Hold olanzapine 2/2 AMS    # Alzheimer's dementia  - Hold aricept and namenda while NPO    # GERD  - Continue pepcid    # DTV prophylaxis  - Continue renally dosed lovenox 30mg SQ    # ACP  - Son updated  - Full code  - Consult palliative care for GOC discussion

## 2022-03-23 DIAGNOSIS — T14.8XXA OTHER INJURY OF UNSPECIFIED BODY REGION, INITIAL ENCOUNTER: ICD-10-CM

## 2022-03-23 LAB
ALBUMIN SERPL ELPH-MCNC: 2.6 G/DL — LOW (ref 3.3–5)
ALP SERPL-CCNC: 63 U/L — SIGNIFICANT CHANGE UP (ref 30–120)
ALT FLD-CCNC: 20 U/L DA — SIGNIFICANT CHANGE UP (ref 10–60)
ANION GAP SERPL CALC-SCNC: 10 MMOL/L — SIGNIFICANT CHANGE UP (ref 5–17)
AST SERPL-CCNC: 26 U/L — SIGNIFICANT CHANGE UP (ref 10–40)
BASOPHILS # BLD AUTO: 0.01 K/UL — SIGNIFICANT CHANGE UP (ref 0–0.2)
BASOPHILS NFR BLD AUTO: 0.1 % — SIGNIFICANT CHANGE UP (ref 0–2)
BILIRUB SERPL-MCNC: 0.8 MG/DL — SIGNIFICANT CHANGE UP (ref 0.2–1.2)
BUN SERPL-MCNC: 29 MG/DL — HIGH (ref 7–23)
CALCIUM SERPL-MCNC: 8 MG/DL — LOW (ref 8.4–10.5)
CHLORIDE SERPL-SCNC: 129 MMOL/L — HIGH (ref 96–108)
CO2 SERPL-SCNC: 24 MMOL/L — SIGNIFICANT CHANGE UP (ref 22–31)
CREAT SERPL-MCNC: 1 MG/DL — SIGNIFICANT CHANGE UP (ref 0.5–1.3)
EGFR: 80 ML/MIN/1.73M2 — SIGNIFICANT CHANGE UP
EOSINOPHIL # BLD AUTO: 0.1 K/UL — SIGNIFICANT CHANGE UP (ref 0–0.5)
EOSINOPHIL NFR BLD AUTO: 1.1 % — SIGNIFICANT CHANGE UP (ref 0–6)
GLUCOSE BLDC GLUCOMTR-MCNC: 211 MG/DL — HIGH (ref 70–99)
GLUCOSE BLDC GLUCOMTR-MCNC: 242 MG/DL — HIGH (ref 70–99)
GLUCOSE BLDC GLUCOMTR-MCNC: 272 MG/DL — HIGH (ref 70–99)
GLUCOSE SERPL-MCNC: 300 MG/DL — HIGH (ref 70–99)
HCT VFR BLD CALC: 38 % — LOW (ref 39–50)
HGB BLD-MCNC: 11.8 G/DL — LOW (ref 13–17)
IMM GRANULOCYTES NFR BLD AUTO: 0.4 % — SIGNIFICANT CHANGE UP (ref 0–1.5)
LYMPHOCYTES # BLD AUTO: 1.1 K/UL — SIGNIFICANT CHANGE UP (ref 1–3.3)
LYMPHOCYTES # BLD AUTO: 11.6 % — LOW (ref 13–44)
MAGNESIUM SERPL-MCNC: 2.6 MG/DL — SIGNIFICANT CHANGE UP (ref 1.6–2.6)
MCHC RBC-ENTMCNC: 30.7 PG — SIGNIFICANT CHANGE UP (ref 27–34)
MCHC RBC-ENTMCNC: 31.1 GM/DL — LOW (ref 32–36)
MCV RBC AUTO: 99 FL — SIGNIFICANT CHANGE UP (ref 80–100)
MONOCYTES # BLD AUTO: 0.25 K/UL — SIGNIFICANT CHANGE UP (ref 0–0.9)
MONOCYTES NFR BLD AUTO: 2.6 % — SIGNIFICANT CHANGE UP (ref 2–14)
MRSA PCR RESULT.: SIGNIFICANT CHANGE UP
NEUTROPHILS # BLD AUTO: 8 K/UL — HIGH (ref 1.8–7.4)
NEUTROPHILS NFR BLD AUTO: 84.2 % — HIGH (ref 43–77)
NRBC # BLD: 0 /100 WBCS — SIGNIFICANT CHANGE UP (ref 0–0)
PHOSPHATE SERPL-MCNC: 2.5 MG/DL — SIGNIFICANT CHANGE UP (ref 2.5–4.5)
PLATELET # BLD AUTO: 81 K/UL — LOW (ref 150–400)
POTASSIUM SERPL-MCNC: 3.6 MMOL/L — SIGNIFICANT CHANGE UP (ref 3.5–5.3)
POTASSIUM SERPL-SCNC: 3.6 MMOL/L — SIGNIFICANT CHANGE UP (ref 3.5–5.3)
PROT SERPL-MCNC: 5.8 G/DL — LOW (ref 6–8.3)
RBC # BLD: 3.84 M/UL — LOW (ref 4.2–5.8)
RBC # FLD: 15.5 % — HIGH (ref 10.3–14.5)
S AUREUS DNA NOSE QL NAA+PROBE: DETECTED
SODIUM SERPL-SCNC: 163 MMOL/L — CRITICAL HIGH (ref 135–145)
WBC # BLD: 9.5 K/UL — SIGNIFICANT CHANGE UP (ref 3.8–10.5)
WBC # FLD AUTO: 9.5 K/UL — SIGNIFICANT CHANGE UP (ref 3.8–10.5)

## 2022-03-23 PROCEDURE — 99233 SBSQ HOSP IP/OBS HIGH 50: CPT

## 2022-03-23 PROCEDURE — 99221 1ST HOSP IP/OBS SF/LOW 40: CPT

## 2022-03-23 RX ORDER — INSULIN GLARGINE 100 [IU]/ML
8 INJECTION, SOLUTION SUBCUTANEOUS
Refills: 0 | Status: DISCONTINUED | OUTPATIENT
Start: 2022-03-23 | End: 2022-03-28

## 2022-03-23 RX ADMIN — Medication 650 MILLIGRAM(S): at 00:35

## 2022-03-23 RX ADMIN — Medication 3 MILLILITER(S): at 07:43

## 2022-03-23 RX ADMIN — FAMOTIDINE 20 MILLIGRAM(S): 10 INJECTION INTRAVENOUS at 12:04

## 2022-03-23 RX ADMIN — INSULIN GLARGINE 8 UNIT(S): 100 INJECTION, SOLUTION SUBCUTANEOUS at 12:44

## 2022-03-23 RX ADMIN — Medication 2: at 17:27

## 2022-03-23 RX ADMIN — Medication 650 MILLIGRAM(S): at 01:05

## 2022-03-23 RX ADMIN — Medication 3 MILLILITER(S): at 14:16

## 2022-03-23 RX ADMIN — Medication 2: at 12:03

## 2022-03-23 RX ADMIN — SODIUM CHLORIDE 150 MILLILITER(S): 9 INJECTION, SOLUTION INTRAVENOUS at 21:48

## 2022-03-23 RX ADMIN — PIPERACILLIN AND TAZOBACTAM 25 GRAM(S): 4; .5 INJECTION, POWDER, LYOPHILIZED, FOR SOLUTION INTRAVENOUS at 07:50

## 2022-03-23 RX ADMIN — Medication 3: at 05:53

## 2022-03-23 RX ADMIN — PIPERACILLIN AND TAZOBACTAM 25 GRAM(S): 4; .5 INJECTION, POWDER, LYOPHILIZED, FOR SOLUTION INTRAVENOUS at 16:02

## 2022-03-23 RX ADMIN — ENOXAPARIN SODIUM 30 MILLIGRAM(S): 100 INJECTION SUBCUTANEOUS at 05:52

## 2022-03-23 RX ADMIN — Medication 3 MILLILITER(S): at 19:32

## 2022-03-23 NOTE — CONSULT NOTE ADULT - ASSESSMENT
Patient presents with Deep tissue pressure injury (DTPI) bilateral buttocks/sacral region 5 x 6 periwound with erythema using FACES pain scale patient without discomfort/pain  Recommend:   -continue cavilon daily  -Continue RIAZ BID and PRN for soiling  Patient is on a low air loss mattress  This pressure injury is community acquired YES  At risk for altered tissue perfusion /YES  Impaired perfusion of peripheral tissue NO/  Continue  Nutrition (as tolerated)  Continue  Offloading   Continue Pericare  Apply cair boots at all times while in bed.   Provide skin checks and foot placement q8h.  Care as per medicine will follow w/ you  Findings and recommendations discussed with ANGELA Fermin   Thank you for this consult  Sobia Kim NP, [facility] [phone]

## 2022-03-23 NOTE — PROGRESS NOTE ADULT - SUBJECTIVE AND OBJECTIVE BOX
[facility] NEPHROLOGY SERVICES, [facility]  NEPHROLOGY AND HYPERTENSION  [address]  [address]  [place] [address]  [phone]    MD MAYA JUNE, MD STELLA SALDAÑA MD YELENA ROSENBERG, MD GREY CLARK, MD MATTHIEU QUINTANILLA MD          Patient events noted    MEDICATIONS  (STANDING):  albuterol/ipratropium for Nebulization 3 milliLiter(s) Nebulizer every 6 hours  dextrose 40% Gel 15 Gram(s) Oral once  dextrose 5% 1000 milliLiter(s) (150 mL/Hr) IV Continuous <Continuous>  dextrose 5%. 1000 milliLiter(s) (50 mL/Hr) IV Continuous <Continuous>  dextrose 5%. 1000 milliLiter(s) (100 mL/Hr) IV Continuous <Continuous>  dextrose 50% Injectable 25 Gram(s) IV Push once  dextrose 50% Injectable 12.5 Gram(s) IV Push once  dextrose 50% Injectable 25 Gram(s) IV Push once  enoxaparin Injectable 30 milliGRAM(s) SubCutaneous every 24 hours  famotidine Injectable 20 milliGRAM(s) IV Push daily  glucagon  Injectable 1 milliGRAM(s) IntraMuscular once  insulin glargine Injectable (LANTUS) 8 Unit(s) SubCutaneous <User Schedule>  insulin lispro (ADMELOG) corrective regimen sliding scale   SubCutaneous every 6 hours  piperacillin/tazobactam IVPB.. 3.375 Gram(s) IV Intermittent every 8 hours    MEDICATIONS  (PRN):  acetaminophen  Suppository .. 650 milliGRAM(s) Rectal every 6 hours PRN Temp greater or equal to 38C (100.4F), Mild Pain (1 - 3)      03-22-22 @ 07:01  -  03-23-22 @ 07:00  --------------------------------------------------------  IN: 1875 mL / OUT: 800 mL / NET: 1075 mL    03-23-22 @ 07:01  -  03-23-22 @ 20:49  --------------------------------------------------------  IN: 0 mL / OUT: 700 mL / NET: -700 mL      PHYSICAL EXAM:      T(C): 37.2 (03-23-22 @ 18:15), Max: 38.2 (03-23-22 @ 00:39)  HR: 60 (03-23-22 @ 19:44) (60 - 86)  BP: 95/54 (03-23-22 @ 18:15) (95/54 - 114/74)  RR: 18 (03-23-22 @ 18:15) (17 - 19)  SpO2: 98% (03-23-22 @ 19:44) (95% - 100%)  Wt(kg): --  Lungs clear  Heart S1S2  Abd soft NT ND  Extremities:   tr edema                                    11.8   9.50  )-----------( 81       ( 23 Mar 2022 07:44 )             38.0     03-23    163<HH>  |  129<H>  |  29<H>  ----------------------------<  300<H>  3.6   |  24  |  1.00    Ca    8.0<L>      23 Mar 2022 07:44  Phos  2.5     03-23  Mg     2.6     03-23    TPro  5.8<L>  /  Alb  2.6<L>  /  TBili  0.8  /  DBili  x   /  AST  26  /  ALT  20  /  AlkPhos  63  03-23      LIVER FUNCTIONS - ( 23 Mar 2022 07:44 )  Alb: 2.6 g/dL / Pro: 5.8 g/dL / ALK PHOS: 63 U/L / ALT: 20 U/L DA / AST: 26 U/L / GGT: x           Creatinine Trend: 1.00<--, 1.16<--, 1.16<--, 1.22<--, 1.28<--      Assessment   ROSALBA; hypernatremia pre renal azotemia     Plan:  Continue IVF D5W    Micha Keene MD

## 2022-03-23 NOTE — PROGRESS NOTE ADULT - ATTENDING COMMENTS
71M Alzheimer's Dementia, DM2, HTN, Afib, HLD, Paranoid personality disorder, GERD, Anxiety, sent from SNF for AMS, Fever, leukocytosis.  In ED found to have Hypernatremia, ROSALBA, failed dysphagia screen, seasonal corona virus.  Also with severe protein calorie malnutrition.    #sepsis (Fever, leukocytosis, ROSALBA, Metabolic encephalopathy) with unclear infection source  - positive for seasonal coronavirus but another source should be ruled out, was treated for UTI recently at [facility]  - blood, urine cultures NGTD  - RUQ sono showed distended GB without stones or obstruction  - Empiric Zosyn, ID consult Dr Rizo appreciated    # hypernatremia and ROSALBA  - normal creat approx 0.6, now 1.3 -> 1.1 ->1.0  - Hypotonic IVF  - Renal consult appreciated  - monitor electrolytes    # Paroxysmal Afib/ HTN  - now in NSR  - cardiology consult appreciated. now off cardizem drip.  monitor for need for cardiac meds    #severe protein calorie malnutrition with possible dysphagia/ underweight/ cachexia   - still to lethargic for S&S eval today  - if cannot start diet soon need to consider NGT - however family states they do no want feeding tube    #DM2  - FS check q6h for now while NPO  - Increase Lantus 8 units daily for continued hyperglycemia    #Dementia, anxiety, Paranoid personality disorder  - currently lethargic and non-verbal due to acute metabolic encephalopathy  - metabolic encephalopathy likely due to combination of hypernatremia and infectious process  - hold zyprexa, namenda and aricept while npo, consider restarting when more alert    #GERD  - IV Pepcid for now    #HLD  - hold statin while NPO    #DVT proph  - Lovenox 30mg SQ daily    Palliative care consult for Goals of Care appreciated.  DNR/I

## 2022-03-23 NOTE — PROGRESS NOTE ADULT - SUBJECTIVE AND OBJECTIVE BOX
Chief Complaint: AMS    Interval Events: Frequent episodes of AF persist.    Review of Systems:  Unable to obtain    Physical Exam:  Vital Signs Last 24 Hrs  T(C): 37.6 (23 Mar 2022 06:46), Max: 38.2 (22 Mar 2022 17:46)  T(F): 99.7 (23 Mar 2022 06:46), Max: 100.7 (22 Mar 2022 17:46)  HR: 72 (23 Mar 2022 08:40) (71 - 86)  BP: 102/61 (23 Mar 2022 05:43) (102/61 - 127/78)  BP(mean): --  RR: 18 (23 Mar 2022 05:43) (18 - 19)  SpO2: 100% (23 Mar 2022 08:40) (97% - 100%)  General: Cachectic  HEENT: Dry MM  Neck: No JVD, no carotid bruit  Lungs: CTAB  CV: RRR, nl S1/S2, no M/R/G  Abdomen: S/NT/ND, +BS  Extremities: No LE edema, no cyanosis  Neuro: AAOx0  Skin: No rash    Labs:    03-23    163<HH>  |  129<H>  |  29<H>  ----------------------------<  300<H>  3.6   |  24  |  1.00    Ca    8.0<L>      23 Mar 2022 07:44  Phos  2.5     03-23  Mg     2.6     03-23    TPro  5.8<L>  /  Alb  2.6<L>  /  TBili  0.8  /  DBili  x   /  AST  26  /  ALT  20  /  AlkPhos  63  03-23                        11.8   9.50  )-----------( 81       ( 23 Mar 2022 07:44 )             38.0       Telemetry: Sinus rhythm, AF

## 2022-03-23 NOTE — PROGRESS NOTE ADULT - ASSESSMENT
71 y.o male w/ PMHx of Afib, DMT2, HTN, HLD, paranoid personality disorder, anxiety, GERD,  Alzheimer's dementia who was BIBEMS from [facility] w/ fever and AMS x 2 days. Pt. had CXR at facility w/ no acute findings. Pt. given zosyn at [facility]. Unable to obtain information from pt. 2/2 AMS. Will need admission to medicine for treatment and monitoring of hypernatremia, ROSALBA, sepsis and severe protein calorie malnutrition.    # Metabolic encephalopathy 2/2 acute hypernatremia 2/2 dehydration   - Present on admission  - Na: 172, repeat this AM: 163  - Free water deficit: 3.4L  - Maintenance IVF rate increased yesterday to 150mL/hr per nephrology  - NGT for administration of free water given pt.'s persistent hyperglycemia  - Head CT unremarkable  - Monitor neuro status, lethargic but following this AM  - Trend BMPs  - Appreciate nephrology recs    # Sepsis most likely 2/2 UTI vs. CAP  - Present on admission  - Evidenced by fever, leukocytosis w/ bandemia, tachycardia  - WBC WNL this AM @ 9.5  - Continues to have intermittent fevers, most recent 00:39 3/23  - UA w/ possible infection  - CT chest showing mucoid impaction distal trachea, right mainstem and RLL bronchus  - CT A/P showing mild bladder thickening consistent w/ possible infection  - RUQ negative for acute cholecystitis   - RVP and coronavirus positive  - COVID negative  - 3/21 blood and urine cultures NGTD  - Received zosyn at [facility]  - Continue broad spectrum coverage w/ zosyn until sensitivities result  - MRSA swab sent, pending results  - Trend WBCs  - ID consulted    # ROSALBA  - Present on admission  - Baseline BUN/Cr 17/0.62  - Admission BUN/Cr. 61/1.28  - Continues to improve, 29/1.00 this AM  - Strict I/Os  - Avoid nephrotoxic medications  - Trend BMPs  - Nephrology consult    # Severe protein calorie malnutrition  - Underweight (BMI 16.9)  - Failed dysphagia screen  - Pt. continues to be too lethargic for PO intake, will place keofeed for nutrition management until more alert and able to take PO  - Monitor albumin and protein levels    # Afib  - Tele monitoring  - Hold ASA while NPO  - Diltiazem gtt stopped overnight, no tachyarrhythmias noted since  - Re-start IV lopressor 5mg IV q6 hours w/ hold parameters  - Cards recs appreciated    # HTN  - Controlled  - Re-start lopressor w/ hold parameters    # DMT2  - Hold oral hypoglycemics  - ISS w/ fingersticks per protocol  - Increased Lantus to 8 units SQ  - Keofeed placement for administration of free water given pt.'s hyperglycemia  - Hypoglycemia protocol    # HLD  - Hold atorvastatin while NPO    # Paranoid personality disorder, anxiety  - Hold olanzapine 2/2 AMS    # Alzheimer's dementia  - Hold aricept and namenda while NPO    # GERD  - Continue pepcid    # DTV prophylaxis  - Continue renally dosed lovenox 30mg SQ    # ACP  - Son updated  - DNR/DNI since yesterday

## 2022-03-23 NOTE — PROGRESS NOTE ADULT - SUBJECTIVE AND OBJECTIVE BOX
Patient is a 71y old male who presents with a chief complaint of AMS.    INTERVAL HPI/OVERNIGHT EVENTS:  - Chart and consult notes reviewed  - Pt. seen and examined at bedside, remains lethargic but able to follow commands  - Off cardizem overnight w/ no tachyarrhythmias   - Remains hyperglycemia, will re-address today    MEDICATIONS  (STANDING):  albuterol/ipratropium for Nebulization 3 milliLiter(s) Nebulizer every 6 hours  dextrose 5% 1000 milliLiter(s) (150 mL/Hr) IV Continuous <Continuous>  enoxaparin Injectable 30 milliGRAM(s) SubCutaneous every 24 hours  famotidine Injectable 20 milliGRAM(s) IV Push daily  insulin glargine Injectable (LANTUS) 5 Unit(s) SubCutaneous <User Schedule>  insulin lispro (ADMELOG) corrective regimen sliding scale SubCutaneous every 6 hours  piperacillin/tazobactam IVPB.. 3.375 Gram(s) IV Intermittent every 8 hours    MEDICATIONS  (PRN):  acetaminophen  Suppository .. 650 milliGRAM(s) Rectal every 6 hours PRN Temp greater or equal to 38C (100.4F), Mild Pain (1 - 3)  dextrose 5%. 1000 milliLiter(s) (50 mL/Hr) IV Continuous <Continuous>  dextrose 5%. 1000 milliLiter(s) (100 mL/Hr) IV Continuous <Continuous>  dextrose 50% Injectable 25 Gram(s) IV Push once  dextrose 50% Injectable 12.5 Gram(s) IV Push once  dextrose 50% Injectable 25 Gram(s) IV Push once  dextrose 40% Gel 15 Gram(s) Oral once  glucagon  Injectable 1 milliGRAM(s) IntraMuscular once    Allergies  No Known Allergies    Unable to meaningful ROS 2/2 mental status    Vital Signs Last 24 Hrs  T(C): 37.6 (23 Mar 2022 06:46), Max: 38.2 (22 Mar 2022 17:46)  T(F): 99.7 (23 Mar 2022 06:46), Max: 100.7 (22 Mar 2022 17:46)  HR: 71 (23 Mar 2022 07:43) (71 - 86)  BP: 102/61 (23 Mar 2022 05:43) (102/61 - 127/78)  BP(mean): --  RR: 18 (23 Mar 2022 05:43) (18 - 19)  SpO2: 100% (23 Mar 2022 07:43) (97% - 100%)    PHYSICAL EXAM:  GENERAL: NAD, well-groomed, well-developed  HEAD:  Atraumatic, normocephalic  EYES: EOMI, PERRLA, conjunctiva and sclera clear  ENMT: Moist mucous membranes, no lesions, no tonsillar erythema, exudates, or enlargement  NECK: Supple, no JVD, normal thyroid  NERVOUS SYSTEM:  A+Ox3, good concentration, all 4 extremities mobile, no gross sensory deficits  CHEST/LUNG: CTA B/L; no rales, rhonchi, wheezing, or rubs  HEART: RRR, + S1/S2, no murmurs, rubs, or gallops  ABDOMEN: Soft, nontender, nondistended, bowel sounds present  EXTREMITIES:  2+ peripheral ulses, no clubbing, cyanosis, or edema  LYMPH: No lymphadenopathy noted  SKIN: No rashes or lesions    LABS:                        11.8   9.50  )-----------( x        ( 23 Mar 2022 07:44 )             38.0     23 Mar 2022 07:44    163    |  129    |  29     ----------------------------<  300    3.6     |  24     |  1.00     Ca    8.0        23 Mar 2022 07:44  Phos  2.5       23 Mar 2022 07:44  Mg     2.6       23 Mar 2022 07:44    TPro  5.8    /  Alb  2.6    /  TBili  0.8    /  DBili  x      /  AST  26     /  ALT  20     /  AlkPhos  63     23 Mar 2022 07:44    PT/INR - ( 21 Mar 2022 13:22 )   PT: 11.4 sec;   INR: 0.99 ratio    PTT - ( 21 Mar 2022 13:22 )  PTT:24.5 sec    Urinalysis Basic - ( 21 Mar 2022 16:04 )    Color: Yellow / Appearance: Clear / S.020 / pH: x  Gluc: x / Ketone: Trace  / Bili: Negative / Urobili: Negative mg/dL   Blood: x / Protein: 100 mg/dL / Nitrite: Negative   Leuk Esterase: Trace / RBC: 6-10 /HPF / WBC 6-10   Sq Epi: x / Non Sq Epi: Negative / Bacteria: Occasional    CAPILLARY BLOOD GLUCOSE  POCT Blood Glucose.: 272 mg/dL (23 Mar 2022 05:45)  POCT Blood Glucose.: 312 mg/dL (22 Mar 2022 23:20)  POCT Blood Glucose.: 227 mg/dL (22 Mar 2022 17:24)  POCT Blood Glucose.: 274 mg/dL (22 Mar 2022 11:07)    < from: US Abdomen Upper Quadrant Right (22 @ 17:54) >  IMPRESSION:    Distended gallbladder without stones or biliary dilatation.    < end of copied text >    < from: CT Abdomen and Pelvis w/ IV Cont (22 @ 16:41) >  IMPRESSION:    Mucoid impaction distal trachea, right mainstem and right lower lobe   bronchus.    Enlarged prostate with heterogeneous enhancement, correlate clinically   for prostatitis.  Possible mild bladder wall thickening with multiple foci of air within   the nondependent bladder wall, correlate for infection.    < end of copied text >    < from: CT Head No Cont (22 @ 16:25) >  IMPRESSION:  Stable exam.    No mass effect, hemorrhage or evidence of acute intracranial pathology.    < end of copied text > Patient is a 71y old male who presents with a chief complaint of AMS.    INTERVAL HPI/OVERNIGHT EVENTS:  - Chart and consult notes reviewed  - Pt. seen and examined at bedside, remains lethargic but able to follow commands  - Off cardizem overnight w/ no tachyarrhythmias   - Remains hyperglycemia, will re-address today    MEDICATIONS  (STANDING):  albuterol/ipratropium for Nebulization 3 milliLiter(s) Nebulizer every 6 hours  dextrose 5% 1000 milliLiter(s) (150 mL/Hr) IV Continuous <Continuous>  enoxaparin Injectable 30 milliGRAM(s) SubCutaneous every 24 hours  famotidine Injectable 20 milliGRAM(s) IV Push daily  insulin glargine Injectable (LANTUS) 5 Unit(s) SubCutaneous <User Schedule>  insulin lispro (ADMELOG) corrective regimen sliding scale SubCutaneous every 6 hours  piperacillin/tazobactam IVPB.. 3.375 Gram(s) IV Intermittent every 8 hours    MEDICATIONS  (PRN):  acetaminophen  Suppository .. 650 milliGRAM(s) Rectal every 6 hours PRN Temp greater or equal to 38C (100.4F), Mild Pain (1 - 3)  dextrose 5%. 1000 milliLiter(s) (50 mL/Hr) IV Continuous <Continuous>  dextrose 5%. 1000 milliLiter(s) (100 mL/Hr) IV Continuous <Continuous>  dextrose 50% Injectable 25 Gram(s) IV Push once  dextrose 50% Injectable 12.5 Gram(s) IV Push once  dextrose 50% Injectable 25 Gram(s) IV Push once  dextrose 40% Gel 15 Gram(s) Oral once  glucagon  Injectable 1 milliGRAM(s) IntraMuscular once    Allergies  No Known Allergies    Unable to meaningful ROS 2/2 mental status    Vital Signs Last 24 Hrs  T(C): 37.6 (23 Mar 2022 06:46), Max: 38.2 (22 Mar 2022 17:46)  T(F): 99.7 (23 Mar 2022 06:46), Max: 100.7 (22 Mar 2022 17:46)  HR: 71 (23 Mar 2022 07:43) (71 - 86)  BP: 102/61 (23 Mar 2022 05:43) (102/61 - 127/78)  BP(mean): --  RR: 18 (23 Mar 2022 05:43) (18 - 19)  SpO2: 100% (23 Mar 2022 07:43) (97% - 100%)    GENERAL: NAD  HEAD:  Atraumatic, normocephalic  EYES: PERRLA, conjunctiva and sclera clear  ENMT: Moist mucous membranes, no lesions  NECK: Supple, no JVD  NERVOUS SYSTEM:  Eye opening to voice, all 4 extremities mobile  CHEST/LUNG: CTA B/L; no rales, rhonchi, wheezing  HEART: RRR, + S1/S2  ABDOMEN: Soft, nontender, nondistended, bowel sounds present  EXTREMITIES:  2+ peripheral pulses, no clubbing, cyanosis, or edema  SKIN: No rashes or lesions    LABS:                        11.8   9.50  )-----------( x        ( 23 Mar 2022 07:44 )             38.0     23 Mar 2022 07:44    163    |  129    |  29     ----------------------------<  300    3.6     |  24     |  1.00     Ca    8.0        23 Mar 2022 07:44  Phos  2.5       23 Mar 2022 07:44  Mg     2.6       23 Mar 2022 07:44    TPro  5.8    /  Alb  2.6    /  TBili  0.8    /  DBili  x      /  AST  26     /  ALT  20     /  AlkPhos  63     23 Mar 2022 07:44    PT/INR - ( 21 Mar 2022 13:22 )   PT: 11.4 sec;   INR: 0.99 ratio    PTT - ( 21 Mar 2022 13:22 )  PTT:24.5 sec    Urinalysis Basic - ( 21 Mar 2022 16:04 )    Color: Yellow / Appearance: Clear / S.020 / pH: x  Gluc: x / Ketone: Trace  / Bili: Negative / Urobili: Negative mg/dL   Blood: x / Protein: 100 mg/dL / Nitrite: Negative   Leuk Esterase: Trace / RBC: 6-10 /HPF / WBC 6-10   Sq Epi: x / Non Sq Epi: Negative / Bacteria: Occasional    CAPILLARY BLOOD GLUCOSE  POCT Blood Glucose.: 272 mg/dL (23 Mar 2022 05:45)  POCT Blood Glucose.: 312 mg/dL (22 Mar 2022 23:20)  POCT Blood Glucose.: 227 mg/dL (22 Mar 2022 17:24)  POCT Blood Glucose.: 274 mg/dL (22 Mar 2022 11:07)    < from: US Abdomen Upper Quadrant Right (22 @ 17:54) >  IMPRESSION:    Distended gallbladder without stones or biliary dilatation.    < end of copied text >    < from: CT Abdomen and Pelvis w/ IV Cont (22 @ 16:41) >  IMPRESSION:    Mucoid impaction distal trachea, right mainstem and right lower lobe   bronchus.    Enlarged prostate with heterogeneous enhancement, correlate clinically   for prostatitis.  Possible mild bladder wall thickening with multiple foci of air within   the nondependent bladder wall, correlate for infection.    < end of copied text >    < from: CT Head No Cont (22 @ 16:25) >  IMPRESSION:  Stable exam.    No mass effect, hemorrhage or evidence of acute intracranial pathology.    < end of copied text >

## 2022-03-23 NOTE — CONSULT NOTE ADULT - SUBJECTIVE AND OBJECTIVE BOX
NOTE IN PROGRESS    HPI:  71 y.o male w/ PMHx of Afib, DMT2, HTN, HLD, paranoid personality disorder, anxiety, GERD,  Alzheimer's dementia who was BIBEMS from [facility] w/ fever and AMS x 2 days. Pt. had CXR at facility w/ no acute findings. Pt. given zosyn at SNF. Unable to obtain information from pt. 2/2 AMS. Will need admission to medicine for treatment and monitoring of hypernatremia, ROSALBA, and sepsis.              PAST MEDICAL & SURGICAL HISTORY:  DM (diabetes mellitus)    Afib    Hyperlipidemia    Hypertension    Alzheimer disease    Dementia    Anxiety    Paranoid personality disorder    GERD (gastroesophageal reflux disease)            REVIEW OF SYSTEMS  Patient is unable to provide any information/ROS  due to baseline mental status    MEDICATIONS  (STANDING):  albuterol/ipratropium for Nebulization 3 milliLiter(s) Nebulizer every 6 hours  dextrose 40% Gel 15 Gram(s) Oral once  dextrose 5% 1000 milliLiter(s) (150 mL/Hr) IV Continuous <Continuous>  dextrose 5%. 1000 milliLiter(s) (50 mL/Hr) IV Continuous <Continuous>  dextrose 5%. 1000 milliLiter(s) (100 mL/Hr) IV Continuous <Continuous>  dextrose 50% Injectable 25 Gram(s) IV Push once  dextrose 50% Injectable 12.5 Gram(s) IV Push once  dextrose 50% Injectable 25 Gram(s) IV Push once  enoxaparin Injectable 30 milliGRAM(s) SubCutaneous every 24 hours  famotidine Injectable 20 milliGRAM(s) IV Push daily  glucagon  Injectable 1 milliGRAM(s) IntraMuscular once  insulin glargine Injectable (LANTUS) 8 Unit(s) SubCutaneous <User Schedule>  insulin lispro (ADMELOG) corrective regimen sliding scale   SubCutaneous every 6 hours  piperacillin/tazobactam IVPB.. 3.375 Gram(s) IV Intermittent every 8 hours    MEDICATIONS  (PRN):  acetaminophen  Suppository .. 650 milliGRAM(s) Rectal every 6 hours PRN Temp greater or equal to 38C (100.4F), Mild Pain (1 - 3)      Allergies    No Known Allergies    Intolerances        SOCIAL HISTORY:  / single/ ; (+)HHA; Denies smoking, ETOH, drugs    FAMILY HISTORY:      Vital Signs Last 24 Hrs  T(C): 37.4 (23 Mar 2022 11:00), Max: 38.2 (22 Mar 2022 17:46)  T(F): 99.4 (23 Mar 2022 11:00), Max: 100.7 (22 Mar 2022 17:46)  HR: 73 (23 Mar 2022 09:00) (71 - 86)  BP: 114/74 (23 Mar 2022 09:00) (102/61 - 127/78)  BP(mean): --  RR: 18 (23 Mar 2022 09:00) (18 - 19)  SpO2: 95% (23 Mar 2022 09:00) (95% - 100%)    NAD / gaurded but stable,  A&Ox3/ Alert  cachectic/ MO/ Obese  within defined normal limits  Total Care Sport/ Versa Care P500 bed                            11.8   9.50  )-----------( 81       ( 23 Mar 2022 07:44 )             38.0     03-23    163<HH>  |  129<H>  |  29<H>  ----------------------------<  300<H>  3.6   |  24  |  1.00    Ca    8.0<L>      23 Mar 2022 07:44  Phos  2.5     03-23  Mg     2.6     03-23    TPro  5.8<L>  /  Alb  2.6<L>  /  TBili  0.8  /  DBili  x   /  AST  26  /  ALT  20  /  AlkPhos  63  03-23    Auto Neutrophil #: 8.00 K/uL (03-23-22 @ 07:44)    A1C with Estimated Average Glucose Result: 8.2 % (03-22-22 @ 13:06)    Respiratory: CTA    Gastrointestinal soft NT/ND (+)BS    Neurology  weakened strength & sensation grossly intact/ paraesthesia  nonverbal, Can not follow commands    Musculoskeletal/Vascular:  ROM  No edema, warm, no calf tenderness, no hair growth  DP/PT  hemosiderin staining  no deformities/ contractures  DP & PT pulses non-palpable bilaterally 2/2 +1 lower extremity pitting edema, Capillary refill 3 seconds, no hair growth, skin temp warm b/l.    Skin:  moist w/ good turgor  frail,  ecchymosis w/o hematoma  serosanguinous drainage, erythema  No odor, erythema, increased warmth, tenderness, induration, fluctuance          RADIOLOGY & ADDITIONAL STUDIES  Wound #:   Location:   Size:    Drainage:   Tunneling/Size:   Wound Type:  Undermining:   Periwound:   Foul Odor:   Necrotic Tissue:   Granulation:   Fibrinous Exudate:  Exposure:  Product:   Frequency:   Treatment:   Granulation:     RN assessed patient to have stage two pressure injuries bilateral buttocks  RN to manage patients stage 2 pressure injuries with Cavilon daily, critic aid Q-shift, turn and reposition Q2H.  Patient is on a low air loss mattress  for the critically ill patient experiencing multiorgan failure, impaired tissue oxygenation, and perfusion can contribute to skin failure thus the development of a pressure related injury may be unavoidable    This pressure injury is community aquired NO/YES  At risk for altered tissue perfusion NO/YES  Impaired perfusion of peripheral tissue NO/YES  Continue  Nutrition (as tolerated)  Continue  Offloading   Continue Pericare  Apply cair boots at all times while in bed.   Provide skin checks and foot placement q8h.  Care as per medicine will follow w/ you  Follow up as outpatient at [facility]   Findings and recommendations discussed with RN  MD   Thank you for this consult  Sobia Kim NP, [facility] [phone]       HPI:  71 y.o male w/ PMHx of Afib, DMT2, HTN, HLD, paranoid personality disorder, anxiety, GERD,  Alzheimer's dementia who was BIBEMS from [facility] w/ fever and AMS x 2 days. Pt. had CXR at facility w/ no acute findings. Pt. given zosyn at SNF. Unable to obtain information from pt. 2/2 AMS. Will need admission to medicine for treatment and monitoring of hypernatremia, ROSALBA, and sepsis.      PAST MEDICAL & SURGICAL HISTORY:  DM (diabetes mellitus)    Afib    Hyperlipidemia    Hypertension    Alzheimer disease    Dementia    Anxiety    Paranoid personality disorder    GERD (gastroesophageal reflux disease)            REVIEW OF SYSTEMS  Patient is unable to provide any information/ROS  due to baseline mental status    MEDICATIONS  (STANDING):  albuterol/ipratropium for Nebulization 3 milliLiter(s) Nebulizer every 6 hours  dextrose 40% Gel 15 Gram(s) Oral once  dextrose 5% 1000 milliLiter(s) (150 mL/Hr) IV Continuous <Continuous>  dextrose 5%. 1000 milliLiter(s) (50 mL/Hr) IV Continuous <Continuous>  dextrose 5%. 1000 milliLiter(s) (100 mL/Hr) IV Continuous <Continuous>  dextrose 50% Injectable 25 Gram(s) IV Push once  dextrose 50% Injectable 12.5 Gram(s) IV Push once  dextrose 50% Injectable 25 Gram(s) IV Push once  enoxaparin Injectable 30 milliGRAM(s) SubCutaneous every 24 hours  famotidine Injectable 20 milliGRAM(s) IV Push daily  glucagon  Injectable 1 milliGRAM(s) IntraMuscular once  insulin glargine Injectable (LANTUS) 8 Unit(s) SubCutaneous <User Schedule>  insulin lispro (ADMELOG) corrective regimen sliding scale   SubCutaneous every 6 hours  piperacillin/tazobactam IVPB.. 3.375 Gram(s) IV Intermittent every 8 hours    MEDICATIONS  (PRN):  acetaminophen  Suppository .. 650 milliGRAM(s) Rectal every 6 hours PRN Temp greater or equal to 38C (100.4F), Mild Pain (1 - 3)      Allergies    No Known Allergies    Intolerances        SOCIAL HISTORY:  / single/ ; (+)HHA; Denies smoking, ETOH, drugs    FAMILY HISTORY:      Vital Signs Last 24 Hrs  T(C): 37.4 (23 Mar 2022 11:00), Max: 38.2 (22 Mar 2022 17:46)  T(F): 99.4 (23 Mar 2022 11:00), Max: 100.7 (22 Mar 2022 17:46)  HR: 73 (23 Mar 2022 09:00) (71 - 86)  BP: 114/74 (23 Mar 2022 09:00) (102/61 - 127/78)  BP(mean): --  RR: 18 (23 Mar 2022 09:00) (18 - 19)  SpO2: 95% (23 Mar 2022 09:00) (95% - 100%)    NAD / gaurded but stable,  A&Ox3/ Alert  cachectic/ MO/ Obese  within defined normal limits  Total Care Sport/ Versa Care P500 bed                            11.8   9.50  )-----------( 81       ( 23 Mar 2022 07:44 )             38.0     03-23    163<HH>  |  129<H>  |  29<H>  ----------------------------<  300<H>  3.6   |  24  |  1.00    Ca    8.0<L>      23 Mar 2022 07:44  Phos  2.5     03-23  Mg     2.6     03-23    TPro  5.8<L>  /  Alb  2.6<L>  /  TBili  0.8  /  DBili  x   /  AST  26  /  ALT  20  /  AlkPhos  63  03-23    Auto Neutrophil #: 8.00 K/uL (03-23-22 @ 07:44)    A1C with Estimated Average Glucose Result: 8.2 % (03-22-22 @ 13:06)    Respiratory: CTA    Gastrointestinal soft NT/ND (+)BS    Neurology  weakened strength & sensation grossly intact/ paraesthesia  nonverbal, Can not follow commands    Musculoskeletal/Vascular:  ROM  No edema, warm, no calf tenderness, no hair growth  DP/PT  hemosiderin staining  no deformities/ contractures  DP & PT pulses non-palpable bilaterally 2/2 +1 lower extremity pitting edema, Capillary refill 3 seconds, no hair growth, skin temp warm b/l.    Skin:    frail,  ecchymosis w/o hematoma

## 2022-03-23 NOTE — PROGRESS NOTE ADULT - SUBJECTIVE AND OBJECTIVE BOX
JODI DUBOSE is a 71yMale , patient examined and chart reviewed.    INTERVAL HPI/ OVERNIGHT EVENTS:   Afebrile. Lethargic arousable.    PAST MEDICAL & SURGICAL HISTORY:  DM (diabetes mellitus)  Afib  Hyperlipidemia  Hypertension  Alzheimer disease  Dementia  Anxiety  Paranoid personality disorder  GERD (gastroesophageal reflux disease)      For details regarding the patient's social history, family history, and other miscellaneous elements, please refer the initial infectious diseases consultation and/or the admitting history and physical examination for this admission.    ROS:  Unable to obtain due to : Dementia    Current inpatient medications :    ANTIBIOTICS/RELEVANT:  piperacillin/tazobactam IVPB.. 3.375 Gram(s) IV Intermittent every 8 hours      acetaminophen  Suppository .. 650 milliGRAM(s) Rectal every 6 hours PRN  albuterol/ipratropium for Nebulization 3 milliLiter(s) Nebulizer every 6 hours  dextrose 40% Gel 15 Gram(s) Oral once  dextrose 5% 1000 milliLiter(s) IV Continuous <Continuous>  dextrose 5%. 1000 milliLiter(s) IV Continuous <Continuous>  dextrose 5%. 1000 milliLiter(s) IV Continuous <Continuous>  dextrose 50% Injectable 25 Gram(s) IV Push once  dextrose 50% Injectable 12.5 Gram(s) IV Push once  dextrose 50% Injectable 25 Gram(s) IV Push once  enoxaparin Injectable 30 milliGRAM(s) SubCutaneous every 24 hours  famotidine Injectable 20 milliGRAM(s) IV Push daily  glucagon  Injectable 1 milliGRAM(s) IntraMuscular once  insulin glargine Injectable (LANTUS) 8 Unit(s) SubCutaneous <User Schedule>  insulin lispro (ADMELOG) corrective regimen sliding scale   SubCutaneous every 6 hours      Objective:     @ 07:  -   @ 07:00  --------------------------------------------------------  IN: 1875 mL / OUT: 800 mL / NET: 1075 mL      T(C): 36.6 (22 @ 13:00), Max: 38.2 (22 @ 17:46)  HR: 70 (22 @ 13:00) (70 - 86)  BP: 107/66 (22 @ 13:00) (102/61 - 127/78)  RR: 17 (22 @ 13:00) (17 - 19)  SpO2: 97% (22 @ 13:00) (95% - 100%)      Physical Exam:  General: no acute distress weak frail  Neck: supple, trachea midline  Lungs: Decreased, no wheeze/rhonchi  Cardiovascular: regular rate and rhythm, S1 S2  Abdomen: soft, nontender,  bowel sounds normal  Neurological: Dementia  Extremities: no edema      LABS:                          11.8   9.50  )-----------( 81       ( 23 Mar 2022 07:44 )             38.0       03-    163<HH>  |  129<H>  |  29<H>  ----------------------------<  300<H>  3.6   |  24  |  1.00    Ca    8.0<L>      23 Mar 2022 07:44  Phos  2.5       Mg     2.6         TPro  5.8<L>  /  Alb  2.6<L>  /  TBili  0.8  /  DBili  x   /  AST  26  /  ALT  20  /  AlkPhos  63  03-23      Urinalysis Basic - ( 21 Mar 2022 16:04 )    Color: Yellow / Appearance: Clear / S.020 / pH: x  Gluc: x / Ketone: Trace  / Bili: Negative / Urobili: Negative mg/dL   Blood: x / Protein: 100 mg/dL / Nitrite: Negative   Leuk Esterase: Trace / RBC: 6-10 /HPF / WBC 6-10   Sq Epi: x / Non Sq Epi: Negative / Bacteria: Occasional    MICROBIOLOGY:    Culture - Urine (collected 21 Mar 2022 21:42)  Source: Clean Catch Clean Catch (Midstream)  Final Report (22 Mar 2022 17:30):    No growth    Culture - Blood (collected 21 Mar 2022 18:16)  Source: .Blood Blood-Peripheral  Preliminary Report (22 Mar 2022 19:02):    No growth to date.    Culture - Blood (collected 21 Mar 2022 18:16)  Source: .Blood Blood-Peripheral  Preliminary Report (22 Mar 2022 19:02):    No growth to date.    Respiratory Viral Panel with COVID-19 by SANDRA (22 @ 13:22)    Rapid RVP Result: Detected    SARS-CoV-2: [facility]: This Respiratory Panel uses polymerase chain reaction (PCR) to detect for  adenovirus; coronavirus (HKU1, NL63, 229E, OC43); human metapneumovirus  (hMPV); human enterovirus/rhinovirus (Entero/RV); influenza A; influenza  A/H1; influenza A/H3; influenza A/H1-2009; influenza B; parainfluenza  viruses 1, 2, 3, 4; respiratory syncytial virus; Mycoplasma pneumoniae;  Chlamydophila pneumoniae; and SARS-CoV-2.    Coronavirus (229E,HKU1,NL63,OC43): Detected    RADIOLOGY & ADDITIONAL STUDIES:    ACC: [id number] EXAM:  CT ABDOMEN AND PELVIS IC                        ACC: [id number] EXAM:  CT CHEST IC                          PROCEDURE DATE:  2022          INTERPRETATION:  CLINICAL INFORMATION: Fever, sepsis. Altered mental   status.    COMPARISON: None.    CONTRAST/COMPLICATIONS:  IV Contrast: Omnipaque 350 (accession [id number]), IV contrast documented   in associated exam (accession [id number])  90 cc administered (accession   [id number]), 0 cc administered (accession [id number])   10 cc discarded   (accession [id number]), 0 cc discarded (accession [id number])  Oral Contrast: NONE  Complications: None reported at time of study completion    PROCEDURE:  CT of the Chest, Abdomen and Pelvis was performed.  Sagittal and coronal reformats were performed.    FINDINGS:    CHEST:    LUNGS AND LARGE AIRWAYS: PLEURA:  There is mucoid impaction distal trachea, right mainstem bronchus and   right lower lobe bronchus.    There is no lobar lung consolidation.  No pleural effusion or pneumothorax.    Tiny calcified granuloma periphery right middle lobe.    VESSELS:  Atherosclerotic changes thoracic aorta and coronary artery calcifications.    HEART: Heart size is normal. No pericardial effusion.    MEDIASTINUM AND MADYSON:  No enlarged external lymphadenopathy.    CHEST WALL AND LOWER NECK: Within normal limits.    ABDOMEN AND PELVIS:  Respiratory motion artifact degrades image quality.    LIVER: Within normal limits.  BILE DUCTS: Normal caliber.  GALLBLADDER: Within normal limits.  SPLEEN: Within normal limits.  PANCREAS: Within normal limits.  ADRENALS: Within normal limits.  KIDNEYS/URETERS:  Right renal cyst.  No hydronephrosis.    BLADDER: Possible mild circumferential bladder wall thickening, correlate   clinically for cystitis.  There is punctate foci of air within the nondependent bladder wall.  REPRODUCTIVE ORGANS: Heterogeneous enlargement of the prostate gland.    BOWEL: Evaluation of the stomach is limited without distention.  Generalized colonic fecal retention with moderate bowel distention.  No bowel obstruction.  The appendix is not clearly visualized on this exam.  PERITONEUM: No ascites.    VESSELS: Atherosclerotic changes.  RETROPERITONEUM/LYMPH NODES: No lymphadenopathy.    ABDOMINAL WALL: Within normal limits.    BONES:  Chronic fracture deformity right inferior and superior pubic rami and   superior puboacetabular junction.  Probable chronic right sacral insufficiency fracture.    IMPRESSION:    Mucoid impaction distal trachea, right mainstem and right lower lobe   bronchus.    Enlarged prostate with heterogeneous enhancement, correlate clinically   for prostatitis.  Possible mild bladder wall thickening with multiple foci of air within   the nondependent bladder wall, correlate for infection.    Assessment :   72YO M PMHx of Afib, DMT2, HTN, HLD, paranoid personality disorder, anxiety, GERD,  Alzheimer's dementia resident of [facility] admitted with sepsis sec aspiration pneumonia  CT CAP with Mucus plugging and RLL pneumonia  Sp fever   WBC downtrending  RVP + coronavirus ( NOT covid 19 )   Severe hypernatremia . NA improving  Severe dehydration       Plan :   Cont Zosyn  Fu cultures- NGTD  Trend temps and cbc  Strict Asp precautions  IVF per renal  Pulm toileting  Increase activity    Continue with present regiment.  Appropriate use of antibiotics and adverse effects reviewed.    > 35 minutes were spent in direct patient care reviewing notes, medications ,labs data/ imaging , discussion with multidisciplinary team.    Thank you for allowing me to participate in care of your patient .    Rimma Rizo MD  Infectious Disease  [phone]

## 2022-03-23 NOTE — SWALLOW BEDSIDE ASSESSMENT ADULT - COMMENTS
Initial clinical swallow assessment completed 3/22, at which time oral nutrition/hydration/medication was contraindicated. Pt seen this AM for a f/u session to determine PO candidacy. Upon arrival, pt sleeping in bed. Pt lethargic and with similar presentation in comparison to initial assessment. RN present at bedside and confirmed this has been pt's clinical presentation throughout hospitalization. PO trials deemed unsafe for administration at this time given persistent lethargy and minimally arousable state. Recommend short-term non-oral means of nutrition/hydration/medication to ensure pt is meeting adequate daily caloric needs and reduce risk of aspiration with ongoing dietary f/u. Discussed with Dr. Escobedo, who is to reconsult this service as pt's level of alertness improves.

## 2022-03-23 NOTE — PROGRESS NOTE ADULT - ASSESSMENT
The patient is a 71 year old male with a history of HTN, HL, DM, paroxysmal atrial fibrillation, dementia who presents with AMS in the setting of severe dehydration, sepsis, possible PNA, viral infection.    Plan:  - Telemetry consistent with frequent brief episodes of AF  - Unable to take PO  - Hold PO diltiazem and metoprolol - resume when able to take PO  - Episodes of AF resolved for time being  - Discontinue diltiazem drip for now - resume if frequent AF episodes recur and patient remains NPO  - Not a candidate for long term anticoagulation for AF given dementia and overall poor prognosis  - Continue IV fluids for hypernatremia  - CT with mucoid impaction of bronchi  - Coronavirus (non-COVID) positive  - On zosyn for possible PNA

## 2022-03-24 DIAGNOSIS — D69.6 THROMBOCYTOPENIA, UNSPECIFIED: ICD-10-CM

## 2022-03-24 LAB
ANION GAP SERPL CALC-SCNC: 6 MMOL/L — SIGNIFICANT CHANGE UP (ref 5–17)
BASOPHILS # BLD AUTO: 0.01 K/UL — SIGNIFICANT CHANGE UP (ref 0–0.2)
BASOPHILS NFR BLD AUTO: 0.1 % — SIGNIFICANT CHANGE UP (ref 0–2)
BUN SERPL-MCNC: 15 MG/DL — SIGNIFICANT CHANGE UP (ref 7–23)
CALCIUM SERPL-MCNC: 8 MG/DL — LOW (ref 8.4–10.5)
CHLORIDE SERPL-SCNC: 122 MMOL/L — HIGH (ref 96–108)
CLOSURE TME COLL+EPINEP BLD: 76 — SIGNIFICANT CHANGE UP
CO2 SERPL-SCNC: 24 MMOL/L — SIGNIFICANT CHANGE UP (ref 22–31)
CREAT SERPL-MCNC: 0.66 MG/DL — SIGNIFICANT CHANGE UP (ref 0.5–1.3)
EGFR: 100 ML/MIN/1.73M2 — SIGNIFICANT CHANGE UP
EOSINOPHIL # BLD AUTO: 0.23 K/UL — SIGNIFICANT CHANGE UP (ref 0–0.5)
EOSINOPHIL NFR BLD AUTO: 2.7 % — SIGNIFICANT CHANGE UP (ref 0–6)
GLUCOSE BLDC GLUCOMTR-MCNC: 140 MG/DL — HIGH (ref 70–99)
GLUCOSE BLDC GLUCOMTR-MCNC: 159 MG/DL — HIGH (ref 70–99)
GLUCOSE BLDC GLUCOMTR-MCNC: 191 MG/DL — HIGH (ref 70–99)
GLUCOSE BLDC GLUCOMTR-MCNC: 252 MG/DL — HIGH (ref 70–99)
GLUCOSE SERPL-MCNC: 156 MG/DL — HIGH (ref 70–99)
HCT VFR BLD CALC: 39.6 % — SIGNIFICANT CHANGE UP (ref 39–50)
HGB BLD-MCNC: 12.1 G/DL — LOW (ref 13–17)
IMM GRANULOCYTES NFR BLD AUTO: 0.6 % — SIGNIFICANT CHANGE UP (ref 0–1.5)
LYMPHOCYTES # BLD AUTO: 1.03 K/UL — SIGNIFICANT CHANGE UP (ref 1–3.3)
LYMPHOCYTES # BLD AUTO: 12.2 % — LOW (ref 13–44)
MAGNESIUM SERPL-MCNC: 2.5 MG/DL — SIGNIFICANT CHANGE UP (ref 1.6–2.6)
MCHC RBC-ENTMCNC: 29.8 PG — SIGNIFICANT CHANGE UP (ref 27–34)
MCHC RBC-ENTMCNC: 30.6 GM/DL — LOW (ref 32–36)
MCV RBC AUTO: 97.5 FL — SIGNIFICANT CHANGE UP (ref 80–100)
MONOCYTES # BLD AUTO: 0.32 K/UL — SIGNIFICANT CHANGE UP (ref 0–0.9)
MONOCYTES NFR BLD AUTO: 3.8 % — SIGNIFICANT CHANGE UP (ref 2–14)
NEUTROPHILS # BLD AUTO: 6.81 K/UL — SIGNIFICANT CHANGE UP (ref 1.8–7.4)
NEUTROPHILS NFR BLD AUTO: 80.6 % — HIGH (ref 43–77)
NRBC # BLD: 0 /100 WBCS — SIGNIFICANT CHANGE UP (ref 0–0)
PHOSPHATE SERPL-MCNC: 2.6 MG/DL — SIGNIFICANT CHANGE UP (ref 2.5–4.5)
PLATELET # BLD AUTO: 68 K/UL — LOW (ref 150–400)
POTASSIUM SERPL-MCNC: 4.2 MMOL/L — SIGNIFICANT CHANGE UP (ref 3.5–5.3)
POTASSIUM SERPL-SCNC: 4.2 MMOL/L — SIGNIFICANT CHANGE UP (ref 3.5–5.3)
RBC # BLD: 4.06 M/UL — LOW (ref 4.2–5.8)
RBC # FLD: 14.6 % — HIGH (ref 10.3–14.5)
SODIUM SERPL-SCNC: 152 MMOL/L — HIGH (ref 135–145)
WBC # BLD: 8.45 K/UL — SIGNIFICANT CHANGE UP (ref 3.8–10.5)
WBC # FLD AUTO: 8.45 K/UL — SIGNIFICANT CHANGE UP (ref 3.8–10.5)

## 2022-03-24 PROCEDURE — 93010 ELECTROCARDIOGRAM REPORT: CPT

## 2022-03-24 PROCEDURE — 99233 SBSQ HOSP IP/OBS HIGH 50: CPT

## 2022-03-24 RX ORDER — SODIUM CHLORIDE 9 MG/ML
1000 INJECTION, SOLUTION INTRAVENOUS
Refills: 0 | Status: DISCONTINUED | OUTPATIENT
Start: 2022-03-24 | End: 2022-03-24

## 2022-03-24 RX ORDER — SODIUM CHLORIDE 9 MG/ML
1000 INJECTION, SOLUTION INTRAVENOUS
Refills: 0 | Status: DISCONTINUED | OUTPATIENT
Start: 2022-03-24 | End: 2022-03-25

## 2022-03-24 RX ADMIN — SODIUM CHLORIDE 100 MILLILITER(S): 9 INJECTION, SOLUTION INTRAVENOUS at 09:55

## 2022-03-24 RX ADMIN — Medication 3 MILLILITER(S): at 15:39

## 2022-03-24 RX ADMIN — PIPERACILLIN AND TAZOBACTAM 25 GRAM(S): 4; .5 INJECTION, POWDER, LYOPHILIZED, FOR SOLUTION INTRAVENOUS at 16:11

## 2022-03-24 RX ADMIN — Medication 3 MILLILITER(S): at 20:03

## 2022-03-24 RX ADMIN — Medication 1: at 12:14

## 2022-03-24 RX ADMIN — ENOXAPARIN SODIUM 30 MILLIGRAM(S): 100 INJECTION SUBCUTANEOUS at 06:44

## 2022-03-24 RX ADMIN — INSULIN GLARGINE 8 UNIT(S): 100 INJECTION, SOLUTION SUBCUTANEOUS at 12:14

## 2022-03-24 RX ADMIN — Medication 3 MILLILITER(S): at 00:38

## 2022-03-24 RX ADMIN — FAMOTIDINE 20 MILLIGRAM(S): 10 INJECTION INTRAVENOUS at 12:14

## 2022-03-24 RX ADMIN — Medication 3: at 00:40

## 2022-03-24 RX ADMIN — Medication 1: at 06:44

## 2022-03-24 RX ADMIN — PIPERACILLIN AND TAZOBACTAM 25 GRAM(S): 4; .5 INJECTION, POWDER, LYOPHILIZED, FOR SOLUTION INTRAVENOUS at 08:05

## 2022-03-24 RX ADMIN — Medication 3 MILLILITER(S): at 06:34

## 2022-03-24 RX ADMIN — PIPERACILLIN AND TAZOBACTAM 25 GRAM(S): 4; .5 INJECTION, POWDER, LYOPHILIZED, FOR SOLUTION INTRAVENOUS at 00:40

## 2022-03-24 RX ADMIN — SODIUM CHLORIDE 150 MILLILITER(S): 9 INJECTION, SOLUTION INTRAVENOUS at 06:45

## 2022-03-24 NOTE — PHYSICAL THERAPY INITIAL EVALUATION ADULT - PERTINENT HX OF CURRENT PROBLEM, REHAB EVAL
71 y.o male w/ PMHx of Afib, DMT2, HTN, HLD, paranoid personality disorder, anxiety, GERD,  Alzheimer's dementia who was BIBEMS from [facility] w/ fever and AMS x 2 days. Pt. had CXR at facility w/ no acute findings. Pt. given zosyn at SNF.  Head CT->neg.

## 2022-03-24 NOTE — PROGRESS NOTE ADULT - SUBJECTIVE AND OBJECTIVE BOX
Chief Complaint: AMS    Interval Events: No events overnight.    Review of Systems:  Unable to obtain    Physical Exam:  Vital Signs Last 24 Hrs  T(C): 36.7 (24 Mar 2022 08:51), Max: 37.4 (23 Mar 2022 11:00)  T(F): 98.1 (24 Mar 2022 08:51), Max: 99.4 (23 Mar 2022 11:00)  HR: 65 (24 Mar 2022 08:51) (60 - 80)  BP: 113/70 (24 Mar 2022 08:51) (95/54 - 113/70)  BP(mean): --  RR: 18 (24 Mar 2022 08:51) (17 - 18)  SpO2: 100% (24 Mar 2022 08:51) (97% - 100%)  General: Cachectic  HEENT: Dry MM  Neck: No JVD, no carotid bruit  Lungs: CTAB  CV: RRR, nl S1/S2, no M/R/G  Abdomen: S/NT/ND, +BS  Extremities: No LE edema, no cyanosis  Neuro: AAOx0  Skin: No rash    Labs:    03-24    152<H>  |  122<H>  |  15  ----------------------------<  156<H>  4.2   |  24  |  0.66    Ca    8.0<L>      24 Mar 2022 08:02  Phos  2.6     03-24  Mg     2.5     03-24    TPro  5.8<L>  /  Alb  2.6<L>  /  TBili  0.8  /  DBili  x   /  AST  26  /  ALT  20  /  AlkPhos  63  03-23                        12.1   8.45  )-----------( 68       ( 24 Mar 2022 08:02 )             39.6       Telemetry: Sinus rhythm, AF with aberrancy

## 2022-03-24 NOTE — PROGRESS NOTE ADULT - ASSESSMENT
71 y.o male w/ PMHx of Afib, DMT2, HTN, HLD, paranoid personality disorder, anxiety, GERD,  Alzheimer's dementia who was BIBEMS from [facility] w/ fever and AMS x 2 days. Pt. had CXR at facility w/ no acute findings. Pt. given zosyn at [facility]. Unable to obtain information from pt. 2/2 AMS. Will need admission to medicine for treatment and monitoring of hypernatremia, ROSALBA, sepsis and severe protein calorie malnutrition.    # Metabolic encephalopathy 2/2 acute hypernatremia 2/2 dehydration   - Present on admission  - Na: 172, repeat this AM: 163  - Free water deficit: 3.4L  - Maintenance IVF rate increased yesterday to 150mL/hr per nephrology  - NGT for administration of free water given pt.'s persistent hyperglycemia  - Head CT unremarkable  - Monitor neuro status, lethargic but following this AM  - Trend BMPs  - Appreciate nephrology recs    # Sepsis most likely 2/2 UTI vs. CAP  - Present on admission  - Evidenced by fever, leukocytosis w/ bandemia, tachycardia  - WBC WNL this AM @ 9.5  - Continues to have intermittent fevers, most recent 00:39 3/23  - UA w/ possible infection  - CT chest showing mucoid impaction distal trachea, right mainstem and RLL bronchus  - CT A/P showing mild bladder thickening consistent w/ possible infection  - RUQ negative for acute cholecystitis   - RVP and coronavirus positive  - COVID negative  - 3/21 blood and urine cultures NGTD  - Received zosyn at [facility]  - Continue broad spectrum coverage w/ zosyn until sensitivities result  - MRSA swab sent, pending results  - Trend WBCs  - ID consulted    # ROSALBA  - Present on admission  - Baseline BUN/Cr 17/0.62  - Admission BUN/Cr. 61/1.28  - Continues to improve, 29/1.00 this AM  - Strict I/Os  - Avoid nephrotoxic medications  - Trend BMPs  - Nephrology consult    # Severe protein calorie malnutrition  - Underweight (BMI 16.9)  - Failed dysphagia screen  - Pt. continues to be too lethargic for PO intake, will place keofeed for nutrition management until more alert and able to take PO  - Monitor albumin and protein levels    # Afib  - Tele monitoring  - Hold ASA while NPO  - Diltiazem gtt stopped overnight, no tachyarrhythmias noted since  - Re-start IV lopressor 5mg IV q6 hours w/ hold parameters  - Cards recs appreciated    # HTN  - Controlled  - Re-start lopressor w/ hold parameters    # DMT2  - Hold oral hypoglycemics  - ISS w/ fingersticks per protocol  - Increased Lantus to 8 units SQ  - Keofeed placement for administration of free water given pt.'s hyperglycemia  - Hypoglycemia protocol    # HLD  - Hold atorvastatin while NPO    # Paranoid personality disorder, anxiety  - Hold olanzapine 2/2 AMS    # Alzheimer's dementia  - Hold aricept and namenda while NPO    # GERD  - Continue pepcid    # DTV prophylaxis  - Continue renally dosed lovenox 30mg SQ    # ACP  - Son updated  - DNR/DNI since yesterday   71 y.o male w/ PMHx of Afib, DMT2, HTN, HLD, paranoid personality disorder, anxiety, GERD,  Alzheimer's dementia who was BIBEMS from [facility] w/ fever and AMS x 2 days. Pt. had CXR at facility w/ no acute findings. Pt. given zosyn at [facility]. Unable to obtain information from pt. 2/2 AMS. Will need admission to medicine for treatment and monitoring of hypernatremia, ROSALBA, sepsis and severe protein calorie malnutrition.    # Metabolic encephalopathy 2/2 acute hypernatremia 2/2 dehydration   - Present on admission  - Na: 172 > 163 > 152  - Free water deficit: 1.3L  - Decrease IVF's to 75mL/hr  - Head CT unremarkable  - Monitor neuro status  - Trend BMPs  - Appreciate nephrology recs    # Sepsis most likely 2/2 UTI vs. CAP  - Present on admission  - Evidenced by fever, leukocytosis w/ bandemia, tachycardia  - WBC WNL this AM @ 9.5  - Continues to have intermittent fevers, most recent 00:39 3/23  - UA w/ possible infection  - CT chest showing mucoid impaction distal trachea, right mainstem and RLL bronchus  - CT A/P showing mild bladder thickening consistent w/ possible infection  - RUQ negative for acute cholecystitis   - RVP and coronavirus positive  - COVID negative  - 3/21 blood and urine cultures NGTD  - Received zosyn at [facility]  - Continue broad spectrum coverage w/ zosyn until sensitivities result  - MRSA swab sent, pending results  - Trend WBCs  - ID consulted    # ROSALBA  - Present on admission  - Baseline BUN/Cr 17/0.62  - Admission BUN/Cr. 61/1.28  - Continues to improve, 29/1.00 this AM  - Strict I/Os  - Avoid nephrotoxic medications  - Trend BMPs  - Nephrology consult    # Severe protein calorie malnutrition  - Underweight (BMI 16.9)  - Failed dysphagia screen  - Pt. continues to be too lethargic for PO intake, will place keofeed for nutrition management until more alert and able to take PO  - Monitor albumin and protein levels    # Afib  - Tele monitoring  - Hold ASA while NPO  - Diltiazem gtt stopped overnight, no tachyarrhythmias noted since  - Re-start IV lopressor 5mg IV q6 hours w/ hold parameters  - Cards recs appreciated    # HTN  - Controlled  - Re-start lopressor w/ hold parameters    # DMT2  - Hold oral hypoglycemics  - ISS w/ fingersticks per protocol  - Increased Lantus to 8 units SQ  - Keofeed placement for administration of free water given pt.'s hyperglycemia  - Hypoglycemia protocol    # HLD  - Hold atorvastatin while NPO    # Paranoid personality disorder, anxiety  - Hold olanzapine 2/2 AMS    # Alzheimer's dementia  - Hold aricept and namenda while NPO    # GERD  - Continue pepcid    # DTV prophylaxis  - Continue renally dosed lovenox 30mg SQ    # ACP  - Son updated  - DNR/DNI since yesterday   71 y.o male w/ PMHx of Afib, DMT2, HTN, HLD, paranoid personality disorder, anxiety, GERD,  Alzheimer's dementia who was BIBEMS from [facility] w/ fever and AMS x 2 days. Pt. had CXR at facility w/ no acute findings. Pt. given zosyn at [facility]. Unable to obtain information from pt. 2/2 AMS. Will need admission to medicine for treatment and monitoring of hypernatremia, ROSALBA, sepsis and severe protein calorie malnutrition.    # Metabolic encephalopathy 2/2 acute hypernatremia 2/2 dehydration   - Present on admission, remains lethargic  - Na: 172 > 163 > 152  - Free water deficit: 1.3L  - Decrease IVF's to 75mL/hr  - Head CT unremarkable  - Monitor neuro status  - Trend BMPs  - Appreciate nephrology recs    # Sepsis most likely 2/2 UTI vs. CAP  - Present on admission  - Evidenced by fever, leukocytosis w/ bandemia, tachycardia  - WBC WNL this AM @ 8.45  - Continues to have intermittent fevers, most recent 00:39 3/23  - UA w/ possible infection  - CT chest showing mucoid impaction distal trachea, right mainstem and RLL bronchus  - CT A/P showing mild bladder thickening consistent w/ possible infection  - RUQ negative for acute cholecystitis   - RVP and coronavirus positive  - COVID negative  - 3/21 blood and urine cultures NGTD  - Received zosyn at [facility]  - Continue broad spectrum coverage w/ zosyn until sensitivities result  - MRSA swab sent, pending results  - Trend WBCs  - ID recs appreciated    # ROSALBA  - Present on admission  - Baseline BUN/Cr 17/0.62  - Admission BUN/Cr. 61/1.28  - Continues to improve, 15/0.66 this AM  - Strict I/Os  - Avoid nephrotoxic medications  - Trend BMPs  - Nephrology consult    # Severe protein calorie malnutrition  - Underweight (BMI 16.9)  - Failed dysphagia screen, reattempt in afternoon  - Monitor albumin and protein levels    # Afib  - Tele monitoring  - Hold ASA while NPO  - 3/23 Diltiazem gtt stopped overnight  - 3/24 AM 28 beats of v-tach  - Re-start IV lopressor 5mg IV q6 hours w/ hold parameters  - Cards recs appreciated    # HTN  - Controlled    # DMT2  - Hold oral hypoglycemics  - ISS w/ fingersticks per protocol  - Continue Lantus to 8 units SQ  - Hypoglycemia protocol    # HLD  - Hold atorvastatin while NPO    # Paranoid personality disorder, anxiety  - Hold olanzapine 2/2 AMS    # Alzheimer's dementia  - Hold aricept and namenda while NPO    # GERD  - Continue pepcid    # DTV prophylaxis  - Continue renally dosed lovenox 30mg SQ    # ACP  - DNR/DNI 71 y.o male w/ PMHx of Afib, DMT2, HTN, HLD, paranoid personality disorder, anxiety, GERD,  Alzheimer's dementia who was BIBEMS from [facility] w/ fever and AMS x 2 days. Pt. had CXR at facility w/ no acute findings. Pt. given zosyn at [facility]. Unable to obtain information from pt. 2/2 AMS. Will need admission to medicine for treatment and monitoring of hypernatremia, ROSALBA, sepsis and severe protein calorie malnutrition.    # Metabolic encephalopathy 2/2 acute hypernatremia 2/2 dehydration   - Present on admission, remains lethargic  - Na: 172 > 163 > 152  - Free water deficit: 1.3L  - Decrease IVF's to 100mL/hr per nephrology  - Head CT unremarkable  - Monitor neuro status  - Trend BMPs  - Appreciate nephrology recs    # Sepsis most likely 2/2 UTI vs. CAP  - Present on admission  - Evidenced by fever, leukocytosis w/ bandemia, tachycardia  - WBC WNL this AM @ 8.45  - Continues to have intermittent fevers, most recent 00:39 3/23  - UA w/ possible infection  - CT chest showing mucoid impaction distal trachea, right mainstem and RLL bronchus  - CT A/P showing mild bladder thickening consistent w/ possible infection  - RUQ negative for acute cholecystitis   - RVP and coronavirus positive  - COVID negative  - 3/21 blood and urine cultures NGTD  - Received zosyn at [facility]  - Continue broad spectrum coverage w/ zosyn until sensitivities result  - MRSA swab sent, pending results  - Trend WBCs  - ID recs appreciated    # ROSALBA  - Present on admission  - Baseline BUN/Cr 17/0.62  - Admission BUN/Cr. 61/1.28  - Continues to improve, 15/0.66 this AM  - Strict I/Os  - Avoid nephrotoxic medications  - Trend BMPs  - Nephrology consult    # Severe protein calorie malnutrition  - Underweight (BMI 16.9)  - Failed dysphagia screen, reattempt in afternoon  - Monitor albumin and protein levels    # Afib  - Tele monitoring  - Hold ASA while NPO  - 3/23 Diltiazem gtt stopped overnight  - 3/24 AM 28 beats of v-tach  - Re-start IV lopressor 5mg IV q6 hours w/ hold parameters  - Cards recs appreciated    # HTN  - Controlled    # DMT2  - Hold oral hypoglycemics  - ISS w/ fingersticks per protocol  - Continue Lantus to 8 units SQ  - Hypoglycemia protocol    # HLD  - Hold atorvastatin while NPO    # Paranoid personality disorder, anxiety  - Hold olanzapine 2/2 AMS    # Alzheimer's dementia  - Hold aricept and namenda while NPO    # GERD  - Continue pepcid    # DTV prophylaxis  - Continue renally dosed lovenox 30mg SQ    # ACP  - DNR/DNI 71 y.o male w/ PMHx of Afib, DMT2, HTN, HLD, paranoid personality disorder, anxiety, GERD,  Alzheimer's dementia who was BIBEMS from [facility] w/ fever and AMS x 2 days. Pt. had CXR at facility w/ no acute findings. Pt. given zosyn at [facility]. Unable to obtain information from pt. 2/2 AMS. Will need admission to medicine for treatment and monitoring of hypernatremia, ROSALBA, sepsis and severe protein calorie malnutrition.    # Metabolic encephalopathy 2/2 acute hypernatremia 2/2 dehydration   - Present on admission, remains lethargic  - Na: 172 > 163 > 152  - Free water deficit: 1.3L  - Decrease IVF's to 100mL/hr per nephrology  - Head CT unremarkable  - Monitor neuro status  - Trend BMPs  - Appreciate nephrology recs    # Sepsis most likely 2/2 UTI vs. CAP  - Present on admission  - Evidenced by fever, leukocytosis w/ bandemia, tachycardia  - WBC WNL this AM @ 8.45  - Continues to have intermittent fevers, most recent 00:39 3/23  - UA w/ possible infection  - CT chest showing mucoid impaction distal trachea, right mainstem and RLL bronchus  - CT A/P showing mild bladder thickening consistent w/ possible infection  - RUQ negative for acute cholecystitis   - RVP and coronavirus positive  - COVID negative  - 3/21 blood and urine cultures NGTD  - Received zosyn at [facility]  - Continue broad spectrum coverage w/ zosyn until sensitivities result  - MRSA negative  - Trend WBCs  - ID recs appreciated    # ROSALBA  - Present on admission  - Baseline BUN/Cr 17/0.62  - Admission BUN/Cr. 61/1.28  - Continues to improve, 15/0.66 this AM  - Strict I/Os  - Avoid nephrotoxic medications  - Trend BMPs  - Nephrology consult    # Severe protein calorie malnutrition  - Underweight (BMI 16.9)  - Failed dysphagia screen, reattempt in afternoon  - Monitor albumin and protein levels    # Afib  - Tele monitoring  - Hold ASA while NPO  - 3/23 Diltiazem gtt stopped overnight  - 3/24 AM 28 beats of v-tach  - Re-start IV lopressor 5mg IV q6 hours w/ hold parameters  - Cards recs appreciated    # HTN  - Controlled    # DMT2  - Hold oral hypoglycemics  - ISS w/ fingersticks per protocol  - Continue Lantus to 8 units SQ  - Hypoglycemia protocol    # HLD  - Hold atorvastatin while NPO    # Paranoid personality disorder, anxiety  - Hold olanzapine 2/2 AMS    # Alzheimer's dementia  - Hold aricept and namenda while NPO    # GERD  - Continue pepcid    # DTV prophylaxis  - Continue renally dosed lovenox 30mg SQ    # ACP  - DNR/DNI

## 2022-03-24 NOTE — PROGRESS NOTE ADULT - ASSESSMENT
The patient is a 71 year old male with a history of HTN, HL, DM, paroxysmal atrial fibrillation, dementia who presents with AMS in the setting of severe dehydration, sepsis, possible PNA, viral infection.    Plan:  - Telemetry consistent with frequent brief episodes of AF  - Unable to take PO  - Hold PO diltiazem and metoprolol - resume when able to take PO  - If frequent episodes of AF recur, restart diltiazem drip  - Not a candidate for long term anticoagulation for AF given dementia and overall poor prognosis  - Continue IV fluids for hypernatremia - improving  - CT with mucoid impaction of bronchi  - Coronavirus (non-COVID) positive  - On zosyn for possible PNA

## 2022-03-24 NOTE — PHYSICAL THERAPY INITIAL EVALUATION ADULT - IMPAIRMENTS CONTRIBUTING IMPAIRED BED MOBILITY, REHAB EVAL
Subjective   Patient ID: Rukhsana is a 17 year old male.    Chief Complaint   Patient presents with   • School Physical     HPI  Pt presents today for cpe with sports clearance for fencing at [employer]  He states an occasional dry intermittent cough that improves throughout the day  He has had it for some time now; cough is more than a month    Diet: generalized except for Beef  Exercise: daily; gym for 60 years and fences  Water intake: 2-3 glasses per day  Caffeine intake: per pt none    Soc Hx:  Student at [employer]; 12th grade  Doing well  Grades are As  No problems with focus  He participates in Fencing      Patient's medications, allergies, past medical, surgical, social and family histories were reviewed and updated as appropriate.    Review of Systems   Respiratory: Positive for cough.         Dry, intermittent   All other systems reviewed and are negative.      Objective   Physical Exam   Constitutional: He is oriented to person, place, and time. He appears well-developed and well-nourished.   HENT:   Right Ear: External ear normal.   Left Ear: External ear normal.   Nose: Mucosal edema present. Right sinus exhibits maxillary sinus tenderness. Left sinus exhibits maxillary sinus tenderness.   Mouth/Throat: Uvula is midline, oropharynx is clear and moist and mucous membranes are normal. Tonsils are 0 on the right. Tonsils are 0 on the left.   Mild to moderate mucosal edema/erythema   Eyes: Pupils are equal, round, and reactive to light. Conjunctivae and EOM are normal.   Neck: Normal range of motion. Neck supple. No thyromegaly present.   Cardiovascular: Normal rate, regular rhythm and normal heart sounds.   Pulmonary/Chest: Effort normal and breath sounds normal.   Abdominal: Soft. Bowel sounds are normal.   Genitourinary: Testes normal and penis normal.   Musculoskeletal: Normal range of motion.   Lymphadenopathy:     He has no cervical adenopathy.   Neurological: He is alert and  oriented to person, place, and time. No cranial nerve deficit.   Skin: Skin is warm and dry. Capillary refill takes less than 2 seconds.   Psychiatric: He has a normal mood and affect. His behavior is normal. Judgment and thought content normal.   Nursing note and vitals reviewed.      There is no problem list on file for this patient.      Assessment   (Z02.5) Sports physical  (primary encounter diagnosis)  Plan:   CBC & AUTO DIFFERENTIAL, COMPREHENSIVE   METABOLIC PANEL, LIPID PANEL WITH REFLEX,   THYROID STIMULATING HORMONE, MAGNESIUM LEVEL   Sports clearance granted.    (R05) Allergic cough  Plan:  Start  fluticasone (FLONASE) 50 MCG/ACT nasal spray, 2 sprays each nostril daily  Start  loratadine (CLARITIN) 10 MG tablet daily as needed           Schedule follow up: in a year, at next annual exam   impaired balance/cognition/decreased strength

## 2022-03-24 NOTE — PROGRESS NOTE ADULT - ATTENDING COMMENTS
71M Alzheimer's Dementia, DM2, HTN, Afib, HLD, Paranoid personality disorder, GERD, Anxiety, sent from SNF for AMS, Fever, leukocytosis.  In ED found to have Hypernatremia, ROSALBA, failed dysphagia screen, seasonal corona virus.  Also with severe protein calorie malnutrition.    Today patient now awake, confused but talking and answering questions.  knows his name.      #sepsis (Fever, leukocytosis, ROSALBA, Metabolic encephalopathy) with unclear infection source  - positive for seasonal coronavirus but another source should be ruled out, was treated for UTI recently at [facility]  - blood, urine cultures NGTD  - RUQ sono showed distended GB without stones or obstruction  - Empiric Zosyn, ID consult Dr Rizo appreciated    # hypernatremia and ROSALBA  - normal creat approx 0.6, now 1.3 -> 1.1 ->1.0 -> 0.6  - Hypotonic IVF  - Renal consult f/u appreciated  - monitor electrolytes    # Paroxysmal Afib/ HTN  - now in NSR  - cardiology consult appreciated. now off cardizem drip.  monitor for need for cardiac meds    #thrombocytopenia  - dropped from 139 -> 68 in 4 days  - no signs of bleeding  - B12 & Folate levels acceptable  - Check platelets in blue top, Heme eval    #severe protein calorie malnutrition with possible dysphagia/ underweight/ cachexia   - retry S&S eval  - if cannot start diet soon need to consider NGT - however family states they do no want feeding tube    #DM2  - FS check q6h for now while NPO  - Continue Lantus 8 units daily for continued hyperglycemia    #Dementia, anxiety, Paranoid personality disorder  - Mental status is improving  - metabolic encephalopathy likely due to combination of hypernatremia and infectious process  - hold zyprexa, namenda and aricept while npo, consider restarting when more alert    #GERD  - IV Pepcid for now    #HLD  - hold statin while NPO    #DVT proph  - Lovenox 30mg SQ daily    Palliative care consult for Goals of Care appreciated.  DNR/I.

## 2022-03-24 NOTE — PROGRESS NOTE ADULT - SUBJECTIVE AND OBJECTIVE BOX
JODI DUBOSE is a 71yMale , patient examined and chart reviewed.    INTERVAL HPI/ OVERNIGHT EVENTS:   Afebrile. More awake alert.  NAD.    PAST MEDICAL & SURGICAL HISTORY:  DM (diabetes mellitus)  Afib  Hyperlipidemia  Hypertension  Alzheimer disease  Dementia  Anxiety  Paranoid personality disorder  GERD (gastroesophageal reflux disease)      For details regarding the patient's social history, family history, and other miscellaneous elements, please refer the initial infectious diseases consultation and/or the admitting history and physical examination for this admission.    ROS:  Unable to obtain due to : Dementia    Current inpatient medications :    ANTIBIOTICS/RELEVANT:  piperacillin/tazobactam IVPB.. 3.375 Gram(s) IV Intermittent every 8 hours    MEDICATIONS  (STANDING):  albuterol/ipratropium for Nebulization 3 milliLiter(s) Nebulizer every 6 hours  dextrose 40% Gel 15 Gram(s) Oral once  dextrose 5%. 1000 milliLiter(s) (100 mL/Hr) IV Continuous <Continuous>  dextrose 5%. 1000 milliLiter(s) (50 mL/Hr) IV Continuous <Continuous>  dextrose 5%. 1000 milliLiter(s) (100 mL/Hr) IV Continuous <Continuous>  dextrose 50% Injectable 25 Gram(s) IV Push once  dextrose 50% Injectable 12.5 Gram(s) IV Push once  dextrose 50% Injectable 25 Gram(s) IV Push once  enoxaparin Injectable 30 milliGRAM(s) SubCutaneous every 24 hours  famotidine Injectable 20 milliGRAM(s) IV Push daily  glucagon  Injectable 1 milliGRAM(s) IntraMuscular once  insulin glargine Injectable (LANTUS) 8 Unit(s) SubCutaneous <User Schedule>  insulin lispro (ADMELOG) corrective regimen sliding scale   SubCutaneous every 6 hours    MEDICATIONS  (PRN):  acetaminophen  Suppository .. 650 milliGRAM(s) Rectal every 6 hours PRN Temp greater or equal to 38C (100.4F), Mild Pain (1 - 3)      Objective:  Vital Signs Last 24 Hrs  T(C): 36.4 (24 Mar 2022 18:38), Max: 36.8 (24 Mar 2022 14:17)  T(F): 97.6 (24 Mar 2022 18:38), Max: 98.2 (24 Mar 2022 14:17)  HR: 69 (24 Mar 2022 18:38) (60 - 69)  BP: 122/74 (24 Mar 2022 18:38) (101/65 - 122/74)  RR: 18 (24 Mar 2022 18:38) (18 - 18)  SpO2: 98% (24 Mar 2022 18:38) (98% - 100%)    Physical Exam:  General: no acute distress weak frail  Neck: supple, trachea midline  Lungs: Decreased, no wheeze/rhonchi  Cardiovascular: regular rate and rhythm, S1 S2  Abdomen: soft, nontender,  bowel sounds normal  Neurological: Dementia awake  Extremities: no edema      LABS:                        12.1   8.45  )-----------( 68       ( 24 Mar 2022 08:02 )             39.6   03-24    152<H>  |  122<H>  |  15  ----------------------------<  156<H>  4.2   |  24  |  0.66    Ca    8.0<L>      24 Mar 2022 08:02  Phos  2.6     03-24  Mg     2.5     03-24    TPro  5.8<L>  /  Alb  2.6<L>  /  TBili  0.8  /  DBili  x   /  AST  26  /  ALT  20  /  AlkPhos  63  03-23      Urinalysis Basic - ( 21 Mar 2022 16:04 )    Color: Yellow / Appearance: Clear / S.020 / pH: x  Gluc: x / Ketone: Trace  / Bili: Negative / Urobili: Negative mg/dL   Blood: x / Protein: 100 mg/dL / Nitrite: Negative   Leuk Esterase: Trace / RBC: 6-10 /HPF / WBC 6-10   Sq Epi: x / Non Sq Epi: Negative / Bacteria: Occasional    MICROBIOLOGY:    Culture - Urine (collected 21 Mar 2022 21:42)  Source: Clean Catch Clean Catch (Midstream)  Final Report (22 Mar 2022 17:30):    No growth    Culture - Blood (collected 21 Mar 2022 18:16)  Source: .Blood Blood-Peripheral  Preliminary Report (22 Mar 2022 19:02):    No growth to date.    Culture - Blood (collected 21 Mar 2022 18:16)  Source: .Blood Blood-Peripheral  Preliminary Report (22 Mar 2022 19:02):    No growth to date.    Respiratory Viral Panel with COVID-19 by SANDRA (22 @ 13:22)    Rapid RVP Result: Detected    SARS-CoV-2: NotDete: This Respiratory Panel uses polymerase chain reaction (PCR) to detect for  adenovirus; coronavirus (HKU1, NL63, 229E, OC43); human metapneumovirus  (hMPV); human enterovirus/rhinovirus (Entero/RV); influenza A; influenza  A/H1; influenza A/H3; influenza A/H1-2009; influenza B; parainfluenza  viruses 1, 2, 3, 4; respiratory syncytial virus; Mycoplasma pneumoniae;  Chlamydophila pneumoniae; and SARS-CoV-2.    Coronavirus (229E,HKU1,NL63,OC43): Detected    RADIOLOGY & ADDITIONAL STUDIES:    ACC: [id number] EXAM:  CT ABDOMEN AND PELVIS IC                        ACC: [id number] EXAM:  CT CHEST IC                          PROCEDURE DATE:  2022          INTERPRETATION:  CLINICAL INFORMATION: Fever, sepsis. Altered mental   status.    COMPARISON: None.    CONTRAST/COMPLICATIONS:  IV Contrast: Omnipaque 350 (accession [id number]), IV contrast documented   in associated exam (accession [id number])  90 cc administered (accession   [id number]), 0 cc administered (accession [id number])   10 cc discarded   (accession [id number]), 0 cc discarded (accession [id number])  Oral Contrast: NONE  Complications: None reported at time of study completion    PROCEDURE:  CT of the Chest, Abdomen and Pelvis was performed.  Sagittal and coronal reformats were performed.    FINDINGS:    CHEST:    LUNGS AND LARGE AIRWAYS: PLEURA:  There is mucoid impaction distal trachea, right mainstem bronchus and   right lower lobe bronchus.    There is no lobar lung consolidation.  No pleural effusion or pneumothorax.    Tiny calcified granuloma periphery right middle lobe.    VESSELS:  Atherosclerotic changes thoracic aorta and coronary artery calcifications.    HEART: Heart size is normal. No pericardial effusion.    MEDIASTINUM AND MADYSON:  No enlarged external lymphadenopathy.    CHEST WALL AND LOWER NECK: Within normal limits.    ABDOMEN AND PELVIS:  Respiratory motion artifact degrades image quality.    LIVER: Within normal limits.  BILE DUCTS: Normal caliber.  GALLBLADDER: Within normal limits.  SPLEEN: Within normal limits.  PANCREAS: Within normal limits.  ADRENALS: Within normal limits.  KIDNEYS/URETERS:  Right renal cyst.  No hydronephrosis.    BLADDER: Possible mild circumferential bladder wall thickening, correlate   clinically for cystitis.  There is punctate foci of air within the nondependent bladder wall.  REPRODUCTIVE ORGANS: Heterogeneous enlargement of the prostate gland.    BOWEL: Evaluation of the stomach is limited without distention.  Generalized colonic fecal retention with moderate bowel distention.  No bowel obstruction.  The appendix is not clearly visualized on this exam.  PERITONEUM: No ascites.    VESSELS: Atherosclerotic changes.  RETROPERITONEUM/LYMPH NODES: No lymphadenopathy.    ABDOMINAL WALL: Within normal limits.    BONES:  Chronic fracture deformity right inferior and superior pubic rami and   superior puboacetabular junction.  Probable chronic right sacral insufficiency fracture.    IMPRESSION:    Mucoid impaction distal trachea, right mainstem and right lower lobe   bronchus.    Enlarged prostate with heterogeneous enhancement, correlate clinically   for prostatitis.  Possible mild bladder wall thickening with multiple foci of air within   the nondependent bladder wall, correlate for infection.    Assessment :   72YO M PMHx of Afib, DMT2, HTN, HLD, paranoid personality disorder, anxiety, GERD,  Alzheimer's dementia resident of [facility] admitted with sepsis sec aspiration pneumonia  CT CAP with Mucus plugging and RLL pneumonia  Sp fever   WBC downtrending  RVP + coronavirus ( NOT covid 19 )   Severe dehydration/ hypernatremia - improving  Clinically improving    Plan :   Cont Zosyn x 7 days  Fu cultures- NGTD  Trend temps and cbc  Strict Asp precautions  IVF per renal  Pulm toileting  Increase activity    Continue with present regiment.  Appropriate use of antibiotics and adverse effects reviewed.    > 35 minutes were spent in direct patient care reviewing notes, medications ,labs data/ imaging , discussion with multidisciplinary team.    Thank you for allowing me to participate in care of your patient .    Rimma Rizo MD  Infectious Disease  [phone]

## 2022-03-24 NOTE — PROGRESS NOTE ADULT - SUBJECTIVE AND OBJECTIVE BOX
Resting    Vital Signs Last 24 Hrs  T(C): 36.7 (03-24-22 @ 08:51), Max: 37.2 (03-23-22 @ 18:15)  T(F): 98.1 (03-24-22 @ 08:51), Max: 99 (03-23-22 @ 18:15)  HR: 65 (03-24-22 @ 08:51) (60 - 80)  BP: 113/70 (03-24-22 @ 08:51) (95/54 - 113/70)  RR: 18 (03-24-22 @ 08:51) (18 - 18)  SpO2: 100% (03-24-22 @ 08:51) (98% - 100%)    I&O's Detail    23 Mar 2022 07:01  -  24 Mar 2022 07:00  --------------------------------------------------------  OUT:    Voided (mL): 1000 mL  Total OUT: 1000 mL    24 Mar 2022 07:01  -  24 Mar 2022 13:38  --------------------------------------------------------  IN:    dextrose 5%: 300 mL    dextrose 5% w/ Additives: 450 mL  Total IN: 750 mL    OUT:    Voided (mL): 200 mL  Total OUT: 200 mL    Total NET: 550 mL    Lungs b/l air entry  Heart S1S2  Abd soft NT ND  Extremities: no edema                        12.1   8.45  )-----------( 68       ( 24 Mar 2022 08:02 )             39.6     24 Mar 2022 08:02    152    |  122    |  15     ----------------------------<  156    4.2     |  24     |  0.66     Ca    8.0        24 Mar 2022 08:02  Phos  2.6       24 Mar 2022 08:02  Mg     2.5       24 Mar 2022 08:02    TPro  5.8    /  Alb  2.6    /  TBili  0.8    /  DBili  x      /  AST  26     /  ALT  20     /  AlkPhos  63     23 Mar 2022 07:44    LIVER FUNCTIONS - ( 23 Mar 2022 07:44 )  Alb: 2.6 g/dL / Pro: 5.8 g/dL / ALK PHOS: 63 U/L / ALT: 20 U/L DA / AST: 26 U/L / GGT: x           Culture - Urine (collected 21 Mar 2022 21:42)  Source: Clean Catch Clean Catch (Midstream)  Final Report (22 Mar 2022 17:30):    No growth    Culture - Blood (collected 21 Mar 2022 18:16)  Source: .Blood Blood-Peripheral  Preliminary Report (22 Mar 2022 19:02):    No growth to date.    Culture - Blood (collected 21 Mar 2022 18:16)  Source: .Blood Blood-Peripheral  Preliminary Report (22 Mar 2022 19:02):    No growth to date.    acetaminophen  Suppository .. 650 milliGRAM(s) Rectal every 6 hours PRN  albuterol/ipratropium for Nebulization 3 milliLiter(s) Nebulizer every 6 hours  dextrose 40% Gel 15 Gram(s) Oral once  dextrose 5%. 1000 milliLiter(s) IV Continuous <Continuous>  dextrose 5%. 1000 milliLiter(s) IV Continuous <Continuous>  dextrose 5%. 1000 milliLiter(s) IV Continuous <Continuous>  dextrose 50% Injectable 25 Gram(s) IV Push once  dextrose 50% Injectable 12.5 Gram(s) IV Push once  dextrose 50% Injectable 25 Gram(s) IV Push once  enoxaparin Injectable 30 milliGRAM(s) SubCutaneous every 24 hours  famotidine Injectable 20 milliGRAM(s) IV Push daily  glucagon  Injectable 1 milliGRAM(s) IntraMuscular once  insulin glargine Injectable (LANTUS) 8 Unit(s) SubCutaneous <User Schedule>  insulin lispro (ADMELOG) corrective regimen sliding scale   SubCutaneous every 6 hours  piperacillin/tazobactam IVPB 3.375 Gram(s) IV Intermittent every 8 hours    Assessment/Plan:    Pre-renal azotemia, dehydration  Improving w/IVF  Continue D5W at 100 cc/hr  Avoid nephrotoxins  F/u [facility]    [phone]

## 2022-03-24 NOTE — PHYSICAL THERAPY INITIAL EVALUATION ADULT - RANGE OF MOTION EXAMINATION, REHAB EVAL
bilateral shld flex to ~80 degrees; pt did not follow commands to actively move LEs/deficits as listed below

## 2022-03-24 NOTE — PROGRESS NOTE ADULT - SUBJECTIVE AND OBJECTIVE BOX
Patient is a 71y old  Male who presents with a chief complaint of AMS (23 Mar 2022 20:49)      INTERVAL HPI/OVERNIGHT EVENTS:    MEDICATIONS  (STANDING):  albuterol/ipratropium for Nebulization 3 milliLiter(s) Nebulizer every 6 hours  enoxaparin Injectable 30 milliGRAM(s) SubCutaneous every 24 hours  famotidine Injectable 20 milliGRAM(s) IV Push daily  insulin glargine Injectable (LANTUS) 8 Unit(s) SubCutaneous <User Schedule>  insulin lispro (ADMELOG) corrective regimen sliding scale SubCutaneous every 6 hours  piperacillin/tazobactam IVPB.. 3.375 Gram(s) IV Intermittent every 8 hours    MEDICATIONS  (PRN):  acetaminophen  Suppository .. 650 milliGRAM(s) Rectal every 6 hours PRN Temp greater or equal to 38C (100.4F), Mild Pain (1 - 3)  dextrose 40% Gel 15 Gram(s) Oral once  dextrose 5% 1000 milliLiter(s) (150 mL/Hr) IV Continuous <Continuous>  dextrose 5%. 1000 milliLiter(s) (50 mL/Hr) IV Continuous <Continuous>  dextrose 5%. 1000 milliLiter(s) (100 mL/Hr) IV Continuous <Continuous>  dextrose 50% Injectable 25 Gram(s) IV Push once  dextrose 50% Injectable 12.5 Gram(s) IV Push once  dextrose 50% Injectable 25 Gram(s) IV Push once  glucagon  Injectable 1 milliGRAM(s) IntraMuscular once    No Known Allergies    Unable to obtain meaningful ROS 2/2 mental statues    Vital Signs Last 24 Hrs  T(C): 36.7 (24 Mar 2022 08:51), Max: 37.4 (23 Mar 2022 11:00)  T(F): 98.1 (24 Mar 2022 08:51), Max: 99.4 (23 Mar 2022 11:00)  HR: 65 (24 Mar 2022 08:51) (60 - 80)  BP: 113/70 (24 Mar 2022 08:51) (95/54 - 113/70)  RR: 18 (24 Mar 2022 08:51) (17 - 18)  SpO2: 100% (24 Mar 2022 08:51) (97% - 100%)    PHYSICAL EXAM:  GENERAL: NAD  HEAD:  Atraumatic, normocephalic  EYES: PERRLA, conjunctiva and sclera clear  ENMT: Moist mucous membranes, no lesions  NECK: Supple, no JVD  NERVOUS SYSTEM:  Eye opening to voice, all 4 extremities mobile  CHEST/LUNG: CTA B/L; no rales, rhonchi, wheezing  HEART: RRR, + S1/S2  ABDOMEN: Soft, nontender, nondistended, bowel sounds present  EXTREMITIES:  2+ peripheral pulses, no clubbing, cyanosis, or edema  SKIN: No rashes or lesions    LABS:                        12.1   8.45  )-----------( 68       ( 24 Mar 2022 08:02 )             39.6     24 Mar 2022 08:02    152    |  122    |  15     ----------------------------<  156    4.2     |  24     |  0.66     Ca    8.0        24 Mar 2022 08:02  Phos  2.6       24 Mar 2022 08:02  Mg     2.5       24 Mar 2022 08:02    CAPILLARY BLOOD GLUCOSE  POCT Blood Glucose.: 159 mg/dL (24 Mar 2022 05:53)  POCT Blood Glucose.: 252 mg/dL (24 Mar 2022 00:30)  POCT Blood Glucose.: 211 mg/dL (23 Mar 2022 17:23)  POCT Blood Glucose.: 242 mg/dL (23 Mar 2022 11:45)    < from: US Abdomen Upper Quadrant Right (03.21.22 @ 17:54) >  IMPRESSION:    Distended gallbladder without stones or biliary dilatation.    < end of copied text >    < from: CT Abdomen and Pelvis w/ IV Cont (03.21.22 @ 16:41) >  IMPRESSION:    Mucoid impaction distal trachea, right mainstem and right lower lobe   bronchus.    Enlarged prostate with heterogeneous enhancement, correlate clinically   for prostatitis.  Possible mild bladder wall thickening with multiple foci of air within   the nondependent bladder wall, correlate for infection.    < end of copied text >    < from: CT Head No Cont (03.21.22 @ 16:25) >  IMPRESSION:  Stable exam.    No mass effect, hemorrhage or evidence of acute intracranial pathology.    < end of copied text >     Patient is a 71y old  Male who presents with a chief complaint of AMS    INTERVAL HPI/OVERNIGHT EVENTS:  - Chart and consult notes reviewed  - 29 beats of v-tach this AM, will address  - Pt. seen and examined at bedside, remains lethargic despite improved sodium levels    MEDICATIONS  (STANDING):  albuterol/ipratropium for Nebulization 3 milliLiter(s) Nebulizer every 6 hours  enoxaparin Injectable 30 milliGRAM(s) SubCutaneous every 24 hours  famotidine Injectable 20 milliGRAM(s) IV Push daily  insulin glargine Injectable (LANTUS) 8 Unit(s) SubCutaneous <User Schedule>  insulin lispro (ADMELOG) corrective regimen sliding scale SubCutaneous every 6 hours  piperacillin/tazobactam IVPB 3.375 Gram(s) IV Intermittent every 8 hours    MEDICATIONS  (PRN):  acetaminophen  Suppository .. 650 milliGRAM(s) Rectal every 6 hours PRN Temp greater or equal to 38C (100.4F), Mild Pain (1 - 3)  dextrose 40% Gel 15 Gram(s) Oral once  dextrose 5% 1000 milliLiter(s) (150 mL/Hr) IV Continuous <Continuous>  dextrose 5%. 1000 milliLiter(s) (50 mL/Hr) IV Continuous <Continuous>  dextrose 5%. 1000 milliLiter(s) (100 mL/Hr) IV Continuous <Continuous>  dextrose 50% Injectable 25 Gram(s) IV Push once  dextrose 50% Injectable 12.5 Gram(s) IV Push once  dextrose 50% Injectable 25 Gram(s) IV Push once  glucagon  Injectable 1 milliGRAM(s) IntraMuscular once    No Known Allergies    Unable to obtain meaningful ROS 2/2 mental statues    Vital Signs Last 24 Hrs  T(C): 36.7 (24 Mar 2022 08:51), Max: 37.4 (23 Mar 2022 11:00)  T(F): 98.1 (24 Mar 2022 08:51), Max: 99.4 (23 Mar 2022 11:00)  HR: 65 (24 Mar 2022 08:51) (60 - 80)  BP: 113/70 (24 Mar 2022 08:51) (95/54 - 113/70)  RR: 18 (24 Mar 2022 08:51) (17 - 18)  SpO2: 100% (24 Mar 2022 08:51) (97% - 100%)    PHYSICAL EXAM:  GENERAL: NAD  HEAD:  Atraumatic, normocephalic  EYES: PERRLA, conjunctiva and sclera clear  ENMT: Moist mucous membranes, no lesions  NECK: Supple, no JVD  NERVOUS SYSTEM:  Eye opening to voice, all 4 extremities mobile  CHEST/LUNG: CTA B/L; no rales, rhonchi, wheezing  HEART: RRR, + S1/S2  ABDOMEN: Soft, nontender, nondistended, bowel sounds present  EXTREMITIES:  2+ peripheral pulses, no clubbing, cyanosis, or edema  SKIN: No rashes or lesions    LABS:                        12.1   8.45  )-----------( 68       ( 24 Mar 2022 08:02 )             39.6     24 Mar 2022 08:02    152    |  122    |  15     ----------------------------<  156    4.2     |  24     |  0.66     Ca    8.0        24 Mar 2022 08:02  Phos  2.6       24 Mar 2022 08:02  Mg     2.5       24 Mar 2022 08:02    CAPILLARY BLOOD GLUCOSE  POCT Blood Glucose.: 159 mg/dL (24 Mar 2022 05:53)  POCT Blood Glucose.: 252 mg/dL (24 Mar 2022 00:30)  POCT Blood Glucose.: 211 mg/dL (23 Mar 2022 17:23)  POCT Blood Glucose.: 242 mg/dL (23 Mar 2022 11:45)    < from: US Abdomen Upper Quadrant Right (03.21.22 @ 17:54) >  IMPRESSION:    Distended gallbladder without stones or biliary dilatation.    < end of copied text >    < from: CT Abdomen and Pelvis w/ IV Cont (03.21.22 @ 16:41) >  IMPRESSION:    Mucoid impaction distal trachea, right mainstem and right lower lobe   bronchus.    Enlarged prostate with heterogeneous enhancement, correlate clinically   for prostatitis.  Possible mild bladder wall thickening with multiple foci of air within   the nondependent bladder wall, correlate for infection.    < end of copied text >    < from: CT Head No Cont (03.21.22 @ 16:25) >  IMPRESSION:  Stable exam.    No mass effect, hemorrhage or evidence of acute intracranial pathology.    < end of copied text >

## 2022-03-24 NOTE — PROVIDER CONTACT NOTE (OTHER) - ACTION/TREATMENT ORDERED:
EKG will be done and placed in chart. Elvia CRISOSTOMO assessed patient at bed side. Elvia CRISOSTOMO will notify cardiologist
Continuing to monitor patient closely

## 2022-03-24 NOTE — CONSULT NOTE ADULT - SUBJECTIVE AND OBJECTIVE BOX
Patient is a 71y old  Male who presents with a chief complaint of AMS (24 Mar 2022 13:38)      HPI:  71 y.o male w/ PMHx of Afib, DMT2, HTN, HLD, paranoid personality disorder, anxiety, GERD,  Alzheimer's dementia who was BIBEMS from [facility] w/ fever and AMS x 2 days. Pt. had CXR at facility w/ no acute findings. Pt. given zosyn at SNF. Unable to obtain information from pt. 2/2 AMS. Will need admission to medicine for treatment and monitoring of hypernatremia, ROSALBA, and sepsis.    ED Course:  - EKG done  - Labs sent, including: CBC, CMP, lactate, Coags  - Blood and urine cultures sent  - RVP and COVID swabs sent  - Imaging done, including: CT A/P, chest and head, CXR  - 1gm tylenol given for fever  - 2L IVF given (21 Mar 2022 16:33)       ROS:  Negative except for:    PAST MEDICAL & SURGICAL HISTORY:  DM (diabetes mellitus)    Afib    Hyperlipidemia    Hypertension    Alzheimer disease    Dementia    Anxiety    Paranoid personality disorder    GERD (gastroesophageal reflux disease)        SOCIAL HISTORY:    FAMILY HISTORY:      MEDICATIONS  (STANDING):  albuterol/ipratropium for Nebulization 3 milliLiter(s) Nebulizer every 6 hours  dextrose 40% Gel 15 Gram(s) Oral once  dextrose 5%. 1000 milliLiter(s) (100 mL/Hr) IV Continuous <Continuous>  dextrose 5%. 1000 milliLiter(s) (50 mL/Hr) IV Continuous <Continuous>  dextrose 5%. 1000 milliLiter(s) (100 mL/Hr) IV Continuous <Continuous>  dextrose 50% Injectable 25 Gram(s) IV Push once  dextrose 50% Injectable 12.5 Gram(s) IV Push once  dextrose 50% Injectable 25 Gram(s) IV Push once  enoxaparin Injectable 30 milliGRAM(s) SubCutaneous every 24 hours  famotidine Injectable 20 milliGRAM(s) IV Push daily  glucagon  Injectable 1 milliGRAM(s) IntraMuscular once  insulin glargine Injectable (LANTUS) 8 Unit(s) SubCutaneous <User Schedule>  insulin lispro (ADMELOG) corrective regimen sliding scale   SubCutaneous every 6 hours  piperacillin/tazobactam IVPB.. 3.375 Gram(s) IV Intermittent every 8 hours    MEDICATIONS  (PRN):  acetaminophen  Suppository .. 650 milliGRAM(s) Rectal every 6 hours PRN Temp greater or equal to 38C (100.4F), Mild Pain (1 - 3)      Allergies    No Known Allergies    Intolerances        Vital Signs Last 24 Hrs  T(C): 36.4 (24 Mar 2022 22:22), Max: 36.8 (24 Mar 2022 14:17)  T(F): 97.5 (24 Mar 2022 22:22), Max: 98.2 (24 Mar 2022 14:17)  HR: 71 (24 Mar 2022 22:22) (60 - 71)  BP: 104/71 (24 Mar 2022 22:22) (101/65 - 122/74)  BP(mean): --  RR: 19 (24 Mar 2022 22:22) (18 - 19)  SpO2: 97% (24 Mar 2022 22:22) (97% - 100%)    PHYSICAL EXAM  General: adult in NAD  HEENT: clear oropharynx, anicteric sclera, pink conjunctivae  Neck: supple  CV: normal S1S2 with no murmur rubs or gallops  Lungs: clear to auscultation, no wheezes, no rhales  Abdomen: soft non-tender non-distended, no hepato/splenomegaly  Ext: no clubbing cyanosis or edema  Skin: no rashes and no petichiae  Neuro: alert and not oriented      LABS:    CBC Full  -  ( 24 Mar 2022 08:02 )  WBC Count : 8.45 K/uL  RBC Count : 4.06 M/uL  Hemoglobin : 12.1 g/dL  Hematocrit : 39.6 %  Platelet Count - Automated : 68 K/uL  Mean Cell Volume : 97.5 fl  Mean Cell Hemoglobin : 29.8 pg  Mean Cell Hemoglobin Concentration : 30.6 gm/dL  Auto Neutrophil # : 6.81 K/uL  Auto Lymphocyte # : 1.03 K/uL  Auto Monocyte # : 0.32 K/uL  Auto Eosinophil # : 0.23 K/uL  Auto Basophil # : 0.01 K/uL  Auto Neutrophil % : 80.6 %  Auto Lymphocyte % : 12.2 %  Auto Monocyte % : 3.8 %  Auto Eosinophil % : 2.7 %  Auto Basophil % : 0.1 %    03-24    152<H>  |  122<H>  |  15  ----------------------------<  156<H>  4.2   |  24  |  0.66    Ca    8.0<L>      24 Mar 2022 08:02  Phos  2.6     03-24  Mg     2.5     03-24    TPro  5.8<L>  /  Alb  2.6<L>  /  TBili  0.8  /  DBili  x   /  AST  26  /  ALT  20  /  AlkPhos  63  03-23              BLOOD SMEAR INTERPRETATION:    RADIOLOGY & ADDITIONAL STUDIES:    c< from: CT Abdomen and Pelvis w/ IV Cont (03.21.22 @ 16:41) >    ACC: [id number] EXAM:  CT ABDOMEN AND PELVIS IC                        ACC: [id number] EXAM:  CT CHEST IC                          PROCEDURE DATE:  03/21/2022          INTERPRETATION:  CLINICAL INFORMATION: Fever, sepsis. Altered mental   status.    COMPARISON: None.    CONTRAST/COMPLICATIONS:  IV Contrast: Omnipaque 350 (accession [id number]), IV contrast documented   in associated exam (accession [id number])  90 cc administered (accession   [id number]), 0 cc administered (accession [id number])   10 cc discarded   (accession [id number]), 0 cc discarded (accession [id number])  Oral Contrast: NONE  Complications: None reported at time of study completion    PROCEDURE:  CT of the Chest, Abdomen and Pelvis was performed.  Sagittal and coronal reformats were performed.    FINDINGS:    CHEST:    LUNGS AND LARGE AIRWAYS: PLEURA:  There is mucoid impaction distal trachea, right mainstem bronchus and   right lower lobe bronchus.    There is no lobar lung consolidation.  No pleural effusion or pneumothorax.    Tiny calcified granuloma periphery right middle lobe.    VESSELS:  Atherosclerotic changes thoracic aorta and coronary artery calcifications.    HEART: Heart size is normal. No pericardial effusion.    MEDIASTINUM AND MADYSON:  No enlarged external lymphadenopathy.    CHEST WALL AND LOWER NECK: Within normal limits.    ABDOMEN AND PELVIS:  Respiratory motion artifact degrades image quality.    LIVER: Within normal limits.  BILE DUCTS: Normal caliber.  GALLBLADDER: Within normal limits.  SPLEEN: Within normal limits.  PANCREAS: Within normal limits.  ADRENALS: Within normal limits.  KIDNEYS/URETERS:  Right renal cyst.  No hydronephrosis.    BLADDER: Possible mild circumferential bladder wall thickening, correlate   clinically for cystitis.  There is punctate foci of air within the nondependent bladder wall.  REPRODUCTIVE ORGANS: Heterogeneous enlargement of the prostate gland.    BOWEL: Evaluation of the stomach is limited without distention.  Generalized colonic fecal retention with moderate bowel distention.  No bowel obstruction.  The appendix is not clearly visualized on this exam.  PERITONEUM: No ascites.    VESSELS: Atherosclerotic changes.  RETROPERITONEUM/LYMPH NODES: No lymphadenopathy.    ABDOMINAL WALL: Within normal limits.    BONES:  Chronic fracture deformity right inferior and superior pubic rami and   superior puboacetabular junction.  Probable chronic right sacral insufficiency fracture.    IMPRESSION:    Mucoid impaction distal trachea, right mainstem and right lower lobe   bronchus.    Enlarged prostate with heterogeneous enhancement, correlate clinically   for prostatitis.  Possible mild bladder wall thickening with multiple foci of air within   the nondependent bladder wall, correlate for infection.    Other findings as discussed above.    --- End of Report ---            KIKI LOVE MD; Attending Radiologist  This document has been electronically signed. Mar 21 2022  5:08PM    < end of copied text >

## 2022-03-24 NOTE — CONSULT NOTE ADULT - ASSESSMENT
Thrombocytopenia new compared to previous lab work most likely related to acute illness.  Was mildly decreased on admission which would go against zosyn  no evidence of bleeding    Recommendations"  1.  follow CBC  2.  continue present medical and ID evaluation   3.  further heme recommendations pending above

## 2022-03-24 NOTE — PHYSICAL THERAPY INITIAL EVALUATION ADULT - ADDITIONAL COMMENTS
Lives in [facility].  Pt. is a poor historian and unable to obtain prior level of function.  As per chart, pt was ambulatory at SNF.

## 2022-03-25 LAB
ANION GAP SERPL CALC-SCNC: 7 MMOL/L — SIGNIFICANT CHANGE UP (ref 5–17)
ANION GAP SERPL CALC-SCNC: 8 MMOL/L — SIGNIFICANT CHANGE UP (ref 5–17)
BUN SERPL-MCNC: 10 MG/DL — SIGNIFICANT CHANGE UP (ref 7–23)
BUN SERPL-MCNC: 11 MG/DL — SIGNIFICANT CHANGE UP (ref 7–23)
CALCIUM SERPL-MCNC: 8.3 MG/DL — LOW (ref 8.4–10.5)
CALCIUM SERPL-MCNC: 8.4 MG/DL — SIGNIFICANT CHANGE UP (ref 8.4–10.5)
CHLORIDE SERPL-SCNC: 111 MMOL/L — HIGH (ref 96–108)
CHLORIDE SERPL-SCNC: 115 MMOL/L — HIGH (ref 96–108)
CO2 SERPL-SCNC: 26 MMOL/L — SIGNIFICANT CHANGE UP (ref 22–31)
CO2 SERPL-SCNC: 28 MMOL/L — SIGNIFICANT CHANGE UP (ref 22–31)
CREAT SERPL-MCNC: 0.73 MG/DL — SIGNIFICANT CHANGE UP (ref 0.5–1.3)
CREAT SERPL-MCNC: 0.8 MG/DL — SIGNIFICANT CHANGE UP (ref 0.5–1.3)
EGFR: 95 ML/MIN/1.73M2 — SIGNIFICANT CHANGE UP
EGFR: 97 ML/MIN/1.73M2 — SIGNIFICANT CHANGE UP
GLUCOSE BLDC GLUCOMTR-MCNC: 140 MG/DL — HIGH (ref 70–99)
GLUCOSE BLDC GLUCOMTR-MCNC: 146 MG/DL — HIGH (ref 70–99)
GLUCOSE BLDC GLUCOMTR-MCNC: 156 MG/DL — HIGH (ref 70–99)
GLUCOSE BLDC GLUCOMTR-MCNC: 188 MG/DL — HIGH (ref 70–99)
GLUCOSE BLDC GLUCOMTR-MCNC: 306 MG/DL — HIGH (ref 70–99)
GLUCOSE SERPL-MCNC: 168 MG/DL — HIGH (ref 70–99)
GLUCOSE SERPL-MCNC: 187 MG/DL — HIGH (ref 70–99)
HCT VFR BLD CALC: 39.1 % — SIGNIFICANT CHANGE UP (ref 39–50)
HGB BLD-MCNC: 12.3 G/DL — LOW (ref 13–17)
MAGNESIUM SERPL-MCNC: 2.2 MG/DL — SIGNIFICANT CHANGE UP (ref 1.6–2.6)
MCHC RBC-ENTMCNC: 30.2 PG — SIGNIFICANT CHANGE UP (ref 27–34)
MCHC RBC-ENTMCNC: 31.5 GM/DL — LOW (ref 32–36)
MCV RBC AUTO: 96.1 FL — SIGNIFICANT CHANGE UP (ref 80–100)
NRBC # BLD: 0 /100 WBCS — SIGNIFICANT CHANGE UP (ref 0–0)
PHOSPHATE SERPL-MCNC: 2.5 MG/DL — SIGNIFICANT CHANGE UP (ref 2.5–4.5)
PLATELET # BLD AUTO: 67 K/UL — LOW (ref 150–400)
POTASSIUM SERPL-MCNC: 4.3 MMOL/L — SIGNIFICANT CHANGE UP (ref 3.5–5.3)
POTASSIUM SERPL-MCNC: 4.4 MMOL/L — SIGNIFICANT CHANGE UP (ref 3.5–5.3)
POTASSIUM SERPL-SCNC: 4.3 MMOL/L — SIGNIFICANT CHANGE UP (ref 3.5–5.3)
POTASSIUM SERPL-SCNC: 4.4 MMOL/L — SIGNIFICANT CHANGE UP (ref 3.5–5.3)
RBC # BLD: 4.07 M/UL — LOW (ref 4.2–5.8)
RBC # FLD: 14.3 % — SIGNIFICANT CHANGE UP (ref 10.3–14.5)
SODIUM SERPL-SCNC: 147 MMOL/L — HIGH (ref 135–145)
SODIUM SERPL-SCNC: 148 MMOL/L — HIGH (ref 135–145)
WBC # BLD: 6.21 K/UL — SIGNIFICANT CHANGE UP (ref 3.8–10.5)
WBC # FLD AUTO: 6.21 K/UL — SIGNIFICANT CHANGE UP (ref 3.8–10.5)

## 2022-03-25 PROCEDURE — 99233 SBSQ HOSP IP/OBS HIGH 50: CPT

## 2022-03-25 RX ORDER — SODIUM CHLORIDE 9 MG/ML
1000 INJECTION, SOLUTION INTRAVENOUS
Refills: 0 | Status: DISCONTINUED | OUTPATIENT
Start: 2022-03-25 | End: 2022-03-27

## 2022-03-25 RX ORDER — INSULIN LISPRO 100/ML
VIAL (ML) SUBCUTANEOUS
Refills: 0 | Status: DISCONTINUED | OUTPATIENT
Start: 2022-03-25 | End: 2022-03-28

## 2022-03-25 RX ORDER — METOPROLOL TARTRATE 50 MG
12.5 TABLET ORAL
Refills: 0 | Status: DISCONTINUED | OUTPATIENT
Start: 2022-03-25 | End: 2022-03-28

## 2022-03-25 RX ORDER — ATORVASTATIN CALCIUM 80 MG/1
40 TABLET, FILM COATED ORAL AT BEDTIME
Refills: 0 | Status: DISCONTINUED | OUTPATIENT
Start: 2022-03-25 | End: 2022-03-28

## 2022-03-25 RX ORDER — MEMANTINE HYDROCHLORIDE 10 MG/1
5 TABLET ORAL DAILY
Refills: 0 | Status: DISCONTINUED | OUTPATIENT
Start: 2022-03-25 | End: 2022-03-28

## 2022-03-25 RX ORDER — METOPROLOL TARTRATE 50 MG
12.5 TABLET ORAL
Refills: 0 | Status: DISCONTINUED | OUTPATIENT
Start: 2022-03-25 | End: 2022-03-25

## 2022-03-25 RX ORDER — ACETAMINOPHEN 500 MG
650 TABLET ORAL EVERY 6 HOURS
Refills: 0 | Status: DISCONTINUED | OUTPATIENT
Start: 2022-03-25 | End: 2022-03-28

## 2022-03-25 RX ORDER — FAMOTIDINE 10 MG/ML
20 INJECTION INTRAVENOUS DAILY
Refills: 0 | Status: DISCONTINUED | OUTPATIENT
Start: 2022-03-25 | End: 2022-03-28

## 2022-03-25 RX ORDER — DONEPEZIL HYDROCHLORIDE 10 MG/1
10 TABLET, FILM COATED ORAL AT BEDTIME
Refills: 0 | Status: DISCONTINUED | OUTPATIENT
Start: 2022-03-25 | End: 2022-03-28

## 2022-03-25 RX ORDER — ASPIRIN/CALCIUM CARB/MAGNESIUM 324 MG
325 TABLET ORAL DAILY
Refills: 0 | Status: DISCONTINUED | OUTPATIENT
Start: 2022-03-25 | End: 2022-03-28

## 2022-03-25 RX ADMIN — SODIUM CHLORIDE 75 MILLILITER(S): 9 INJECTION, SOLUTION INTRAVENOUS at 21:46

## 2022-03-25 RX ADMIN — FAMOTIDINE 20 MILLIGRAM(S): 10 INJECTION INTRAVENOUS at 12:06

## 2022-03-25 RX ADMIN — DONEPEZIL HYDROCHLORIDE 10 MILLIGRAM(S): 10 TABLET, FILM COATED ORAL at 21:46

## 2022-03-25 RX ADMIN — ENOXAPARIN SODIUM 30 MILLIGRAM(S): 100 INJECTION SUBCUTANEOUS at 06:35

## 2022-03-25 RX ADMIN — SODIUM CHLORIDE 100 MILLILITER(S): 9 INJECTION, SOLUTION INTRAVENOUS at 04:42

## 2022-03-25 RX ADMIN — PIPERACILLIN AND TAZOBACTAM 25 GRAM(S): 4; .5 INJECTION, POWDER, LYOPHILIZED, FOR SOLUTION INTRAVENOUS at 08:31

## 2022-03-25 RX ADMIN — Medication 1: at 00:14

## 2022-03-25 RX ADMIN — ATORVASTATIN CALCIUM 40 MILLIGRAM(S): 80 TABLET, FILM COATED ORAL at 21:45

## 2022-03-25 RX ADMIN — Medication 325 MILLIGRAM(S): at 12:06

## 2022-03-25 RX ADMIN — INSULIN GLARGINE 8 UNIT(S): 100 INJECTION, SOLUTION SUBCUTANEOUS at 13:15

## 2022-03-25 RX ADMIN — Medication 3 MILLILITER(S): at 01:07

## 2022-03-25 RX ADMIN — Medication 3 MILLILITER(S): at 19:38

## 2022-03-25 RX ADMIN — Medication 3 MILLILITER(S): at 14:20

## 2022-03-25 RX ADMIN — PIPERACILLIN AND TAZOBACTAM 25 GRAM(S): 4; .5 INJECTION, POWDER, LYOPHILIZED, FOR SOLUTION INTRAVENOUS at 18:34

## 2022-03-25 RX ADMIN — PIPERACILLIN AND TAZOBACTAM 25 GRAM(S): 4; .5 INJECTION, POWDER, LYOPHILIZED, FOR SOLUTION INTRAVENOUS at 00:04

## 2022-03-25 RX ADMIN — Medication 3 MILLILITER(S): at 06:37

## 2022-03-25 RX ADMIN — Medication 4: at 13:14

## 2022-03-25 RX ADMIN — Medication 12.5 MILLIGRAM(S): at 18:34

## 2022-03-25 RX ADMIN — MEMANTINE HYDROCHLORIDE 5 MILLIGRAM(S): 10 TABLET ORAL at 12:07

## 2022-03-25 NOTE — PROGRESS NOTE ADULT - SUBJECTIVE AND OBJECTIVE BOX
Chief Complaint: AMS    Interval Events: No events overnight.    Review of Systems:  Unable to obtain    Physical Exam:  Vital Signs Last 24 Hrs  T(C): 36.7 (25 Mar 2022 05:17), Max: 36.8 (24 Mar 2022 14:17)  T(F): 98.1 (25 Mar 2022 05:17), Max: 98.3 (25 Mar 2022 00:52)  HR: 77 (25 Mar 2022 08:13) (60 - 98)  BP: 115/74 (25 Mar 2022 05:17) (101/65 - 122/74)  BP(mean): --  RR: 18 (25 Mar 2022 05:17) (18 - 19)  SpO2: 95% (25 Mar 2022 08:13) (95% - 100%)  General: Cachectic  HEENT: Dry MM  Neck: No JVD, no carotid bruit  Lungs: CTAB  CV: RRR, nl S1/S2, no M/R/G  Abdomen: S/NT/ND, +BS  Extremities: No LE edema, no cyanosis  Neuro: AAOx0  Skin: No rash    Labs:    03-25    148<H>  |  115<H>  |  10  ----------------------------<  168<H>  4.4   |  26  |  0.73    Ca    8.4      25 Mar 2022 07:58  Phos  2.5     03-25  Mg     2.2     03-25                          12.3   6.21  )-----------( 67       ( 25 Mar 2022 07:58 )             39.1       Telemetry: Sinus rhythm, AF with aberrancy

## 2022-03-25 NOTE — CHART NOTE - NSCHARTNOTEFT_GEN_A_CORE
Critical value received sodium 168, potassium 3.4. Changed fluids to D5 w K20 meq at 75cc. Repeat bmp at midnight
Assessment:     Pt seen for nutrition follow-up. Per H&P, pt is a "71 y.o male w/ PMHx of Afib, DMT2, HTN, HLD, paranoid personality disorder, anxiety, GERD,  Alzheimer's dementia who was BIBEMS from [facility] w/ fever and AMS x 2 days. Pt. had CXR at facility w/ no acute findings. Pt. given zosyn at SNF. Unable to obtain information from pt. 2/2 AMS. Will need admission to medicine for treatment and monitoring of hypernatremia, ROSALBA, and sepsis."    Nutrition consult previously ordered for stage II or > pressure ulcer.  Per chart, pt admitted with stage I pressure ucler to sacrum and R suspected DTI to sacrum, DTI to L sacrum. Pt with hx Alzheimer's dementia, visited while resting in bed - minimally verbal, unable to provide nutrition related hx.  Pt is a resident of [facility], per transfer documents, PTA pt on regular diet with thin liquids (RANJANA, NCS) with Glucerna nutrition supplement 8oz TID.  NPO status previously maintained secondary to dysphagia - Initial clinical swallow assessment completed 3/22, at which time oral nutrition/hydration/medication was contraindicated. On 3/23- per SLP, PO trials deemed unsafe for administration at this time given persistent lethargy and minimally arousable state. SLP assessed pt this am (3/25)- recommended Puree with Mildly thick liquids, as tolerated. Diet advanced today to con cho, DASH/TLC puree diet, with mildly thick liquids. Pt needs total assistance during meal times. PO intakes % per RN documentation. Observed pt consume >75% of breakfast and lunch meal today. Pt continues on IVFs; D5@75ml/hr. +BM 3/24. Nephro following given severe hypernatremia (improving).  Noted pt is a full code; palliative care consulted.   Adm wt 121# (consistent with transfer ht 5'11", wt 121#, bed scale wt 118.3# (3/22), per comprehensive chart review, pt weighed 138# in Nov 2020, indicating 17#/12% wt loss x >1year.  Pt underweight in appearance, BMI 16.9; NFPE completed at time of initial RD visit. Pt previously met clinical criteria for severe protein malnutrition in the context of chronic illness (based on BMI <19 and moderate-severe subcutaneous fat and muscle mass loss). Recommend glucerna nutritional supplements TID for addtl kcal/protein and to supplement suspected variable po intake (220kcal, 10g protein per 8 fl oz).  RD will continue to follow up and will continue to monitor pt's nutrition status.    Factors impacting intake: [ ] none [ ] nausea  [ ] vomiting [ ] diarrhea [ ] constipation  [ ]chewing problems [ ] swallowing issues  [ ] other:     Diet Prescription: Diet, Pureed:   Consistent Carbohydrate {No Snacks}  DASH/TLC {Sodium & Cholesterol Restricted}  Mildly Thick Liquids (MILDTHICKLIQS) (03-25-22 @ 11:11)    Intake: good po intake of breakfast and lunch meal today    Current Weight: Weight (kg): 54.9 (03-21 @ 12:53)  % Weight Change    Pertinent Medications: MEDICATIONS  (STANDING):  albuterol/ipratropium for Nebulization 3 milliLiter(s) Nebulizer every 6 hours  aspirin 325 milliGRAM(s) Oral daily  atorvastatin 40 milliGRAM(s) Oral at bedtime  dextrose 40% Gel 15 Gram(s) Oral once  dextrose 5%. 1000 milliLiter(s) (50 mL/Hr) IV Continuous <Continuous>  dextrose 5%. 1000 milliLiter(s) (100 mL/Hr) IV Continuous <Continuous>  dextrose 5%. 1000 milliLiter(s) (75 mL/Hr) IV Continuous <Continuous>  dextrose 50% Injectable 25 Gram(s) IV Push once  dextrose 50% Injectable 12.5 Gram(s) IV Push once  dextrose 50% Injectable 25 Gram(s) IV Push once  donepezil 10 milliGRAM(s) Oral at bedtime  enoxaparin Injectable 30 milliGRAM(s) SubCutaneous every 24 hours  famotidine    Tablet 20 milliGRAM(s) Oral daily  glucagon  Injectable 1 milliGRAM(s) IntraMuscular once  insulin glargine Injectable (LANTUS) 8 Unit(s) SubCutaneous <User Schedule>  insulin lispro (ADMELOG) corrective regimen sliding scale   SubCutaneous three times a day before meals  memantine 5 milliGRAM(s) Oral daily  metoprolol tartrate 12.5 milliGRAM(s) Oral two times a day  piperacillin/tazobactam IVPB.. 3.375 Gram(s) IV Intermittent every 8 hours    MEDICATIONS  (PRN):  acetaminophen    Suspension .. 650 milliGRAM(s) Oral every 6 hours PRN Temp greater or equal to 38C (100.4F), Mild Pain (1 - 3)    Pertinent Labs: 03-25 Na148 mmol/L<H> Glu 168 mg/dL<H> K+ 4.4 mmol/L Cr  0.73 mg/dL BUN 10 mg/dL 03-25 Phos 2.5 mg/dL 03-23 Alb 2.6 g/dL<L>    CAPILLARY BLOOD GLUCOSE  POCT Blood Glucose.: 306 mg/dL (25 Mar 2022 12:09)  POCT Blood Glucose.: 140 mg/dL (25 Mar 2022 06:35)  POCT Blood Glucose.: 156 mg/dL (25 Mar 2022 00:11)  POCT Blood Glucose.: 140 mg/dL (24 Mar 2022 17:41)    Skin: pressure ulcer; R suspected DTI to sacrum, DTI to L sacrum    Estimated Needs:   [X] no change since previous assessment  [ ] recalculated:     Previous Nutrition Diagnosis:   [ ] Inadequate Energy Intake [ ]Inadequate Oral Intake [ ] Excessive Energy Intake   [ ] Underweight [ ] Increased Nutrient Needs [ ] Overweight/Obesity   [ ] Altered GI Function [ ] Unintended Weight Loss [ ] Food & Nutrition Related Knowledge Deficit [X] Malnutrition (severe/chronic)    Nutrition Diagnosis is [X] ongoing  [ ] resolved [ ] not applicable     New Nutrition Diagnosis: [ ] not applicable       Interventions: Continue nutrition care plan, con cho, DASH/TLC puree diet, with mildly thick liquids, aspiration precautions, assistance and encouragement at all meal times  Recommend  [ ] Change Diet To:  [ ] Nutrition Supplement: Glucerna TID at mildly thick consistency  [ ] Nutrition Support  [ ] Other:     Monitoring and Evaluation:   [X] PO intake [ x ] Tolerance to diet prescription [ x ] weights [ x ] labs[ x ] follow up per protocol  [ ] other:

## 2022-03-25 NOTE — SWALLOW BEDSIDE ASSESSMENT ADULT - ADDITIONAL RECOMMENDATIONS
Speech to follow to check PO tolerance and reassess at bedside, as schedule permits.
ongoing oral care

## 2022-03-25 NOTE — SWALLOW BEDSIDE ASSESSMENT ADULT - CONSISTENCIES ADMINISTERED
PACU IN REPORT FROM ANESTHESIA    Verbal report received from   Edwin Cruz   [] MD/DO-Anesthesiologist    [x] CRNA   [x] with student    CHOICE ANESTHESIA:  [] GENERAL  [] TIVA  [x] MAC  [] LOCAL  [x] REGIONAL  [x] SPINAL   [] EPIDURAL   **Note the anesthesia record for medications given intraoperatively. **           [] E.R.A.S. PROTOCOL    SURGICAL PROCEDURE: Procedure(s) (LRB):  LEFT TOTAL KNEE ARTHROPLASTY, MAKOPLASTY (Left)     SURGEON: Thom Lange MD.    Brief Initial Visual Assessment:    Patient Age: [] Infant(1-12mo)      []Pediatric(1-13yrs)    [] Adolescent(13-18yrs)    [] Adult(18-65yrs)      [x]Geriatric Adult(>65yrs). Patient    [x] Alert           [x]Calm & Cooperative      [] Anxious  Appearance: [x] Drowsy      [] Sedated      [] Unresponsive     Oriented x  3            Airway:     [x] Patent                          [] Obstructs easily/Obstructed on arrival   [] \"Difficult Airway\" report by Anesthesia                        [] Airway improved with head/airway repositioning                       Airway Adjuncts Present: [] Oral Airway    [] Nasal Trumpet    [] ETT    [] LMA            Respiratory  [x] Even   [] Labored   [] Shallow   [] Tachypnea   [] Bradypnea  Pattern:    [x] Non-Labored  [] VENT and/or respiratory assistance     being provided. Skin:     [x] Pink [] Dusky    [] Pale        [x] Warm    [] Hot [] Cool       [] Cold   [x]Dry [] Moist [] Diaphoretic     Membranes:  [x] Pink [] Pale       [x] Moist [] Dry     [] Crusty     Pain:   [x] No Acute Discomfort. 0  /10 Scale [x] Verbal Numeric   [] Moderate Discomfort.      [] V.A.S. [] Acute Discomfort.                [] A.N.V.    [] Chronic-Issue Related Discomfort.   [] F.L.A.C.C. Note E-MAR for medications administered. []Faces, Yeung/Baker    Note assessments documented in flowsheets; any assessment variants to be found in comments or narrative perioperative nurse notes.        Post-anesthesia care now assumed by Michael Sheffield BSN, RN-BC pureed minced & moist moderately thick mildly thick thin liquid

## 2022-03-25 NOTE — PROGRESS NOTE ADULT - SUBJECTIVE AND OBJECTIVE BOX
===[INTERVAL HX: ]===  Patient seen and examined;  Chart reviewed and events noted;     Awaiting speech / swallow eval  no CP, no SOB        ===[HEALTH ISSUES]===      HEALTH ISSUES - PROBLEM Dx:  Deep tissue injury    Thrombocytopenia                ===[MEDICATIONS]===  MEDICATIONS  (STANDING):  albuterol/ipratropium for Nebulization 3 milliLiter(s) Nebulizer every 6 hours  aspirin 325 milliGRAM(s) Oral daily  atorvastatin 40 milliGRAM(s) Oral at bedtime  dextrose 40% Gel 15 Gram(s) Oral once  dextrose 5%. 1000 milliLiter(s) (100 mL/Hr) IV Continuous <Continuous>  dextrose 5%. 1000 milliLiter(s) (50 mL/Hr) IV Continuous <Continuous>  dextrose 5%. 1000 milliLiter(s) (100 mL/Hr) IV Continuous <Continuous>  dextrose 50% Injectable 25 Gram(s) IV Push once  dextrose 50% Injectable 12.5 Gram(s) IV Push once  dextrose 50% Injectable 25 Gram(s) IV Push once  donepezil 10 milliGRAM(s) Oral at bedtime  enoxaparin Injectable 30 milliGRAM(s) SubCutaneous every 24 hours  famotidine    Tablet 20 milliGRAM(s) Oral daily  glucagon  Injectable 1 milliGRAM(s) IntraMuscular once  insulin glargine Injectable (LANTUS) 8 Unit(s) SubCutaneous <User Schedule>  insulin lispro (ADMELOG) corrective regimen sliding scale   SubCutaneous three times a day before meals  memantine 5 milliGRAM(s) Oral daily  metoprolol tartrate 12.5 milliGRAM(s) Oral two times a day  piperacillin/tazobactam IVPB.. 3.375 Gram(s) IV Intermittent every 8 hours    MEDICATIONS  (PRN):  acetaminophen    Suspension .. 650 milliGRAM(s) Oral every 6 hours PRN Temp greater or equal to 38C (100.4F), Mild Pain (1 - 3)      ===[VITALS]===  Vital Signs Last 24 Hrs  T(C): 36.7 (25 Mar 2022 09:40), Max: 36.8 (24 Mar 2022 14:17)  T(F): 98.1 (25 Mar 2022 09:40), Max: 98.3 (25 Mar 2022 00:52)  HR: 79 (25 Mar 2022 09:40) (60 - 98)  BP: 129/78 (25 Mar 2022 09:40) (101/65 - 129/78)  BP(mean): --  RR: 19 (25 Mar 2022 09:40) (18 - 19)  SpO2: 94% (25 Mar 2022 09:40) (94% - 100%)  [WT/HT]  Daily     Daily   [VENT]    ===[PHYSICAL EXAM]===  General: WN,  WD adult in NAD. cahcetic  Neck: supple, no masses.  CV: normal S1S2, no murmur, no rubs, no gallops.  Lungs: clear to auscultation, no wheezes, no rales, no rhonchi.  Abdomen: soft, non-tender, non-distended, no hepatosplenomegaly, normal BS, no guarding.  scaphoid abd  Ext: no clubbing, no cyanosis, no edema. BL heel boots  Skin: no rashes,  no petechiae, no venous stasis changes.  Neuro: alert and oriented X 1, no focal motor deficits.  LN: no SC JESSEE.        ===[LABS:]===                        12.3   6.21  )-----------( 67       ( 25 Mar 2022 07:58 )             39.1     03-25    148<H>  |  115<H>  |  10  ----------------------------<  168<H>  4.4   |  26  |  0.73    Ca    8.4      25 Mar 2022 07:58  Phos  2.5     03-25  Mg     2.2     03-25            Vitamin B12, Serum: 1266 pg/mL (03-22-22 @ 13:49)  Folate, Serum: 18.9 ng/mL (03-22-22 @ 13:49)        SARS-CoV-2: NotDetec (21 Mar 2022 13:22)              ===[RADIOLOGY STUDIES:]===

## 2022-03-25 NOTE — PROGRESS NOTE ADULT - ASSESSMENT
71 y.o male w/ PMHx of Afib, DMT2, HTN, HLD, paranoid personality disorder, anxiety, GERD,  Alzheimer's dementia who was BIBEMS from [facility] w/ fever and AMS x 2 days. Pt. had CXR at facility w/ no acute findings. Pt. given zosyn at SNF. Unable to obtain information from pt. 2/2 AMS. Will need admission to medicine for treatment and monitoring of hypernatremia, ROSALBA, sepsis and severe protein calorie malnutrition.    # Metabolic encephalopathy 2/2 acute hypernatremia 2/2 dehydration   - Present on admission, more awake this AM  - Na: 172 > 163 > 152  - Na 148 this AM  - Recommend decreasing D5 to 50mL/hr  - Head CT unremarkable  - Monitor neuro status  - Trend BMPs  - Appreciate nephrology recs    # Sepsis most likely 2/2 UTI vs. CAP  - Present on admission  - Evidenced by fever, leukocytosis w/ bandemia, tachycardia  - WBC continues to be WNL  - Afebrile, last fever 00:39 3/23  - UA w/ possible infection  - CT chest showing mucoid impaction distal trachea, right mainstem and RLL bronchus  - CT A/P showing mild bladder thickening consistent w/ possible infection  - RUQ negative for acute cholecystitis   - RVP and coronavirus positive  - COVID negative  - 3/21 blood and urine cultures NGTD  - Continue zosyn x7 days per ID  - MRSA negative  - ID recs appreciated    # ROSALBA  - Present on admission  - Baseline BUN/Cr 17/0.62  - Admission BUN/Cr. 61/1.28  - Resolved, 10/0.73 this AM  - Strict I/Os  - Avoid nephrotoxic medications  - Trend BMPs  - Nephrology consult    # Severe protein calorie malnutrition  - Underweight (BMI 16.9)  - More awake this AM, official S+S to be done  - Monitor albumin and protein levels    # Afib  - Tele monitoring  - Hold ASA while NPO  - 3/23 Diltiazem gtt stopped overnight  - 3/24 AM 28 beats of v-tach  - Re-start IV lopressor 5mg IV q6 hours w/ hold parameters  - Cards recs appreciated    # HTN  - Controlled    # DMT2  - Hold oral hypoglycemics  - ISS w/ fingersticks per protocol  - Decrease Lantus to 5 units SQ 2/2 decreasing D5 infusion  - Hypoglycemia protocol    # HLD  - Hold atorvastatin while NPO    # Paranoid personality disorder, anxiety  - Hold olanzapine 2/2 AMS    # Alzheimer's dementia  - Hold aricept and namenda while NPO    # GERD  - Continue pepcid    # Thrombocytopenia  - Plts downtrending since admission 139 > 113 > 81 > 68 > 67  - Stable last 2 days  - Continue to monitor  - No s/s's of acute bleeding  - Heme recs appreciated    # DTV prophylaxis  - Continue renally dosed lovenox 30mg SQ    # ACP  - DNR/DNI

## 2022-03-25 NOTE — PROGRESS NOTE ADULT - ATTENDING COMMENTS
71M Alzheimer's Dementia, DM2, HTN, Afib, HLD, Paranoid personality disorder, GERD, Anxiety, sent from SNF for AMS, Fever, leukocytosis.  In ED found to have Hypernatremia, ROSALBA, failed dysphagia screen, seasonal corona virus.  Also with severe protein calorie malnutrition.    Today patient awake, confused but talking and answering questions.  knows his name.  being fed by nurses aid - working his way through the tray.     #sepsis (Fever, leukocytosis, ROSALBA, Metabolic encephalopathy) with unclear infection source  - positive for seasonal coronavirus  - blood, urine cultures NGTD  - RUQ sono showed distended GB without stones or obstruction  - As per ID 7 days Zosyn total for aspiration pneumonia    # hypernatremia and ROSALBA  - ROSALBA resolved, Sodium almost normalized  - Hypotonic IVF  - Renal consult f/u appreciated  - monitor electrolytes    # Paroxysmal Afib/ HTN  - now in NSR  - cardiology consult appreciated. now off cardizem drip.    - as pt now taking PO  started on lopressor 12.5 bid    #thrombocytopenia  - dropped from 139 -> 68 in 4 days  - no signs of bleeding  - B12 & Folate levels acceptable  - Heme eval appreciated    #severe protein calorie malnutrition with possible dysphagia/ underweight/ cachexia   - started on dysphagia diet   - monitor oral intake    #DM2  - FS changed to QAC/HS with lispro coverage  - Continue Lantus 8 units daily   - continue to monitor Glucose closely as IVF containing dextrose and PO intake are fluctuating     #Dementia, anxiety, Paranoid personality disorder  - Mental status is improving  - metabolic encephalopathy likely due to combination of hypernatremia and infectious process - improving  - restart Namenda and Aricept  - monitor for need to restart zyprexa    #GERD  - Pepcid PO    #HLD  - Lipitor 40mg    #DVT proph  - Lovenox 30mg SQ daily    Palliative care consult for Goals of Care appreciated.  DNR/I.     DC plan to SNF early next week.

## 2022-03-25 NOTE — SWALLOW BEDSIDE ASSESSMENT ADULT - COMMENTS
Chart reviewed order received for swallow eval.  Pt received upright in bed A&A, Ox2, reduced cognition, follows simple commands, pain scale 0/10 pre & post eval.  Swallow eval completed see below for details.  Pt educated on rx's, ? understanding due to reduced cognition, education ongoing.  Pt left as received NAD ANGELA Rosenthal & Dr. Escobedo notified.  Will follow to check PO tolerance and reassess at bedside, as schedule permits.    As per charting, pt is a "71 y.o male w/ PMHx of Afib, DMT2, HTN, HLD, paranoid personality disorder, anxiety, GERD,  Alzheimer's dementia who was BIBEMS from [facility] w/ fever and AMS x 2 days. Pt. had CXR at facility w/ no acute findings. Pt. given zosyn at SNF. Unable to obtain information from pt. 2/2 AMS. Will need admission to medicine for treatment and monitoring of hypernatremia, ROSALBA, sepsis and severe protein calorie malnutrition."    CT Chest 3/21/22: "Mucoid impaction distal trachea, right mainstem and right lower lobe bronchus.  Enlarged prostate with heterogeneous enhancement, correlate clinically for prostatitis.  Possible mild bladder wall thickening with multiple foci of air within the nondependent bladder wall, correlate for infection."    WBC 3/25/22: "6.21"

## 2022-03-25 NOTE — PROGRESS NOTE ADULT - SUBJECTIVE AND OBJECTIVE BOX
Patient is a 71 y.o male who presents with a chief complaint of AMS    INTERVAL HPI/OVERNIGHT EVENTS:  - Chart and consult notes reviewed  - No acute events overnight  - Pt. seen and examined at bedside, more awake this morning, able to engage in some conversation and follow commands    MEDICATIONS  (STANDING):  albuterol/ipratropium for Nebulization 3 milliLiter(s) Nebulizer every 6 hours  enoxaparin Injectable 30 milliGRAM(s) SubCutaneous every 24 hours  famotidine Injectable 20 milliGRAM(s) IV Push daily  insulin glargine Injectable (LANTUS) 8 Unit(s) SubCutaneous <User Schedule>  insulin lispro (ADMELOG) corrective regimen sliding scale   SubCutaneous every 6 hours  piperacillin/tazobactam IVPB.. 3.375 Gram(s) IV Intermittent every 8 hours    MEDICATIONS  (PRN):  acetaminophen  Suppository .. 650 milliGRAM(s) Rectal every 6 hours PRN Temp greater or equal to 38C (100.4F), Mild Pain (1 - 3)  dextrose 40% Gel 15 Gram(s) Oral once  dextrose 5%. 1000 milliLiter(s) (100 mL/Hr) IV Continuous <Continuous>  dextrose 5%. 1000 milliLiter(s) (50 mL/Hr) IV Continuous <Continuous>  dextrose 5%. 1000 milliLiter(s) (100 mL/Hr) IV Continuous <Continuous>  dextrose 50% Injectable 25 Gram(s) IV Push once  dextrose 50% Injectable 12.5 Gram(s) IV Push once  dextrose 50% Injectable 25 Gram(s) IV Push once  glucagon  Injectable 1 milliGRAM(s) IntraMuscular once    No Known Allergies    REVIEW OF SYSTEMS:  CONSTITUTIONAL: No fever fatigue  NECK: No pain or stiffness  RESPIRATORY: No cough, no shortness of breath  CARDIOVASCULAR: No chest pain, palpitations  GASTROINTESTINAL: No abdominal pain. No nausea, vomiting  NEUROLOGICAL: No headaches  SKIN: No itching, burning, rashes, or lesions   MUSCULOSKELETAL: No joint pain or swelling; No muscle, back, or extremity pain    Vital Signs Last 24 Hrs  T(C): 36.7 (25 Mar 2022 05:17), Max: 36.8 (24 Mar 2022 14:17)  T(F): 98.1 (25 Mar 2022 05:17), Max: 98.3 (25 Mar 2022 00:52)  HR: 77 (25 Mar 2022 08:13) (60 - 98)  BP: 115/74 (25 Mar 2022 05:17) (101/65 - 122/74)  RR: 18 (25 Mar 2022 05:17) (18 - 19)  SpO2: 95% (25 Mar 2022 08:13) (95% - 100%)    PHYSICAL EXAM:  GENERAL: NAD, cachetic   HEAD:  Atraumatic, normocephalic  EYES: PERRLA, conjunctiva and sclera clear  ENMT: Moist mucous membranes, no lesions  NECK: Supple, no JVD  NERVOUS SYSTEM:  A+Ox2, following commands, all 4 extremities mobile  CHEST/LUNG: CTA B/L; no rales, rhonchi, wheezing  HEART: RRR, + S1/S2,   ABDOMEN: Concave, soft, nontender, nondistended, bowel sounds present  EXTREMITIES:  2+ peripheral pulses, no clubbing, cyanosis, or edema    LABS:                        12.3   6.21  )-----------( 67       ( 25 Mar 2022 07:58 )             39.1     25 Mar 2022 07:58    148    |  115    |  10     ----------------------------<  168    4.4     |  26     |  0.73     Ca    8.4        25 Mar 2022 07:58  Phos  2.5       25 Mar 2022 07:58  Mg     2.2       25 Mar 2022 07:58    CAPILLARY BLOOD GLUCOSE  POCT Blood Glucose.: 140 mg/dL (25 Mar 2022 06:35)  POCT Blood Glucose.: 156 mg/dL (25 Mar 2022 00:11)  POCT Blood Glucose.: 140 mg/dL (24 Mar 2022 17:41)  POCT Blood Glucose.: 191 mg/dL (24 Mar 2022 12:11)    < from: US Abdomen Upper Quadrant Right (03.21.22 @ 17:54) >  IMPRESSION:    Distended gallbladder without stones or biliary dilatation.    < end of copied text >    < from: CT Abdomen and Pelvis w/ IV Cont (03.21.22 @ 16:41) >  IMPRESSION:    Mucoid impaction distal trachea, right mainstem and right lower lobe   bronchus.    Enlarged prostate with heterogeneous enhancement, correlate clinically   for prostatitis.  Possible mild bladder wall thickening with multiple foci of air within   the nondependent bladder wall, correlate for infection.    < end of copied text >    < from: CT Head No Cont (03.21.22 @ 16:25) >  IMPRESSION:  Stable exam.    No mass effect, hemorrhage or evidence of acute intracranial pathology.    < end of copied text >

## 2022-03-25 NOTE — PROGRESS NOTE ADULT - SUBJECTIVE AND OBJECTIVE BOX
JODI DUBOSE is a 71yMale , patient examined and chart reviewed.    INTERVAL HPI/ OVERNIGHT EVENTS:   Afebrile. Awake alert. Confused.  NAD.    PAST MEDICAL & SURGICAL HISTORY:  DM (diabetes mellitus)  Afib  Hyperlipidemia  Hypertension  Alzheimer disease  Dementia  Anxiety  Paranoid personality disorder  GERD (gastroesophageal reflux disease)      For details regarding the patient's social history, family history, and other miscellaneous elements, please refer the initial infectious diseases consultation and/or the admitting history and physical examination for this admission.    ROS:  Unable to obtain due to : Dementia    Current inpatient medications :    ANTIBIOTICS/RELEVANT:  piperacillin/tazobactam IVPB.. 3.375 Gram(s) IV Intermittent every 8 hours    MEDICATIONS  (STANDING):  albuterol/ipratropium for Nebulization 3 milliLiter(s) Nebulizer every 6 hours  aspirin 325 milliGRAM(s) Oral daily  atorvastatin 40 milliGRAM(s) Oral at bedtime  dextrose 40% Gel 15 Gram(s) Oral once  dextrose 5%. 1000 milliLiter(s) (50 mL/Hr) IV Continuous <Continuous>  dextrose 5%. 1000 milliLiter(s) (100 mL/Hr) IV Continuous <Continuous>  dextrose 5%. 1000 milliLiter(s) (75 mL/Hr) IV Continuous <Continuous>  dextrose 50% Injectable 25 Gram(s) IV Push once  dextrose 50% Injectable 12.5 Gram(s) IV Push once  dextrose 50% Injectable 25 Gram(s) IV Push once  donepezil 10 milliGRAM(s) Oral at bedtime  enoxaparin Injectable 30 milliGRAM(s) SubCutaneous every 24 hours  famotidine    Tablet 20 milliGRAM(s) Oral daily  glucagon  Injectable 1 milliGRAM(s) IntraMuscular once  insulin glargine Injectable (LANTUS) 8 Unit(s) SubCutaneous <User Schedule>  insulin lispro (ADMELOG) corrective regimen sliding scale   SubCutaneous three times a day before meals  memantine 5 milliGRAM(s) Oral daily  metoprolol tartrate 12.5 milliGRAM(s) Oral two times a day    MEDICATIONS  (PRN):  acetaminophen    Suspension .. 650 milliGRAM(s) Oral every 6 hours PRN Temp greater or equal to 38C (100.4F), Mild Pain (1 - 3)        Objective:  Vital Signs Last 24 Hrs  T(C): 36.7 (25 Mar 2022 09:40), Max: 36.8 (25 Mar 2022 00:52)  T(F): 98.1 (25 Mar 2022 09:40), Max: 98.3 (25 Mar 2022 00:52)  HR: 79 (25 Mar 2022 09:40) (60 - 98)  BP: 129/78 (25 Mar 2022 09:40) (104/71 - 129/78)  RR: 19 (25 Mar 2022 09:40) (18 - 19)  SpO2: 94% (25 Mar 2022 09:40) (94% - 100%)      Physical Exam:  General: no acute distress weak frail  Neck: supple, trachea midline  Lungs: Decreased, no wheeze/rhonchi  Cardiovascular: regular rate and rhythm, S1 S2  Abdomen: soft, nontender,  bowel sounds normal  Neurological: Dementia awake  Extremities: no edema      LABS:                        12.3   6.21  )-----------( 67       ( 25 Mar 2022 07:58 )             39.1   03-25    148<H>  |  115<H>  |  10  ----------------------------<  168<H>  4.4   |  26  |  0.73    Ca    8.4      25 Mar 2022 07:58  Phos  2.5     03-25  Mg     2.2     03-25              Urinalysis Basic - ( 21 Mar 2022 16:04 )    Color: Yellow / Appearance: Clear / S.020 / pH: x  Gluc: x / Ketone: Trace  / Bili: Negative / Urobili: Negative mg/dL   Blood: x / Protein: 100 mg/dL / Nitrite: Negative   Leuk Esterase: Trace / RBC: 6-10 /HPF / WBC 6-10   Sq Epi: x / Non Sq Epi: Negative / Bacteria: Occasional    MICROBIOLOGY:    Culture - Urine (collected 21 Mar 2022 21:42)  Source: Clean Catch Clean Catch (Midstream)  Final Report (22 Mar 2022 17:30):    No growth    Culture - Blood (collected 21 Mar 2022 18:16)  Source: .Blood Blood-Peripheral  Preliminary Report (22 Mar 2022 19:02):    No growth to date.    Culture - Blood (collected 21 Mar 2022 18:16)  Source: .Blood Blood-Peripheral  Preliminary Report (22 Mar 2022 19:02):    No growth to date.    Respiratory Viral Panel with COVID-19 by SANDRA (22 @ 13:22)    Rapid RVP Result: Detected    SARS-CoV-2: NotDete: This Respiratory Panel uses polymerase chain reaction (PCR) to detect for  adenovirus; coronavirus (HKU1, NL63, 229E, OC43); human metapneumovirus  (hMPV); human enterovirus/rhinovirus (Entero/RV); influenza A; influenza  A/H1; influenza A/H3; influenza A/H1-2009; influenza B; parainfluenza  viruses 1, 2, 3, 4; respiratory syncytial virus; Mycoplasma pneumoniae;  Chlamydophila pneumoniae; and SARS-CoV-2.    Coronavirus (229E,HKU1,NL63,OC43): Detected    RADIOLOGY & ADDITIONAL STUDIES:    ACC: [id number] EXAM:  CT ABDOMEN AND PELVIS IC                        ACC: [id number] EXAM:  CT CHEST IC                          PROCEDURE DATE:  2022          INTERPRETATION:  CLINICAL INFORMATION: Fever, sepsis. Altered mental   status.    COMPARISON: None.    CONTRAST/COMPLICATIONS:  IV Contrast: Omnipaque 350 (accession [id number]), IV contrast documented   in associated exam (accession [id number])  90 cc administered (accession   [id number]), 0 cc administered (accession [id number])   10 cc discarded   (accession [id number]), 0 cc discarded (accession [id number])  Oral Contrast: NONE  Complications: None reported at time of study completion    PROCEDURE:  CT of the Chest, Abdomen and Pelvis was performed.  Sagittal and coronal reformats were performed.    FINDINGS:    CHEST:    LUNGS AND LARGE AIRWAYS: PLEURA:  There is mucoid impaction distal trachea, right mainstem bronchus and   right lower lobe bronchus.    There is no lobar lung consolidation.  No pleural effusion or pneumothorax.    Tiny calcified granuloma periphery right middle lobe.    VESSELS:  Atherosclerotic changes thoracic aorta and coronary artery calcifications.    HEART: Heart size is normal. No pericardial effusion.    MEDIASTINUM AND MADYSON:  No enlarged external lymphadenopathy.    CHEST WALL AND LOWER NECK: Within normal limits.    ABDOMEN AND PELVIS:  Respiratory motion artifact degrades image quality.    LIVER: Within normal limits.  BILE DUCTS: Normal caliber.  GALLBLADDER: Within normal limits.  SPLEEN: Within normal limits.  PANCREAS: Within normal limits.  ADRENALS: Within normal limits.  KIDNEYS/URETERS:  Right renal cyst.  No hydronephrosis.    BLADDER: Possible mild circumferential bladder wall thickening, correlate   clinically for cystitis.  There is punctate foci of air within the nondependent bladder wall.  REPRODUCTIVE ORGANS: Heterogeneous enlargement of the prostate gland.    BOWEL: Evaluation of the stomach is limited without distention.  Generalized colonic fecal retention with moderate bowel distention.  No bowel obstruction.  The appendix is not clearly visualized on this exam.  PERITONEUM: No ascites.    VESSELS: Atherosclerotic changes.  RETROPERITONEUM/LYMPH NODES: No lymphadenopathy.    ABDOMINAL WALL: Within normal limits.    BONES:  Chronic fracture deformity right inferior and superior pubic rami and   superior puboacetabular junction.  Probable chronic right sacral insufficiency fracture.    IMPRESSION:    Mucoid impaction distal trachea, right mainstem and right lower lobe   bronchus.    Enlarged prostate with heterogeneous enhancement, correlate clinically   for prostatitis.  Possible mild bladder wall thickening with multiple foci of air within   the nondependent bladder wall, correlate for infection.    Assessment :   70YO M PMHx of Afib, DMT2, HTN, HLD, paranoid personality disorder, anxiety, GERD,  Alzheimer's dementia resident of [facility] admitted with sepsis sec aspiration pneumonia  CT CAP with Mucus plugging and RLL pneumonia  Sp fever   WBC downtrending  RVP + coronavirus ( NOT covid 19 )   Severe dehydration/ hypernatremia - improving  Clinically improving    Plan :   Cont Zosyn x 7 days till 3/28/22  Fu cultures- NGTD  Trend temps and cbc  Strict Asp precautions  IVF per renal  Pulm toileting  Increase activity    Continue with present regiment.  Appropriate use of antibiotics and adverse effects reviewed.    > 35 minutes were spent in direct patient care reviewing notes, medications ,labs data/ imaging , discussion with multidisciplinary team.    Thank you for allowing me to participate in care of your patient .    Rimma Rizo MD  Infectious Disease  [phone]

## 2022-03-25 NOTE — SWALLOW BEDSIDE ASSESSMENT ADULT - MUCOSAL QUALITY
mild/sticky/white-tinged secretions observed in anterior portion of oral cavity; SLP attempted oral care, however, pt was orally defensive
+thick white coating along lingual surface observed, RN notified

## 2022-03-25 NOTE — PROGRESS NOTE ADULT - SUBJECTIVE AND OBJECTIVE BOX
Resting    Vital Signs Last 24 Hrs  T(C): 36.7 (03-25-22 @ 09:40), Max: 36.8 (03-25-22 @ 00:52)  T(F): 98.1 (03-25-22 @ 09:40), Max: 98.3 (03-25-22 @ 00:52)  HR: 79 (03-25-22 @ 09:40) (60 - 98)  BP: 129/78 (03-25-22 @ 09:40) (104/71 - 129/78)  RR: 19 (03-25-22 @ 09:40) (18 - 19)  SpO2: 94% (03-25-22 @ 09:40) (94% - 100%)    I&O's Detail    24 Mar 2022 07:01  -  25 Mar 2022 07:00  --------------------------------------------------------  IN:    dextrose 5%: 1910 mL    dextrose 5% w/ Additives: 450 mL    IV PiggyBack: 100 mL  Total IN: 2460 mL    OUT:    Voided (mL): 1900 mL  Total OUT: 1900 mL    Total NET: 560 mL    Lungs b/l air entry  Heart S1S2  Abd soft NT ND  Extremities: no edema                                 12.3   6.21  )-----------( 67       ( 25 Mar 2022 07:58 )             39.1     25 Mar 2022 07:58    148    |  115    |  10     ----------------------------<  168    4.4     |  26     |  0.73     Ca    8.4        25 Mar 2022 07:58  Phos  2.5       25 Mar 2022 07:58  Mg     2.2       25 Mar 2022 07:58    acetaminophen    Suspension .. 650 milliGRAM(s) Oral every 6 hours PRN  albuterol/ipratropium for Nebulization 3 milliLiter(s) Nebulizer every 6 hours  aspirin 325 milliGRAM(s) Oral daily  atorvastatin 40 milliGRAM(s) Oral at bedtime  dextrose 40% Gel 15 Gram(s) Oral once  dextrose 5%. 1000 milliLiter(s) IV Continuous <Continuous>  dextrose 5%. 1000 milliLiter(s) IV Continuous <Continuous>  dextrose 5%. 1000 milliLiter(s) IV Continuous <Continuous>  dextrose 50% Injectable 25 Gram(s) IV Push once  dextrose 50% Injectable 12.5 Gram(s) IV Push once  dextrose 50% Injectable 25 Gram(s) IV Push once  donepezil 10 milliGRAM(s) Oral at bedtime  enoxaparin Injectable 30 milliGRAM(s) SubCutaneous every 24 hours  famotidine    Tablet 20 milliGRAM(s) Oral daily  glucagon  Injectable 1 milliGRAM(s) IntraMuscular once  insulin glargine Injectable (LANTUS) 8 Unit(s) SubCutaneous <User Schedule>  insulin lispro (ADMELOG) corrective regimen sliding scale   SubCutaneous three times a day before meals  memantine 5 milliGRAM(s) Oral daily  metoprolol tartrate 12.5 milliGRAM(s) Oral two times a day  piperacillin/tazobactam IVPB.. 3.375 Gram(s) IV Intermittent every 8 hours    Assessment/Plan:    Pre-renal azotemia, dehydration  Improving w/IVF  Continue D5W at 75 cc/hr  Avoid nephrotoxins  F/u [facility]    [phone]

## 2022-03-25 NOTE — PROGRESS NOTE ADULT - ASSESSMENT
IMPRESSION  Admitted for delirium, AMS, hypernatremia, ROSALBA, sepsis and severe protein calorie malnutrition.    71 y.o male w/ PMHx of Afib, DMT2, HTN, HLD, paranoid personality disorder, anxiety, GERD,  Alzheimer's dementia who was BIBEMS from [facility] w/ fever and AMS x 2 days. Pt. had CXR at facility w/ no acute findings. Pt. given zosyn at SNF. Unable to obtain information from pt. 2/2 AMS.      Thrombocytopenia new compared to previous lab work.  Most likely related to acute illness and infection.    Was mildly decreased on admission which would go against zosyn as cause.     no evidence of bleeding  Mild anemia Hgb 12    RECOMMENDATION  Follow CBC  Mgmt and tx for infection per ID.  Doubt Zosyn playing role in low plt. Continue as warranted.     Supportive measures from heme perspectives.   No indication for transfusion at current time  Follow CBC.     SCD for DVT prophylaxis

## 2022-03-26 LAB
ANION GAP SERPL CALC-SCNC: 6 MMOL/L — SIGNIFICANT CHANGE UP (ref 5–17)
BUN SERPL-MCNC: 8 MG/DL — SIGNIFICANT CHANGE UP (ref 7–23)
CALCIUM SERPL-MCNC: 8.3 MG/DL — LOW (ref 8.4–10.5)
CHLORIDE SERPL-SCNC: 109 MMOL/L — HIGH (ref 96–108)
CO2 SERPL-SCNC: 29 MMOL/L — SIGNIFICANT CHANGE UP (ref 22–31)
CREAT SERPL-MCNC: 0.74 MG/DL — SIGNIFICANT CHANGE UP (ref 0.5–1.3)
CULTURE RESULTS: SIGNIFICANT CHANGE UP
CULTURE RESULTS: SIGNIFICANT CHANGE UP
EGFR: 97 ML/MIN/1.73M2 — SIGNIFICANT CHANGE UP
GLUCOSE BLDC GLUCOMTR-MCNC: 175 MG/DL — HIGH (ref 70–99)
GLUCOSE BLDC GLUCOMTR-MCNC: 180 MG/DL — HIGH (ref 70–99)
GLUCOSE BLDC GLUCOMTR-MCNC: 199 MG/DL — HIGH (ref 70–99)
GLUCOSE BLDC GLUCOMTR-MCNC: 229 MG/DL — HIGH (ref 70–99)
GLUCOSE SERPL-MCNC: 191 MG/DL — HIGH (ref 70–99)
HCT VFR BLD CALC: 38.7 % — LOW (ref 39–50)
HGB BLD-MCNC: 12.4 G/DL — LOW (ref 13–17)
MAGNESIUM SERPL-MCNC: 2.2 MG/DL — SIGNIFICANT CHANGE UP (ref 1.6–2.6)
MCHC RBC-ENTMCNC: 29.7 PG — SIGNIFICANT CHANGE UP (ref 27–34)
MCHC RBC-ENTMCNC: 32 GM/DL — SIGNIFICANT CHANGE UP (ref 32–36)
MCV RBC AUTO: 92.8 FL — SIGNIFICANT CHANGE UP (ref 80–100)
NRBC # BLD: 0 /100 WBCS — SIGNIFICANT CHANGE UP (ref 0–0)
PHOSPHATE SERPL-MCNC: 3.1 MG/DL — SIGNIFICANT CHANGE UP (ref 2.5–4.5)
PLATELET # BLD AUTO: 93 K/UL — LOW (ref 150–400)
POTASSIUM SERPL-MCNC: 3.5 MMOL/L — SIGNIFICANT CHANGE UP (ref 3.5–5.3)
POTASSIUM SERPL-SCNC: 3.5 MMOL/L — SIGNIFICANT CHANGE UP (ref 3.5–5.3)
RBC # BLD: 4.17 M/UL — LOW (ref 4.2–5.8)
RBC # FLD: 14 % — SIGNIFICANT CHANGE UP (ref 10.3–14.5)
SODIUM SERPL-SCNC: 144 MMOL/L — SIGNIFICANT CHANGE UP (ref 135–145)
SPECIMEN SOURCE: SIGNIFICANT CHANGE UP
SPECIMEN SOURCE: SIGNIFICANT CHANGE UP
WBC # BLD: 5.87 K/UL — SIGNIFICANT CHANGE UP (ref 3.8–10.5)
WBC # FLD AUTO: 5.87 K/UL — SIGNIFICANT CHANGE UP (ref 3.8–10.5)

## 2022-03-26 PROCEDURE — 99233 SBSQ HOSP IP/OBS HIGH 50: CPT

## 2022-03-26 RX ADMIN — SODIUM CHLORIDE 50 MILLILITER(S): 9 INJECTION, SOLUTION INTRAVENOUS at 08:34

## 2022-03-26 RX ADMIN — Medication 3 MILLILITER(S): at 19:44

## 2022-03-26 RX ADMIN — FAMOTIDINE 20 MILLIGRAM(S): 10 INJECTION INTRAVENOUS at 11:32

## 2022-03-26 RX ADMIN — ENOXAPARIN SODIUM 30 MILLIGRAM(S): 100 INJECTION SUBCUTANEOUS at 06:01

## 2022-03-26 RX ADMIN — PIPERACILLIN AND TAZOBACTAM 25 GRAM(S): 4; .5 INJECTION, POWDER, LYOPHILIZED, FOR SOLUTION INTRAVENOUS at 17:20

## 2022-03-26 RX ADMIN — Medication 325 MILLIGRAM(S): at 11:31

## 2022-03-26 RX ADMIN — DONEPEZIL HYDROCHLORIDE 10 MILLIGRAM(S): 10 TABLET, FILM COATED ORAL at 21:51

## 2022-03-26 RX ADMIN — Medication 1: at 08:35

## 2022-03-26 RX ADMIN — Medication 2: at 11:41

## 2022-03-26 RX ADMIN — PIPERACILLIN AND TAZOBACTAM 25 GRAM(S): 4; .5 INJECTION, POWDER, LYOPHILIZED, FOR SOLUTION INTRAVENOUS at 02:42

## 2022-03-26 RX ADMIN — Medication 3 MILLILITER(S): at 07:51

## 2022-03-26 RX ADMIN — Medication 3 MILLILITER(S): at 02:33

## 2022-03-26 RX ADMIN — Medication 3 MILLILITER(S): at 14:00

## 2022-03-26 RX ADMIN — MEMANTINE HYDROCHLORIDE 5 MILLIGRAM(S): 10 TABLET ORAL at 14:04

## 2022-03-26 RX ADMIN — Medication 12.5 MILLIGRAM(S): at 06:01

## 2022-03-26 RX ADMIN — Medication 1: at 17:21

## 2022-03-26 RX ADMIN — INSULIN GLARGINE 8 UNIT(S): 100 INJECTION, SOLUTION SUBCUTANEOUS at 11:40

## 2022-03-26 RX ADMIN — ATORVASTATIN CALCIUM 40 MILLIGRAM(S): 80 TABLET, FILM COATED ORAL at 21:51

## 2022-03-26 RX ADMIN — Medication 12.5 MILLIGRAM(S): at 21:51

## 2022-03-26 RX ADMIN — PIPERACILLIN AND TAZOBACTAM 25 GRAM(S): 4; .5 INJECTION, POWDER, LYOPHILIZED, FOR SOLUTION INTRAVENOUS at 11:31

## 2022-03-26 NOTE — PROGRESS NOTE ADULT - SUBJECTIVE AND OBJECTIVE BOX
Patient is a 71y Male with a known history of :  Deep tissue injury [T14.8XXA]    Thrombocytopenia [D69.6]      HPI:  71 y.o male w/ PMHx of Afib, DMT2, HTN, HLD, paranoid personality disorder, anxiety, GERD,  Alzheimer's dementia who was BIBEMS from [facility] w/ fever and AMS x 2 days. Pt. had CXR at facility w/ no acute findings. Pt. given zosyn at SNF. Unable to obtain information from pt. 2/2 AMS. Will need admission to medicine for treatment and monitoring of hypernatremia, ROSALBA, and sepsis.    ED Course:  - EKG done  - Labs sent, including: CBC, CMP, lactate, Coags  - Blood and urine cultures sent  - RVP and COVID swabs sent  - Imaging done, including: CT A/P, chest and head, CXR  - 1gm tylenol given for fever  - 2L IVF given (21 Mar 2022 16:33)      REVIEW OF SYSTEMS:    CONSTITUTIONAL: No fever, weight loss, or fatigue  EYES: No eye pain, visual disturbances, or discharge  ENMT:  No difficulty hearing, tinnitus, vertigo; No sinus or throat pain  NECK: No pain or stiffness  BREASTS: No pain, masses, or nipple discharge  RESPIRATORY: No cough, wheezing, chills or hemoptysis; No shortness of breath  CARDIOVASCULAR: No chest pain, palpitations, dizziness, or leg swelling  GASTROINTESTINAL: No abdominal or epigastric pain. No nausea, vomiting, or hematemesis; No diarrhea or constipation. No melena or hematochezia.  GENITOURINARY: No dysuria, frequency, hematuria, or incontinence  NEUROLOGICAL: No headaches, memory loss, loss of strength, numbness, or tremors  SKIN: No itching, burning, rashes, or lesions   LYMPH NODES: No enlarged glands  ENDOCRINE: No heat or cold intolerance; No hair loss  MUSCULOSKELETAL: No joint pain or swelling; No muscle, back, or extremity pain  PSYCHIATRIC: No depression, anxiety, mood swings, or difficulty sleeping  HEME/LYMPH: No easy bruising, or bleeding gums  ALLERGY AND IMMUNOLOGIC: No hives or eczema    MEDICATIONS  (STANDING):  albuterol/ipratropium for Nebulization 3 milliLiter(s) Nebulizer every 6 hours  aspirin 325 milliGRAM(s) Oral daily  atorvastatin 40 milliGRAM(s) Oral at bedtime  dextrose 40% Gel 15 Gram(s) Oral once  dextrose 5%. 1000 milliLiter(s) (50 mL/Hr) IV Continuous <Continuous>  dextrose 5%. 1000 milliLiter(s) (100 mL/Hr) IV Continuous <Continuous>  dextrose 5%. 1000 milliLiter(s) (50 mL/Hr) IV Continuous <Continuous>  dextrose 50% Injectable 25 Gram(s) IV Push once  dextrose 50% Injectable 12.5 Gram(s) IV Push once  dextrose 50% Injectable 25 Gram(s) IV Push once  donepezil 10 milliGRAM(s) Oral at bedtime  enoxaparin Injectable 30 milliGRAM(s) SubCutaneous every 24 hours  famotidine    Tablet 20 milliGRAM(s) Oral daily  glucagon  Injectable 1 milliGRAM(s) IntraMuscular once  insulin glargine Injectable (LANTUS) 8 Unit(s) SubCutaneous <User Schedule>  insulin lispro (ADMELOG) corrective regimen sliding scale   SubCutaneous three times a day before meals  memantine 5 milliGRAM(s) Oral daily  metoprolol tartrate 12.5 milliGRAM(s) Oral two times a day  piperacillin/tazobactam IVPB.. 3.375 Gram(s) IV Intermittent every 8 hours    MEDICATIONS  (PRN):  acetaminophen    Suspension .. 650 milliGRAM(s) Oral every 6 hours PRN Temp greater or equal to 38C (100.4F), Mild Pain (1 - 3)      ALLERGIES: No Known Allergies      FAMILY HISTORY:      Social history:  Alochol:   Smoking:   Drug Use:   Marital Status:     PHYSICAL EXAMINATION:  -----------------------------  T(C): 36.8 (03-26-22 @ 05:14), Max: 37.1 (03-25-22 @ 13:30)  HR: 62 (03-26-22 @ 07:51) (61 - 85)  BP: 123/80 (03-26-22 @ 05:14) (96/66 - 128/77)  RR: 18 (03-26-22 @ 05:14) (16 - 18)  SpO2: 97% (03-26-22 @ 07:51) (95% - 100%)  Wt(kg): --    03-25 @ 07:01  -  03-26 @ 07:00  --------------------------------------------------------  IN:    dextrose 5%: 400 mL    dextrose 5%: 600 mL    Oral Fluid: 50 mL  Total IN: 1050 mL    OUT:    Voided (mL): 300 mL  Total OUT: 300 mL    Total NET: 750 mL            Constitutional: well developed, normal appearance, well groomed, well nourished, no deformities and no acute distress.   Eyes: the conjunctiva exhibited no abnormalities and the eyelids demonstrated no xanthelasmas.   HEENT: normal oral mucosa, no oral pallor and no oral cyanosis.   Neck: normal jugular venous A waves present, normal jugular venous V waves present and no jugular venous nicolas A waves.   Pulmonary: no respiratory distress, normal respiratory rhythm and effort, no accessory muscle use and lungs were clear to auscultation bilaterally. Anteriorly  Cardiovascular: heart rate and rhythm were normal, normal S1 and S2 and no murmur, gallop, rub, heave or thrill are present.   Musculoskeletal: the gait could not be assessed.   Extremities: no clubbing of the fingernails, no localized cyanosis, no petechial hemorrhages and no ischemic changes.   Skin: normal skin color and pigmentation, no rash, no venous stasis, no skin lesions, no skin ulcer and no xanthoma was observed.       LABS:   --------  03-26    144  |  109<H>  |  8   ----------------------------<  191<H>  3.5   |  29  |  0.74    Ca    8.3<L>      26 Mar 2022 07:27  Phos  3.1     03-26  Mg     2.2     03-26                           12.4   5.87  )-----------( 93       ( 26 Mar 2022 07:27 )             38.7                   Radiology:    < from: Transthoracic Echocardiogram (01.11.20 @ 12:10) >     EXAM:  ECHO TTE WO CON COMP W DOP         PROCEDURE DATE:  01/11/2020        INTERPRETATION:  Transthoracic Echocardiography Report (TTE)     Demographics     Patient name            GRACY ZAPATA        Age            69 year(s)     Med Rec #          [id number]            Gender         Male     Account #               [id number]         Date of Birth  1950     Interpreting Physician  Eduin Nowak MD        Room Number    0046     Referring Physician     Gurpreet Byrne M.D.   Sonographer    Lamar Weinbergas     Date of study           01/11/2020 10:27 AM     Height                  70 in                Weight         160.06 pounds    Type of Study:     TTE procedure: ECHO TTE WO CON COMP W DOP     BP: 162/92 mmHg     Technical Quality: Fair    Indications   1) R94.31 - Abnormal electrocardiogram ECG EKG    M-Mode Measurements (cm)     LVEDd: 4.88 cm            LVESd: 3.46 cm   IVSEd: 1.03 cm   LVPWd: 1.05 cm            AO Root Dimension: 3.5 cm                             ACS: 1.8 cm                             LA: 4 cm                             LVOT: 2.1 cm    Doppler Measurements:     AV Velocity:145 cm/s                MV Peak E-Wave: 43.4 cm/s   AV Peak Gradient: 8.41 mmHg         MV Peak A-Wave: 82.9 cm/s                            MV E/A Ratio: 0.52 %   TR Velocity:125 cm/s                MV Peak Gradient: 0.75 mmHg   TR Gradient:6.25 mmHg   Estimated RAP:5 mmHg   RVSP:20 mmHg     Findings     Mitral Valve   The mitral valve leaflets appear thin and normal.   Mild (1+) mitral regurgitation is present.   EA reversal of the mitral inflow consistent with reduced compliance of the   left ventricle.     Aortic Valve   The aortic valve is trileaflet with thin pliable leaflets.     Tricuspid Valve   Normal appearing tricuspid valve structure and function.   Trace tricuspid valve regurgitation is present.     Pulmonic Valve   Pulmonic valve not well seen.     Left Atrium   The left atrium is mildly dilated.     Left Ventricle   Estimated left ventricular ejection fraction is 55 %.   The left ventricle is normal in size, wall thickness, wall motion and   contractility as seen in limited views.     Right Atrium   Normal appearing right atrium.     Right Ventricle   Normal appearing right ventricle structure and function.     Pericardial Effusion   No evidence of pericardial effusion.     Pleural Effusion   No evidence of pleural effusion.     Miscellaneous   All visualized extra cardiac structures appears to be normal.     Impression     Summary     Estimated left ventricular ejection fraction is 55 %.   The left ventricle is normal in size, wall thickness, wall motion and   contractility as seen in limited views.   The left atrium is mildly dilated.   Normal appearing right ventricle structure and function.   The aortic valve is trileaflet with thin pliable leaflets.   The mitral valve leaflets appear thin and normal.   Mild (1+) mitral regurgitation is present.   EA reversal of the mitral inflow consistent with reduced compliance of the   left ventricle.   No evidence of pericardial effusion.     Signature     ----------------------------------------------------------------   Electronically signed by Eduin Nowak MD(Interpreting physician)   on 01/11/2020 02:31 PM   ----------------------------------------------------------------    Valves     Mitral Valve     Peak E-Wave: 43.4 cm/s   Peak A-Wave: 82.9 cm/s   Peak Gradient: 0.75 mmHg                                                 E/A Ratio: 0.52     Aortic Valve     Peak Velocity: 145 cm/s   Peak Gradient: 8.41 mmHg     Cusp Separation: 1.8 cm     Tricuspid Valve     TR Velocity: 125 cm/s               Estimated RAP: 5 mmHg   TR Gradient: 6.25 mmHg              Estimated RVSP: 20 mmHg     Pulmonic Valve              Estimated PASP: 11.25 mmHg     LVOT     Peak Velocity: 95.3 cm/s   Peak Gradient: 4 mmHg   LVOT Diameter: 2.1 cm    Structures     Left Atrium     LA Dimension: 4 cm         LA Area: 21.5 cm^2   LA/Aorta: 1.14             LA Volume/Index: 65 ml /34m^2     Left Ventricle     Diastolic Dimension: 4.88 cm          Systolic Dimension: 3.46 cm   Septum Diastolic: 1.03 cm   PW Diastolic: 1.05 cm     FS: 29.1 %   LVOT Diameter: 2.1 cm     Right Atrium     RA Systolic Pressure: 5 mmHg     Right Ventricle        RV Systolic Pressure: 11.25 mmHg     Miscellaneous     Aorta     Aortic Root: 3.5 cm   LVOT Diameter: 2.1 cm                    EDUIN NOWAK M.D., ATTENDING CARDIOLOGIST  This document has been electronically signed. Jan 11 2020  2:31PM                < end of copied text >

## 2022-03-26 NOTE — PROGRESS NOTE ADULT - SUBJECTIVE AND OBJECTIVE BOX
JODI DUBOSE is a 71yMale , patient examined and chart reviewed.    INTERVAL HPI/ OVERNIGHT EVENTS:   Afebrile. Awake alert. Confused.  NAD. No events.    PAST MEDICAL & SURGICAL HISTORY:  DM (diabetes mellitus)  Afib  Hyperlipidemia  Hypertension  Alzheimer disease  Dementia  Anxiety  Paranoid personality disorder  GERD (gastroesophageal reflux disease)      For details regarding the patient's social history, family history, and other miscellaneous elements, please refer the initial infectious diseases consultation and/or the admitting history and physical examination for this admission.    ROS:  Unable to obtain due to : Dementia    Current inpatient medications :    ANTIBIOTICS/RELEVANT:  piperacillin/tazobactam IVPB.. 3.375 Gram(s) IV Intermittent every 8 hours    MEDICATIONS  (STANDING):  albuterol/ipratropium for Nebulization 3 milliLiter(s) Nebulizer every 6 hours  aspirin 325 milliGRAM(s) Oral daily  atorvastatin 40 milliGRAM(s) Oral at bedtime  dextrose 40% Gel 15 Gram(s) Oral once  dextrose 5%. 1000 milliLiter(s) (50 mL/Hr) IV Continuous <Continuous>  dextrose 5%. 1000 milliLiter(s) (100 mL/Hr) IV Continuous <Continuous>  dextrose 5%. 1000 milliLiter(s) (50 mL/Hr) IV Continuous <Continuous>  dextrose 50% Injectable 25 Gram(s) IV Push once  dextrose 50% Injectable 12.5 Gram(s) IV Push once  dextrose 50% Injectable 25 Gram(s) IV Push once  donepezil 10 milliGRAM(s) Oral at bedtime  enoxaparin Injectable 30 milliGRAM(s) SubCutaneous every 24 hours  famotidine    Tablet 20 milliGRAM(s) Oral daily  glucagon  Injectable 1 milliGRAM(s) IntraMuscular once  insulin glargine Injectable (LANTUS) 8 Unit(s) SubCutaneous <User Schedule>  insulin lispro (ADMELOG) corrective regimen sliding scale   SubCutaneous three times a day before meals  memantine 5 milliGRAM(s) Oral daily  metoprolol tartrate 12.5 milliGRAM(s) Oral two times a day    MEDICATIONS  (PRN):  acetaminophen    Suspension .. 650 milliGRAM(s) Oral every 6 hours PRN Temp greater or equal to 38C (100.4F), Mild Pain (1 - 3)      Objective:  Vital Signs Last 24 Hrs  T(C): 36.8 (26 Mar 2022 05:14), Max: 37.1 (25 Mar 2022 13:30)  T(F): 98.2 (26 Mar 2022 05:14), Max: 98.7 (25 Mar 2022 13:30)  HR: 62 (26 Mar 2022 07:51) (61 - 85)  BP: 123/80 (26 Mar 2022 05:14) (96/66 - 128/77)  RR: 18 (26 Mar 2022 05:14) (16 - 18)  SpO2: 97% (26 Mar 2022 07:51) (95% - 100%)      Physical Exam:  General: no acute distress weak frail  Neck: supple, trachea midline  Lungs: Decreased, no wheeze/rhonchi  Cardiovascular: regular rate and rhythm, S1 S2  Abdomen: soft, nontender,  bowel sounds normal  Neurological: Dementia awake  Extremities: no edema      LABS:                        12.4   5.87  )-----------( 93       ( 26 Mar 2022 07:27 )             38.7   03-26    144  |  109<H>  |  8   ----------------------------<  191<H>  3.5   |  29  |  0.74    Ca    8.3<L>      26 Mar 2022 07:27  Phos  3.1     03-  Mg     2.2     03-26            Urinalysis Basic - ( 21 Mar 2022 16:04 )    Color: Yellow / Appearance: Clear / S.020 / pH: x  Gluc: x / Ketone: Trace  / Bili: Negative / Urobili: Negative mg/dL   Blood: x / Protein: 100 mg/dL / Nitrite: Negative   Leuk Esterase: Trace / RBC: 6-10 /HPF / WBC 6-10   Sq Epi: x / Non Sq Epi: Negative / Bacteria: Occasional    MICROBIOLOGY:    Culture - Urine (collected 21 Mar 2022 21:42)  Source: Clean Catch Clean Catch (Midstream)  Final Report (22 Mar 2022 17:30):    No growth    Culture - Blood (collected 21 Mar 2022 18:16)  Source: .Blood Blood-Peripheral  Preliminary Report (22 Mar 2022 19:02):    No growth to date.    Culture - Blood (collected 21 Mar 2022 18:16)  Source: .Blood Blood-Peripheral  Preliminary Report (22 Mar 2022 19:02):    No growth to date.    Respiratory Viral Panel with COVID-19 by SANDRA (22 @ 13:22)    Rapid RVP Result: Detected    SARS-CoV-2: NotDete: This Respiratory Panel uses polymerase chain reaction (PCR) to detect for  adenovirus; coronavirus (HKU1, NL63, 229E, OC43); human metapneumovirus  (hMPV); human enterovirus/rhinovirus (Entero/RV); influenza A; influenza  A/H1; influenza A/H3; influenza A/H1-2009; influenza B; parainfluenza  viruses 1, 2, 3, 4; respiratory syncytial virus; Mycoplasma pneumoniae;  Chlamydophila pneumoniae; and SARS-CoV-2.    Coronavirus (229E,HKU1,NL63,OC43): Detected    RADIOLOGY & ADDITIONAL STUDIES:    ACC: [id number] EXAM:  CT ABDOMEN AND PELVIS IC                        ACC: [id number] EXAM:  CT CHEST IC                          PROCEDURE DATE:  2022          INTERPRETATION:  CLINICAL INFORMATION: Fever, sepsis. Altered mental   status.    COMPARISON: None.    CONTRAST/COMPLICATIONS:  IV Contrast: Omnipaque 350 (accession [id number]), IV contrast documented   in associated exam (accession [id number])  90 cc administered (accession   [id number]), 0 cc administered (accession [id number])   10 cc discarded   (accession [id number]), 0 cc discarded (accession [id number])  Oral Contrast: NONE  Complications: None reported at time of study completion    PROCEDURE:  CT of the Chest, Abdomen and Pelvis was performed.  Sagittal and coronal reformats were performed.    FINDINGS:    CHEST:    LUNGS AND LARGE AIRWAYS: PLEURA:  There is mucoid impaction distal trachea, right mainstem bronchus and   right lower lobe bronchus.    There is no lobar lung consolidation.  No pleural effusion or pneumothorax.    Tiny calcified granuloma periphery right middle lobe.    VESSELS:  Atherosclerotic changes thoracic aorta and coronary artery calcifications.    HEART: Heart size is normal. No pericardial effusion.    MEDIASTINUM AND MADYSON:  No enlarged external lymphadenopathy.    CHEST WALL AND LOWER NECK: Within normal limits.    ABDOMEN AND PELVIS:  Respiratory motion artifact degrades image quality.    LIVER: Within normal limits.  BILE DUCTS: Normal caliber.  GALLBLADDER: Within normal limits.  SPLEEN: Within normal limits.  PANCREAS: Within normal limits.  ADRENALS: Within normal limits.  KIDNEYS/URETERS:  Right renal cyst.  No hydronephrosis.    BLADDER: Possible mild circumferential bladder wall thickening, correlate   clinically for cystitis.  There is punctate foci of air within the nondependent bladder wall.  REPRODUCTIVE ORGANS: Heterogeneous enlargement of the prostate gland.    BOWEL: Evaluation of the stomach is limited without distention.  Generalized colonic fecal retention with moderate bowel distention.  No bowel obstruction.  The appendix is not clearly visualized on this exam.  PERITONEUM: No ascites.    VESSELS: Atherosclerotic changes.  RETROPERITONEUM/LYMPH NODES: No lymphadenopathy.    ABDOMINAL WALL: Within normal limits.    BONES:  Chronic fracture deformity right inferior and superior pubic rami and   superior puboacetabular junction.  Probable chronic right sacral insufficiency fracture.    IMPRESSION:    Mucoid impaction distal trachea, right mainstem and right lower lobe   bronchus.    Enlarged prostate with heterogeneous enhancement, correlate clinically   for prostatitis.  Possible mild bladder wall thickening with multiple foci of air within   the nondependent bladder wall, correlate for infection.    Assessment :   72YO M PMHx of Afib, DMT2, HTN, HLD, paranoid personality disorder, anxiety, GERD,  Alzheimer's dementia resident of [facility] admitted with sepsis sec aspiration pneumonia  CT CAP with Mucus plugging and RLL pneumonia  Sp fever   WBC downtrending  RVP + coronavirus ( NOT covid 19 )   Severe dehydration/ hypernatremia - improving  Clinically improving    Plan :   Cont Zosyn x 7 days till 3/28/22  Fu cultures- NGTD  Trend temps and cbc  Strict Asp precautions  Pulm toileting  Increase activity  Stable from ID standpoint    Continue with present regiment.  Appropriate use of antibiotics and adverse effects reviewed.    > 35 minutes were spent in direct patient care reviewing notes, medications ,labs data/ imaging , discussion with multidisciplinary team.    Thank you for allowing me to participate in care of your patient .    Rimma Rizo MD  Infectious Disease  [phone]

## 2022-03-26 NOTE — PROGRESS NOTE ADULT - SUBJECTIVE AND OBJECTIVE BOX
[facility] Division of Hospital Medicine Progress Note    Norris Thorne M.D.    Patient is a 71y old  Male who presents with a chief complaint of AMS (26 Mar 2022 08:53)      SUBJECTIVE / OVERNIGHT EVENTS: no event overnight. Denies any concerns this AM.     Patient denies chest pain, SOB, abd pain, N/V, fever, chills, dysuria or any other complaints. All remainder ROS negative.     MEDICATIONS  (STANDING):  albuterol/ipratropium for Nebulization 3 milliLiter(s) Nebulizer every 6 hours  aspirin 325 milliGRAM(s) Oral daily  atorvastatin 40 milliGRAM(s) Oral at bedtime  dextrose 40% Gel 15 Gram(s) Oral once  dextrose 5%. 1000 milliLiter(s) (50 mL/Hr) IV Continuous <Continuous>  dextrose 5%. 1000 milliLiter(s) (100 mL/Hr) IV Continuous <Continuous>  dextrose 5%. 1000 milliLiter(s) (50 mL/Hr) IV Continuous <Continuous>  dextrose 50% Injectable 25 Gram(s) IV Push once  dextrose 50% Injectable 12.5 Gram(s) IV Push once  dextrose 50% Injectable 25 Gram(s) IV Push once  donepezil 10 milliGRAM(s) Oral at bedtime  enoxaparin Injectable 30 milliGRAM(s) SubCutaneous every 24 hours  famotidine    Tablet 20 milliGRAM(s) Oral daily  glucagon  Injectable 1 milliGRAM(s) IntraMuscular once  insulin glargine Injectable (LANTUS) 8 Unit(s) SubCutaneous <User Schedule>  insulin lispro (ADMELOG) corrective regimen sliding scale   SubCutaneous three times a day before meals  memantine 5 milliGRAM(s) Oral daily  metoprolol tartrate 12.5 milliGRAM(s) Oral two times a day  piperacillin/tazobactam IVPB.. 3.375 Gram(s) IV Intermittent every 8 hours    MEDICATIONS  (PRN):  acetaminophen    Suspension .. 650 milliGRAM(s) Oral every 6 hours PRN Temp greater or equal to 38C (100.4F), Mild Pain (1 - 3)      I&O's Summary    25 Mar 2022 07:01  -  26 Mar 2022 07:00  --------------------------------------------------------  IN: 1050 mL / OUT: 300 mL / NET: 750 mL        PHYSICAL EXAM:  Vital Signs Last 24 Hrs  T(C): 36.8 (26 Mar 2022 05:14), Max: 37.1 (25 Mar 2022 13:30)  T(F): 98.2 (26 Mar 2022 05:14), Max: 98.7 (25 Mar 2022 13:30)  HR: 62 (26 Mar 2022 07:51) (61 - 85)  BP: 123/80 (26 Mar 2022 05:14) (96/66 - 128/77)  BP(mean): --  RR: 18 (26 Mar 2022 05:14) (16 - 18)  SpO2: 97% (26 Mar 2022 07:51) (95% - 100%)    CONSTITUTIONAL: NAD, well-groomed. + Temporal wasting noted  ENMT: Moist oral mucosa, no pharyngeal injection or exudates  RESPIRATORY: Normal respiratory effort; lungs are clear to auscultation bilaterally  CARDIOVASCULAR: Regular rate and rhythm, normal S1 and S2, no murmur/rub/gallop; No lower extremity edema  ABDOMEN: Nontender to palpation, normoactive bowel sounds  PSYCH: mildly flat affect, refuse to answer AAO questions  NEUROLOGY: CN 2-12 are intact and symmetric; no gross sensory deficits;   SKIN: No rashes; no palpable lesions    LABS:                        12.4   5.87  )-----------( 93       ( 26 Mar 2022 07:27 )             38.7     03-26    144  |  109<H>  |  8   ----------------------------<  191<H>  3.5   |  29  |  0.74    Ca    8.3<L>      26 Mar 2022 07:27  Phos  3.1     03-26  Mg     2.2     03-26          CAPILLARY BLOOD GLUCOSE      POCT Blood Glucose.: 180 mg/dL (26 Mar 2022 08:11)  POCT Blood Glucose.: 188 mg/dL (25 Mar 2022 21:02)  POCT Blood Glucose.: 146 mg/dL (25 Mar 2022 16:56)  POCT Blood Glucose.: 306 mg/dL (25 Mar 2022 12:09)      RADIOLOGY & ADDITIONAL TESTS:  Results Reviewed:   Imaging Personally Reviewed:  Electrocardiogram Personally Reviewed:

## 2022-03-26 NOTE — PROGRESS NOTE ADULT - NUTRITIONAL ASSESSMENT
This patient has been assessed with a concern for Malnutrition and has been determined to have a diagnosis/diagnoses of Severe protein-calorie malnutrition and Underweight (BMI < 19).    This patient is being managed with:   Diet Pureed-  Consistent Carbohydrate {No Snacks}  DASH/TLC {Sodium & Cholesterol Restricted}  Mildly Thick Liquids (MILDTHICKLIQS)  Entered: Mar 25 2022 11:12AM

## 2022-03-26 NOTE — PROGRESS NOTE ADULT - ASSESSMENT
72 y/o man w hx Afib, DMT2, HTN, HLD, paranoid personality disorder, anxiety, GERD,  Alzheimer's dementia who was BIBEMS from [facility] w/ fever delirium and AMS x 2 days. Pt. had CXR at facility w/ no acute findings, given Zosyn.  W/U here sig for hypernatremia, ROSALBA, sepsis, poor nutrition, COVID positive  Serial CBC show progressive thrombocytopenia during hospitalization    -thrombocytopenia reactive due to acute illness/infection, plts increased to 90+ today from 60+  -continue present management, treat the acute illness  -stable from Heme standpoint, will sign off, please call if any questions

## 2022-03-26 NOTE — PROGRESS NOTE ADULT - SUBJECTIVE AND OBJECTIVE BOX
All interim records and events noted.    Patient is a 71y old  Male who presents with a chief complaint of AMS (25 Mar 2022 14:49)      MEDICATIONS  (STANDING):  albuterol/ipratropium for Nebulization 3 milliLiter(s) Nebulizer every 6 hours  aspirin 325 milliGRAM(s) Oral daily  atorvastatin 40 milliGRAM(s) Oral at bedtime  dextrose 40% Gel 15 Gram(s) Oral once  dextrose 5%. 1000 milliLiter(s) (50 mL/Hr) IV Continuous <Continuous>  dextrose 5%. 1000 milliLiter(s) (100 mL/Hr) IV Continuous <Continuous>  dextrose 5%. 1000 milliLiter(s) (50 mL/Hr) IV Continuous <Continuous>  dextrose 50% Injectable 25 Gram(s) IV Push once  dextrose 50% Injectable 12.5 Gram(s) IV Push once  dextrose 50% Injectable 25 Gram(s) IV Push once  donepezil 10 milliGRAM(s) Oral at bedtime  enoxaparin Injectable 30 milliGRAM(s) SubCutaneous every 24 hours  famotidine    Tablet 20 milliGRAM(s) Oral daily  glucagon  Injectable 1 milliGRAM(s) IntraMuscular once  insulin glargine Injectable (LANTUS) 8 Unit(s) SubCutaneous <User Schedule>  insulin lispro (ADMELOG) corrective regimen sliding scale   SubCutaneous three times a day before meals  memantine 5 milliGRAM(s) Oral daily  metoprolol tartrate 12.5 milliGRAM(s) Oral two times a day  piperacillin/tazobactam IVPB.. 3.375 Gram(s) IV Intermittent every 8 hours    MEDICATIONS  (PRN):  acetaminophen    Suspension .. 650 milliGRAM(s) Oral every 6 hours PRN Temp greater or equal to 38C (100.4F), Mild Pain (1 - 3)      Vital Signs Last 24 Hrs  T(C): 36.8 (26 Mar 2022 05:14), Max: 37.1 (25 Mar 2022 13:30)  T(F): 98.2 (26 Mar 2022 05:14), Max: 98.7 (25 Mar 2022 13:30)  HR: 62 (26 Mar 2022 07:51) (61 - 85)  BP: 123/80 (26 Mar 2022 05:14) (96/66 - 129/78)  BP(mean): --  RR: 18 (26 Mar 2022 05:14) (16 - 19)  SpO2: 97% (26 Mar 2022 07:51) (94% - 100%)    PHYSICAL EXAM  detailed exam as per co treatment team    LABS:             12.4   5.87  )-----------( 93       ( 03-26 @ 07:27 )             38.7                12.3   6.21  )-----------( 67       ( 03-25 @ 07:58 )             39.1                12.1   8.45  )-----------( 68       ( 03-24 @ 08:02 )             39.6       03-26    144  |  109<H>  |  8   ----------------------------<  191<H>  3.5   |  29  |  0.74    Ca    8.3<L>      26 Mar 2022 07:27  Phos  3.1     03-26  Mg     2.2     03-26 03-21 @ 13:22  PT11.4 INR0.99  PTT24.5      RADIOLOGY & ADDITIONAL STUDIES:    IMPRESSION/RECOMMENDATIONS:

## 2022-03-26 NOTE — PROGRESS NOTE ADULT - ASSESSMENT
71 y.o male w/ PMHx of Afib, DMT2, HTN, HLD, paranoid personality disorder, anxiety, GERD,  Alzheimer's dementia who was BIBEMS from [facility] w/ fever and AMS x 2 days. Admitted for hypernatremia, ROSALBA, and sepsis and severe with protein calorie malnutrition.    # Metabolic encephalopathy 2/2 acute hypernatremia 2/2 dehydration   - Present on admission, more awake this AM  - Na: 172 > 163 > 152 > 144  - c/w D5 at 50cc/hr  - Head CT unremarkable  - Monitor neuro status  - Trend BMPs  - Appreciate nephrology recs    # Sepsis most likely 2/2 UTI vs. aspiration PNA vs. CAP  - Present on admission  - Evidenced by fever, leukocytosis w/ bandemia, tachycardia  - CT chest showing mucoid impaction distal trachea, right mainstem and RLL bronchus  - CT A/P showing mild bladder thickening consistent w/ possible infection  - RUQ negative for acute cholecystitis   - RVP and coronavirus positive  - COVID negative  - 3/21 blood and urine cultures NGTD  - Continue zosyn x7 days per ID  - aspiration precuation  - MRSA negative    # ROSALBA  - Present on admission  - RESOLVED    # Severe protein calorie malnutrition  - Underweight (BMI 16.9)  - More awake this AM, official S+S to be done  - Monitor albumin and protein levels    # Afib  - Tele monitoring  - 3/23 Diltiazem gtt stopped overnight  - 3/24 AM 28 beats of v-tach  - c/w lopressor 12.5mg BID  - Cards recs appreciated  - No AC given dementia and overall fall risk    # HTN  - Controlled    # DMT2  - Hold oral hypoglycemics  - ISS w/ fingersticks per protocol  - c/w Lantus 8 QHS + ISS    # HLD  - c/w statin    # Paranoid personality disorder, anxiety  # Alzheimer's dementia  - c/w Donepezil and Namenda  - Olanzapine on hold, resume if agitations     # GERD  - Continue Pepcid    # Thrombocytopenia  - Pssupect 2/2 sepsis and Zosyn use  - daily CBC  - cindi heme/onc recs    DVT - Lovenox 30mg SQ  Diet: purred, mild thick liquid   Dispo: pending clinical course, likely next week     DNR/DNI 71 y.o male w/ PMHx of Afib, DMT2, HTN, HLD, paranoid personality disorder, anxiety, GERD,  Alzheimer's dementia who was BIBEMS from [facility] w/ fever and AMS x 2 days. Admitted for hypernatremia, ROSALBA, and sepsis and severe with protein calorie malnutrition.    # Metabolic encephalopathy 2/2 acute hypernatremia 2/2 dehydration   - Present on admission, more awake this AM  - Na: 172 > 163 > 152 > 144  - c/w D5 at 50cc/hr  - Head CT unremarkable  - Monitor neuro status  - Trend BMPs  - Appreciate nephrology recs    # Sepsis most likely 2/2 UTI vs. aspiration PNA vs. CAP  - Present on admission  - Evidenced by fever, leukocytosis w/ bandemia, tachycardia  - CT chest showing mucoid impaction distal trachea, right mainstem and RLL bronchus  - CT A/P showing mild bladder thickening consistent w/ possible infection  - RUQ negative for acute cholecystitis   - RVP and coronavirus positive  - COVID negative  - 3/21 blood and urine cultures NGTD  - Continue zosyn x7 days per ID  - aspiration precuation  - MRSA negative    # ROSALBA  - Present on admission  - RESOLVED    # Severe protein calorie malnutrition  - Underweight (BMI 16.9)  - More awake this AM, official S+S to be done  - Monitor albumin and protein levels    # Afib  - Tele monitoring  - 3/23 Diltiazem gtt stopped overnight  - 3/24 AM 28 beats of v-tach  - c/w lopressor 12.5mg BID  - Cards recs appreciated  - No AC given dementia and overall fall risk    # HTN  - Controlled    # DMT2  - Hold oral hypoglycemics  - ISS w/ fingersticks per protocol  - c/w Lantus 8 QHS + ISS    # HLD  - c/w statin    # Paranoid personality disorder, anxiety  # Alzheimer's dementia  - c/w Donepezil and Namenda  - Olanzapine on hold, resume if agitations     # GERD  - Continue Pepcid    # Thrombocytopenia  - Pssupect 2/2 sepsis and Zosyn use  - daily CBC  - cindi heme/onc recs    DVT - Lovenox 30mg SQ  Diet: purred, mild thick liquid   Dispo: pending clinical course, SNF next week    DNR/DNI

## 2022-03-26 NOTE — PROGRESS NOTE ADULT - ASSESSMENT
The patient is a 71 year old male with a history of HTN, HL, DM, paroxysmal atrial fibrillation, dementia who presents with AMS in the setting of severe dehydration, sepsis, possible PNA, viral infection.    3/26/22  Seen at [facility] telemetry  Sitting semi-erect, sleeping comfortably  Easily arousable    Plan:  - Telemetry consistent with frequent brief episodes of AF  - On Metoprolol-125mg BID         Atorvastatin-40mg HS  - If frequent episodes of AF recur, restart diltiazem drip  - Not a candidate for long term anticoagulation for AF given dementia and overall poor prognosis  - Hypernatremia now normal at 144  - CT with mucoid impaction of bronchi  - Coronavirus (non-COVID) positive  - Seen by speech and swallow  - Being followed by ID, Hem/Onc, Nephrology  - On zosyn for possible PNA

## 2022-03-27 LAB
ANION GAP SERPL CALC-SCNC: 7 MMOL/L — SIGNIFICANT CHANGE UP (ref 5–17)
BUN SERPL-MCNC: 9 MG/DL — SIGNIFICANT CHANGE UP (ref 7–23)
CALCIUM SERPL-MCNC: 8.5 MG/DL — SIGNIFICANT CHANGE UP (ref 8.4–10.5)
CHLORIDE SERPL-SCNC: 109 MMOL/L — HIGH (ref 96–108)
CO2 SERPL-SCNC: 27 MMOL/L — SIGNIFICANT CHANGE UP (ref 22–31)
CREAT SERPL-MCNC: 0.65 MG/DL — SIGNIFICANT CHANGE UP (ref 0.5–1.3)
EGFR: 101 ML/MIN/1.73M2 — SIGNIFICANT CHANGE UP
GLUCOSE BLDC GLUCOMTR-MCNC: 155 MG/DL — HIGH (ref 70–99)
GLUCOSE BLDC GLUCOMTR-MCNC: 189 MG/DL — HIGH (ref 70–99)
GLUCOSE BLDC GLUCOMTR-MCNC: 200 MG/DL — HIGH (ref 70–99)
GLUCOSE BLDC GLUCOMTR-MCNC: 237 MG/DL — HIGH (ref 70–99)
GLUCOSE SERPL-MCNC: 168 MG/DL — HIGH (ref 70–99)
HCT VFR BLD CALC: 37 % — LOW (ref 39–50)
HGB BLD-MCNC: 12.1 G/DL — LOW (ref 13–17)
MAGNESIUM SERPL-MCNC: 2.1 MG/DL — SIGNIFICANT CHANGE UP (ref 1.6–2.6)
MCHC RBC-ENTMCNC: 30.4 PG — SIGNIFICANT CHANGE UP (ref 27–34)
MCHC RBC-ENTMCNC: 32.7 GM/DL — SIGNIFICANT CHANGE UP (ref 32–36)
MCV RBC AUTO: 93 FL — SIGNIFICANT CHANGE UP (ref 80–100)
NRBC # BLD: 0 /100 WBCS — SIGNIFICANT CHANGE UP (ref 0–0)
PHOSPHATE SERPL-MCNC: 3.2 MG/DL — SIGNIFICANT CHANGE UP (ref 2.5–4.5)
PLATELET # BLD AUTO: 106 K/UL — LOW (ref 150–400)
POTASSIUM SERPL-MCNC: 4.3 MMOL/L — SIGNIFICANT CHANGE UP (ref 3.5–5.3)
POTASSIUM SERPL-SCNC: 4.3 MMOL/L — SIGNIFICANT CHANGE UP (ref 3.5–5.3)
RBC # BLD: 3.98 M/UL — LOW (ref 4.2–5.8)
RBC # FLD: 14 % — SIGNIFICANT CHANGE UP (ref 10.3–14.5)
SARS-COV-2 RNA SPEC QL NAA+PROBE: SIGNIFICANT CHANGE UP
SODIUM SERPL-SCNC: 143 MMOL/L — SIGNIFICANT CHANGE UP (ref 135–145)
WBC # BLD: 6.21 K/UL — SIGNIFICANT CHANGE UP (ref 3.8–10.5)
WBC # FLD AUTO: 6.21 K/UL — SIGNIFICANT CHANGE UP (ref 3.8–10.5)

## 2022-03-27 PROCEDURE — 99232 SBSQ HOSP IP/OBS MODERATE 35: CPT

## 2022-03-27 RX ADMIN — PIPERACILLIN AND TAZOBACTAM 25 GRAM(S): 4; .5 INJECTION, POWDER, LYOPHILIZED, FOR SOLUTION INTRAVENOUS at 09:39

## 2022-03-27 RX ADMIN — Medication 12.5 MILLIGRAM(S): at 21:02

## 2022-03-27 RX ADMIN — Medication 1: at 07:53

## 2022-03-27 RX ADMIN — ATORVASTATIN CALCIUM 40 MILLIGRAM(S): 80 TABLET, FILM COATED ORAL at 21:02

## 2022-03-27 RX ADMIN — Medication 3 MILLILITER(S): at 07:36

## 2022-03-27 RX ADMIN — ENOXAPARIN SODIUM 30 MILLIGRAM(S): 100 INJECTION SUBCUTANEOUS at 05:18

## 2022-03-27 RX ADMIN — PIPERACILLIN AND TAZOBACTAM 25 GRAM(S): 4; .5 INJECTION, POWDER, LYOPHILIZED, FOR SOLUTION INTRAVENOUS at 16:33

## 2022-03-27 RX ADMIN — DONEPEZIL HYDROCHLORIDE 10 MILLIGRAM(S): 10 TABLET, FILM COATED ORAL at 21:02

## 2022-03-27 RX ADMIN — Medication 1: at 16:40

## 2022-03-27 RX ADMIN — Medication 3 MILLILITER(S): at 01:01

## 2022-03-27 RX ADMIN — Medication 325 MILLIGRAM(S): at 11:57

## 2022-03-27 RX ADMIN — INSULIN GLARGINE 8 UNIT(S): 100 INJECTION, SOLUTION SUBCUTANEOUS at 11:57

## 2022-03-27 RX ADMIN — Medication 2: at 11:58

## 2022-03-27 RX ADMIN — FAMOTIDINE 20 MILLIGRAM(S): 10 INJECTION INTRAVENOUS at 11:57

## 2022-03-27 RX ADMIN — MEMANTINE HYDROCHLORIDE 5 MILLIGRAM(S): 10 TABLET ORAL at 11:57

## 2022-03-27 RX ADMIN — Medication 3 MILLILITER(S): at 15:21

## 2022-03-27 RX ADMIN — PIPERACILLIN AND TAZOBACTAM 25 GRAM(S): 4; .5 INJECTION, POWDER, LYOPHILIZED, FOR SOLUTION INTRAVENOUS at 01:10

## 2022-03-27 RX ADMIN — Medication 650 MILLIGRAM(S): at 16:32

## 2022-03-27 RX ADMIN — Medication 650 MILLIGRAM(S): at 16:33

## 2022-03-27 RX ADMIN — Medication 12.5 MILLIGRAM(S): at 09:39

## 2022-03-27 NOTE — PROGRESS NOTE ADULT - ASSESSMENT
71 y.o male w/ PMHx of Afib, DMT2, HTN, HLD, paranoid personality disorder, anxiety, GERD,  Alzheimer's dementia who was BIBEMS from [facility] w/ fever and AMS x 2 days. Admitted for hypernatremia, ROSALBA, and sepsis and severe with protein calorie malnutrition.    # Metabolic encephalopathy 2/2 acute hypernatremia 2/2 dehydration   - Present on admission  - Na: 172 > 163 > 152 > 144 > 143  - will monitor off fluids  - Head CT unremarkable  - Monitor neuro status  - Trend BMPs  - Appreciate nephrology recs    # Sepsis most likely 2/2 UTI vs. aspiration PNA vs. CAP  - Present on admission  - Evidenced by fever, leukocytosis w/ bandemia, tachycardia  - CT chest showing mucoid impaction distal trachea, right mainstem and RLL bronchus  - CT A/P showing mild bladder thickening consistent w/ possible infection  - RUQ negative for acute cholecystitis   - RVP and coronavirus positive  - COVID negative  - 3/21 blood and urine cultures NGTD  - Continue zosyn x7 days per ID, last day 3/28  - aspiration precaution  - MRSA negative    # ROSALBA  - Present on admission  - RESOLVED    # Severe protein calorie malnutrition  - Underweight (BMI 16.9)  - More awake this AM, official S+S to be done  - Monitor albumin and protein levels    # Afib  - Tele monitoring  - 3/23 Diltiazem gtt stopped overnight  - 3/24 AM 28 beats of v-tach  - c/w lopressor 12.5mg BID  - Cards recs appreciated  - No AC given dementia and overall fall risk    # HTN  - Controlled    # DMT2  - Hold oral hypoglycemics  - ISS w/ fingersticks per protocol  - c/w Lantus 8 QHS + ISS    # HLD  - c/w statin    # Paranoid personality disorder, anxiety  # Alzheimer's dementia  - c/w Donepezil and Namenda  - Olanzapine on hold, resume if agitations     # GERD  - Continue Pepcid    # Thrombocytopenia  - Susupect 2/2 sepsis and Zosyn use  - daily CBC  - cindi heme/onc recs    DVT - Lovenox 30mg SQ  Diet: pureed, mild thick liquid   Dispo: after abx nicki, to SNF    DNR/DNI

## 2022-03-27 NOTE — PROGRESS NOTE ADULT - ASSESSMENT
The patient is a 71 year old male with a history of HTN, HL, DM, paroxysmal atrial fibrillation, dementia who presents with AMS in the setting of severe dehydration, sepsis, possible PNA, viral infection.    3/27/22  Seen at [facility] telemetry  Sister and son at bedside  Sitting in chair, being fed lunch    Plan:  - Telemetry consistent with frequent brief episodes of AF  - On Metoprolol-125mg BID         Atorvastatin-40mg HS  - If frequent episodes of AF recur, restart diltiazem drip  - Not a candidate for long term anticoagulation for AF given dementia and overall poor prognosis  - Hypernatremia now normal at 144  - CT with mucoid impaction of bronchi  - Coronavirus (non-COVID) positive  - Seen by speech and swallow  - Being followed by ID, Hem/Onc, Nephrology  - On zosyn for possible PNA

## 2022-03-27 NOTE — PROGRESS NOTE ADULT - SUBJECTIVE AND OBJECTIVE BOX
JODI DUBOSE is a 71yMale , patient examined and chart reviewed.    INTERVAL HPI/ OVERNIGHT EVENTS:   Afebrile. NAD. No events.    PAST MEDICAL & SURGICAL HISTORY:  DM (diabetes mellitus)  Afib  Hyperlipidemia  Hypertension  Alzheimer disease  Dementia  Anxiety  Paranoid personality disorder  GERD (gastroesophageal reflux disease)      For details regarding the patient's social history, family history, and other miscellaneous elements, please refer the initial infectious diseases consultation and/or the admitting history and physical examination for this admission.    ROS:  Unable to obtain due to : Dementia    Current inpatient medications :    ANTIBIOTICS/RELEVANT:  piperacillin/tazobactam IVPB.. 3.375 Gram(s) IV Intermittent every 8 hours    MEDICATIONS  (STANDING):  albuterol/ipratropium for Nebulization 3 milliLiter(s) Nebulizer every 6 hours  aspirin 325 milliGRAM(s) Oral daily  atorvastatin 40 milliGRAM(s) Oral at bedtime  dextrose 40% Gel 15 Gram(s) Oral once  dextrose 5%. 1000 milliLiter(s) (100 mL/Hr) IV Continuous <Continuous>  dextrose 5%. 1000 milliLiter(s) (50 mL/Hr) IV Continuous <Continuous>  dextrose 50% Injectable 25 Gram(s) IV Push once  dextrose 50% Injectable 12.5 Gram(s) IV Push once  dextrose 50% Injectable 25 Gram(s) IV Push once  donepezil 10 milliGRAM(s) Oral at bedtime  enoxaparin Injectable 30 milliGRAM(s) SubCutaneous every 24 hours  famotidine    Tablet 20 milliGRAM(s) Oral daily  glucagon  Injectable 1 milliGRAM(s) IntraMuscular once  insulin glargine Injectable (LANTUS) 8 Unit(s) SubCutaneous <User Schedule>  insulin lispro (ADMELOG) corrective regimen sliding scale   SubCutaneous three times a day before meals  memantine 5 milliGRAM(s) Oral daily  metoprolol tartrate 12.5 milliGRAM(s) Oral two times a day    MEDICATIONS  (PRN):  acetaminophen    Suspension .. 650 milliGRAM(s) Oral every 6 hours PRN Temp greater or equal to 38C (100.4F), Mild Pain (1 - 3)        Objective:  Vital Signs Last 24 Hrs  T(C): 36.3 (03-27-22 @ 17:12), Max: 36.9 (03-26-22 @ 21:50)  T(F): 97.4 (03-27-22 @ 17:12), Max: 98.4 (03-26-22 @ 21:50)  HR: 64 (03-27-22 @ 17:12) (48 - 91)  BP: 119/73 (03-27-22 @ 17:12) (97/65 - 138/72)  RR: 17 (03-27-22 @ 17:12) (16 - 18)  SpO2: 95% (03-27-22 @ 17:12) (95% - 98%)      Physical Exam:  General: no acute distress weak frail  Neck: supple, trachea midline  Lungs: Decreased, no wheeze/rhonchi  Cardiovascular: regular rate and rhythm, S1 S2  Abdomen: soft, nontender,  bowel sounds normal  Neurological: Dementia awake  Extremities: no edema      LABS:                                 12.1   6.21  )-----------( 106      ( 27 Mar 2022 06:45 )             37.0   03-27    143  |  109<H>  |  9   ----------------------------<  168<H>  4.3   |  27  |  0.65    Ca    8.5      27 Mar 2022 06:45  Phos  3.2     03-27  Mg     2.1     03-27    MICROBIOLOGY:    Culture - Urine (collected 21 Mar 2022 21:42)  Source: Clean Catch Clean Catch (Midstream)  Final Report (22 Mar 2022 17:30):    No growth    Culture - Blood (collected 21 Mar 2022 18:16)  Source: .Blood Blood-Peripheral  Preliminary Report (22 Mar 2022 19:02):    No growth to date.    Culture - Blood (collected 21 Mar 2022 18:16)  Source: .Blood Blood-Peripheral  Preliminary Report (22 Mar 2022 19:02):    No growth to date.    Respiratory Viral Panel with COVID-19 by SANDRA (03.21.22 @ 13:22)    Rapid RVP Result: Detected    SARS-CoV-2: NotDete: This Respiratory Panel uses polymerase chain reaction (PCR) to detect for  adenovirus; coronavirus (HKU1, NL63, 229E, OC43); human metapneumovirus  (hMPV); human enterovirus/rhinovirus (Entero/RV); influenza A; influenza  A/H1; influenza A/H3; influenza A/H1-2009; influenza B; parainfluenza  viruses 1, 2, 3, 4; respiratory syncytial virus; Mycoplasma pneumoniae;  Chlamydophila pneumoniae; and SARS-CoV-2.    Coronavirus (229E,HKU1,NL63,OC43): Detected    RADIOLOGY & ADDITIONAL STUDIES:    ACC: [id number] EXAM:  CT ABDOMEN AND PELVIS IC                        ACC: [id number] EXAM:  CT CHEST IC                          PROCEDURE DATE:  03/21/2022          INTERPRETATION:  CLINICAL INFORMATION: Fever, sepsis. Altered mental   status.    COMPARISON: None.    CONTRAST/COMPLICATIONS:  IV Contrast: Omnipaque 350 (accession [id number]), IV contrast documented   in associated exam (accession [id number])  90 cc administered (accession   [id number]), 0 cc administered (accession [id number])   10 cc discarded   (accession [id number]), 0 cc discarded (accession [id number])  Oral Contrast: NONE  Complications: None reported at time of study completion    PROCEDURE:  CT of the Chest, Abdomen and Pelvis was performed.  Sagittal and coronal reformats were performed.    FINDINGS:    CHEST:    LUNGS AND LARGE AIRWAYS: PLEURA:  There is mucoid impaction distal trachea, right mainstem bronchus and   right lower lobe bronchus.    There is no lobar lung consolidation.  No pleural effusion or pneumothorax.    Tiny calcified granuloma periphery right middle lobe.    VESSELS:  Atherosclerotic changes thoracic aorta and coronary artery calcifications.    HEART: Heart size is normal. No pericardial effusion.    MEDIASTINUM AND MADYSON:  No enlarged external lymphadenopathy.    CHEST WALL AND LOWER NECK: Within normal limits.    ABDOMEN AND PELVIS:  Respiratory motion artifact degrades image quality.    LIVER: Within normal limits.  BILE DUCTS: Normal caliber.  GALLBLADDER: Within normal limits.  SPLEEN: Within normal limits.  PANCREAS: Within normal limits.  ADRENALS: Within normal limits.  KIDNEYS/URETERS:  Right renal cyst.  No hydronephrosis.    BLADDER: Possible mild circumferential bladder wall thickening, correlate   clinically for cystitis.  There is punctate foci of air within the nondependent bladder wall.  REPRODUCTIVE ORGANS: Heterogeneous enlargement of the prostate gland.    BOWEL: Evaluation of the stomach is limited without distention.  Generalized colonic fecal retention with moderate bowel distention.  No bowel obstruction.  The appendix is not clearly visualized on this exam.  PERITONEUM: No ascites.    VESSELS: Atherosclerotic changes.  RETROPERITONEUM/LYMPH NODES: No lymphadenopathy.    ABDOMINAL WALL: Within normal limits.    BONES:  Chronic fracture deformity right inferior and superior pubic rami and   superior puboacetabular junction.  Probable chronic right sacral insufficiency fracture.    IMPRESSION:    Mucoid impaction distal trachea, right mainstem and right lower lobe   bronchus.    Enlarged prostate with heterogeneous enhancement, correlate clinically   for prostatitis.  Possible mild bladder wall thickening with multiple foci of air within   the nondependent bladder wall, correlate for infection.    Assessment :   70YO M PMHx of Afib, DMT2, HTN, HLD, paranoid personality disorder, anxiety, GERD,  Alzheimer's dementia resident of [facility] admitted with sepsis sec aspiration pneumonia  CT CAP with Mucus plugging and RLL pneumonia  Sp fever   WBC downtrending  RVP + coronavirus ( NOT covid 19 )   Severe dehydration/ hypernatremia - improving  Clinically improving    Plan :   Cont Zosyn x 7 days till 3/28/22  Fu cultures- NGTD  Trend temps and cbc  Strict Asp precautions  Pulm toileting  Increase activity  Stable from ID standpoint  Dc planning    Continue with present regiment.  Appropriate use of antibiotics and adverse effects reviewed.    > 35 minutes were spent in direct patient care reviewing notes, medications ,labs data/ imaging , discussion with multidisciplinary team.    Thank you for allowing me to participate in care of your patient .    Rimma Rizo MD  Infectious Disease  [phone]

## 2022-03-27 NOTE — PROGRESS NOTE ADULT - SUBJECTIVE AND OBJECTIVE BOX
Patient is a 71y Male with a known history of :  Deep tissue injury [T14.8XXA]    Thrombocytopenia [D69.6]      HPI:  71 y.o male w/ PMHx of Afib, DMT2, HTN, HLD, paranoid personality disorder, anxiety, GERD,  Alzheimer's dementia who was BIBEMS from [facility] w/ fever and AMS x 2 days. Pt. had CXR at facility w/ no acute findings. Pt. given zosyn at SNF. Unable to obtain information from pt. 2/2 AMS. Will need admission to medicine for treatment and monitoring of hypernatremia, ROSALBA, and sepsis.    ED Course:  - EKG done  - Labs sent, including: CBC, CMP, lactate, Coags  - Blood and urine cultures sent  - RVP and COVID swabs sent  - Imaging done, including: CT A/P, chest and head, CXR  - 1gm tylenol given for fever  - 2L IVF given (21 Mar 2022 16:33)      REVIEW OF SYSTEMS:    CONSTITUTIONAL: No fever, weight loss, or fatigue  EYES: No eye pain, visual disturbances, or discharge  ENMT:  No difficulty hearing, tinnitus, vertigo; No sinus or throat pain  NECK: No pain or stiffness  BREASTS: No pain, masses, or nipple discharge  RESPIRATORY: No cough, wheezing, chills or hemoptysis; No shortness of breath  CARDIOVASCULAR: No chest pain, palpitations, dizziness, or leg swelling  GASTROINTESTINAL: No abdominal or epigastric pain. No nausea, vomiting, or hematemesis; No diarrhea or constipation. No melena or hematochezia.  GENITOURINARY: No dysuria, frequency, hematuria, or incontinence  NEUROLOGICAL: No headaches, memory loss, loss of strength, numbness, or tremors  SKIN: No itching, burning, rashes, or lesions   LYMPH NODES: No enlarged glands  ENDOCRINE: No heat or cold intolerance; No hair loss  MUSCULOSKELETAL: No joint pain or swelling; No muscle, back, or extremity pain  PSYCHIATRIC: No depression, anxiety, mood swings, or difficulty sleeping  HEME/LYMPH: No easy bruising, or bleeding gums  ALLERGY AND IMMUNOLOGIC: No hives or eczema    MEDICATIONS  (STANDING):  albuterol/ipratropium for Nebulization 3 milliLiter(s) Nebulizer every 6 hours  aspirin 325 milliGRAM(s) Oral daily  atorvastatin 40 milliGRAM(s) Oral at bedtime  dextrose 40% Gel 15 Gram(s) Oral once  dextrose 5%. 1000 milliLiter(s) (50 mL/Hr) IV Continuous <Continuous>  dextrose 5%. 1000 milliLiter(s) (100 mL/Hr) IV Continuous <Continuous>  dextrose 50% Injectable 25 Gram(s) IV Push once  dextrose 50% Injectable 12.5 Gram(s) IV Push once  dextrose 50% Injectable 25 Gram(s) IV Push once  donepezil 10 milliGRAM(s) Oral at bedtime  enoxaparin Injectable 30 milliGRAM(s) SubCutaneous every 24 hours  famotidine    Tablet 20 milliGRAM(s) Oral daily  glucagon  Injectable 1 milliGRAM(s) IntraMuscular once  insulin glargine Injectable (LANTUS) 8 Unit(s) SubCutaneous <User Schedule>  insulin lispro (ADMELOG) corrective regimen sliding scale   SubCutaneous three times a day before meals  memantine 5 milliGRAM(s) Oral daily  metoprolol tartrate 12.5 milliGRAM(s) Oral two times a day  piperacillin/tazobactam IVPB.. 3.375 Gram(s) IV Intermittent every 8 hours    MEDICATIONS  (PRN):  acetaminophen    Suspension .. 650 milliGRAM(s) Oral every 6 hours PRN Temp greater or equal to 38C (100.4F), Mild Pain (1 - 3)      ALLERGIES: No Known Allergies      FAMILY HISTORY:      Social history:  Alochol:   Smoking:   Drug Use:   Marital Status:     PHYSICAL EXAMINATION:  -----------------------------  T(C): 36.5 (03-27-22 @ 09:27), Max: 36.9 (03-26-22 @ 21:50)  HR: 91 (03-27-22 @ 09:27) (48 - 96)  BP: 108/72 (03-27-22 @ 09:27) (107/70 - 138/72)  RR: 18 (03-27-22 @ 09:27) (16 - 20)  SpO2: 96% (03-27-22 @ 09:27) (96% - 98%)  Wt(kg): --    03-26 @ 07:01  -  03-27 @ 07:00  --------------------------------------------------------  IN:    dextrose 5%: 1100 mL    IV PiggyBack: 100 mL    Oral Fluid: 400 mL  Total IN: 1600 mL    OUT:  Total OUT: 0 mL    Total NET: 1600 mL            Constitutional: well developed, normal appearance, well groomed, well nourished, no deformities and no acute distress.   Eyes: the conjunctiva exhibited no abnormalities and the eyelids demonstrated no xanthelasmas.   HEENT: normal oral mucosa, no oral pallor and no oral cyanosis.   Neck: normal jugular venous A waves present, normal jugular venous V waves present and no jugular venous nicolas A waves.   Pulmonary: no respiratory distress, normal respiratory rhythm and effort, no accessory muscle use and lungs were clear to auscultation bilaterally. Anteriorly  Cardiovascular: heart rate and rhythm were normal, normal S1 and S2 and no murmur, gallop, rub, heave or thrill are present.   Musculoskeletal: the gait could not be assessed.  Extremities: no clubbing of the fingernails, no localized cyanosis, no petechial hemorrhages and no ischemic changes.   Skin: normal skin color and pigmentation, no rash, no venous stasis, no skin lesions, no skin ulcer and no xanthoma was observed.    LABS:   --------  03-27    143  |  109<H>  |  9   ----------------------------<  168<H>  4.3   |  27  |  0.65    Ca    8.5      27 Mar 2022 06:45  Phos  3.2     03-27  Mg     2.1     03-27                           12.1   6.21  )-----------( 106      ( 27 Mar 2022 06:45 )             37.0                   Radiology:

## 2022-03-27 NOTE — PROGRESS NOTE ADULT - SUBJECTIVE AND OBJECTIVE BOX
Patient is a 71y old  Male who presents with a chief complaint of AMS (26 Mar 2022 10:51)      SUBJECTIVE / OVERNIGHT EVENTS: No event overnight. Finished all breakfast. Denies any concerns.     Patient denies chest pain, SOB, abd pain, N/V, fever, chills, dysuria or any other complaints. All remainder ROS negative.     MEDICATIONS  (STANDING):  albuterol/ipratropium for Nebulization 3 milliLiter(s) Nebulizer every 6 hours  aspirin 325 milliGRAM(s) Oral daily  atorvastatin 40 milliGRAM(s) Oral at bedtime  dextrose 40% Gel 15 Gram(s) Oral once  dextrose 5%. 1000 milliLiter(s) (50 mL/Hr) IV Continuous <Continuous>  dextrose 5%. 1000 milliLiter(s) (100 mL/Hr) IV Continuous <Continuous>  dextrose 50% Injectable 25 Gram(s) IV Push once  dextrose 50% Injectable 12.5 Gram(s) IV Push once  dextrose 50% Injectable 25 Gram(s) IV Push once  donepezil 10 milliGRAM(s) Oral at bedtime  enoxaparin Injectable 30 milliGRAM(s) SubCutaneous every 24 hours  famotidine    Tablet 20 milliGRAM(s) Oral daily  glucagon  Injectable 1 milliGRAM(s) IntraMuscular once  insulin glargine Injectable (LANTUS) 8 Unit(s) SubCutaneous <User Schedule>  insulin lispro (ADMELOG) corrective regimen sliding scale   SubCutaneous three times a day before meals  memantine 5 milliGRAM(s) Oral daily  metoprolol tartrate 12.5 milliGRAM(s) Oral two times a day  piperacillin/tazobactam IVPB.. 3.375 Gram(s) IV Intermittent every 8 hours    MEDICATIONS  (PRN):  acetaminophen    Suspension .. 650 milliGRAM(s) Oral every 6 hours PRN Temp greater or equal to 38C (100.4F), Mild Pain (1 - 3)      I&O's Summary    26 Mar 2022 07:01  -  27 Mar 2022 07:00  --------------------------------------------------------  IN: 1600 mL / OUT: 0 mL / NET: 1600 mL      PHYSICAL EXAM:  Vital Signs Last 24 Hrs  T(C): 36.4 (27 Mar 2022 05:45), Max: 36.9 (26 Mar 2022 21:50)  T(F): 97.6 (27 Mar 2022 05:45), Max: 98.4 (26 Mar 2022 21:50)  HR: 60 (27 Mar 2022 05:45) (48 - 96)  BP: 115/75 (27 Mar 2022 05:45) (107/70 - 138/72)  BP(mean): --  RR: 18 (27 Mar 2022 05:45) (16 - 20)  SpO2: 97% (27 Mar 2022 05:45) (96% - 99%)    CONSTITUTIONAL: NAD, well-groomed. + Temporal wasting noted  ENMT: Moist oral mucosa, no pharyngeal injection or exudates  RESPIRATORY: Normal respiratory effort; lungs are clear to auscultation bilaterally  CARDIOVASCULAR: Regular rate and rhythm, normal S1 and S2, no murmur/rub/gallop; No lower extremity edema  ABDOMEN: Nontender to palpation, normoactive bowel sounds  PSYCH: mildly flat affect, refuse to answer AAO questions  NEUROLOGY: CN 2-12 are intact and symmetric; no gross sensory deficits;   SKIN: Stage 1-2 sacral decub ulcer, without drainage     LABS:                        12.1   6.21  )-----------( 106      ( 27 Mar 2022 06:45 )             37.0     03-27    143  |  109<H>  |  9   ----------------------------<  168<H>  4.3   |  27  |  0.65    Ca    8.5      27 Mar 2022 06:45  Phos  3.2     03-27  Mg     2.1     03-27        CAPILLARY BLOOD GLUCOSE      POCT Blood Glucose.: 155 mg/dL (27 Mar 2022 07:42)  POCT Blood Glucose.: 175 mg/dL (26 Mar 2022 21:01)  POCT Blood Glucose.: 199 mg/dL (26 Mar 2022 16:49)  POCT Blood Glucose.: 229 mg/dL (26 Mar 2022 11:38)      RADIOLOGY & ADDITIONAL TESTS:  Results Reviewed:   Imaging Personally Reviewed:  Electrocardiogram Personally Reviewed:

## 2022-03-28 ENCOUNTER — TRANSCRIPTION ENCOUNTER (OUTPATIENT)
Age: 72
End: 2022-03-28

## 2022-03-28 VITALS
HEART RATE: 76 BPM | TEMPERATURE: 97 F | DIASTOLIC BLOOD PRESSURE: 76 MMHG | RESPIRATION RATE: 18 BRPM | SYSTOLIC BLOOD PRESSURE: 119 MMHG | OXYGEN SATURATION: 100 %

## 2022-03-28 LAB
ANION GAP SERPL CALC-SCNC: 8 MMOL/L — SIGNIFICANT CHANGE UP (ref 5–17)
BUN SERPL-MCNC: 10 MG/DL — SIGNIFICANT CHANGE UP (ref 7–23)
CALCIUM SERPL-MCNC: 8.3 MG/DL — LOW (ref 8.4–10.5)
CHLORIDE SERPL-SCNC: 108 MMOL/L — SIGNIFICANT CHANGE UP (ref 96–108)
CO2 SERPL-SCNC: 28 MMOL/L — SIGNIFICANT CHANGE UP (ref 22–31)
CREAT SERPL-MCNC: 0.77 MG/DL — SIGNIFICANT CHANGE UP (ref 0.5–1.3)
EGFR: 96 ML/MIN/1.73M2 — SIGNIFICANT CHANGE UP
GLUCOSE BLDC GLUCOMTR-MCNC: 169 MG/DL — HIGH (ref 70–99)
GLUCOSE BLDC GLUCOMTR-MCNC: 242 MG/DL — HIGH (ref 70–99)
GLUCOSE SERPL-MCNC: 153 MG/DL — HIGH (ref 70–99)
HCT VFR BLD CALC: 36.3 % — LOW (ref 39–50)
HGB BLD-MCNC: 11.8 G/DL — LOW (ref 13–17)
MAGNESIUM SERPL-MCNC: 2 MG/DL — SIGNIFICANT CHANGE UP (ref 1.6–2.6)
MCHC RBC-ENTMCNC: 30.3 PG — SIGNIFICANT CHANGE UP (ref 27–34)
MCHC RBC-ENTMCNC: 32.5 GM/DL — SIGNIFICANT CHANGE UP (ref 32–36)
MCV RBC AUTO: 93.3 FL — SIGNIFICANT CHANGE UP (ref 80–100)
NRBC # BLD: 0 /100 WBCS — SIGNIFICANT CHANGE UP (ref 0–0)
PHOSPHATE SERPL-MCNC: 2.9 MG/DL — SIGNIFICANT CHANGE UP (ref 2.5–4.5)
PLATELET # BLD AUTO: 160 K/UL — SIGNIFICANT CHANGE UP (ref 150–400)
POTASSIUM SERPL-MCNC: 4.1 MMOL/L — SIGNIFICANT CHANGE UP (ref 3.5–5.3)
POTASSIUM SERPL-SCNC: 4.1 MMOL/L — SIGNIFICANT CHANGE UP (ref 3.5–5.3)
RBC # BLD: 3.89 M/UL — LOW (ref 4.2–5.8)
RBC # FLD: 14.1 % — SIGNIFICANT CHANGE UP (ref 10.3–14.5)
SODIUM SERPL-SCNC: 144 MMOL/L — SIGNIFICANT CHANGE UP (ref 135–145)
WBC # BLD: 5.5 K/UL — SIGNIFICANT CHANGE UP (ref 3.8–10.5)
WBC # FLD AUTO: 5.5 K/UL — SIGNIFICANT CHANGE UP (ref 3.8–10.5)

## 2022-03-28 PROCEDURE — 92611 MOTION FLUOROSCOPY/SWALLOW: CPT

## 2022-03-28 PROCEDURE — 83036 HEMOGLOBIN GLYCOSYLATED A1C: CPT

## 2022-03-28 PROCEDURE — 83735 ASSAY OF MAGNESIUM: CPT

## 2022-03-28 PROCEDURE — 85025 COMPLETE CBC W/AUTO DIFF WBC: CPT

## 2022-03-28 PROCEDURE — 71045 X-RAY EXAM CHEST 1 VIEW: CPT

## 2022-03-28 PROCEDURE — 85049 AUTOMATED PLATELET COUNT: CPT

## 2022-03-28 PROCEDURE — 83605 ASSAY OF LACTIC ACID: CPT

## 2022-03-28 PROCEDURE — 74177 CT ABD & PELVIS W/CONTRAST: CPT | Mod: MA

## 2022-03-28 PROCEDURE — 85027 COMPLETE CBC AUTOMATED: CPT

## 2022-03-28 PROCEDURE — 93005 ELECTROCARDIOGRAM TRACING: CPT

## 2022-03-28 PROCEDURE — 96361 HYDRATE IV INFUSION ADD-ON: CPT

## 2022-03-28 PROCEDURE — 94668 MNPJ CHEST WALL SBSQ: CPT

## 2022-03-28 PROCEDURE — 97110 THERAPEUTIC EXERCISES: CPT

## 2022-03-28 PROCEDURE — 81001 URINALYSIS AUTO W/SCOPE: CPT

## 2022-03-28 PROCEDURE — 96374 THER/PROPH/DIAG INJ IV PUSH: CPT

## 2022-03-28 PROCEDURE — 74230 X-RAY XM SWLNG FUNCJ C+: CPT | Mod: 26

## 2022-03-28 PROCEDURE — 74230 X-RAY XM SWLNG FUNCJ C+: CPT

## 2022-03-28 PROCEDURE — 82746 ASSAY OF FOLIC ACID SERUM: CPT

## 2022-03-28 PROCEDURE — 84100 ASSAY OF PHOSPHORUS: CPT

## 2022-03-28 PROCEDURE — 94640 AIRWAY INHALATION TREATMENT: CPT

## 2022-03-28 PROCEDURE — 97116 GAIT TRAINING THERAPY: CPT

## 2022-03-28 PROCEDURE — 94667 MNPJ CHEST WALL 1ST: CPT

## 2022-03-28 PROCEDURE — 87640 STAPH A DNA AMP PROBE: CPT

## 2022-03-28 PROCEDURE — 87641 MR-STAPH DNA AMP PROBE: CPT

## 2022-03-28 PROCEDURE — 94760 N-INVAS EAR/PLS OXIMETRY 1: CPT

## 2022-03-28 PROCEDURE — 80053 COMPREHEN METABOLIC PANEL: CPT

## 2022-03-28 PROCEDURE — 85730 THROMBOPLASTIN TIME PARTIAL: CPT

## 2022-03-28 PROCEDURE — 36415 COLL VENOUS BLD VENIPUNCTURE: CPT

## 2022-03-28 PROCEDURE — 76705 ECHO EXAM OF ABDOMEN: CPT

## 2022-03-28 PROCEDURE — U0005: CPT

## 2022-03-28 PROCEDURE — 71260 CT THORAX DX C+: CPT | Mod: MA

## 2022-03-28 PROCEDURE — 85610 PROTHROMBIN TIME: CPT

## 2022-03-28 PROCEDURE — 92610 EVALUATE SWALLOWING FUNCTION: CPT

## 2022-03-28 PROCEDURE — 87040 BLOOD CULTURE FOR BACTERIA: CPT

## 2022-03-28 PROCEDURE — 99239 HOSP IP/OBS DSCHRG MGMT >30: CPT

## 2022-03-28 PROCEDURE — 80048 BASIC METABOLIC PNL TOTAL CA: CPT

## 2022-03-28 PROCEDURE — 0225U NFCT DS DNA&RNA 21 SARSCOV2: CPT

## 2022-03-28 PROCEDURE — 92526 ORAL FUNCTION THERAPY: CPT

## 2022-03-28 PROCEDURE — 99285 EMERGENCY DEPT VISIT HI MDM: CPT

## 2022-03-28 PROCEDURE — 70450 CT HEAD/BRAIN W/O DYE: CPT | Mod: MA

## 2022-03-28 PROCEDURE — 82962 GLUCOSE BLOOD TEST: CPT

## 2022-03-28 PROCEDURE — 87086 URINE CULTURE/COLONY COUNT: CPT

## 2022-03-28 PROCEDURE — U0003: CPT

## 2022-03-28 PROCEDURE — 82607 VITAMIN B-12: CPT

## 2022-03-28 PROCEDURE — 97161 PT EVAL LOW COMPLEX 20 MIN: CPT

## 2022-03-28 RX ORDER — LORATADINE 10 MG/1
1 TABLET ORAL
Qty: 0 | Refills: 0 | DISCHARGE

## 2022-03-28 RX ORDER — METOPROLOL TARTRATE 50 MG
12.5 TABLET ORAL
Qty: 0 | Refills: 0 | DISCHARGE
Start: 2022-03-28

## 2022-03-28 RX ORDER — INSULIN GLARGINE 100 [IU]/ML
8 INJECTION, SOLUTION SUBCUTANEOUS
Qty: 0 | Refills: 0 | DISCHARGE
Start: 2022-03-28

## 2022-03-28 RX ORDER — ACETAMINOPHEN 500 MG
650 TABLET ORAL
Qty: 0 | Refills: 0 | DISCHARGE
Start: 2022-03-28

## 2022-03-28 RX ORDER — METFORMIN HYDROCHLORIDE 850 MG/1
500 TABLET ORAL DAILY
Refills: 0 | Status: DISCONTINUED | OUTPATIENT
Start: 2022-03-28 | End: 2022-03-28

## 2022-03-28 RX ORDER — METFORMIN HYDROCHLORIDE 850 MG/1
1 TABLET ORAL
Qty: 0 | Refills: 0 | DISCHARGE
Start: 2022-03-28

## 2022-03-28 RX ORDER — ACETAMINOPHEN 500 MG
2 TABLET ORAL
Qty: 0 | Refills: 0 | DISCHARGE

## 2022-03-28 RX ORDER — PIPERACILLIN AND TAZOBACTAM 4; .5 G/20ML; G/20ML
3.38 INJECTION, POWDER, LYOPHILIZED, FOR SOLUTION INTRAVENOUS
Qty: 0 | Refills: 0 | DISCHARGE
Start: 2022-03-28

## 2022-03-28 RX ADMIN — INSULIN GLARGINE 8 UNIT(S): 100 INJECTION, SOLUTION SUBCUTANEOUS at 12:33

## 2022-03-28 RX ADMIN — Medication 2: at 12:33

## 2022-03-28 RX ADMIN — Medication 3 MILLILITER(S): at 13:36

## 2022-03-28 RX ADMIN — MEMANTINE HYDROCHLORIDE 5 MILLIGRAM(S): 10 TABLET ORAL at 12:33

## 2022-03-28 RX ADMIN — Medication 3 MILLILITER(S): at 07:42

## 2022-03-28 RX ADMIN — PIPERACILLIN AND TAZOBACTAM 25 GRAM(S): 4; .5 INJECTION, POWDER, LYOPHILIZED, FOR SOLUTION INTRAVENOUS at 08:40

## 2022-03-28 RX ADMIN — FAMOTIDINE 20 MILLIGRAM(S): 10 INJECTION INTRAVENOUS at 12:33

## 2022-03-28 RX ADMIN — Medication 325 MILLIGRAM(S): at 12:32

## 2022-03-28 RX ADMIN — METFORMIN HYDROCHLORIDE 500 MILLIGRAM(S): 850 TABLET ORAL at 13:28

## 2022-03-28 RX ADMIN — PIPERACILLIN AND TAZOBACTAM 25 GRAM(S): 4; .5 INJECTION, POWDER, LYOPHILIZED, FOR SOLUTION INTRAVENOUS at 02:05

## 2022-03-28 RX ADMIN — ENOXAPARIN SODIUM 30 MILLIGRAM(S): 100 INJECTION SUBCUTANEOUS at 05:31

## 2022-03-28 RX ADMIN — Medication 1: at 08:38

## 2022-03-28 RX ADMIN — Medication 12.5 MILLIGRAM(S): at 08:39

## 2022-03-28 NOTE — DISCHARGE NOTE PROVIDER - DETAILS OF MALNUTRITION DIAGNOSIS/DIAGNOSES
This patient has been assessed with a concern for Malnutrition and was treated during this hospitalization for the following Nutrition diagnosis/diagnoses:     -  03/22/2022: Severe protein-calorie malnutrition   -  03/22/2022: Underweight (BMI < 19)

## 2022-03-28 NOTE — SWALLOW VFSS/MBS ASSESSMENT ADULT - UNSUCCESSFUL STRATEGIES TRIALED DURING PROCEDURE
Attempted chin tuck, pt unable to complete despite max cues due to reduced cognition; 1/2 teaspoon presentation resulted in penetration

## 2022-03-28 NOTE — SWALLOW VFSS/MBS ASSESSMENT ADULT - RECOMMENDED FEEDING/EATING TECHNIQUES
Small bites, encourage multiple swallows in between presentations of puree/allow for swallow between intakes/crush medication (when feasible)/maintain upright posture during/after eating for 30 mins/oral hygiene/position upright (90 degrees)

## 2022-03-28 NOTE — SWALLOW VFSS/MBS ASSESSMENT ADULT - RECOMMENDED CONSISTENCY
Consider Puree with No liquids with alternative means of hydration versus GOC discussion regarding pt/family wishes for nutrition/hydration.  Presentation and rx's discussed with Dr. Gregg MD in agreement with rx's and MD to discuss with pt's son.

## 2022-03-28 NOTE — DISCHARGE NOTE NURSING/CASE MANAGEMENT/SOCIAL WORK - PATIENT PORTAL LINK FT
You can access the FollowMyHealth Patient Portal offered by [facility] by registering at the following website: [email or web address]. By joining [facility]’s FollowMyHealth portal, you will also be able to view your health information using other applications (apps) compatible with our system.

## 2022-03-28 NOTE — PROGRESS NOTE ADULT - SUBJECTIVE AND OBJECTIVE BOX
Patient is a 71y old  Male who presents with a chief complaint of AMS (27 Mar 2022 20:14)    INTERVAL HPI/OVERNIGHT EVENTS:  pt confused, denies acute complaints    MEDICATIONS  (STANDING):  albuterol/ipratropium for Nebulization 3 milliLiter(s) Nebulizer every 6 hours  aspirin 325 milliGRAM(s) Oral daily  atorvastatin 40 milliGRAM(s) Oral at bedtime  dextrose 40% Gel 15 Gram(s) Oral once  dextrose 5%. 1000 milliLiter(s) (50 mL/Hr) IV Continuous <Continuous>  dextrose 5%. 1000 milliLiter(s) (100 mL/Hr) IV Continuous <Continuous>  dextrose 50% Injectable 25 Gram(s) IV Push once  dextrose 50% Injectable 12.5 Gram(s) IV Push once  dextrose 50% Injectable 25 Gram(s) IV Push once  donepezil 10 milliGRAM(s) Oral at bedtime  enoxaparin Injectable 30 milliGRAM(s) SubCutaneous every 24 hours  famotidine    Tablet 20 milliGRAM(s) Oral daily  glucagon  Injectable 1 milliGRAM(s) IntraMuscular once  insulin glargine Injectable (LANTUS) 8 Unit(s) SubCutaneous <User Schedule>  insulin lispro (ADMELOG) corrective regimen sliding scale   SubCutaneous three times a day before meals  memantine 5 milliGRAM(s) Oral daily  metoprolol tartrate 12.5 milliGRAM(s) Oral two times a day  piperacillin/tazobactam IVPB.. 3.375 Gram(s) IV Intermittent every 8 hours    MEDICATIONS  (PRN):  acetaminophen    Suspension .. 650 milliGRAM(s) Oral every 6 hours PRN Temp greater or equal to 38C (100.4F), Mild Pain (1 - 3)    Allergies  No Known Allergies    REVIEW OF SYSTEMS:  unable to obtain    Vital Signs Last 24 Hrs  T(C): 36.4 (28 Mar 2022 09:54), Max: 36.9 (27 Mar 2022 14:22)  T(F): 97.6 (28 Mar 2022 09:54), Max: 98.4 (27 Mar 2022 14:22)  HR: 86 (28 Mar 2022 09:54) (62 - 86)  BP: 100/64 (28 Mar 2022 09:54) (97/65 - 137/73)  BP(mean): --  RR: 18 (28 Mar 2022 09:54) (17 - 18)  SpO2: 99% (28 Mar 2022 09:54) (95% - 99%)    PHYSICAL EXAM:  GENERAL: NAD, cachectic   HEAD:  Atraumatic, Normocephalic  EYES: conjunctiva and sclera clear  ENMT: Moist mucous membranes  NECK: Supple, No JVD1  NERVOUS SYSTEM:  Alert & Oriented X1 (name), All 4 extremities mobile, no gross sensory deficits.   CHEST/LUNG: Clear to auscultation bilaterally;   HEART: Regular rate and rhythm; No murmurs, rubs, or gallops  ABDOMEN: Soft, Nontender, Nondistended; Bowel sounds present  EXTREMITIES:  2+ Peripheral Pulses, No clubbing, cyanosis, or edema      LABS:                        11.8   5.50  )-----------( 160      ( 28 Mar 2022 08:02 )             36.3     28 Mar 2022 08:02    144    |  108    |  10     ----------------------------<  153    4.1     |  28     |  0.77     Ca    8.3        28 Mar 2022 08:02  Phos  2.9       28 Mar 2022 08:02  Mg     2.0       28 Mar 2022 08:02      CAPILLARY BLOOD GLUCOSE  POCT Blood Glucose.: 169 mg/dL (28 Mar 2022 07:58)  POCT Blood Glucose.: 200 mg/dL (27 Mar 2022 21:02)  POCT Blood Glucose.: 189 mg/dL (27 Mar 2022 16:34)  POCT Blood Glucose.: 237 mg/dL (27 Mar 2022 11:51)    RADIOLOGY & ADDITIONAL TESTS:    Imaging Personally Reviewed:  [ ] YES     Consultant(s) Notes Reviewed:      Care Discussed with Consultants/Other Providers:    Advanced Directives: [ ] DNR  [ ] No feeding tube  [ ] MOLST in chart  [ ] MOLST completed today  [ ] Unknown

## 2022-03-28 NOTE — DISCHARGE NOTE PROVIDER - CARE PROVIDER_API CALL
Familia Coffman)  Internal Medicine  [address], [address], [place] [address]  Phone: [phone]  Fax: [phone]  Follow Up Time:

## 2022-03-28 NOTE — SWALLOW BEDSIDE ASSESSMENT ADULT - SLP GENERAL OBSERVATIONS
Pt received upright in bed A&A, Ox2, reduced cognition, follows simple commands, pain scale 0/10 pre & post eval
Pt received upright in bed on RA, A&A Ox1, reduced cognition, pain scale 0/10 pre & post eval

## 2022-03-28 NOTE — PROGRESS NOTE ADULT - ASSESSMENT
71M Alzheimer's Dementia, DM2, HTN, Afib, HLD, Paranoid personality disorder, GERD, Anxiety, sent from SNF for AMS, Fever, leukocytosis.  In ED found to have Hypernatremia, ROSALBA, failed dysphagia screen, seasonal corona virus.  Also with severe protein calorie malnutrition.    #sepsis (Fever, leukocytosis, ROSALBA, Metabolic encephalopathy) with unclear infection source  - positive for seasonal coronavirus  - blood, urine cultures NGTD  - RUQ sono showed distended GB without stones or obstruction  - As per ID 7 days Zosyn total for aspiration pneumonia - last dose today    # hypernatremia and ROSALBA  - ROSALBA resolved, Sodium normalized  - oral fluids  - monitor electrolytes    # Paroxysmal Afib/ HTN  - now in NSR  - cardiology consult appreciated. now off cardizem drip.    - continue lopressor 12.5 bid    #thrombocytopenia  - dropped from 139 -> 68 in 4 day  but now improved to 160  - no signs of bleeding  - B12 & Folate levels acceptable  - Heme eval appreciated - likely due to acute illness    #severe protein calorie malnutrition with possible dysphagia/ underweight/ cachexia   - started on dysphagia diet   - monitor oral intake  - MBS today    #DM2  - FS monitoring QAC/HS with lispro coverage  - Continue Lantus 8 units daily   - A1C = 8.2  - continue to monitor Glucose     #Dementia, anxiety, Paranoid personality disorder  - Mental status seems to be back at baseline  - metabolic encephalopathy likely due to combination of hypernatremia and infectious process - resolved  - restart Namenda and Aricept  - monitor for need to restart zyprexa, has not been agitated    #GERD  - Pepcid PO    #HLD  - Lipitor 40mg    #DVT proph  - Lovenox 30mg SQ daily    Palliative care consult for Goals of Care appreciated.  DNR/I.    DC planning to SNF

## 2022-03-28 NOTE — SWALLOW BEDSIDE ASSESSMENT ADULT - ASR SWALLOW ASPIRATION MONITOR
If any s/s aspiration noted, d/c PO & resume NPO with speech to reassess at bedside/change of breathing pattern/oral hygiene/position upright (90Y)/cough/gurgly voice/fever/pneumonia/throat clearing/upper respiratory infection
change of breathing pattern/oral hygiene/position upright (90Y)/cough/gurgly voice/fever/pneumonia/throat clearing/upper respiratory infection
change of breathing pattern/oral hygiene/position upright (90Y)/cough/gurgly voice/fever/pneumonia/throat clearing/upper respiratory infection

## 2022-03-28 NOTE — PROGRESS NOTE ADULT - ASSESSMENT
The patient is a 71 year old male with a history of HTN, HL, DM, paroxysmal atrial fibrillation, dementia who presents with AMS in the setting of severe dehydration, sepsis, possible PNA, viral infection.    Plan:  - Continue metoprolol tartrate 12.5 mg bid - sinus bradycardic at times  - Not a candidate for long term anticoagulation for AF given dementia and overall poor prognosis  - Hypernatremia resolved  - CT with mucoid impaction of bronchi  - Coronavirus (non-COVID) positive  - On zosyn for possible PNA

## 2022-03-28 NOTE — PROGRESS NOTE ADULT - SUBJECTIVE AND OBJECTIVE BOX
Chief Complaint: AMS    Interval Events: No events overnight.    Review of Systems:  Unable to obtain    Physical Exam:  Vital Signs Last 24 Hrs  T(C): 36.4 (28 Mar 2022 09:54), Max: 36.9 (27 Mar 2022 14:22)  T(F): 97.6 (28 Mar 2022 09:54), Max: 98.4 (27 Mar 2022 14:22)  HR: 86 (28 Mar 2022 09:54) (62 - 86)  BP: 100/64 (28 Mar 2022 09:54) (97/65 - 137/73)  BP(mean): --  RR: 18 (28 Mar 2022 09:54) (17 - 18)  SpO2: 99% (28 Mar 2022 09:54) (95% - 99%)  General: Cachectic  HEENT: Dry MM  Neck: No JVD, no carotid bruit  Lungs: CTAB  CV: RRR, nl S1/S2, no M/R/G  Abdomen: S/NT/ND, +BS  Extremities: No LE edema, no cyanosis  Neuro: AAOx0  Skin: No rash    Labs:    03-28    144  |  108  |  10  ----------------------------<  153<H>  4.1   |  28  |  0.77    Ca    8.3<L>      28 Mar 2022 08:02  Phos  2.9     03-28  Mg     2.0     03-28                          11.8   5.50  )-----------( 160      ( 28 Mar 2022 08:02 )             36.3         Telemetry: Sinus bradycardia

## 2022-03-28 NOTE — SWALLOW BEDSIDE ASSESSMENT ADULT - SLP PERTINENT HISTORY OF CURRENT PROBLEM
Per charting, "71 y.o male w/ PMHx of Afib, DMT2, HTN, HLD, paranoid personality disorder, anxiety, GERD,  Alzheimer's dementia who was BIBEMS from [facility] w/ fever and AMS x 2 days. Pt. had CXR at facility w/ no acute findings. Pt. given zosyn at SNF. Unable to obtain information from pt. 2/2 AMS. Will need admission to medicine for treatment and monitoring of hypernatremia, ROSALBA, sepsis and severe protein calorie malnutrition."
Per charting, "71 y.o male w/ PMHx of Afib, DMT2, HTN, HLD, paranoid personality disorder, anxiety, GERD,  Alzheimer's dementia who was BIBEMS from [facility] w/ fever and AMS x 2 days. Pt. had CXR at facility w/ no acute findings. Pt. given zosyn at SNF. Unable to obtain information from pt. 2/2 AMS. Will need admission to medicine for treatment and monitoring of hypernatremia, ROSALBA, sepsis and severe protein calorie malnutrition."
Pt seen for swallow evaluation 3/25/22, Rx Puree with Mildly thick liquids, and reassess at bedside, see report for details.
Swallow evaluations attempted this admission, 3/22, 3/23, however, unable to be completed due to secretions on 3/22 and lethargy 3/23, see reports for details.

## 2022-03-28 NOTE — SWALLOW BEDSIDE ASSESSMENT ADULT - SWALLOW EVAL: FEEDING ASSISTANCE
1:1 supervision and assistance with all PO
1:1 supervision and assistance with all PO due to reduced cognition

## 2022-03-28 NOTE — SWALLOW BEDSIDE ASSESSMENT ADULT - SPECIFY REASON(S)
f/u session to determine PO candidacy
to assess swallow function
Reassess swallow to determine candidacy for diet upgrade
R/O Dysphagia

## 2022-03-28 NOTE — SWALLOW VFSS/MBS ASSESSMENT ADULT - NS SWALLOW VFSS REC ASPIR MON
If any s/s aspiration with puree, d/c PO and defer continuation of PO to MD per pt/family wishes/change of breathing pattern/oral hygiene/position upright (90Y)/cough/gurgly voice/fever/pneumonia/throat clearing/upper respiratory infection

## 2022-03-28 NOTE — SWALLOW BEDSIDE ASSESSMENT ADULT - SWALLOW EVAL: DIAGNOSIS
Pt was unable to maintain adequate level of alertness despite persistent cueing for safe administration of PO trials. Pt with nonpurposeful movements to oral stim. Pt with mouth slightly open at rest, mild/sticky/white-tinged secretions noted to anterior portion of oral cavity. SLP attempted oral care; however, pt became orally defensive. Given current clinical presentation, pt is deemed an increased aspiration risk. Recommend that oral nutrition/hydration/medication is contraindicated, consider short-term non-oral means and GOC discussion. Will continue to follow to determine PO candidacy, as pt's clinical presentation improves. Discussed with Dr. Escobedo.
Mild-moderate oral dysphagia marked by reduced mastication with minced & moist, suspected posterior loss, suspected piecemeal deglutition across consistencies.  Suspect pharyngeal dysphagia with thin liquids due to +wet vocal quality post swallow.  Pharyngeal stage deemed WFL for puree, minced & moist, moderately thick and mildly thick liquids, no overt s/s aspiration noted.
Mild oral dysphagia marked by reduced mastication with minced & moist, slightly improved from 3/25 MBS, suspected posterior loss, suspected piecemeal deglutition across consistencies.  Suspect pharyngeal dysphagia with thin liquids due to +wet vocal quality post swallow.  Pharyngeal stage deemed WFL for puree, minced & moist and mildly thick liquids, no overt s/s aspiration noted.  Pt would benefit from MBS for objective view of pharyngeal stage to rule out aspiration and determine candidacy for diet advancement.

## 2022-03-28 NOTE — PROGRESS NOTE ADULT - PROVIDER SPECIALTY LIST ADULT
Infectious Disease
Infectious Disease
Nephrology
Cardiology
Cardiology
Heme/Onc
Hospitalist
Infectious Disease
Nephrology
Cardiology
Hospitalist
Hospitalist
Infectious Disease
Nephrology
Heme/Onc
Hospitalist
Cardiology
Hospitalist

## 2022-03-28 NOTE — SWALLOW VFSS/MBS ASSESSMENT ADULT - ROSENBEK'S PENETRATION ASPIRATION SCALE
(1) no aspiration, contrast does not enter airway during the swallow with head neutral; 8 = silent aspiration during and after the swallow with head neutral/(3) contrast remains above the vocal cords, visible residue remains (penetration) (3) contrast remains above the vocal cords, visible residue remains (penetration)

## 2022-03-28 NOTE — DISCHARGE NOTE PROVIDER - NSDCCPCAREPLAN_GEN_ALL_CORE_FT
PRINCIPAL DISCHARGE DIAGNOSIS  Diagnosis: Sepsis  Assessment and Plan of Treatment: resolved      SECONDARY DISCHARGE DIAGNOSES  Diagnosis: Pneumonia, aspiration  Assessment and Plan of Treatment: last dose Zosyn 3/28; dysphagia diet    Diagnosis: Deep tissue injury  Assessment and Plan of Treatment: continue cavilon daily  -Continue RIAZ BID and PRN for soiling  Patient is on a low air loss mattress  Continue  Offloading   Continue Pericare  Apply cair boots at all times while in bed.   Provide skin checks and foot placement q8h.    Diagnosis: Hypernatremia  Assessment and Plan of Treatment:

## 2022-03-28 NOTE — SWALLOW BEDSIDE ASSESSMENT ADULT - DIET PRIOR TO ADMI
regular solids with thin liquids, as per dietary consult
Regular with Thin per transfer paperwork from [facility]
Regular with Thin per transfer paperwork from [facility]

## 2022-03-28 NOTE — DISCHARGE NOTE PROVIDER - NSDCMRMEDTOKEN_GEN_ALL_CORE_FT
acetaminophen 325 mg oral tablet: 2 tab(s) orally every 6 hours, As Needed  Aricept 10 mg oral tablet: 1 tab(s) orally once a day (at bedtime)  aspirin 325 mg oral tablet: 1 tab(s) orally once a day  atorvastatin 40 mg oral tablet: 1 tab(s) orally once a day (at bedtime)  Claritin 10 mg oral tablet: 1 tab(s) orally once a day  dilTIAZem 240 mg/24 hours oral capsule, extended release: 1 cap(s) orally once a day  enoxaparin: 40 milligram(s) subcutaneous once a day  famotidine 20 mg oral tablet: 1 tab(s) orally once a day  insulin lispro 100 units/mL injectable solution:  injectable corrective regimen sliding scale   melatonin 5 mg oral tablet: 1 tab(s) orally once a day (at bedtime), As needed, Insomnia  metoprolol tartrate 50 mg oral tablet: 1 tab(s) orally 2 times a day  Namenda 5 mg oral tablet: 1 tab(s) orally 2 times a day  OLANZapine 10 mg oral tablet, disintegratin tab(s) orally 3 times a day   acetaminophen 160 mg/5 mL oral suspension: 650 milligram(s) orally every 6 hours, As Needed - 3)  Aricept 10 mg oral tablet: 1 tab(s) orally once a day (at bedtime)  aspirin 325 mg oral tablet: 1 tab(s) orally once a day  atorvastatin 40 mg oral tablet: 1 tab(s) orally once a day (at bedtime)  enoxaparin: 40 milligram(s) subcutaneous once a day  famotidine 20 mg oral tablet: 1 tab(s) orally once a day  insulin glargine 100 units/mL subcutaneous solution: 8 unit(s) subcutaneous once a day  melatonin 5 mg oral tablet: 1 tab(s) orally once a day (at bedtime), As needed, Insomnia  metFORMIN 500 mg oral tablet: 1 tab(s) orally once a day  metoprolol: 12.5 milligram(s) orally 2 times a day  Namenda 5 mg oral tablet: 1 tab(s) orally 2 times a day  piperacillin-tazobactam: 3.375 milligram(s) intravenous once

## 2022-03-28 NOTE — PROGRESS NOTE ADULT - SUBJECTIVE AND OBJECTIVE BOX
JODI DUBOSE is a 71yMale , patient examined and chart reviewed.    INTERVAL HPI/ OVERNIGHT EVENTS:   Afebrile. NAD. No events.  Awake.    PAST MEDICAL & SURGICAL HISTORY:  DM (diabetes mellitus)  Afib  Hyperlipidemia  Hypertension  Alzheimer disease  Dementia  Anxiety  Paranoid personality disorder  GERD (gastroesophageal reflux disease)      For details regarding the patient's social history, family history, and other miscellaneous elements, please refer the initial infectious diseases consultation and/or the admitting history and physical examination for this admission.    ROS:  Unable to obtain due to : Dementia    Current inpatient medications :    ANTIBIOTICS/RELEVANT:  piperacillin/tazobactam IVPB.. 3.375 Gram(s) IV Intermittent every 8 hours    MEDICATIONS  (STANDING):  albuterol/ipratropium for Nebulization 3 milliLiter(s) Nebulizer every 6 hours  aspirin 325 milliGRAM(s) Oral daily  atorvastatin 40 milliGRAM(s) Oral at bedtime  dextrose 40% Gel 15 Gram(s) Oral once  dextrose 5%. 1000 milliLiter(s) (50 mL/Hr) IV Continuous <Continuous>  dextrose 5%. 1000 milliLiter(s) (100 mL/Hr) IV Continuous <Continuous>  dextrose 50% Injectable 25 Gram(s) IV Push once  dextrose 50% Injectable 12.5 Gram(s) IV Push once  dextrose 50% Injectable 25 Gram(s) IV Push once  donepezil 10 milliGRAM(s) Oral at bedtime  enoxaparin Injectable 30 milliGRAM(s) SubCutaneous every 24 hours  famotidine    Tablet 20 milliGRAM(s) Oral daily  glucagon  Injectable 1 milliGRAM(s) IntraMuscular once  insulin glargine Injectable (LANTUS) 8 Unit(s) SubCutaneous <User Schedule>  insulin lispro (ADMELOG) corrective regimen sliding scale   SubCutaneous three times a day before meals  memantine 5 milliGRAM(s) Oral daily  metFORMIN 500 milliGRAM(s) Oral daily  metoprolol tartrate 12.5 milliGRAM(s) Oral two times a day    MEDICATIONS  (PRN):  acetaminophen    Suspension .. 650 milliGRAM(s) Oral every 6 hours PRN Temp greater or equal to 38C (100.4F), Mild Pain (1 - 3)        Objective:  Vital Signs Last 24 Hrs  T(C): 36.4 (28 Mar 2022 09:54), Max: 36.9 (27 Mar 2022 14:22)  T(F): 97.6 (28 Mar 2022 09:54), Max: 98.4 (27 Mar 2022 14:22)  HR: 86 (28 Mar 2022 09:54) (62 - 86)  BP: 100/64 (28 Mar 2022 09:54) (97/65 - 137/73)  RR: 18 (28 Mar 2022 09:54) (17 - 18)  SpO2: 99% (28 Mar 2022 09:54) (95% - 99%)      Physical Exam:  General: no acute distress weak frail  Neck: supple, trachea midline  Lungs: Decreased, no wheeze/rhonchi  Cardiovascular: regular rate and rhythm, S1 S2  Abdomen: soft, nontender,  bowel sounds normal  Neurological: Dementia awake  Extremities: no edema      LABS:                        11.8   5.50  )-----------( 160      ( 28 Mar 2022 08:02 )             36.3   03-28    144  |  108  |  10  ----------------------------<  153<H>  4.1   |  28  |  0.77    Ca    8.3<L>      28 Mar 2022 08:02  Phos  2.9     03-28  Mg     2.0     03-28    MICROBIOLOGY:    Culture - Urine (collected 21 Mar 2022 21:42)  Source: Clean Catch Clean Catch (Midstream)  Final Report (22 Mar 2022 17:30):    No growth    Culture - Blood (collected 21 Mar 2022 18:16)  Source: .Blood Blood-Peripheral  Preliminary Report (22 Mar 2022 19:02):    No growth to date.    Culture - Blood (collected 21 Mar 2022 18:16)  Source: .Blood Blood-Peripheral  Preliminary Report (22 Mar 2022 19:02):    No growth to date.    Respiratory Viral Panel with COVID-19 by SANDRA (03.21.22 @ 13:22)    Rapid RVP Result: Detected    SARS-CoV-2: NotDete: This Respiratory Panel uses polymerase chain reaction (PCR) to detect for  adenovirus; coronavirus (HKU1, NL63, 229E, OC43); human metapneumovirus  (hMPV); human enterovirus/rhinovirus (Entero/RV); influenza A; influenza  A/H1; influenza A/H3; influenza A/H1-2009; influenza B; parainfluenza  viruses 1, 2, 3, 4; respiratory syncytial virus; Mycoplasma pneumoniae;  Chlamydophila pneumoniae; and SARS-CoV-2.    Coronavirus (229E,HKU1,NL63,OC43): Detected    RADIOLOGY & ADDITIONAL STUDIES:    ACC: [id number] EXAM:  CT ABDOMEN AND PELVIS IC                        ACC: [id number] EXAM:  CT CHEST IC                          PROCEDURE DATE:  03/21/2022          INTERPRETATION:  CLINICAL INFORMATION: Fever, sepsis. Altered mental   status.    COMPARISON: None.    CONTRAST/COMPLICATIONS:  IV Contrast: Omnipaque 350 (accession [id number]), IV contrast documented   in associated exam (accession [id number])  90 cc administered (accession   [id number]), 0 cc administered (accession [id number])   10 cc discarded   (accession [id number]), 0 cc discarded (accession [id number])  Oral Contrast: NONE  Complications: None reported at time of study completion    PROCEDURE:  CT of the Chest, Abdomen and Pelvis was performed.  Sagittal and coronal reformats were performed.    FINDINGS:    CHEST:    LUNGS AND LARGE AIRWAYS: PLEURA:  There is mucoid impaction distal trachea, right mainstem bronchus and   right lower lobe bronchus.    There is no lobar lung consolidation.  No pleural effusion or pneumothorax.    Tiny calcified granuloma periphery right middle lobe.    VESSELS:  Atherosclerotic changes thoracic aorta and coronary artery calcifications.    HEART: Heart size is normal. No pericardial effusion.    MEDIASTINUM AND MADYSON:  No enlarged external lymphadenopathy.    CHEST WALL AND LOWER NECK: Within normal limits.    ABDOMEN AND PELVIS:  Respiratory motion artifact degrades image quality.    LIVER: Within normal limits.  BILE DUCTS: Normal caliber.  GALLBLADDER: Within normal limits.  SPLEEN: Within normal limits.  PANCREAS: Within normal limits.  ADRENALS: Within normal limits.  KIDNEYS/URETERS:  Right renal cyst.  No hydronephrosis.    BLADDER: Possible mild circumferential bladder wall thickening, correlate   clinically for cystitis.  There is punctate foci of air within the nondependent bladder wall.  REPRODUCTIVE ORGANS: Heterogeneous enlargement of the prostate gland.    BOWEL: Evaluation of the stomach is limited without distention.  Generalized colonic fecal retention with moderate bowel distention.  No bowel obstruction.  The appendix is not clearly visualized on this exam.  PERITONEUM: No ascites.    VESSELS: Atherosclerotic changes.  RETROPERITONEUM/LYMPH NODES: No lymphadenopathy.    ABDOMINAL WALL: Within normal limits.    BONES:  Chronic fracture deformity right inferior and superior pubic rami and   superior puboacetabular junction.  Probable chronic right sacral insufficiency fracture.    IMPRESSION:    Mucoid impaction distal trachea, right mainstem and right lower lobe   bronchus.    Enlarged prostate with heterogeneous enhancement, correlate clinically   for prostatitis.  Possible mild bladder wall thickening with multiple foci of air within   the nondependent bladder wall, correlate for infection.    Assessment :   72YO M PMHx of Afib, DMT2, HTN, HLD, paranoid personality disorder, anxiety, GERD,  Alzheimer's dementia resident of [facility] admitted with sepsis sec aspiration pneumonia  CT CAP with Mucus plugging and RLL pneumonia  Sp fever   WBC normalized  RVP + coronavirus ( NOT covid 19 )   Severe dehydration/ hypernatremia - resolved  Clinically improving    Plan :   Cont Zosyn x 7 days till 3/28/22 ( day 7/7 today )  Fu cultures- NGTD  Trend temps and cbc  Strict Asp precautions  Pulm toileting  Increase activity  Stable from ID standpoint  Dc planning    Continue with present regiment.  Appropriate use of antibiotics and adverse effects reviewed.    > 35 minutes were spent in direct patient care reviewing notes, medications ,labs data/ imaging , discussion with multidisciplinary team.    Thank you for allowing me to participate in care of your patient .    Rimma Rizo MD  Infectious Disease  [phone]

## 2022-03-28 NOTE — SWALLOW VFSS/MBS ASSESSMENT ADULT - DIAGNOSTIC IMPRESSIONS
Mild-moderate oral dysphagia marked by reduced lingual ROM/strength/coordination with +resultant posterior loss and piecemeal deglutition across consistencies.  Mild-moderate pharyngeal dysphagia with puree marked by reduced tongue base retraction, reduced hyolaryngeal excursion, +mild-moderate stasis in valleculae and mild stasis in pyriform sinuses observed to reduce with subsequent swallows.  No penetration/aspiration observed.  Severe pharyngeal dysphagia with mildly thick liquids marked by reduced tongue base retraction, reduced hyolaryngeal excursion, reduced laryngeal elevation, reduced airway closure, +mild stasis in valleculae and pyriform sinuses observed to reduce with subsequent swallows, +silent penetration above the vocal folds without clearance during and after the swallow, with head neutral, +silent aspiration during and after the swallow, with head neutral.  Attempted chin tuck posture, however, pt unable to complete despite max modality cues due to reduced cognition.  Reduction in bolus volume to 1/2 teaspoon presentation still resulted in silent penetration above the vocal folds without clearance.  Mild-moderate pharyngeal dysphagia with moderately thick liquids marked by reduced tongue base retraction, reduced hyolaryngeal excursion, reduced laryngeal elevation, reduced airway closure, +mild stasis in valleculae and pyriform sinuses observed to reduce with subsequent swallows, +silent penetration above the vocal folds without clearance during and after the swallow, with head neutral.  Attempted chin tuck posture, however, pt unable to complete despite max modality cues due to reduced cognition.  Reduction in bolus volume to 1/2 teaspoon presentation still resulted in silent penetration above the vocal folds without clearance.  Pt at risk for aspiration with all liquids.

## 2022-03-28 NOTE — DISCHARGE NOTE PROVIDER - HOSPITAL COURSE
71M Alzheimer's Dementia, DM2, HTN, Afib, HLD, Paranoid personality disorder, GERD, Anxiety, sent from SNF for AMS, Fever, leukocytosis.  In ED found to have Hypernatremia, ROSALBA, failed dysphagia screen, seasonal corona virus.  Also with severe protein calorie malnutrition.  Admitted to telemetry.     #sepsis (Fever, leukocytosis, ROSALBA, Metabolic encephalopathy) initially unclear infection source but most likely aspiration pneumonia.  - positive for seasonal coronavirus  - CT chest showed Mucoid impaction distal trachea, right mainstem and right lower lobe bronchus.  - blood, urine cultures NGTD  - RUQ sono showed distended GB without stones or obstruction  - As per ID 7 days Zosyn total for aspiration pneumonia     # hypernatremia and ROSALBA  - Na 172, Creat 1.28 (baseline creat ~0.6)  - ROSALBA resolved, Sodium normalized with aggressive hypotonic IVF hdyration  - oral fluids  - monitor electrolytes    # Paroxysmal Afib/ HTN  - was in Afib with RVR -likely because wasn't able to take PO meds  - Converted to NSR  - cardiology consult appreciated. now off cardizem drip.    - continue lopressor 12.5 bid    #thrombocytopenia  - dropped from 139 -> 68 in 4 days  but now improved to 160  - no signs of bleeding  - B12 & Folate levels acceptable  - Heme eval appreciated - likely due to acute illness    #severe protein calorie malnutrition with possible dysphagia/ underweight/ cachexia   - started on dysphagia diet   - monitor oral intake  - MBS silently aspirating on all fluid consistencies.  Needs puree diet.     #DM2  - FS monitoring QAC/HS with lispro coverage  - Continue Lantus 8 units daily; restart metformin  - A1C = 8.2  - continue to monitor Glucose     #Dementia, anxiety, Paranoid personality disorder  - Mental status seems to be back at baseline  - metabolic encephalopathy likely due to combination of hypernatremia and infectious process - resolved  - restart Namenda and Aricept  - monitor for need to restart zyprexa, has not been agitated    #GERD  - Pepcid PO    #HLD  - Lipitor 40mg    #DVT proph  - Lovenox 30mg SQ daily    Palliative care consult for Goals of Care appreciated.  DNR/I.     71M Alzheimer's Dementia, DM2, HTN, Afib, HLD, Paranoid personality disorder, GERD, Anxiety, sent from SNF for AMS, Fever, leukocytosis.  In ED found to have Hypernatremia, ROSALBA, failed dysphagia screen, seasonal corona virus.  Also with severe protein calorie malnutrition.  Admitted to telemetry.     #sepsis (Fever, leukocytosis, ROSALBA, Metabolic encephalopathy) initially unclear infection source but most likely aspiration pneumonia.  - positive for seasonal coronavirus  - CT chest showed Mucoid impaction distal trachea, right mainstem and right lower lobe bronchus.  - blood, urine cultures NGTD  - RUQ sono showed distended GB without stones or obstruction  - As per ID 7 days Zosyn total for aspiration pneumonia     # hypernatremia and ROSALBA  - Na 172, Creat 1.28 (baseline creat ~0.6)  - ROSALBA resolved, Sodium normalized with aggressive hypotonic IVF hdyration  - oral fluids  - monitor electrolytes    # Paroxysmal Afib/ HTN  - was in Afib with RVR -likely because wasn't able to take PO meds  - Converted to NSR  - cardiology consult appreciated. now off cardizem drip.    - continue lopressor 12.5 bid    #thrombocytopenia  - dropped from 139 -> 68 in 4 days  but now improved to 160  - no signs of bleeding  - B12 & Folate levels acceptable  - Heme eval appreciated - likely due to acute illness    #severe protein calorie malnutrition with possible dysphagia/ underweight/ cachexia   - started on dysphagia diet   - monitor oral intake  - MBS silently aspirating on all fluid consistencies.  Needs puree diet. d/w Son risks or benefits of PEG placement and continued aspiration.  He is refusing PEG at this time.  continue oral feeds with understanding of risk of recurrent pneumonia and sepsis that could lead to death.      #DM2  - FS monitoring QAC/HS with lispro coverage  - Continue Lantus 8 units daily; restart metformin  - A1C = 8.2  - continue to monitor Glucose     #Dementia, anxiety, Paranoid personality disorder  - Mental status seems to be back at baseline  - metabolic encephalopathy likely due to combination of hypernatremia and infectious process - resolved  - restart Namenda and Aricept  - monitor for need to restart zyprexa, has not been agitated    #GERD  - Pepcid PO    #HLD  - Lipitor 40mg    #DVT proph  - Lovenox 30mg SQ daily    Palliative care consult for Goals of Care appreciated.  DNR/I.

## 2022-03-28 NOTE — SWALLOW BEDSIDE ASSESSMENT ADULT - ORAL PHASE
Suspect posterior loss, suspect piecemeal deglutition
Suspect posterior loss, suspect piecemeal deglutition

## 2022-03-28 NOTE — SWALLOW BEDSIDE ASSESSMENT ADULT - SWALLOW EVAL: RECOMMENDED DIET
Puree with Mildly thick liquids, as tolerated
1. Oral nutrition/hydration/medication is contraindicated 2. Consider short-term non-oral means of nutrition/hydration/medication to ensure pt is meeting adequate daily caloric needs and reduce risk of aspiration 3. Consider GOC discussion with pt/family re: nutritional intake
Puree with Mildly thick liquids, as tolerated

## 2022-03-28 NOTE — SWALLOW BEDSIDE ASSESSMENT ADULT - SWALLOW EVAL: RECOMMENDED FEEDING/EATING TECHNIQUES
allow for swallow between intakes/check mouth frequently for oral residue/pocketing/crush medication (when feasible)/maintain upright posture during/after eating for 30 mins/oral hygiene/position upright (90 degrees)/small sips/bites
allow for swallow between intakes/check mouth frequently for oral residue/pocketing/crush medication (when feasible)/maintain upright posture during/after eating for 30 mins/oral hygiene/position upright (90 degrees)/small sips/bites

## 2022-03-28 NOTE — SWALLOW VFSS/MBS ASSESSMENT ADULT - ADDITIONAL RECOMMENDATIONS
1) GOC discussion regarding pt/family wishes for nutrition/hydration   2) Speech to follow for POC/GOC

## 2022-03-28 NOTE — SWALLOW VFSS/MBS ASSESSMENT ADULT - SLP PERTINENT HISTORY OF CURRENT PROBLEM
Pt seen for swallow reassessment this morning, Rx Puree with Mildly thick liquids and MBS to rule out aspiration and candidacy for diet advancement.

## 2022-03-28 NOTE — SWALLOW VFSS/MBS ASSESSMENT ADULT - COMMENTS
Chart reviewed order received for MBS.  Pt received as above.  MBS completed see below for details.  Pt left as received NAD ANGELA Rosenthal notified.  Presentation and rx's discussed with Dr. Gregg MD in agreement with rx's and MD to discuss with pt's son.  Will follow for POC/GOC.    As per charting, pt is a "71 y.o male w/ PMHx of Afib, DMT2, HTN, HLD, paranoid personality disorder, anxiety, GERD,  Alzheimer's dementia who was BIBEMS from [facility] w/ fever and AMS x 2 days. Pt. had CXR at facility w/ no acute findings. Pt. given zosyn at SNF. Unable to obtain information from pt. 2/2 AMS. Will need admission to medicine for treatment and monitoring of hypernatremia, ROSALBA, sepsis and severe protein calorie malnutrition."    CT Chest 3/21/22: "Mucoid impaction distal trachea, right mainstem and right lower lobe bronchus.  Enlarged prostate with heterogeneous enhancement, correlate clinically for prostatitis.  Possible mild bladder wall thickening with multiple foci of air within the nondependent bladder wall, correlate for infection."    WBC 3/28/22: "5.50"

## 2022-03-28 NOTE — SWALLOW VFSS/MBS ASSESSMENT ADULT - SLP GENERAL OBSERVATIONS
Pt received in Radiology upright on cine chair, on RA, A&A Ox1, reduced cognition, pain scale 0/10 pre & post MBS

## 2022-03-28 NOTE — SWALLOW BEDSIDE ASSESSMENT ADULT - COMMENTS
Chart reviewed pt seen to reassess swallow.  Pt received upright in bed on RA, A&A Ox1, reduced cognition, pain scale 0/10 pre & post eval.  Swallow reassessment completed see below for details.  Pt educated, ongoing education due to reduced cognition, ptleft as received NAD ANGELA Rosenthal & Dr. Escobedo notified.  Will follow for MBS this morning.     As per charting, pt is a "71 y.o male w/ PMHx of Afib, DMT2, HTN, HLD, paranoid personality disorder, anxiety, GERD,  Alzheimer's dementia who was BIBEMS from [facility] w/ fever and AMS x 2 days. Pt. had CXR at facility w/ no acute findings. Pt. given zosyn at SNF. Unable to obtain information from pt. 2/2 AMS. Will need admission to medicine for treatment and monitoring of hypernatremia, ROSALBA, sepsis and severe protein calorie malnutrition."    CT Chest 3/21/22: "Mucoid impaction distal trachea, right mainstem and right lower lobe bronchus.  Enlarged prostate with heterogeneous enhancement, correlate clinically for prostatitis.  Possible mild bladder wall thickening with multiple foci of air within the nondependent bladder wall, correlate for infection." Chart reviewed pt seen to reassess swallow.  Pt received upright in bed on RA, A&A Ox1, reduced cognition, pain scale 0/10 pre & post eval.  Swallow reassessment completed see below for details.  Pt educated, ongoing education due to reduced cognition, ptleft as received NAD ANGELA Rosenthal & Dr. Escobedo notified.  Will follow for MBS this morning.     As per charting, pt is a "71 y.o male w/ PMHx of Afib, DMT2, HTN, HLD, paranoid personality disorder, anxiety, GERD,  Alzheimer's dementia who was BIBEMS from [facility] w/ fever and AMS x 2 days. Pt. had CXR at facility w/ no acute findings. Pt. given zosyn at SNF. Unable to obtain information from pt. 2/2 AMS. Will need admission to medicine for treatment and monitoring of hypernatremia, ROSALBA, sepsis and severe protein calorie malnutrition."    CT Chest 3/21/22: "Mucoid impaction distal trachea, right mainstem and right lower lobe bronchus.  Enlarged prostate with heterogeneous enhancement, correlate clinically for prostatitis.  Possible mild bladder wall thickening with multiple foci of air within the nondependent bladder wall, correlate for infection."    WBC 3/28/22: "5.50"

## 2022-03-28 NOTE — DISCHARGE NOTE NURSING/CASE MANAGEMENT/SOCIAL WORK - NSDCPEFALRISK_GEN_ALL_CORE
For information on Fall & Injury Prevention, visit: [email or web address] OR  [email or web address] OR  https://www.cdc.gov/steadi/patient.html

## 2022-05-20 NOTE — BEHAVIORAL HEALTH ASSESSMENT NOTE - NSBHCONSULTOBSREASON_PSY_A_CORE FT
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 Weight:   Wt Readings from Last 1 Encounters:   05/17/22 200 lb (90.7 kg)       TSH:    Lab Results   Component Value Date    TSH 5.890 05/12/2022       Hepatic Function Panel:                            Lab Results   Component Value Date    ALKPHOS 112 05/17/2022    ALT 25 05/17/2022    AST 18 05/17/2022    PROT 7.0 05/17/2022    BILITOT 0.2 05/17/2022    BILIDIR 0.0 03/10/2022    LABALBU 4.1 05/17/2022       Current warfarin drug-drug interactions include: No significant interactions    Recent Labs     05/18/22  0427 05/19/22  0609 05/20/22  0614   HGB 9.6* 10.5* 11.0*    352 316     Date Warfarin Dose INR Heparin or LMWH Comment   05/18 7.5 mg 1.1 Heparin Gtt Factor V Leiden   5/19 10 mg 1 Heparin gtt    5/20 10 mg 1.1 Heparin gtt                    Assessment:  · Patient is a 50 y.o. male on warfarin for History of  VTE/PE and Factor V Leiden. Patient's home warfarin dosing regimen is 7.5mg nightly. · Goal INR 2 - 3  · INR 1.1  today, subtherapeutic aptt 43.1 with heparin gtt., monitor drug- drug interaction Yasmine Berrios) with doxycycline addition onset 2-5 days.     Plan:  · Warfarin 10 mg tonight x one today  · Daily PT/INR until the INR is stable within the therapeutic range  · Pharmacist will follow and monitor/adjust dosing as necessary    Thank you for this consult,    Wesly Benedict, [facility] 5/20/2022 9:18 AM Patient is unpredictable and can get combative

## 2022-06-27 NOTE — PROGRESS NOTE ADULT - PROBLEM/PLAN-7
--Continue ointment for 1 more week over incision site.   --In one week you may begin to massage incision area like Dr. Davis showed you in office, for thirty to forty seconds, at least twice a day.   --Apply sunscreen when exposed to sunlight to prevent discoloration during the first year of healing. If you would like to use a over the counter product  for scar revision we recommend using a silicone gel based one.   --Silicone scar gel recommended (sheets or topical ointment).    --Follow up with Dr. Davis in 3-4 months to recheck the scar.    DISPLAY PLAN FREE TEXT

## 2022-08-29 NOTE — ED ADULT NURSE NOTE - CHPI ED NUR AGGRAVATING FX

## 2022-08-31 NOTE — PROGRESS NOTE ADULT - SUBJECTIVE AND OBJECTIVE BOX
Patient is currently using daily soft CTL. She can continue to wear when playing sports.     Interval History:    CENTRAL LINE:   [  ] YES       [  ] NO  [place]:                 [  ] YES       [  ] NO         REVIEW OF SYSTEMS:  All Systems below were reviewed and are negative [  ]  HEENT:  ID:  Pulmonary:  Cardiac:  GI:  Renal:  Musculoskeletal:  All other systems above were reviewed and are negative   [  ]      MEDICATIONS  (STANDING):  aspirin 325 milliGRAM(s) Oral daily  atorvastatin 40 milliGRAM(s) Oral at bedtime  dextrose 40% Gel 15 Gram(s) Oral once  dextrose 5% + sodium chloride 0.45% with potassium chloride 20 mEq/L 1000 milliLiter(s) (60 mL/Hr) IV Continuous <Continuous>  dextrose 5%. 1000 milliLiter(s) (50 mL/Hr) IV Continuous <Continuous>  dextrose 5%. 1000 milliLiter(s) (100 mL/Hr) IV Continuous <Continuous>  dextrose 50% Injectable 25 Gram(s) IV Push once  dextrose 50% Injectable 12.5 Gram(s) IV Push once  dextrose 50% Injectable 25 Gram(s) IV Push once  diltiazem    milliGRAM(s) Oral daily  donepezil 10 milliGRAM(s) Oral at bedtime  famotidine    Tablet 20 milliGRAM(s) Oral daily  glucagon  Injectable 1 milliGRAM(s) IntraMuscular once  heparin   Injectable 5000 Unit(s) SubCutaneous every 12 hours  insulin lispro (ADMELOG) corrective regimen sliding scale   SubCutaneous three times a day before meals  loratadine 10 milliGRAM(s) Oral daily  memantine 5 milliGRAM(s) Oral two times a day  metoprolol tartrate 25 milliGRAM(s) Oral two times a day  OLANZapine Disintegrating Tablet 10 milliGRAM(s) Oral three times a day    MEDICATIONS  (PRN):  acetaminophen   Tablet .. 650 milliGRAM(s) Oral every 6 hours PRN Temp greater or equal to 38C (100.4F), Mild Pain (1 - 3)  LORazepam   Injectable 0.5 milliGRAM(s) IV Push every 6 hours PRN SEVERE AGITATION  melatonin 5 milliGRAM(s) Oral at bedtime PRN Insomnia  OLANZapine Injectable 5 milliGRAM(s) IntraMuscular every 6 hours PRN agitation      Vital Signs Last 24 Hrs  T(C): 36.7 (13 Nov 2020 13:50), Max: 36.8 (12 Nov 2020 21:52)  T(F): 98 (13 Nov 2020 13:50), Max: 98.3 (12 Nov 2020 21:52)  HR: 80 (13 Nov 2020 18:25) (72 - 80)  BP: 92/68 (13 Nov 2020 18:25) (92/68 - 158/77)  BP(mean): --  RR: 18 (13 Nov 2020 13:50) (18 - 18)  SpO2: 97% (13 Nov 2020 18:25) (96% - 98%)    I&O's Summary    12 Nov 2020 07:01  -  13 Nov 2020 07:00  --------------------------------------------------------  IN: 710 mL / OUT: 0 mL / NET: 710 mL    13 Nov 2020 07:01  -  13 Nov 2020 19:31  --------------------------------------------------------  IN: 0 mL / OUT: 350 mL / NET: -350 mL        PHYSICAL EXAM:  HEENT: NC/AT; PERRLA  Neck: Soft; no tenderness  Lungs: CTA bilaterally; no wheezing.   Heart:  Abdomen:  Genital/ Rectal:  Extremities:  Neurologic:  Vascular:      LABORATORY:    CBC Full  -  ( 12 Nov 2020 08:50 )  WBC Count : 4.94 K/uL  RBC Count : 3.91 M/uL  Hemoglobin : 12.1 g/dL  Hematocrit : 35.4 %  Platelet Count - Automated : 178 K/uL  Mean Cell Volume : 90.5 fl  Mean Cell Hemoglobin : 30.9 pg  Mean Cell Hemoglobin Concentration : 34.2 gm/dL  Auto Neutrophil # : x  Auto Lymphocyte # : x  Auto Monocyte # : x  Auto Eosinophil # : x  Auto Basophil # : x  Auto Neutrophil % : x  Auto Lymphocyte % : x  Auto Monocyte % : x  Auto Eosinophil % : x  Auto Basophil % : x      ESR:                   11-11 @ 05:52  --    C-Reactive Protein:     11-11 @ 05:52  --    Procalcitonin:           11-11 @ 05:52   <0.05  ESR:                   11-10 @ 15:33  --    C-Reactive Protein:     11-10 @ 15:33  1.24    Procalcitonin:           11-10 @ 15:33   --  ESR:                   11-10 @ 09:41  --    C-Reactive Protein:     11-10 @ 09:41  --    Procalcitonin:           11-10 @ 09:41   <0.05      11-12    144  |  110<H>  |  10  ----------------------------<  106<H>  3.4<L>   |  24  |  0.65    Ca    8.5      12 Nov 2020 08:50  Phos  3.1     11-12  Mg     1.7     11-12            Assessment and Plan:          Droian Ruiz MD   [phone]. He is awake. Comfortable.  No fevers.      MEDICATIONS  (STANDING):  aspirin 325 milliGRAM(s) Oral daily  atorvastatin 40 milliGRAM(s) Oral at bedtime  dextrose 40% Gel 15 Gram(s) Oral once  dextrose 5% + sodium chloride 0.45% with potassium chloride 20 mEq/L 1000 milliLiter(s) (60 mL/Hr) IV Continuous <Continuous>  dextrose 5%. 1000 milliLiter(s) (50 mL/Hr) IV Continuous <Continuous>  dextrose 5%. 1000 milliLiter(s) (100 mL/Hr) IV Continuous <Continuous>  dextrose 50% Injectable 25 Gram(s) IV Push once  dextrose 50% Injectable 12.5 Gram(s) IV Push once  dextrose 50% Injectable 25 Gram(s) IV Push once  diltiazem    milliGRAM(s) Oral daily  donepezil 10 milliGRAM(s) Oral at bedtime  famotidine    Tablet 20 milliGRAM(s) Oral daily  glucagon  Injectable 1 milliGRAM(s) IntraMuscular once  heparin   Injectable 5000 Unit(s) SubCutaneous every 12 hours  insulin lispro (ADMELOG) corrective regimen sliding scale   SubCutaneous three times a day before meals  loratadine 10 milliGRAM(s) Oral daily  memantine 5 milliGRAM(s) Oral two times a day  metoprolol tartrate 25 milliGRAM(s) Oral two times a day  OLANZapine Disintegrating Tablet 10 milliGRAM(s) Oral three times a day    MEDICATIONS  (PRN):  acetaminophen   Tablet .. 650 milliGRAM(s) Oral every 6 hours PRN Temp greater or equal to 38C (100.4F), Mild Pain (1 - 3)  LORazepam   Injectable 0.5 milliGRAM(s) IV Push every 6 hours PRN SEVERE AGITATION  melatonin 5 milliGRAM(s) Oral at bedtime PRN Insomnia  OLANZapine Injectable 5 milliGRAM(s) IntraMuscular every 6 hours PRN agitation      Vital Signs Last 24 Hrs  T(C): 36.7 (13 Nov 2020 13:50), Max: 36.8 (12 Nov 2020 21:52)  T(F): 98 (13 Nov 2020 13:50), Max: 98.3 (12 Nov 2020 21:52)  HR: 80 (13 Nov 2020 18:25) (72 - 80)  BP: 92/68 (13 Nov 2020 18:25) (92/68 - 158/77)  BP(mean): --  RR: 18 (13 Nov 2020 13:50) (18 - 18)  SpO2: 97% (13 Nov 2020 18:25) (96% - 98%)    I&O's Summary    12 Nov 2020 07:01  -  13 Nov 2020 07:00  --------------------------------------------------------  IN: 710 mL / OUT: 0 mL / NET: 710 mL    13 Nov 2020 07:01  -  13 Nov 2020 19:31  --------------------------------------------------------  IN: 0 mL / OUT: 350 mL / NET: -350 mL      PHYSICAL EXAM:  HEENT: NC/AT; PERRLA  Neck: Soft; no tenderness  Lungs: Coarse BS bilaterally; no wheezing.   Heart: RRR; no murmurs.   Abdomen: Soft; no tenderness.   Extremities: No ulcers.  Neurologic: Awake.       LABORATORY:    CBC Full  -  ( 12 Nov 2020 08:50 )  WBC Count : 4.94 K/uL  RBC Count : 3.91 M/uL  Hemoglobin : 12.1 g/dL  Hematocrit : 35.4 %  Platelet Count - Automated : 178 K/uL  Mean Cell Volume : 90.5 fl  Mean Cell Hemoglobin : 30.9 pg  Mean Cell Hemoglobin Concentration : 34.2 gm/dL  Auto Neutrophil # : x  Auto Lymphocyte # : x  Auto Monocyte # : x  Auto Eosinophil # : x  Auto Basophil # : x  Auto Neutrophil % : x  Auto Lymphocyte % : x  Auto Monocyte % : x  Auto Eosinophil % : x  Auto Basophil % : x      ESR:                   11-11 @ 05:52  --    C-Reactive Protein:     11-11 @ 05:52  --    Procalcitonin:           11-11 @ 05:52   <0.05  ESR:                   11-10 @ 15:33  --    C-Reactive Protein:     11-10 @ 15:33  1.24    Procalcitonin:           11-10 @ 15:33   --  ESR:                   11-10 @ 09:41  --    C-Reactive Protein:     11-10 @ 09:41  --    Procalcitonin:           11-10 @ 09:41   <0.05      11-12    144  |  110<H>  |  10  ----------------------------<  106<H>  3.4<L>   |  24  |  0.65    Ca    8.5      12 Nov 2020 08:50  Phos  3.1     11-12  Mg     1.7     11-12    Assessment and Plan:      1. Delirium.   2. Positive blood culture with coagulase negative Staph, likely due to skin contamination.     . He is currently with no SOB, hypoxia or fevers. COVID 19 PCR were negative x2 in the ED.   . Blood culture with coagulase negative Staph was likely due to contamination. Repeated blood cultures are negative. Will not treat.   . Monitor the progression of delirium.             Dorian Ruiz MD   [phone].

## 2023-08-21 NOTE — DISCHARGE NOTE PROVIDER - EXTENDED VTE YES NO FOR MLM ENOXAPARIN
Planned for CABG with Emma Arzola MD on 8/23/23    Known risk factors for perioperative complications: Coronary disease  Renal dysfunction    Difficulty with intubation is not anticipated.    Cardiac Risk Estimation: Based on the Revised Cardiac Risk index, patient is a Class 3 risk with a 10.1% risk of a major cardiac event.    1.) Preoperative workup as follows: ECG, hemoglobin, hematocrit, electrolytes, creatinine, liver function studies.  2.) Change in medication regimen before surgery: discontinue ASA, NSAIDs 7 days before surgery, hold Metformin 24 hours prior to surgery.  3.) Prophylaxis for cardiac events with perioperative beta-blockers: Continue Metoprolol as prescribed.  4.) Invasive hemodynamic monitoring perioperatively: at the discretion of anesthesiologist.  5.) Deep vein thrombosis prophylaxis postoperatively: intermittent pneumatic compression boots and regimen to be chosen by surgical team.  6.) Surveillance for postoperative MI with ECG immediately postoperatively and on postoperati ve days 1 and 2 AND troponin levels 24 hours postoperatively and on day 4 or hospital discharge (whichever comes first): at the discretion of anesthesiologist.  7.) Current medications which may produce withdrawal symptoms if withheld perioperatively: None  8.) Other measures: None     --Morning of Procedure: metoprolol, levothyroxine  --Resume all medications post-operatively at discretion of surgeon  --Hold NSAIDs and all vitamins/supplements 7 days prior to procedure   ,

## 2024-07-26 NOTE — PATIENT PROFILE ADULT - NSPROPTRIGHTNOTIFY_GEN_A_NUR
No-Patient/Caregiver offered and refused free interpretation services.
information could not be obtained